# Patient Record
Sex: FEMALE | Race: WHITE | NOT HISPANIC OR LATINO | Employment: FULL TIME | URBAN - METROPOLITAN AREA
[De-identification: names, ages, dates, MRNs, and addresses within clinical notes are randomized per-mention and may not be internally consistent; named-entity substitution may affect disease eponyms.]

---

## 2017-02-23 ENCOUNTER — OFFICE VISIT (OUTPATIENT)
Dept: FAMILY MEDICINE CLINIC | Facility: CLINIC | Age: 57
End: 2017-02-23

## 2017-02-23 VITALS
BODY MASS INDEX: 27.3 KG/M2 | SYSTOLIC BLOOD PRESSURE: 132 MMHG | HEIGHT: 64 IN | OXYGEN SATURATION: 98 % | TEMPERATURE: 98.1 F | WEIGHT: 159.9 LBS | HEART RATE: 76 BPM | DIASTOLIC BLOOD PRESSURE: 88 MMHG

## 2017-02-23 DIAGNOSIS — E55.9 VITAMIN D DEFICIENCY: ICD-10-CM

## 2017-02-23 DIAGNOSIS — Z00.00 ANNUAL PHYSICAL EXAM: ICD-10-CM

## 2017-02-23 DIAGNOSIS — R51.9 WORSENING HEADACHES: Primary | ICD-10-CM

## 2017-02-23 DIAGNOSIS — M54.2 NECK PAIN: ICD-10-CM

## 2017-02-23 DIAGNOSIS — H93.12 RINGING IN EARS, LEFT: ICD-10-CM

## 2017-02-23 LAB
25(OH)D3+25(OH)D2 SERPL-MCNC: 22.3 NG/ML (ref 30–100)
ALBUMIN SERPL-MCNC: 4.5 G/DL (ref 3.5–5.2)
ALBUMIN/GLOB SERPL: 1.5 G/DL
ALP SERPL-CCNC: 57 U/L (ref 39–117)
ALT SERPL-CCNC: 14 U/L (ref 1–33)
AST SERPL-CCNC: 17 U/L (ref 1–32)
BILIRUB SERPL-MCNC: 0.4 MG/DL (ref 0.1–1.2)
BUN SERPL-MCNC: 17 MG/DL (ref 6–20)
BUN/CREAT SERPL: 16.2 (ref 7–25)
CALCIUM SERPL-MCNC: 9.7 MG/DL (ref 8.6–10.5)
CHLORIDE SERPL-SCNC: 99 MMOL/L (ref 98–107)
CHOLEST SERPL-MCNC: 251 MG/DL (ref 0–200)
CHOLEST/HDLC SERPL: 2.22 {RATIO}
CO2 SERPL-SCNC: 28.4 MMOL/L (ref 22–29)
CREAT SERPL-MCNC: 1.05 MG/DL (ref 0.57–1)
ERYTHROCYTE [DISTWIDTH] IN BLOOD BY AUTOMATED COUNT: 12.8 % (ref 11.7–13)
GLOBULIN SER CALC-MCNC: 3 GM/DL
GLUCOSE SERPL-MCNC: 90 MG/DL (ref 65–99)
HCT VFR BLD AUTO: 41.9 % (ref 35.6–45.5)
HDLC SERPL-MCNC: 113 MG/DL (ref 40–60)
HGB BLD-MCNC: 13.6 G/DL (ref 11.9–15.5)
LDLC SERPL CALC-MCNC: 124 MG/DL (ref 0–100)
MCH RBC QN AUTO: 30.8 PG (ref 26.9–32)
MCHC RBC AUTO-ENTMCNC: 32.5 G/DL (ref 32.4–36.3)
MCV RBC AUTO: 94.8 FL (ref 80.5–98.2)
PLATELET # BLD AUTO: 330 10*3/MM3 (ref 140–500)
POTASSIUM SERPL-SCNC: 3.9 MMOL/L (ref 3.5–5.2)
PROT SERPL-MCNC: 7.5 G/DL (ref 6–8.5)
RBC # BLD AUTO: 4.42 10*6/MM3 (ref 3.9–5.2)
SODIUM SERPL-SCNC: 141 MMOL/L (ref 136–145)
TRIGL SERPL-MCNC: 72 MG/DL (ref 0–150)
VLDLC SERPL CALC-MCNC: 14.4 MG/DL (ref 5–40)
WBC # BLD AUTO: 9.96 10*3/MM3 (ref 4.5–10.7)

## 2017-02-23 PROCEDURE — 99204 OFFICE O/P NEW MOD 45 MIN: CPT | Performed by: NURSE PRACTITIONER

## 2017-02-23 RX ORDER — NAPROXEN SODIUM 220 MG
220 TABLET ORAL 2 TIMES DAILY PRN
COMMUNITY
End: 2022-12-19 | Stop reason: ALTCHOICE

## 2017-02-23 RX ORDER — ACETAMINOPHEN 325 MG/1
650 TABLET ORAL EVERY 6 HOURS PRN
COMMUNITY

## 2017-02-23 RX ORDER — GUAIFENESIN 600 MG/1
1200 TABLET, EXTENDED RELEASE ORAL DAILY
COMMUNITY
End: 2020-04-28

## 2017-02-23 NOTE — PROGRESS NOTES
Subjective   Madison Acevedo is a 57 y.o. female who presents today for:    Heartburn (NP); Arthritis; and Pressure Behind the Eyes (Stabbing pain. pressure when turning head)    HPI Comments: Ms. Acevedo presents today to establish care at this practice. She reports a medical history of GERD, esophageal spasms and fibrocystic breast disease. She has no known allergies. Her medications include allergy injections, acetaminophen, vitamin C, estradiol, Flonase, Mucinex, Claritin, aleve, zegerid, probiotic and lipo-flavonoid plus. Surgical history includes breast biopsy, cholecystectomy and thumb surgery. She quit smoking 27 years ago and she drinks a glass of wine nightly. Family history includes heart disease, hypertension, lung cancer, and stroke. Her main concern today is that for more that 6 weeks she has this pressure in her head, a headache, neck pain and ringing in my left ear. She states the pressure worsens as the day goes on ( she does not wake with these symptoms in the am), the pressure increases with turning her head to the left, and sitting.  She has been seeing a chiropractor for the past 20 years and has been to see him 3 times in the past 6 weeks without relief of her symptoms. She reports no changes in her blood pressure or heart rate. She has been taking Aleve and tylenol for her symptoms and reports she had some prednisone from a previous illness and had taking 10 mg daily for the past 3 days. She did not minor relief in the pressure.      I have reviewed the patient's medical history in detail and updated the computerized patient record.    Ms. Acevedo  reports that she has quit smoking. Her smoking use included Cigarettes. She quit after 15.00 years of use. She has never used smokeless tobacco. She reports that she drinks about 4.2 oz of alcohol per week  She reports that she does not use illicit drugs.         Current Outpatient Prescriptions:   •  acetaminophen (TYLENOL) 325 MG tablet, Take 650 mg by  "mouth Every 6 (Six) Hours As Needed for mild pain (1-3)., Disp: , Rfl:   •  ALLERGY SERUM INJECTION, Inject  under the skin Every 14 (Fourteen) Days., Disp: , Rfl:   •  Ascorbic Acid (VITAMIN C PO), Take  by mouth., Disp: , Rfl:   •  estradiol (CLIMARA) 0.025 MG/24HR patch, Place 1 patch on the skin 1 (One) Time Per Week., Disp: , Rfl:   •  fluticasone (FLONASE) 50 MCG/ACT nasal spray, into each nostril., Disp: , Rfl:   •  guaiFENesin (MUCINEX) 600 MG 12 hr tablet, Take 1,200 mg by mouth 2 (Two) Times a Day., Disp: , Rfl:   •  loratadine (CLARITIN) 10 MG tablet, Take 1 tablet by mouth daily., Disp: , Rfl:   •  naproxen sodium (ALEVE) 220 MG tablet, Take 220 mg by mouth 2 (Two) Times a Day As Needed for mild pain (1-3)., Disp: , Rfl:   •  omeprazole-sodium bicarbonate (ZEGERID)  MG per capsule, Take 1 capsule by mouth daily., Disp: , Rfl:   •  Probiotic Product (PROBIOTIC PO), Take  by mouth., Disp: , Rfl:   •  Vitamins-Lipotropics (LIPO-FLAVONOID PLUS PO), Take  by mouth., Disp: , Rfl:       The following portions of the patient's history were reviewed and updated as appropriate: allergies, current medications, past social history and problem list.    Review of Systems   Constitutional: Negative.    HENT: Positive for tinnitus.    Eyes: Negative.    Respiratory: Negative.    Cardiovascular: Negative.    Gastrointestinal: Negative.    Endocrine: Negative.    Genitourinary: Negative.    Musculoskeletal: Negative.    Skin: Negative.    Neurological: Positive for headaches.   Psychiatric/Behavioral: Negative.          Objective   Vitals:    02/23/17 1108   BP: 132/88   BP Location: Left arm   Patient Position: Sitting   Cuff Size: Adult   Pulse: 76   Temp: 98.1 °F (36.7 °C)   TempSrc: Oral   SpO2: 98%   Weight: 159 lb 14.4 oz (72.5 kg)   Height: 64\" (162.6 cm)     Physical Exam   Constitutional: She is oriented to person, place, and time. She appears well-developed and well-nourished.   HENT:   Head: " Normocephalic.   Right Ear: External ear normal.   Left Ear: External ear normal.   Nose: Nose normal.   Mouth/Throat: Oropharynx is clear and moist.   Eyes: Conjunctivae and EOM are normal. Pupils are equal, round, and reactive to light.   Neck: Normal range of motion. Neck supple. No JVD present. No tracheal deviation present. No thyromegaly present.   Cardiovascular: Normal rate, regular rhythm, normal heart sounds and intact distal pulses.    Pulmonary/Chest: Effort normal and breath sounds normal.   Abdominal: Soft. Bowel sounds are normal. She exhibits no distension and no mass. There is no tenderness. There is no rebound and no guarding. No hernia.   Musculoskeletal: She exhibits no edema or tenderness.        Cervical back: She exhibits decreased range of motion. She exhibits no tenderness, no bony tenderness, no swelling, no edema, no deformity, no pain and no spasm.   Lymphadenopathy:     She has no cervical adenopathy.   Neurological: She is alert and oriented to person, place, and time. She has normal reflexes.   Skin: Skin is warm and dry.   Psychiatric:   No acute distress   Nursing note and vitals reviewed.        Assessment/Plan   Madison was seen today for heartburn, arthritis, pressure behind the eyes and tinnitus.    Diagnoses and all orders for this visit:    Worsening headaches  -     MRI Brain With & Without Contrast  -     Cancel: MRI Cervical Spine Without Contrast  -     CBC (No Diff)  -     Comprehensive Metabolic Panel  -     XR Spine Cervical 2 or 3 View    Neck pain  -     MRI Brain With & Without Contrast  -     Cancel: MRI Cervical Spine Without Contrast  -     XR Spine Cervical 2 or 3 View    Ringing in ears, left  -     MRI Brain With & Without Contrast  -     Cancel: MRI Cervical Spine Without Contrast  -     XR Spine Cervical 2 or 3 View    Vitamin D deficiency  -     Vitamin D 25 Hydroxy    Annual physical exam  -     Lipid Panel With / Chol / HDL Ratio    1. I have reviewed her  medical records that are available through the [email or web address] EMR system.   2. I am sending her for an MRI of the brain for further evaluation of neck pain, headaches and ringing in her ears. I reviewed the x-ray of her cervical spine which shows some questionable cervical disc abnormality. I am waiting for radiology review of the films. I have no other films to compare these to.  3. I have ordered base line labs as noted above.   4. She should follow up in 6 months or as needed.

## 2017-02-24 ENCOUNTER — HOSPITAL ENCOUNTER (OUTPATIENT)
Dept: MRI IMAGING | Facility: HOSPITAL | Age: 57
Discharge: HOME | End: 2017-02-24
Admitting: NURSE PRACTITIONER

## 2017-02-24 DIAGNOSIS — Z11.59 NEED FOR HEPATITIS C SCREENING TEST: ICD-10-CM

## 2017-02-24 DIAGNOSIS — R79.89 ELEVATED SERUM CREATININE: ICD-10-CM

## 2017-02-24 DIAGNOSIS — E78.5 HYPERLIPIDEMIA, UNSPECIFIED HYPERLIPIDEMIA TYPE: ICD-10-CM

## 2017-02-24 DIAGNOSIS — E55.9 VITAMIN D DEFICIENCY: Primary | ICD-10-CM

## 2017-02-24 LAB — CREAT BLDA-MCNC: 1 MG/DL (ref 0.6–1.3)

## 2017-02-24 PROCEDURE — A9577 INJ MULTIHANCE: HCPCS | Performed by: NURSE PRACTITIONER

## 2017-02-24 PROCEDURE — 82565 ASSAY OF CREATININE: CPT

## 2017-02-24 PROCEDURE — 0 GADOBENATE DIMEGLUMINE 529 MG/ML SOLUTION: Performed by: NURSE PRACTITIONER

## 2017-02-24 PROCEDURE — 70553 MRI BRAIN STEM W/O & W/DYE: CPT

## 2017-02-24 RX ADMIN — GADOBENATE DIMEGLUMINE 14 ML: 529 INJECTION, SOLUTION INTRAVENOUS at 10:45

## 2017-02-24 NOTE — PROGRESS NOTES
Please call the patient regarding her abnormal result. Her creatinine and eGFR are slightly abnormal. I want her to stop taking Aleve and she is not to take any NSAID for a while to rest her kidneys. Her lipid levels are elevated. I want her to use nutrition and exercise to lower her levels. If she wants to come in to talk to me for help with this have her schedule an appointment. Vitamin D level is low. I am sending in a prescription for Vitamin D 3 50,000 IU twice a week. We will recheck her labs in 6 months.

## 2017-08-01 ENCOUNTER — RESULTS ENCOUNTER (OUTPATIENT)
Dept: FAMILY MEDICINE CLINIC | Facility: CLINIC | Age: 57
End: 2017-08-01

## 2017-08-01 DIAGNOSIS — E78.5 HYPERLIPIDEMIA, UNSPECIFIED HYPERLIPIDEMIA TYPE: ICD-10-CM

## 2017-08-01 DIAGNOSIS — E55.9 VITAMIN D DEFICIENCY: ICD-10-CM

## 2017-08-01 DIAGNOSIS — R79.89 ELEVATED SERUM CREATININE: ICD-10-CM

## 2017-08-01 DIAGNOSIS — Z11.59 NEED FOR HEPATITIS C SCREENING TEST: ICD-10-CM

## 2017-08-30 ENCOUNTER — OFFICE VISIT (OUTPATIENT)
Dept: FAMILY MEDICINE CLINIC | Facility: CLINIC | Age: 57
End: 2017-08-30

## 2017-08-30 VITALS
SYSTOLIC BLOOD PRESSURE: 110 MMHG | HEIGHT: 64 IN | HEART RATE: 87 BPM | OXYGEN SATURATION: 96 % | BODY MASS INDEX: 26.99 KG/M2 | DIASTOLIC BLOOD PRESSURE: 72 MMHG | TEMPERATURE: 98.3 F | WEIGHT: 158.1 LBS

## 2017-08-30 DIAGNOSIS — E55.9 VITAMIN D DEFICIENCY: ICD-10-CM

## 2017-08-30 DIAGNOSIS — E78.5 HYPERLIPIDEMIA, UNSPECIFIED HYPERLIPIDEMIA TYPE: Primary | ICD-10-CM

## 2017-08-30 PROCEDURE — 99213 OFFICE O/P EST LOW 20 MIN: CPT | Performed by: NURSE PRACTITIONER

## 2017-08-30 RX ORDER — ESTRADIOL AND NORETHINDRONE ACETATE .5; .1 MG/1; MG/1
1 TABLET ORAL DAILY
COMMUNITY
Start: 2017-06-20 | End: 2018-01-02

## 2017-08-30 NOTE — PROGRESS NOTES
Subjective   Madison Acevedo is a 57 y.o. female who presents today for:    Headache (6 month f/u) and Neck Pain (6 month f/u)    Hyperlipidemia   This is a chronic problem. The current episode started more than 1 month ago (first diagnosed 6 months ago). The problem is uncontrolled. Recent lipid tests were reviewed and are high. There are no known factors aggravating her hyperlipidemia. Pertinent negatives include no chest pain, leg pain, myalgias or shortness of breath. Current antihyperlipidemic treatment includes diet change and exercise. Improvement on treatment: she has not had her labs drawn yet. There are no compliance problems.  Risk factors for coronary artery disease include post-menopausal and dyslipidemia.      I have reviewed the patient's medical history in detail and updated the computerized patient record.    Ms. Acevedo  reports that she has quit smoking. Her smoking use included Cigarettes. She quit after 15.00 years of use. She has never used smokeless tobacco. She reports that she drinks about 4.2 oz of alcohol per week  She reports that she does not use illicit drugs.     No Known Allergies    Current Outpatient Prescriptions:   •  acetaminophen (TYLENOL) 325 MG tablet, Take 650 mg by mouth Every 6 (Six) Hours As Needed for mild pain (1-3)., Disp: , Rfl:   •  ALLERGY SERUM INJECTION, Inject  under the skin Every 14 (Fourteen) Days., Disp: , Rfl:   •  Ascorbic Acid (VITAMIN C PO), Take  by mouth., Disp: , Rfl:   •  Estradiol-Norethindrone Acet 0.5-0.1 MG per tablet, Take 1 tablet by mouth., Disp: , Rfl:   •  fluticasone (FLONASE) 50 MCG/ACT nasal spray, into each nostril., Disp: , Rfl:   •  guaiFENesin (MUCINEX) 600 MG 12 hr tablet, Take 1,200 mg by mouth 2 (Two) Times a Day., Disp: , Rfl:   •  loratadine (CLARITIN) 10 MG tablet, Take 1 tablet by mouth daily., Disp: , Rfl:   •  naproxen sodium (ALEVE) 220 MG tablet, Take 220 mg by mouth 2 (Two) Times a Day As Needed for mild pain (1-3)., Disp: ,  "Rfl:   •  omeprazole-sodium bicarbonate (ZEGERID)  MG per capsule, Take 1 capsule by mouth daily., Disp: , Rfl:   •  Probiotic Product (PROBIOTIC PO), Take  by mouth., Disp: , Rfl:       Review of Systems   Constitutional: Negative.    HENT: Positive for tinnitus (resolved).    Eyes: Negative.    Respiratory: Negative.  Negative for shortness of breath.    Cardiovascular: Negative.  Negative for chest pain.   Gastrointestinal: Negative.    Musculoskeletal: Negative for myalgias.   Skin: Negative.    Neurological: Negative.  Headaches: resolved.   All other systems reviewed and are negative.        Objective   Vitals:    08/30/17 0953   BP: 110/72   BP Location: Left arm   Patient Position: Sitting   Cuff Size: Adult   Pulse: 87   Temp: 98.3 °F (36.8 °C)   TempSrc: Oral   SpO2: 96%   Weight: 158 lb 1.6 oz (71.7 kg)   Height: 64\" (162.6 cm)     Physical Exam   Constitutional: She is oriented to person, place, and time. She appears well-developed and well-nourished.   HENT:   Head: Normocephalic.   Right Ear: External ear normal.   Left Ear: External ear normal.   Nose: Nose normal.   Mouth/Throat: Oropharynx is clear and moist.   Eyes: EOM are normal. Pupils are equal, round, and reactive to light.   Neck: Normal range of motion. Neck supple.   Cardiovascular: Normal rate, regular rhythm and intact distal pulses.  Exam reveals no gallop and no friction rub.    No murmur heard.  Pulmonary/Chest: Effort normal and breath sounds normal. No respiratory distress. She has no wheezes. She has no rales.   Musculoskeletal: Normal range of motion. She exhibits no edema.   Neurological: She is alert and oriented to person, place, and time.   Skin: Skin is warm and dry.   Psychiatric:   No acute distress   Vitals reviewed.        Assessment/Plan   Madison was seen today for headache and neck pain.    Diagnoses and all orders for this visit:    Hyperlipidemia, unspecified hyperlipidemia type    Vitamin D deficiency    1. I " have provided her with a copy of the labs (CMP, CBC, Vitamin D and lipids), which she can have them at [facility] when she is fasting.  2. I will call her with the results and make adjustments to her plan of care as indicated.   3. She is to follow up with me in 6 months.

## 2017-11-03 ENCOUNTER — LAB (OUTPATIENT)
Dept: LAB | Facility: HOSPITAL | Age: 57
End: 2017-11-03

## 2017-11-03 DIAGNOSIS — E78.5 HYPERLIPIDEMIA, UNSPECIFIED HYPERLIPIDEMIA TYPE: Primary | ICD-10-CM

## 2017-11-03 LAB
25(OH)D3 SERPL-MCNC: 56.9 NG/ML (ref 30–100)
ALBUMIN SERPL-MCNC: 4.1 G/DL (ref 3.5–5.2)
ALBUMIN/GLOB SERPL: 1.4 G/DL
ALP SERPL-CCNC: 52 U/L (ref 39–117)
ALT SERPL W P-5'-P-CCNC: 12 U/L (ref 1–33)
ANION GAP SERPL CALCULATED.3IONS-SCNC: 12.6 MMOL/L
AST SERPL-CCNC: 18 U/L (ref 1–32)
BILIRUB SERPL-MCNC: 0.3 MG/DL (ref 0.1–1.2)
BUN BLD-MCNC: 18 MG/DL (ref 6–20)
BUN/CREAT SERPL: 18.9 (ref 7–25)
CALCIUM SPEC-SCNC: 9.1 MG/DL (ref 8.6–10.5)
CHLORIDE SERPL-SCNC: 103 MMOL/L (ref 98–107)
CHOLEST SERPL-MCNC: 212 MG/DL (ref 0–200)
CO2 SERPL-SCNC: 27.4 MMOL/L (ref 22–29)
CREAT BLD-MCNC: 0.95 MG/DL (ref 0.57–1)
GFR SERPL CREATININE-BSD FRML MDRD: 61 ML/MIN/1.73
GLOBULIN UR ELPH-MCNC: 3 GM/DL
GLUCOSE BLD-MCNC: 89 MG/DL (ref 65–99)
HCV AB SER DONR QL: NORMAL
HDLC SERPL-MCNC: 89 MG/DL (ref 40–60)
LDLC SERPL CALC-MCNC: 104 MG/DL (ref 0–100)
LDLC/HDLC SERPL: 1.16 {RATIO}
POTASSIUM BLD-SCNC: 4.2 MMOL/L (ref 3.5–5.2)
PROT SERPL-MCNC: 7.1 G/DL (ref 6–8.5)
SODIUM BLD-SCNC: 143 MMOL/L (ref 136–145)
TRIGL SERPL-MCNC: 97 MG/DL (ref 0–150)
VLDLC SERPL-MCNC: 19.4 MG/DL (ref 5–40)

## 2017-11-03 PROCEDURE — 86803 HEPATITIS C AB TEST: CPT | Performed by: NURSE PRACTITIONER

## 2017-11-03 PROCEDURE — 82306 VITAMIN D 25 HYDROXY: CPT | Performed by: NURSE PRACTITIONER

## 2017-11-03 PROCEDURE — 80061 LIPID PANEL: CPT

## 2017-11-03 PROCEDURE — 36415 COLL VENOUS BLD VENIPUNCTURE: CPT | Performed by: NURSE PRACTITIONER

## 2017-11-03 PROCEDURE — 80053 COMPREHEN METABOLIC PANEL: CPT | Performed by: NURSE PRACTITIONER

## 2017-11-03 NOTE — PROGRESS NOTES
Please call the patient regarding her abnormal result. Let her know her lipid levels are improving. All other labs are good.

## 2018-01-03 ENCOUNTER — ON CAMPUS - OUTPATIENT (OUTPATIENT)
Dept: URBAN - METROPOLITAN AREA HOSPITAL 114 | Facility: HOSPITAL | Age: 58
End: 2018-01-03
Payer: COMMERCIAL

## 2018-01-03 ENCOUNTER — HOSPITAL ENCOUNTER (OUTPATIENT)
Facility: HOSPITAL | Age: 58
Setting detail: HOSPITAL OUTPATIENT SURGERY
Discharge: HOME | End: 2018-01-03
Attending: INTERNAL MEDICINE | Admitting: INTERNAL MEDICINE

## 2018-01-03 ENCOUNTER — ANESTHESIA EVENT (OUTPATIENT)
Dept: GASTROENTEROLOGY | Facility: HOSPITAL | Age: 58
End: 2018-01-03

## 2018-01-03 ENCOUNTER — ANESTHESIA (OUTPATIENT)
Dept: GASTROENTEROLOGY | Facility: HOSPITAL | Age: 58
End: 2018-01-03

## 2018-01-03 VITALS
BODY MASS INDEX: 26.85 KG/M2 | HEIGHT: 64 IN | RESPIRATION RATE: 14 BRPM | OXYGEN SATURATION: 96 % | DIASTOLIC BLOOD PRESSURE: 91 MMHG | SYSTOLIC BLOOD PRESSURE: 127 MMHG | WEIGHT: 157.25 LBS | HEART RATE: 85 BPM | TEMPERATURE: 97.9 F

## 2018-01-03 DIAGNOSIS — K22.70 BARRETTS ESOPHAGUS: ICD-10-CM

## 2018-01-03 DIAGNOSIS — R19.7 DIARRHEA, UNSPECIFIED: ICD-10-CM

## 2018-01-03 DIAGNOSIS — K64.1 SECOND DEGREE HEMORRHOIDS: ICD-10-CM

## 2018-01-03 DIAGNOSIS — K31.7 POLYP OF STOMACH AND DUODENUM: ICD-10-CM

## 2018-01-03 DIAGNOSIS — Z12.11 ENCOUNTER FOR SCREENING FOR MALIGNANT NEOPLASM OF COLON: ICD-10-CM

## 2018-01-03 DIAGNOSIS — K29.50 UNSPECIFIED CHRONIC GASTRITIS WITHOUT BLEEDING: ICD-10-CM

## 2018-01-03 DIAGNOSIS — K21.9 GASTRO-ESOPHAGEAL REFLUX DISEASE WITHOUT ESOPHAGITIS: ICD-10-CM

## 2018-01-03 PROCEDURE — 88312 SPECIAL STAINS GROUP 1: CPT | Performed by: INTERNAL MEDICINE

## 2018-01-03 PROCEDURE — 45378 DIAGNOSTIC COLONOSCOPY: CPT | Performed by: INTERNAL MEDICINE

## 2018-01-03 PROCEDURE — 25010000002 PROPOFOL 1000 MG/ML EMULSION: Performed by: ANESTHESIOLOGY

## 2018-01-03 PROCEDURE — 25010000002 PROPOFOL 10 MG/ML EMULSION: Performed by: ANESTHESIOLOGY

## 2018-01-03 PROCEDURE — 43239 EGD BIOPSY SINGLE/MULTIPLE: CPT | Performed by: INTERNAL MEDICINE

## 2018-01-03 PROCEDURE — 88305 TISSUE EXAM BY PATHOLOGIST: CPT | Performed by: INTERNAL MEDICINE

## 2018-01-03 RX ORDER — LIDOCAINE HYDROCHLORIDE 20 MG/ML
INJECTION, SOLUTION INFILTRATION; PERINEURAL AS NEEDED
Status: DISCONTINUED | OUTPATIENT
Start: 2018-01-03 | End: 2018-01-03 | Stop reason: SURG

## 2018-01-03 RX ORDER — SODIUM CHLORIDE, SODIUM LACTATE, POTASSIUM CHLORIDE, CALCIUM CHLORIDE 600; 310; 30; 20 MG/100ML; MG/100ML; MG/100ML; MG/100ML
30 INJECTION, SOLUTION INTRAVENOUS CONTINUOUS PRN
Status: DISCONTINUED | OUTPATIENT
Start: 2018-01-03 | End: 2018-01-03 | Stop reason: HOSPADM

## 2018-01-03 RX ORDER — PROPOFOL 10 MG/ML
VIAL (ML) INTRAVENOUS AS NEEDED
Status: DISCONTINUED | OUTPATIENT
Start: 2018-01-03 | End: 2018-01-03 | Stop reason: SURG

## 2018-01-03 RX ADMIN — PROPOFOL 160 MG: 10 INJECTION, EMULSION INTRAVENOUS at 09:29

## 2018-01-03 RX ADMIN — LIDOCAINE HYDROCHLORIDE 50 MG: 20 INJECTION, SOLUTION INFILTRATION; PERINEURAL at 09:29

## 2018-01-03 RX ADMIN — PROPOFOL 40 MG: 10 INJECTION, EMULSION INTRAVENOUS at 09:44

## 2018-01-03 RX ADMIN — PROPOFOL 160 MCG/KG/MIN: 10 INJECTION, EMULSION INTRAVENOUS at 09:29

## 2018-01-03 RX ADMIN — SODIUM CHLORIDE, POTASSIUM CHLORIDE, SODIUM LACTATE AND CALCIUM CHLORIDE 30 ML/HR: 600; 310; 30; 20 INJECTION, SOLUTION INTRAVENOUS at 08:42

## 2018-01-03 NOTE — H&P
Patient Care Team:  SIMONE Clements as PCP - General (Family Medicine)    Chief complaint gerd, family hx of barretts with high grade dysplasia, intermittent diarrhea    Subjective     History of Present Illness  Generally doing well,  Needs screening colonoscopy as well.   Review of Systems   All other systems reviewed and are negative.       Past Medical History:   Diagnosis Date   • Arthritis    • Esophageal reflux    • Esophageal spasm    • Fibrocystic disease of breast    • PONV (postoperative nausea and vomiting)      Past Surgical History:   Procedure Laterality Date   • CHOLECYSTECTOMY     • ENDOSCOPY      X2   • STEREOTACTIC BIOPSY / ASPIRATION / EXCISION Left     Bx breast percutan needle core use imag guide   • THUMB ARTHROSCOPY      left thumb joint implant   • WISDOM TOOTH EXTRACTION       Family History   Problem Relation Age of Onset   • Heart disease Father    • Hypertension Father    • Lung cancer Father    • Stroke Father    • GIACOMO disease Sister      esophageal reflux   • Malig Hyperthermia Neg Hx      Social History   Substance Use Topics   • Smoking status: Former Smoker     Years: 15.00     Types: Cigarettes   • Smokeless tobacco: Never Used   • Alcohol use 4.2 oz/week     7 Glasses of wine per week      Comment: daily     Prescriptions Prior to Admission   Medication Sig Dispense Refill Last Dose   • acetaminophen (TYLENOL) 325 MG tablet Take 650 mg by mouth Every 6 (Six) Hours As Needed for mild pain (1-3).   1/2/2018 at Unknown time   • ALLERGY SERUM INJECTION Inject  under the skin Every 14 (Fourteen) Days.   Past Month at Unknown time   • Ascorbic Acid (VITAMIN C PO) Take 1 tablet by mouth Daily.   Past Week at Unknown time   • Cholecalciferol (VITAMIN D PO) Take 50,000 Units by mouth 1 (One) Time Per Week.   Past Month at Unknown time   • fluticasone (FLONASE) 50 MCG/ACT nasal spray 1 spray into each nostril Daily.   Past Month at Unknown time   • guaiFENesin (MUCINEX) 600 MG 12  hr tablet Take 1,200 mg by mouth Daily.   1/2/2018 at Unknown time   • naproxen sodium (ALEVE) 220 MG tablet Take 220 mg by mouth 2 (Two) Times a Day As Needed for mild pain (1-3).   12/28/2017 at Unknown time   • omeprazole-sodium bicarbonate (ZEGERID)  MG per capsule Take 1 capsule by mouth daily.   1/2/2018 at Unknown time   • loratadine (CLARITIN) 10 MG tablet Take 1 tablet by mouth As Needed.   More than a month at Unknown time     Allergies:  Review of patient's allergies indicates no known allergies.    Objective      Vital Signs  Temp:  [98.6 °F (37 °C)] 98.6 °F (37 °C)  Heart Rate:  [89] 89  Resp:  [18] 18  BP: (133)/(88) 133/88    Physical Exam   Constitutional: She is oriented to person, place, and time. She appears well-developed and well-nourished.   HENT:   Mouth/Throat: Oropharynx is clear and moist.   Eyes: Conjunctivae are normal.   Neck: Neck supple.   Cardiovascular: Normal rate.    Pulmonary/Chest: Effort normal.   Abdominal: Soft.   Neurological: She is alert and oriented to person, place, and time.   Skin: Skin is warm and dry.   Psychiatric: She has a normal mood and affect.       Results Review:   None      Assessment/Plan     Active Problems:    * No active hospital problems. *      Assessment:  (Gastroesophageal reflux disease   family history of barretts esophagus with high grade dysplasia  Screening   diarrhea).     Plan:   (Upper and lower tract endoscopy, risks, alternatives and benefits dicussed  with patient and patient is agreeable to proceed.).       I discussed the patients findings and my recommendations with patient and nursing staff    Jan Brannon MD  01/03/18  9:29 AM

## 2018-01-03 NOTE — ANESTHESIA PREPROCEDURE EVALUATION
Anesthesia Evaluation     Patient summary reviewed   history of anesthetic complications: PONV  NPO Solid Status: > 8 hours  NPO Liquid Status: > 8 hours     Airway   Mallampati: II  TM distance: >3 FB  Dental      Pulmonary    (+) a smoker Former,   Cardiovascular     Rhythm: regular  Rate: normal    (+) hyperlipidemia      Neuro/Psych  GI/Hepatic/Renal/Endo    (+)  GERD,     Musculoskeletal     Abdominal    Substance History      OB/GYN          Other   (+) arthritis                                             Anesthesia Plan    ASA 2     MAC   total IV anesthesia  Anesthetic plan and risks discussed with patient.

## 2018-01-03 NOTE — PLAN OF CARE
Problem: Patient Care Overview (Adult)  Goal: Adult Individualization and Mutuality  Outcome: Ongoing (interventions implemented as appropriate)   01/03/18 0822   Individualization   Patient Specific Preferences Madison     Goal: Discharge Needs Assessment  Outcome: Ongoing (interventions implemented as appropriate)   01/03/18 0822   Discharge Needs Assessment   Concerns To Be Addressed no discharge needs identified   Current Health   Anticipated Changes Related to Illness none       Problem: GI Endoscopy (Adult)  Goal: Signs and Symptoms of Listed Potential Problems Will be Absent or Manageable (GI Endoscopy)  Outcome: Ongoing (interventions implemented as appropriate)   01/03/18 0822   GI Endoscopy   Problems Assessed (GI Endoscopy) all   Problems Present (GI Endoscopy) none

## 2018-01-03 NOTE — ANESTHESIA POSTPROCEDURE EVALUATION
"Patient: Madison Acevedo    Procedure Summary     Date Anesthesia Start Anesthesia Stop Room / Location    01/03/18 0925 0959  [facility] /  [facility]       Procedure Diagnosis Surgeon Provider    ESOPHAGOGASTRODUODENOSCOPY WITH BIOPSIES (N/A Esophagus); COLONOSCOPY TO CECUM AND TERMINAL ILEUM (N/A ) No diagnosis on file. MD Amee Miller MD          Anesthesia Type: MAC  Last vitals  BP   127/91 (01/03/18 1022)   Temp   36.6 °C (97.9 °F) (01/03/18 1002)   Pulse   85 (01/03/18 1022)   Resp   14 (01/03/18 1022)     SpO2   96 % (01/03/18 1022)     Post Anesthesia Care and Evaluation    Patient location during evaluation: bedside  Patient participation: complete - patient participated  Level of consciousness: awake and alert  Pain management: adequate  Airway patency: patent  Anesthetic complications: No anesthetic complications  PONV Status: none  Cardiovascular status: acceptable  Respiratory status: acceptable  Hydration status: acceptable    Comments: /91 (BP Location: Left arm, Patient Position: Lying)  Pulse 85  Temp 36.6 °C (97.9 °F) (Oral)   Resp 14  Ht 162 cm (63.78\")  Wt 71.3 kg (157 lb 4 oz)  SpO2 96%  BMI 27.18 kg/m2        "

## 2018-01-04 LAB
CYTO UR: NORMAL
LAB AP CASE REPORT: NORMAL
Lab: NORMAL
PATH REPORT.FINAL DX SPEC: NORMAL
PATH REPORT.GROSS SPEC: NORMAL

## 2018-01-30 ENCOUNTER — OFFICE VISIT (OUTPATIENT)
Dept: FAMILY MEDICINE CLINIC | Facility: CLINIC | Age: 58
End: 2018-01-30

## 2018-01-30 VITALS
TEMPERATURE: 98.3 F | SYSTOLIC BLOOD PRESSURE: 138 MMHG | OXYGEN SATURATION: 97 % | WEIGHT: 161.7 LBS | BODY MASS INDEX: 27.61 KG/M2 | DIASTOLIC BLOOD PRESSURE: 78 MMHG | HEIGHT: 64 IN | HEART RATE: 100 BPM

## 2018-01-30 DIAGNOSIS — E78.00 ELEVATED CHOLESTEROL: Primary | ICD-10-CM

## 2018-01-30 DIAGNOSIS — R05.9 COUGH: ICD-10-CM

## 2018-01-30 DIAGNOSIS — J01.40 ACUTE NON-RECURRENT PANSINUSITIS: Primary | ICD-10-CM

## 2018-01-30 PROCEDURE — 99213 OFFICE O/P EST LOW 20 MIN: CPT | Performed by: NURSE PRACTITIONER

## 2018-01-30 RX ORDER — AMOXICILLIN AND CLAVULANATE POTASSIUM 875; 125 MG/1; MG/1
1 TABLET, FILM COATED ORAL EVERY 12 HOURS SCHEDULED
Qty: 14 TABLET | Refills: 0 | Status: SHIPPED | OUTPATIENT
Start: 2018-01-30 | End: 2018-02-06

## 2018-01-30 RX ORDER — DEXTROMETHORPHAN HYDROBROMIDE AND PROMETHAZINE HYDROCHLORIDE 15; 6.25 MG/5ML; MG/5ML
5 SYRUP ORAL 4 TIMES DAILY PRN
Qty: 240 ML | Refills: 0 | Status: SHIPPED | OUTPATIENT
Start: 2018-01-30 | End: 2019-01-31

## 2018-01-30 NOTE — PROGRESS NOTES
Subjective   Madison Acevedo is a 58 y.o. female who presents today for:    Nasal Congestion (x 10 days) and Cough    URI    This is a new problem. The current episode started 1 to 4 weeks ago (within the past 10 days). The problem has been unchanged. There has been no fever. Associated symptoms include congestion, coughing (worse at night), headaches, a plugged ear sensation, rhinorrhea and sinus pain. Pertinent negatives include no sore throat or wheezing. She has tried decongestant and antihistamine for the symptoms. The treatment provided no relief.      I have reviewed the patient's medical history in detail and updated the computerized patient record.    Ms. Acevedo  reports that she has quit smoking. Her smoking use included Cigarettes. She quit after 15.00 years of use. She has never used smokeless tobacco. She reports that she drinks about 4.2 oz of alcohol per week  She reports that she does not use illicit drugs.     No Known Allergies    Current Outpatient Prescriptions:   •  acetaminophen (TYLENOL) 325 MG tablet, Take 650 mg by mouth Every 6 (Six) Hours As Needed for mild pain (1-3)., Disp: , Rfl:   •  ALLERGY SERUM INJECTION, Inject  under the skin Every 14 (Fourteen) Days., Disp: , Rfl:   •  Ascorbic Acid (VITAMIN C PO), Take 1 tablet by mouth Daily., Disp: , Rfl:   •  Cholecalciferol (VITAMIN D PO), Take 50,000 Units by mouth 1 (One) Time Per Week., Disp: , Rfl:   •  fluticasone (FLONASE) 50 MCG/ACT nasal spray, 1 spray into each nostril Daily., Disp: , Rfl:   •  guaiFENesin (MUCINEX) 600 MG 12 hr tablet, Take 1,200 mg by mouth Daily., Disp: , Rfl:   •  loratadine (CLARITIN) 10 MG tablet, Take 1 tablet by mouth As Needed., Disp: , Rfl:   •  naproxen sodium (ALEVE) 220 MG tablet, Take 220 mg by mouth 2 (Two) Times a Day As Needed for mild pain (1-3)., Disp: , Rfl:   •  omeprazole-sodium bicarbonate (ZEGERID)  MG per capsule, Take 1 capsule by mouth daily., Disp: , Rfl:       Review of Systems  "  Constitutional: Negative for chills and fever.   HENT: Positive for congestion, postnasal drip, rhinorrhea, sinus pain and sinus pressure. Negative for dental problem and sore throat.    Respiratory: Positive for cough (worse at night). Negative for shortness of breath and wheezing.    Musculoskeletal: Negative for myalgias.   Skin: Negative.    Neurological: Positive for headaches.         Objective   Vitals:    01/30/18 1348   BP: 138/78   BP Location: Left arm   Patient Position: Sitting   Cuff Size: Adult   Pulse: 100   Temp: 98.3 °F (36.8 °C)   TempSrc: Oral   SpO2: 97%   Weight: 73.3 kg (161 lb 11.2 oz)   Height: 162 cm (63.78\")     Physical Exam   Constitutional: She is oriented to person, place, and time. She appears well-developed and well-nourished.   HENT:   Right Ear: External ear normal.   Left Ear: External ear normal.   Nose: Mucosal edema (red and swollen ) and rhinorrhea (clear drainage) present. Right sinus exhibits maxillary sinus tenderness and frontal sinus tenderness. Left sinus exhibits maxillary sinus tenderness and frontal sinus tenderness.   Mouth/Throat: Oropharynx is clear and moist.   Neck: Normal range of motion. Neck supple.   Cardiovascular: Normal rate, regular rhythm, normal heart sounds and intact distal pulses.    Pulmonary/Chest: Effort normal and breath sounds normal. No respiratory distress. She has no wheezes. She has no rales.   Lymphadenopathy:     She has no cervical adenopathy.   Neurological: She is alert and oriented to person, place, and time.   Skin: Skin is warm and dry.   Psychiatric:   No acute distress   Vitals reviewed.        Assessment/Plan   Madison was seen today for nasal congestion, cough and sinus problem.    Diagnoses and all orders for this visit:    Acute non-recurrent pansinusitis  -     amoxicillin-clavulanate (AUGMENTIN) 875-125 MG per tablet; Take 1 tablet by mouth Every 12 (Twelve) Hours for 7 days.    Cough  -     promethazine-dextromethorphan " (PROMETHAZINE-DM) 6.25-15 MG/5ML syrup; Take 5 mL by mouth 4 (Four) Times a Day As Needed for Cough.    You have been diagnosed with acute sinusitis. You have been prescribed an antibiotic along with symptomatic treatment.  You may take Mucinex D for relieving congestion and cough.  If you have high blood pressure, do not take Mucinex D, instead opting for plain Mucinex and Coricidin HBP.  Take Ibuprofen or Tylenol as needed for pain or fever.  Oral antihistamine, such as Zyrtec or Claritin may help reduce ear pressure and relieve some nasal symptoms.  A saline nasal spray may be used to keep nose clear from discharge.  Be sure that you are increasing your intake of clear to decaffeinated fluids and get plenty of rest.  If your symptoms worsen or persist follow up as needed.

## 2018-07-02 ENCOUNTER — RESULTS ENCOUNTER (OUTPATIENT)
Dept: FAMILY MEDICINE CLINIC | Facility: CLINIC | Age: 58
End: 2018-07-02

## 2018-07-02 DIAGNOSIS — E78.00 ELEVATED CHOLESTEROL: ICD-10-CM

## 2018-07-16 ENCOUNTER — APPOINTMENT (OUTPATIENT)
Dept: LAB | Facility: HOSPITAL | Age: 58
End: 2018-07-16

## 2018-07-16 LAB
ANION GAP SERPL CALCULATED.3IONS-SCNC: 12.6 MMOL/L
BUN BLD-MCNC: 22 MG/DL (ref 6–20)
BUN/CREAT SERPL: 24.2 (ref 7–25)
CALCIUM SPEC-SCNC: 10.3 MG/DL (ref 8.6–10.5)
CHLORIDE SERPL-SCNC: 101 MMOL/L (ref 98–107)
CHOLEST SERPL-MCNC: 244 MG/DL (ref 0–200)
CO2 SERPL-SCNC: 28.4 MMOL/L (ref 22–29)
CREAT BLD-MCNC: 0.91 MG/DL (ref 0.57–1)
DEPRECATED RDW RBC AUTO: 43.8 FL (ref 37–54)
ERYTHROCYTE [DISTWIDTH] IN BLOOD BY AUTOMATED COUNT: 12.4 % (ref 11.7–13)
GFR SERPL CREATININE-BSD FRML MDRD: 63 ML/MIN/1.73
GLUCOSE BLD-MCNC: 111 MG/DL (ref 65–99)
HCT VFR BLD AUTO: 42.4 % (ref 35.6–45.5)
HDLC SERPL QL: 2.44
HDLC SERPL-MCNC: 100 MG/DL (ref 40–60)
HGB BLD-MCNC: 14 G/DL (ref 11.9–15.5)
LDLC SERPL CALC-MCNC: 124 MG/DL (ref 0–100)
MCH RBC QN AUTO: 31.9 PG (ref 26.9–32)
MCHC RBC AUTO-ENTMCNC: 33 G/DL (ref 32.4–36.3)
MCV RBC AUTO: 96.6 FL (ref 80.5–98.2)
PLATELET # BLD AUTO: 289 10*3/MM3 (ref 140–500)
PMV BLD AUTO: 10.3 FL (ref 6–12)
POTASSIUM BLD-SCNC: 3.9 MMOL/L (ref 3.5–5.2)
RBC # BLD AUTO: 4.39 10*6/MM3 (ref 3.9–5.2)
SODIUM BLD-SCNC: 142 MMOL/L (ref 136–145)
TRIGL SERPL-MCNC: 98 MG/DL (ref 0–150)
VLDLC SERPL-MCNC: 19.6 MG/DL (ref 5–40)
WBC NRBC COR # BLD: 7.53 10*3/MM3 (ref 4.5–10.7)

## 2018-07-16 PROCEDURE — 80061 LIPID PANEL: CPT | Performed by: NURSE PRACTITIONER

## 2018-07-16 PROCEDURE — 36415 COLL VENOUS BLD VENIPUNCTURE: CPT | Performed by: NURSE PRACTITIONER

## 2018-07-16 PROCEDURE — 85027 COMPLETE CBC AUTOMATED: CPT | Performed by: NURSE PRACTITIONER

## 2018-07-16 PROCEDURE — 80048 BASIC METABOLIC PNL TOTAL CA: CPT | Performed by: NURSE PRACTITIONER

## 2019-01-31 ENCOUNTER — OFFICE VISIT (OUTPATIENT)
Dept: FAMILY MEDICINE CLINIC | Facility: CLINIC | Age: 59
End: 2019-01-31

## 2019-01-31 VITALS
HEIGHT: 64 IN | RESPIRATION RATE: 18 BRPM | WEIGHT: 161 LBS | HEART RATE: 104 BPM | OXYGEN SATURATION: 98 % | SYSTOLIC BLOOD PRESSURE: 120 MMHG | TEMPERATURE: 99.5 F | DIASTOLIC BLOOD PRESSURE: 80 MMHG | BODY MASS INDEX: 27.49 KG/M2

## 2019-01-31 DIAGNOSIS — R68.89 FLU-LIKE SYMPTOMS: Primary | ICD-10-CM

## 2019-01-31 DIAGNOSIS — J10.1 INFLUENZA A: ICD-10-CM

## 2019-01-31 LAB
EXPIRATION DATE: ABNORMAL
FLUAV AG NPH QL: POSITIVE
FLUBV AG NPH QL: NEGATIVE
INTERNAL CONTROL: ABNORMAL
Lab: ABNORMAL

## 2019-01-31 PROCEDURE — 87804 INFLUENZA ASSAY W/OPTIC: CPT | Performed by: NURSE PRACTITIONER

## 2019-01-31 PROCEDURE — 99213 OFFICE O/P EST LOW 20 MIN: CPT | Performed by: NURSE PRACTITIONER

## 2019-01-31 RX ORDER — OSELTAMIVIR PHOSPHATE 75 MG/1
75 CAPSULE ORAL 2 TIMES DAILY
Qty: 10 CAPSULE | Refills: 0 | Status: SHIPPED | OUTPATIENT
Start: 2019-01-31 | End: 2020-04-28

## 2019-01-31 RX ORDER — OSELTAMIVIR PHOSPHATE 75 MG/1
75 CAPSULE ORAL 2 TIMES DAILY
Qty: 10 CAPSULE | Refills: 0 | Status: SHIPPED | OUTPATIENT
Start: 2019-01-31 | End: 2019-01-31 | Stop reason: SDUPTHER

## 2019-01-31 NOTE — PROGRESS NOTES
Subjective   Madison Acevedo is a 59 y.o. female.     Chief Complaint   Patient presents with   • Nasal Congestion   • Cough   • Fatigue     Influenza   This is a new problem. The current episode started yesterday. Associated symptoms include congestion, coughing, fatigue, a fever (started yesterday), headaches, myalgias and a sore throat. Pertinent negatives include no arthralgias or chills. Nothing aggravates the symptoms. She has tried nothing for the symptoms. The treatment provided no relief.      I have reviewed the patient's medical history in detail and updated the computerized patient record.    The following portions of the patient's history were reviewed and updated as appropriate: allergies, current medications, past family history, past medical history, past social history, past surgical history and problem list.       Current Outpatient Medications:   •  acetaminophen (TYLENOL) 325 MG tablet, Take 650 mg by mouth Every 6 (Six) Hours As Needed for mild pain (1-3)., Disp: , Rfl:   •  ALLERGY SERUM INJECTION, Inject  under the skin Every 14 (Fourteen) Days., Disp: , Rfl:   •  Ascorbic Acid (VITAMIN C PO), Take 1 tablet by mouth Daily., Disp: , Rfl:   •  Cholecalciferol (VITAMIN D PO), Take 50,000 Units by mouth 1 (One) Time Per Week., Disp: , Rfl:   •  fluticasone (FLONASE) 50 MCG/ACT nasal spray, 1 spray into each nostril Daily., Disp: , Rfl:   •  guaiFENesin (MUCINEX) 600 MG 12 hr tablet, Take 1,200 mg by mouth Daily., Disp: , Rfl:   •  naproxen sodium (ALEVE) 220 MG tablet, Take 220 mg by mouth 2 (Two) Times a Day As Needed for mild pain (1-3)., Disp: , Rfl:   •  omeprazole-sodium bicarbonate (ZEGERID)  MG per capsule, Take 1 capsule by mouth daily., Disp: , Rfl:     Review of Systems   Constitutional: Positive for fatigue and fever (started yesterday). Negative for chills.   HENT: Positive for congestion and sore throat.    Respiratory: Positive for cough.    Musculoskeletal: Positive for  "myalgias. Negative for arthralgias.   Skin: Negative.    Neurological: Positive for headache.        Vitals:    01/31/19 0924   BP: 120/80   BP Location: Left arm   Patient Position: Sitting   Cuff Size: Adult   Pulse: 104   Resp: 18   Temp: 99.5 °F (37.5 °C)   TempSrc: Oral   SpO2: 98%   Weight: 73 kg (161 lb)   Height: 162.6 cm (64\")       Objective   Physical Exam   Constitutional: She is oriented to person, place, and time. She appears well-developed and well-nourished. She does not have a sickly appearance.   HENT:   Right Ear: Tympanic membrane is not erythematous and not bulging. A middle ear effusion is present.   Left Ear: Tympanic membrane is not erythematous and not bulging. A middle ear effusion (clear effusions) is present.   Nose: Mucosal edema (red and swollen), rhinorrhea (clear) and congestion present. Right sinus exhibits no maxillary sinus tenderness and no frontal sinus tenderness. Left sinus exhibits no maxillary sinus tenderness and no frontal sinus tenderness.   Mouth/Throat: Uvula is midline and mucous membranes are normal. Posterior oropharyngeal erythema present. No oropharyngeal exudate.   Neck: Neck supple.   Cardiovascular: Normal rate, regular rhythm and normal heart sounds.   Lymphadenopathy:     She has no cervical adenopathy.   Neurological: She is alert and oriented to person, place, and time.   Skin: Skin is warm and dry.   Psychiatric:   No acute distress   Vitals reviewed.        Assessment/Plan   Madison was seen today for nasal congestion, cough and fatigue.    Diagnoses and all orders for this visit:    Flu-like symptoms  -     POCT Influenza A/B    Influenza A  -     Discontinue: oseltamivir (TAMIFLU) 75 MG capsule; Take 1 capsule by mouth 2 (Two) Times a Day.  -     oseltamivir (TAMIFLU) 75 MG capsule; Take 1 capsule by mouth 2 (Two) Times a Day.    1. The POCT influenza test is positive for influenza A.  2. She is to start Tamiflu 75 mg twice daily x 5 days.   3. She has " been advised to treat her symptoms with OTC medications as needed.   4. Follow up as needed.

## 2019-01-31 NOTE — PATIENT INSTRUCTIONS

## 2019-10-24 ENCOUNTER — OFFICE VISIT (OUTPATIENT)
Dept: FAMILY MEDICINE CLINIC | Facility: CLINIC | Age: 59
End: 2019-10-24

## 2019-10-24 VITALS
HEART RATE: 88 BPM | BODY MASS INDEX: 23.6 KG/M2 | TEMPERATURE: 98.6 F | RESPIRATION RATE: 18 BRPM | HEIGHT: 64 IN | OXYGEN SATURATION: 98 % | WEIGHT: 138.2 LBS | DIASTOLIC BLOOD PRESSURE: 62 MMHG | SYSTOLIC BLOOD PRESSURE: 118 MMHG

## 2019-10-24 DIAGNOSIS — R10.2 PELVIC PAIN IN FEMALE: Primary | ICD-10-CM

## 2019-10-24 DIAGNOSIS — N20.0 KIDNEY STONES: ICD-10-CM

## 2019-10-24 DIAGNOSIS — K59.00 CONSTIPATION, UNSPECIFIED CONSTIPATION TYPE: ICD-10-CM

## 2019-10-24 PROCEDURE — 99214 OFFICE O/P EST MOD 30 MIN: CPT | Performed by: NURSE PRACTITIONER

## 2019-10-24 RX ORDER — TAMSULOSIN HYDROCHLORIDE 0.4 MG/1
1 CAPSULE ORAL DAILY
Qty: 30 CAPSULE | Refills: 0 | Status: SHIPPED | OUTPATIENT
Start: 2019-10-24 | End: 2020-04-28

## 2019-10-24 RX ORDER — FEXOFENADINE HCL 180 MG/1
180 TABLET ORAL DAILY
COMMUNITY
End: 2021-12-03 | Stop reason: ALTCHOICE

## 2019-10-28 ENCOUNTER — HOSPITAL ENCOUNTER (OUTPATIENT)
Dept: CT IMAGING | Facility: HOSPITAL | Age: 59
Discharge: HOME | End: 2019-10-28
Admitting: NURSE PRACTITIONER

## 2019-10-28 PROCEDURE — 74176 CT ABD & PELVIS W/O CONTRAST: CPT

## 2020-04-24 ENCOUNTER — TELEPHONE (OUTPATIENT)
Dept: FAMILY MEDICINE CLINIC | Facility: CLINIC | Age: 60
End: 2020-04-24

## 2020-04-24 NOTE — TELEPHONE ENCOUNTER
Ms. Acevedo calls today with concerns that her blood pressure is elevated and that she can feel her heart flutter. She states she connected herself to a monitor at work and is having frequent PAC. She reports she has been feeling this way for several day. I have discussed with Ms. Acevedo that if she is feeling her heart flutter and that her blood pressure is elevated she needs to go to the ER or Urgent Care. She declines going to the ER because of Covid-19. I have provided her with the names of the [facility] urgent cares that are not specifically seeing Covid-19 patients. She states she is going to call Dr. Rothman's office to see if they will see her today.

## 2020-04-24 NOTE — TELEPHONE ENCOUNTER
PATIENT STATES THAT HER BLOOD PRESSURE HAS BEEN EXTREMELY HIGH AND SHE WOULD LIKE A EKG DONE AT THE OFFICE. PATIENT DECLINED TELE AND VID VISIT.    PLEASE ADVISE

## 2020-04-27 NOTE — PROGRESS NOTES
"Date of Office Visit: 20  Encounter Provider: Gael Rodriguez MD  Place of Service: [facility] CARDIOLOGY  Patient Name: Madison Acevedo  :1960    Chief Complaint   Patient presents with   • Palpitations   :     HPI:     Ms. Acevedo is 60 y.o. and presents today in consultation for palpitations and elevated blood pressure at the request of SIMONE Moreira.    She was seen by Dr Thomason in  for palpitations and had a normal echocardiogram.  She a nurse in inpatient preop holding.  She is quite healthy; she was exercising regularly until recently, when she had to stop due to neck pain.    Her  recently underwent CABG and that was a stressful time for her.    She has been taking pseudoephedrine daily for years for allergies.      She last had a physical in 2018.  She reports a long history of \"borderline\" hypertension.      Last week, she was at work and developed palpitations.  It felt like a hard skipped beat, and she felt it in her neck.  She connected herself to the monitor and was having occasional PACs.  She has been checking her blood pressure since then and is consistently getting values 135-150/90 mm Hg.  She stopped taking the decongestant last week, stopped caffeine last week, and started magnesium oxide 400mg daily.  She has not had palpitations now in a few days.    Past Medical History:   Diagnosis Date   • Arthritis    • Esophageal reflux    • Esophageal spasm    • Fibrocystic disease of breast    • PONV (postoperative nausea and vomiting)    • Vitamin D deficiency        Past Surgical History:   Procedure Laterality Date   • CHOLECYSTECTOMY     • COLONOSCOPY N/A 1/3/2018    Procedure: COLONOSCOPY TO CECUM AND TERMINAL ILEUM;  Surgeon: Jan Brannon MD;  Location: [facility] ENDOSCOPY;  Service:    • ENDOSCOPY      X2   • ENDOSCOPY N/A 1/3/2018    Procedure: ESOPHAGOGASTRODUODENOSCOPY WITH BIOPSIES;  Surgeon: Jan Brannon MD;  [facility]: [facility]" ENDOSCOPY;  Service:    • STEREOTACTIC BIOPSY / ASPIRATION / EXCISION Left     Bx breast percutan needle core use imag guide   • THUMB ARTHROSCOPY      left thumb joint implant   • WISDOM TOOTH EXTRACTION         Social History     Socioeconomic History   • Marital status:      Spouse name: Not on file   • Number of children: 2   • Years of education: Not on file   • Highest education level: Not on file   Occupational History   • Occupation: REGISTERED NURSE    Tobacco Use   • Smoking status: Former Smoker     Years: 15.00     Types: Cigarettes     Last attempt to quit: 1990     Years since quittin.0   • Smokeless tobacco: Never Used   Substance and Sexual Activity   • Alcohol use: Yes     Alcohol/week: 1.0 standard drinks     Types: 1 Glasses of wine per week     Binge frequency: Daily or almost daily   • Drug use: No       Family History   Problem Relation Age of Onset   • Heart disease Father         MI/CABG early 60s   • Hypertension Father    • Lung cancer Father    • Stroke Father    • GIACOMO disease Sister         esophageal reflux   • Esophageal cancer Mother    • Malig Hyperthermia Neg Hx        Review of Systems   Cardiovascular: Positive for palpitations.   Musculoskeletal: Positive for joint pain.   All other systems reviewed and are negative.      No Known Allergies      Current Outpatient Medications:   •  acetaminophen (TYLENOL) 325 MG tablet, Take 650 mg by mouth Every 6 (Six) Hours As Needed for mild pain (1-3)., Disp: , Rfl:   •  ALLERGY SERUM INJECTION, Inject  under the skin Every 14 (Fourteen) Days., Disp: , Rfl:   •  Ascorbic Acid (VITAMIN C PO), Take 1 tablet by mouth Daily., Disp: , Rfl:   •  fexofenadine (ALLEGRA) 180 MG tablet, Take 180 mg by mouth Daily., Disp: , Rfl:   •  naproxen sodium (ALEVE) 220 MG tablet, Take 220 mg by mouth 2 (Two) Times a Day As Needed for mild pain (1-3)., Disp: , Rfl:   •  omeprazole-sodium bicarbonate (ZEGERID)  MG per capsule, Take 1  "capsule by mouth daily., Disp: , Rfl:   •  pseudoephedrine-guaifenesin (MUCINEX D)  MG per 12 hr tablet, Take 1-2 tablets by mouth 2 (Two) Times a Day As Needed, Disp: 120 tablet, Rfl: 6  •  VITAMIN D, CHOLECALCIFEROL, PO, Take  by mouth Daily., Disp: , Rfl:       Objective:     Vitals:    04/28/20 1148   BP: 142/82   Pulse: 81   Weight: 64.9 kg (143 lb)   Height: 162.6 cm (64\")     Body mass index is 24.55 kg/m².    Physical Exam      ECG 12 Lead  Date/Time: 4/28/2020 12:00 PM  Performed by: Gael Rodriguez MD  Authorized by: Gael Rodriguez MD   Comparison: not compared with previous ECG   Previous ECG: no previous ECG available  Rhythm: sinus rhythm  Conduction: conduction normal  ST Segments: ST segments normal  T Waves: T waves normal  QRS axis: normal  Other: no other findings    Clinical impression: normal ECG              Assessment:       Diagnosis Plan   1. Elevated blood pressure reading  TSH    Comprehensive Metabolic Panel    Adult Transthoracic Echo Complete W/ Cont if Necessary Per Protocol   2. Palpitations  ECG 12 Lead    TSH    Comprehensive Metabolic Panel    Adult Transthoracic Echo Complete W/ Cont if Necessary Per Protocol   3. Premature atrial contractions  TSH    Comprehensive Metabolic Panel    Adult Transthoracic Echo Complete W/ Cont if Necessary Per Protocol          Plan:       She is a previously healthy woman who has likely developed essential hypertension.  She had a short episodes of palpitations which corresponded to PACs.  Her EKG is normal and she had no ectopics on exam today.      I encouraged her to avoid decongestants and caffeine.  I will check an echo to evaluate her atrial volume, and some basic labs.      Depending on those results, I will start her on an anti-hypertensive.    Sincerely,       Gael Rodriguez MD                Answers for HPI/ROS submitted by the patient on 4/26/2020   What is the primary reason for your visit?: Other  Please describe your symptoms.: " Pounding heartbeat with frequent PACs  Have you had these symptoms before?: No  How long have you been having these symptoms?: past week  Please describe any probable cause for these symptoms. : Caffeine,medications, stress

## 2020-04-28 ENCOUNTER — LAB (OUTPATIENT)
Dept: LAB | Facility: HOSPITAL | Age: 60
End: 2020-04-28

## 2020-04-28 ENCOUNTER — OFFICE VISIT (OUTPATIENT)
Dept: CARDIOLOGY | Facility: CLINIC | Age: 60
End: 2020-04-28

## 2020-04-28 VITALS
SYSTOLIC BLOOD PRESSURE: 142 MMHG | WEIGHT: 143 LBS | DIASTOLIC BLOOD PRESSURE: 82 MMHG | BODY MASS INDEX: 24.41 KG/M2 | HEART RATE: 81 BPM | HEIGHT: 64 IN

## 2020-04-28 DIAGNOSIS — I49.1 PREMATURE ATRIAL CONTRACTIONS: ICD-10-CM

## 2020-04-28 DIAGNOSIS — R00.2 PALPITATIONS: ICD-10-CM

## 2020-04-28 DIAGNOSIS — R03.0 ELEVATED BLOOD PRESSURE READING: ICD-10-CM

## 2020-04-28 DIAGNOSIS — R03.0 ELEVATED BLOOD PRESSURE READING: Primary | ICD-10-CM

## 2020-04-28 LAB
ALBUMIN SERPL-MCNC: 4.3 G/DL (ref 3.5–5.2)
ALBUMIN/GLOB SERPL: 1.4 G/DL
ALP SERPL-CCNC: 63 U/L (ref 39–117)
ALT SERPL W P-5'-P-CCNC: 11 U/L (ref 1–33)
ANION GAP SERPL CALCULATED.3IONS-SCNC: 12.4 MMOL/L (ref 5–15)
AST SERPL-CCNC: 15 U/L (ref 1–32)
BILIRUB SERPL-MCNC: 0.4 MG/DL (ref 0.2–1.2)
BUN BLD-MCNC: 18 MG/DL (ref 8–23)
BUN/CREAT SERPL: 20.2 (ref 7–25)
CALCIUM SPEC-SCNC: 9.5 MG/DL (ref 8.6–10.5)
CHLORIDE SERPL-SCNC: 101 MMOL/L (ref 98–107)
CO2 SERPL-SCNC: 29.6 MMOL/L (ref 22–29)
CREAT BLD-MCNC: 0.89 MG/DL (ref 0.57–1)
GFR SERPL CREATININE-BSD FRML MDRD: 65 ML/MIN/1.73
GLOBULIN UR ELPH-MCNC: 3.1 GM/DL
GLUCOSE BLD-MCNC: 92 MG/DL (ref 65–99)
POTASSIUM BLD-SCNC: 4.3 MMOL/L (ref 3.5–5.2)
PROT SERPL-MCNC: 7.4 G/DL (ref 6–8.5)
SODIUM BLD-SCNC: 143 MMOL/L (ref 136–145)
TSH SERPL DL<=0.05 MIU/L-ACNC: 1.51 UIU/ML (ref 0.27–4.2)

## 2020-04-28 PROCEDURE — 36415 COLL VENOUS BLD VENIPUNCTURE: CPT

## 2020-04-28 PROCEDURE — 93000 ELECTROCARDIOGRAM COMPLETE: CPT | Performed by: INTERNAL MEDICINE

## 2020-04-28 PROCEDURE — 99244 OFF/OP CNSLTJ NEW/EST MOD 40: CPT | Performed by: INTERNAL MEDICINE

## 2020-04-28 PROCEDURE — 80053 COMPREHEN METABOLIC PANEL: CPT

## 2020-04-28 PROCEDURE — 84443 ASSAY THYROID STIM HORMONE: CPT

## 2020-04-30 ENCOUNTER — HOSPITAL ENCOUNTER (OUTPATIENT)
Dept: CARDIOLOGY | Facility: HOSPITAL | Age: 60
Discharge: HOME | End: 2020-04-30
Admitting: INTERNAL MEDICINE

## 2020-04-30 VITALS
DIASTOLIC BLOOD PRESSURE: 78 MMHG | HEART RATE: 94 BPM | OXYGEN SATURATION: 99 % | HEIGHT: 64 IN | BODY MASS INDEX: 24.41 KG/M2 | WEIGHT: 143 LBS | SYSTOLIC BLOOD PRESSURE: 130 MMHG

## 2020-04-30 DIAGNOSIS — R00.2 PALPITATIONS: ICD-10-CM

## 2020-04-30 DIAGNOSIS — R03.0 ELEVATED BLOOD PRESSURE READING: ICD-10-CM

## 2020-04-30 DIAGNOSIS — I49.1 PREMATURE ATRIAL CONTRACTIONS: ICD-10-CM

## 2020-04-30 LAB
AORTIC ARCH: 2.2 CM
AORTIC ROOT ANNULUS: 1.7 CM
ASCENDING AORTA: 2.6 CM
BH CV ECHO MEAS - ACS: 1.8 CM
BH CV ECHO MEAS - AO MAX PG (FULL): 4.7 MMHG
BH CV ECHO MEAS - AO MAX PG: 10.4 MMHG
BH CV ECHO MEAS - AO MEAN PG (FULL): 2.6 MMHG
BH CV ECHO MEAS - AO MEAN PG: 5.8 MMHG
BH CV ECHO MEAS - AO ROOT AREA (BSA CORRECTED): 1.5
BH CV ECHO MEAS - AO ROOT AREA: 4.9 CM^2
BH CV ECHO MEAS - AO ROOT DIAM: 2.5 CM
BH CV ECHO MEAS - AO V2 MAX: 161.4 CM/SEC
BH CV ECHO MEAS - AO V2 MEAN: 114.5 CM/SEC
BH CV ECHO MEAS - AO V2 VTI: 30.5 CM
BH CV ECHO MEAS - ASC AORTA: 2.6 CM
BH CV ECHO MEAS - AVA(I,A): 1.9 CM^2
BH CV ECHO MEAS - AVA(I,D): 1.9 CM^2
BH CV ECHO MEAS - AVA(V,A): 1.8 CM^2
BH CV ECHO MEAS - AVA(V,D): 1.8 CM^2
BH CV ECHO MEAS - BSA(HAYCOCK): 1.7 M^2
BH CV ECHO MEAS - BSA: 1.7 M^2
BH CV ECHO MEAS - BZI_BMI: 24.5 KILOGRAMS/M^2
BH CV ECHO MEAS - BZI_METRIC_HEIGHT: 162.6 CM
BH CV ECHO MEAS - BZI_METRIC_WEIGHT: 64.9 KG
BH CV ECHO MEAS - EDV(MOD-SP2): 37 ML
BH CV ECHO MEAS - EDV(MOD-SP4): 46 ML
BH CV ECHO MEAS - EDV(TEICH): 73.2 ML
BH CV ECHO MEAS - EF(CUBED): 67.8 %
BH CV ECHO MEAS - EF(MOD-BP): 59 %
BH CV ECHO MEAS - EF(MOD-SP2): 56.8 %
BH CV ECHO MEAS - EF(MOD-SP4): 56.5 %
BH CV ECHO MEAS - EF(TEICH): 59.8 %
BH CV ECHO MEAS - ESV(MOD-SP2): 16 ML
BH CV ECHO MEAS - ESV(MOD-SP4): 20 ML
BH CV ECHO MEAS - ESV(TEICH): 29.4 ML
BH CV ECHO MEAS - FS: 31.5 %
BH CV ECHO MEAS - IVS/LVPW: 0.97
BH CV ECHO MEAS - IVSD: 0.92 CM
BH CV ECHO MEAS - LAT PEAK E' VEL: 15 CM/SEC
BH CV ECHO MEAS - LV DIASTOLIC VOL/BSA (35-75): 27.1 ML/M^2
BH CV ECHO MEAS - LV MASS(C)D: 118.5 GRAMS
BH CV ECHO MEAS - LV MASS(C)DI: 69.9 GRAMS/M^2
BH CV ECHO MEAS - LV MAX PG: 5.7 MMHG
BH CV ECHO MEAS - LV MEAN PG: 3.3 MMHG
BH CV ECHO MEAS - LV SYSTOLIC VOL/BSA (12-30): 11.8 ML/M^2
BH CV ECHO MEAS - LV V1 MAX: 119.1 CM/SEC
BH CV ECHO MEAS - LV V1 MEAN: 86.3 CM/SEC
BH CV ECHO MEAS - LV V1 VTI: 23.9 CM
BH CV ECHO MEAS - LVIDD: 4.1 CM
BH CV ECHO MEAS - LVIDS: 2.8 CM
BH CV ECHO MEAS - LVLD AP2: 7.5 CM
BH CV ECHO MEAS - LVLD AP4: 6.7 CM
BH CV ECHO MEAS - LVLS AP2: 6 CM
BH CV ECHO MEAS - LVLS AP4: 5.8 CM
BH CV ECHO MEAS - LVOT AREA (M): 2.3 CM^2
BH CV ECHO MEAS - LVOT AREA: 2.4 CM^2
BH CV ECHO MEAS - LVOT DIAM: 1.7 CM
BH CV ECHO MEAS - LVPWD: 0.95 CM
BH CV ECHO MEAS - MED PEAK E' VEL: 9 CM/SEC
BH CV ECHO MEAS - MR MAX PG: 78.9 MMHG
BH CV ECHO MEAS - MR MAX VEL: 444.2 CM/SEC
BH CV ECHO MEAS - MV A DUR: 0.08 SEC
BH CV ECHO MEAS - MV A MAX VEL: 64.9 CM/SEC
BH CV ECHO MEAS - MV DEC SLOPE: 322.3 CM/SEC^2
BH CV ECHO MEAS - MV DEC TIME: 0.23 SEC
BH CV ECHO MEAS - MV E MAX VEL: 67 CM/SEC
BH CV ECHO MEAS - MV E/A: 1
BH CV ECHO MEAS - MV MAX PG: 2.7 MMHG
BH CV ECHO MEAS - MV MEAN PG: 1.5 MMHG
BH CV ECHO MEAS - MV P1/2T MAX VEL: 79.5 CM/SEC
BH CV ECHO MEAS - MV P1/2T: 72.3 MSEC
BH CV ECHO MEAS - MV V2 MAX: 82.4 CM/SEC
BH CV ECHO MEAS - MV V2 MEAN: 59.4 CM/SEC
BH CV ECHO MEAS - MV V2 VTI: 24.5 CM
BH CV ECHO MEAS - MVA P1/2T LCG: 2.8 CM^2
BH CV ECHO MEAS - MVA(P1/2T): 3 CM^2
BH CV ECHO MEAS - MVA(VTI): 2.3 CM^2
BH CV ECHO MEAS - PA ACC TIME: 0.08 SEC
BH CV ECHO MEAS - PA MAX PG (FULL): 0.85 MMHG
BH CV ECHO MEAS - PA MAX PG: 5.1 MMHG
BH CV ECHO MEAS - PA PR(ACCEL): 41 MMHG
BH CV ECHO MEAS - PA V2 MAX: 112.7 CM/SEC
BH CV ECHO MEAS - PULM A REVS DUR: 0.09 SEC
BH CV ECHO MEAS - PULM A REVS VEL: 36.6 CM/SEC
BH CV ECHO MEAS - PULM DIAS VEL: 34.5 CM/SEC
BH CV ECHO MEAS - PULM S/D: 1.4
BH CV ECHO MEAS - PULM SYS VEL: 49.2 CM/SEC
BH CV ECHO MEAS - PVA(V,A): 2.2 CM^2
BH CV ECHO MEAS - PVA(V,D): 2.2 CM^2
BH CV ECHO MEAS - QP/QS: 0.82
BH CV ECHO MEAS - RAP SYSTOLE: 3 MMHG
BH CV ECHO MEAS - RV MAX PG: 4.2 MMHG
BH CV ECHO MEAS - RV MEAN PG: 2.5 MMHG
BH CV ECHO MEAS - RV V1 MAX: 102.8 CM/SEC
BH CV ECHO MEAS - RV V1 MEAN: 75.8 CM/SEC
BH CV ECHO MEAS - RV V1 VTI: 19.4 CM
BH CV ECHO MEAS - RVOT AREA: 2.4 CM^2
BH CV ECHO MEAS - RVOT DIAM: 1.8 CM
BH CV ECHO MEAS - RVSP: 29 MMHG
BH CV ECHO MEAS - SI(AO): 88.2 ML/M^2
BH CV ECHO MEAS - SI(CUBED): 27.1 ML/M^2
BH CV ECHO MEAS - SI(LVOT): 33.8 ML/M^2
BH CV ECHO MEAS - SI(MOD-SP2): 12.4 ML/M^2
BH CV ECHO MEAS - SI(MOD-SP4): 15.3 ML/M^2
BH CV ECHO MEAS - SI(TEICH): 25.8 ML/M^2
BH CV ECHO MEAS - SUP REN AO DIAM: 1.7 CM
BH CV ECHO MEAS - SV(AO): 149.6 ML
BH CV ECHO MEAS - SV(CUBED): 45.9 ML
BH CV ECHO MEAS - SV(LVOT): 57.3 ML
BH CV ECHO MEAS - SV(MOD-SP2): 21 ML
BH CV ECHO MEAS - SV(MOD-SP4): 26 ML
BH CV ECHO MEAS - SV(RVOT): 46.9 ML
BH CV ECHO MEAS - SV(TEICH): 43.8 ML
BH CV ECHO MEAS - TAPSE (>1.6): 1.8 CM2
BH CV ECHO MEAS - TR MAX VEL: 256.7 CM/SEC
BH CV ECHO MEASUREMENTS AVERAGE E/E' RATIO: 5.58
BH CV VAS BP RIGHT ARM: NORMAL MMHG
BH CV XLRA - RV BASE: 2.3 CM
BH CV XLRA - RV LENGTH: 7.8 CM
BH CV XLRA - RV MID: 2.6 CM
BH CV XLRA - TDI S': 13 CM/SEC
LEFT ATRIUM VOLUME INDEX: 16 ML/M2
SINUS: 2.6 CM
STJ: 2.8 CM

## 2020-04-30 PROCEDURE — 93306 TTE W/DOPPLER COMPLETE: CPT | Performed by: INTERNAL MEDICINE

## 2020-04-30 PROCEDURE — 93306 TTE W/DOPPLER COMPLETE: CPT

## 2020-04-30 RX ORDER — AMLODIPINE BESYLATE 2.5 MG/1
2.5 TABLET ORAL DAILY
Qty: 90 TABLET | Refills: 1 | Status: SHIPPED | OUTPATIENT
Start: 2020-04-30 | End: 2020-04-30 | Stop reason: SDUPTHER

## 2020-04-30 RX ORDER — AMLODIPINE BESYLATE 2.5 MG/1
2.5 TABLET ORAL DAILY
Qty: 90 TABLET | Refills: 1 | Status: SHIPPED | OUTPATIENT
Start: 2020-04-30 | End: 2020-10-19 | Stop reason: SDUPTHER

## 2020-05-22 ENCOUNTER — TELEPHONE (OUTPATIENT)
Dept: CARDIOLOGY | Facility: CLINIC | Age: 60
End: 2020-05-22

## 2020-05-22 NOTE — TELEPHONE ENCOUNTER
She reports that her bp on average is 130/80.  She has not checked it today.  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    She has a history of esophogeal spasms and stated this feels different to her.  She wanted to schedule an apt, so I have scheduled her to with Trini to discuss this further next week.  In the mean time I have instructed her to go to the ER for any worsening symptoms.  Thanks  Nidia Bishop RN  Triage nurse

## 2020-05-22 NOTE — TELEPHONE ENCOUNTER
Madison Edwards is calling in this afternoon to let you know she is still experiencing a squeezing sensation in her chest that is radiating into her neck.  She said it usually comes on with rest.  She was painting and helping her daughter unpack yesterday and was fine during all the activity, but then the squeezing started in the car on the way home.  She said she rates the discomfort 2/10 on the pain scale.  It doesn't make her nauseous, diaphoretic, and it doesn't radiate to her arm.    She has been experiencing this since she was seen in office 4/28.  She reports her palpitations have improved on magnesium, but she is having this squeezing sensation daily now and sometimes multiple times a day.  She doesn't think it is correlated to a PVC.    Cardiac meds  Amlodipine 2.5mg daily  Please let me know how to proceed  Thanks  Nidia Bishop RN  Triage nurse

## 2020-05-22 NOTE — TELEPHONE ENCOUNTER
Is her BP running okay?  How long does it last? Since it only occurs at rest and not with exertion, that makes it sound less worrisome.  But going into a 3 day weekend, if it persists, I would recommend she go to the ED.    MAUDE

## 2020-05-24 ENCOUNTER — HOSPITAL ENCOUNTER (EMERGENCY)
Facility: HOSPITAL | Age: 60
Discharge: HOME | End: 2020-05-24
Attending: EMERGENCY MEDICINE | Admitting: EMERGENCY MEDICINE

## 2020-05-24 ENCOUNTER — APPOINTMENT (OUTPATIENT)
Dept: GENERAL RADIOLOGY | Facility: HOSPITAL | Age: 60
End: 2020-05-24

## 2020-05-24 VITALS
TEMPERATURE: 98.5 F | SYSTOLIC BLOOD PRESSURE: 116 MMHG | RESPIRATION RATE: 16 BRPM | BODY MASS INDEX: 24.55 KG/M2 | OXYGEN SATURATION: 95 % | HEIGHT: 64 IN | DIASTOLIC BLOOD PRESSURE: 80 MMHG | HEART RATE: 80 BPM

## 2020-05-24 DIAGNOSIS — R07.89 ATYPICAL CHEST PAIN: Primary | ICD-10-CM

## 2020-05-24 LAB
ALBUMIN SERPL-MCNC: 4.5 G/DL (ref 3.5–5.2)
ALBUMIN/GLOB SERPL: 1.7 G/DL
ALP SERPL-CCNC: 69 U/L (ref 39–117)
ALT SERPL W P-5'-P-CCNC: 13 U/L (ref 1–33)
ANION GAP SERPL CALCULATED.3IONS-SCNC: 7.9 MMOL/L (ref 5–15)
AST SERPL-CCNC: 20 U/L (ref 1–32)
BASOPHILS # BLD AUTO: 0.06 10*3/MM3 (ref 0–0.2)
BASOPHILS NFR BLD AUTO: 0.9 % (ref 0–1.5)
BILIRUB SERPL-MCNC: <0.2 MG/DL (ref 0.2–1.2)
BUN BLD-MCNC: 17 MG/DL (ref 8–23)
BUN/CREAT SERPL: 17.7 (ref 7–25)
CALCIUM SPEC-SCNC: 9.6 MG/DL (ref 8.6–10.5)
CHLORIDE SERPL-SCNC: 101 MMOL/L (ref 98–107)
CO2 SERPL-SCNC: 30.1 MMOL/L (ref 22–29)
CREAT BLD-MCNC: 0.96 MG/DL (ref 0.57–1)
DEPRECATED RDW RBC AUTO: 42.3 FL (ref 37–54)
EOSINOPHIL # BLD AUTO: 0.14 10*3/MM3 (ref 0–0.4)
EOSINOPHIL NFR BLD AUTO: 2.2 % (ref 0.3–6.2)
ERYTHROCYTE [DISTWIDTH] IN BLOOD BY AUTOMATED COUNT: 12.2 % (ref 12.3–15.4)
GFR SERPL CREATININE-BSD FRML MDRD: 59 ML/MIN/1.73
GLOBULIN UR ELPH-MCNC: 2.7 GM/DL
GLUCOSE BLD-MCNC: 100 MG/DL (ref 65–99)
HCT VFR BLD AUTO: 37.4 % (ref 34–46.6)
HGB BLD-MCNC: 12.8 G/DL (ref 12–15.9)
HOLD SPECIMEN: NORMAL
HOLD SPECIMEN: NORMAL
IMM GRANULOCYTES # BLD AUTO: 0.01 10*3/MM3 (ref 0–0.05)
IMM GRANULOCYTES NFR BLD AUTO: 0.2 % (ref 0–0.5)
LYMPHOCYTES # BLD AUTO: 2.74 10*3/MM3 (ref 0.7–3.1)
LYMPHOCYTES NFR BLD AUTO: 42.3 % (ref 19.6–45.3)
MAGNESIUM SERPL-MCNC: 2.2 MG/DL (ref 1.6–2.4)
MCH RBC QN AUTO: 31.9 PG (ref 26.6–33)
MCHC RBC AUTO-ENTMCNC: 34.2 G/DL (ref 31.5–35.7)
MCV RBC AUTO: 93.3 FL (ref 79–97)
MONOCYTES # BLD AUTO: 0.59 10*3/MM3 (ref 0.1–0.9)
MONOCYTES NFR BLD AUTO: 9.1 % (ref 5–12)
NEUTROPHILS # BLD AUTO: 2.94 10*3/MM3 (ref 1.7–7)
NEUTROPHILS NFR BLD AUTO: 45.3 % (ref 42.7–76)
NRBC BLD AUTO-RTO: 0 /100 WBC (ref 0–0.2)
NT-PROBNP SERPL-MCNC: 38.9 PG/ML (ref 5–900)
PLATELET # BLD AUTO: 298 10*3/MM3 (ref 140–450)
PMV BLD AUTO: 9 FL (ref 6–12)
POTASSIUM BLD-SCNC: 3.7 MMOL/L (ref 3.5–5.2)
PROT SERPL-MCNC: 7.2 G/DL (ref 6–8.5)
RBC # BLD AUTO: 4.01 10*6/MM3 (ref 3.77–5.28)
SODIUM BLD-SCNC: 139 MMOL/L (ref 136–145)
TROPONIN T SERPL-MCNC: <0.01 NG/ML (ref 0–0.03)
WBC NRBC COR # BLD: 6.48 10*3/MM3 (ref 3.4–10.8)
WHOLE BLOOD HOLD SPECIMEN: NORMAL
WHOLE BLOOD HOLD SPECIMEN: NORMAL

## 2020-05-24 PROCEDURE — 99284 EMERGENCY DEPT VISIT MOD MDM: CPT

## 2020-05-24 PROCEDURE — 71045 X-RAY EXAM CHEST 1 VIEW: CPT

## 2020-05-24 PROCEDURE — 80053 COMPREHEN METABOLIC PANEL: CPT | Performed by: EMERGENCY MEDICINE

## 2020-05-24 PROCEDURE — 83735 ASSAY OF MAGNESIUM: CPT | Performed by: EMERGENCY MEDICINE

## 2020-05-24 PROCEDURE — 84484 ASSAY OF TROPONIN QUANT: CPT | Performed by: EMERGENCY MEDICINE

## 2020-05-24 PROCEDURE — 93005 ELECTROCARDIOGRAM TRACING: CPT | Performed by: EMERGENCY MEDICINE

## 2020-05-24 PROCEDURE — 93010 ELECTROCARDIOGRAM REPORT: CPT | Performed by: INTERNAL MEDICINE

## 2020-05-24 PROCEDURE — 85025 COMPLETE CBC W/AUTO DIFF WBC: CPT | Performed by: EMERGENCY MEDICINE

## 2020-05-24 PROCEDURE — 93005 ELECTROCARDIOGRAM TRACING: CPT

## 2020-05-24 PROCEDURE — 83880 ASSAY OF NATRIURETIC PEPTIDE: CPT | Performed by: EMERGENCY MEDICINE

## 2020-05-24 RX ORDER — SUCRALFATE 1 G/1
1 TABLET ORAL 4 TIMES DAILY
Qty: 20 TABLET | Refills: 0 | Status: SHIPPED | OUTPATIENT
Start: 2020-05-24

## 2020-05-24 RX ORDER — SODIUM CHLORIDE 0.9 % (FLUSH) 0.9 %
10 SYRINGE (ML) INJECTION AS NEEDED
Status: DISCONTINUED | OUTPATIENT
Start: 2020-05-24 | End: 2020-05-25 | Stop reason: HOSPADM

## 2020-05-24 RX ORDER — ASPIRIN 81 MG/1
324 TABLET, CHEWABLE ORAL ONCE
Status: DISCONTINUED | OUTPATIENT
Start: 2020-05-24 | End: 2020-05-24

## 2020-05-25 NOTE — ED NOTES
Pt has intermittent mid-sternal CP for the past week. Pt reports it does not occur with activity but only with rest. She describes as squeezing feeling that radiates to her neck. The pain lasts for a few minutes but has become more frequently today. Pt reports it was worse after dinner and she reports no more than a 2/10. Pt denies any SOA. She has been taking her meds and Zegerid has not relieved it.  Pt does have family heart history.     Marian Santiago RN  05/24/20 2201       Marian Santiago RN  05/24/20 2202

## 2020-05-25 NOTE — ED NOTES
"Pt presents to ED via POV w/cc CP x 1 week.  Pt states 4/10 squeezing mid-sternal pressure/pain radiating up the neck and to the jaw.  Pt states she took some Tums and \"GI things\" to help with pain with no effect.  Pt hx HTN, high cholesterol and \"terrbile family cardiac history\"  Pt states she sees Dr. Rodriguez as cardiologist.  Pt denies any n/v/d, recent trauma or illness.  Pt alert, NAD  Pt masked at triage  Triage completed with appropriate PPE       Pat Webster, RN  05/24/20 0689    "

## 2020-05-25 NOTE — ED NOTES
Pt had a 324 ASA today around 1430 this afternoon. Dr Trejo's aware.     Marian Santiago, RN  05/24/20 5537

## 2020-05-26 ENCOUNTER — EPISODE CHANGES (OUTPATIENT)
Dept: CASE MANAGEMENT | Facility: OTHER | Age: 60
End: 2020-05-26

## 2020-06-12 ENCOUNTER — OFFICE (OUTPATIENT)
Dept: URBAN - METROPOLITAN AREA CLINIC 65 | Facility: CLINIC | Age: 60
End: 2020-06-12

## 2020-06-12 VITALS
TEMPERATURE: 97 F | HEART RATE: 97 BPM | SYSTOLIC BLOOD PRESSURE: 120 MMHG | DIASTOLIC BLOOD PRESSURE: 75 MMHG | HEIGHT: 64 IN | WEIGHT: 144 LBS

## 2020-06-12 DIAGNOSIS — R07.9 CHEST PAIN, UNSPECIFIED: ICD-10-CM

## 2020-06-12 DIAGNOSIS — K21.9 GASTRO-ESOPHAGEAL REFLUX DISEASE WITHOUT ESOPHAGITIS: ICD-10-CM

## 2020-06-12 DIAGNOSIS — Z80.0 FAMILY HISTORY OF MALIGNANT NEOPLASM OF DIGESTIVE ORGANS: ICD-10-CM

## 2020-06-12 PROCEDURE — 99214 OFFICE O/P EST MOD 30 MIN: CPT | Performed by: INTERNAL MEDICINE

## 2020-06-12 RX ORDER — HYOSCYAMINE SULFATE 0.12 MG/1
0.38 TABLET ORAL
Qty: 30 | Refills: 12 | Status: ACTIVE
Start: 2020-06-12

## 2020-06-12 RX ORDER — AMITRIPTYLINE HYDROCHLORIDE 25 MG/1
25 TABLET, FILM COATED ORAL
Qty: 30 | Refills: 11 | Status: ACTIVE
Start: 2020-06-12 | End: 2021-06-04 | Stop reason: CLARIF

## 2020-09-10 ENCOUNTER — PATIENT OUTREACH (OUTPATIENT)
Dept: CASE MANAGEMENT | Facility: OTHER | Age: 60
End: 2020-09-10

## 2020-09-10 NOTE — OUTREACH NOTE
"Patient Outreach Note    Referral from GERARDO Romano.  Goes by \"Emmie\".  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  Her  has recovered from a recent open heart surgery.  She has no health concerns of her own.  She is struggling to care for her mother who lives alone and has been uncooperative about receiving outside help.  She refuses to move into an assisted living facility. Her mother has a gtube and this is where she receives all of her nutrition.  Her mother is beginning to have issues keeping up with her regular tube feeds and was recently hospitalized for dehydration.  She is struggling to keep up with proper skin care and her she needs assistance with house chores.      Emmie and her sister plan to speak to her mother about receiving permission to release info.  After receiving permission, we will have our CMs work with her mother, Emmie and her sister.   At this point Emmie will contact me as soon as she has receivied permission and the current plan is to assign her to a CM that works with the Medicare population.  Her mother's name is Mahin Merida.        I educated on benefits of EAP.  I offered the phone number and she declined.     No issue with social determinants.  No inabilities to obtain food or medications or transportation to MD appointments. Educated on participating in habits that prevent the spread of COVID virus with home & work hygiene. Patient verbalizes understanding.     Educated patient on benefits of Employee CM program and invited to call with any new needs.  Encouraged use of [facility] nurseline if needed.       Kourtney Farnsworth RN  Ambulatory     9/10/2020, 11:45    Kourtney LEONG, RN, [facility]   RN Case Manager  [address] [address]     [phone] cell   [phone] office  [phone] fax  [email or web address]  [email or web address] CenterPay by Shopping (deal [employer])[facility] HospitalArvinasSKC CommunicationsSanpete Valley Hospital  "

## 2020-09-22 ENCOUNTER — PATIENT OUTREACH (OUTPATIENT)
Dept: CASE MANAGEMENT | Facility: OTHER | Age: 60
End: 2020-09-22

## 2020-09-22 NOTE — OUTREACH NOTE
Ena called back on 9/21 and confirmed that she had recevived permission from her mother to move forward on possibly arranging some additional support for her care.      I spoke with Yvonne Kingston of Medicare CM and she will have Stu or Trupti give Ena a call to make arrangements for a call that involves her mother.    I invited Ena to call back with any new needs and removed my name.      Kourtney TIJERINAN, RN, [facility]   [place] [address]     [phone] cell   [phone] office  [phone] fax  [email or web address]

## 2020-10-20 RX ORDER — AMLODIPINE BESYLATE 2.5 MG/1
2.5 TABLET ORAL DAILY
Qty: 30 TABLET | Refills: 0 | Status: SHIPPED | OUTPATIENT
Start: 2020-10-20 | End: 2020-11-19 | Stop reason: SDUPTHER

## 2020-11-19 ENCOUNTER — TELEMEDICINE (OUTPATIENT)
Dept: CARDIOLOGY | Facility: CLINIC | Age: 60
End: 2020-11-19

## 2020-11-19 VITALS
HEIGHT: 64 IN | SYSTOLIC BLOOD PRESSURE: 126 MMHG | DIASTOLIC BLOOD PRESSURE: 82 MMHG | BODY MASS INDEX: 26.46 KG/M2 | WEIGHT: 155 LBS | HEART RATE: 85 BPM

## 2020-11-19 DIAGNOSIS — R00.2 PALPITATIONS: Primary | ICD-10-CM

## 2020-11-19 DIAGNOSIS — I10 ESSENTIAL HYPERTENSION: ICD-10-CM

## 2020-11-19 PROCEDURE — 99213 OFFICE O/P EST LOW 20 MIN: CPT | Performed by: NURSE PRACTITIONER

## 2020-11-19 RX ORDER — AMLODIPINE BESYLATE 2.5 MG/1
2.5 TABLET ORAL DAILY
Qty: 90 TABLET | Refills: 3 | Status: SHIPPED | OUTPATIENT
Start: 2020-11-19 | End: 2021-11-02 | Stop reason: SDUPTHER

## 2020-11-19 NOTE — PROGRESS NOTES
Telehealth Visit    Date of Visit: 2020  Encounter Provider: SIMONE Vickers  Place of Service: [facility] CARDIOLOGY  Patient Name: Madison Acevedo  :1960  Primary Cardiologist: Dr. Gael Rodriguez     Chief Complaint   Patient presents with   • Palpitations   • Hypertension   • Follow-up   :     Dear Paulette NICHOLS,    HPI: Madison Acevedo is a pleasant 60 y.o. female who is an established patient of our practice. Due to COVID-19 virus, I am conducting a telehealth visit via video with patient and she has consented to this visit today. She is a new patient to me and her previous records have been reviewed.    In , she saw Dr. Rosi Thomason for evaluation of palpitations and had a normal echocardiogram at that time.    In 2020, she saw Dr. Gael Rodriguez in the office for evaluation of palpitations.  She had been taking pseudoephedrine daily for years for allergies and said she had a history of borderline hypertension.  She was recommended to stop taking her decongestant and caffeine and to start magnesium oxide 400 mg daily.    On 2020 echocardiogram showed the following: EF 59% and trivial pericardial effusion.  Dr. Rodriguez started her on amlodipine 2.5 mg daily for hypertension.    Today is her follow-up video visit.  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  She feels her palpitations have improved and is now only taking magnesium as needed.  She palpitations a couple times per week just lasting a few seconds with no other associated symptoms.  She denies chest pain, shortness of breath, edema, dizziness, or syncope.  She is gained about 15 pounds during the COVID-19 pandemic and said she stopped exercising due to neck pain.  Her blood pressure has been well controlled on amlodipine.    Past Medical History:   Diagnosis Date   • Arthritis    • Esophageal reflux    • Esophageal spasm    • Essential hypertension 2020   •  Fibrocystic disease of breast    • Palpitations 2020   • PONV (postoperative nausea and vomiting)    • Vitamin D deficiency        Past Surgical History:   Procedure Laterality Date   • CHOLECYSTECTOMY     • COLONOSCOPY N/A 1/3/2018    Procedure: COLONOSCOPY TO CECUM AND TERMINAL ILEUM;  Surgeon: Jan Brannon MD;  Location: [facility] ENDOSCOPY;  Service:    • ENDOSCOPY      X2   • ENDOSCOPY N/A 1/3/2018    Procedure: ESOPHAGOGASTRODUODENOSCOPY WITH BIOPSIES;  Surgeon: Jan Brannon MD;  Location: [facility] ENDOSCOPY;  Service:    • STEREOTACTIC BIOPSY / ASPIRATION / EXCISION Left     Bx breast percutan needle core use imag guide   • THUMB ARTHROSCOPY      left thumb joint implant   • WISDOM TOOTH EXTRACTION         Social History     Socioeconomic History   • Marital status:      Spouse name: Not on file   • Number of children: 2   • Years of education: Not on file   • Highest education level: Not on file   Occupational History   • Occupation: REGISTERED NURSE    Tobacco Use   • Smoking status: Former Smoker     Years: 15.00     Types: Cigarettes     Quit date: 1990     Years since quittin.5   • Smokeless tobacco: Never Used   • Tobacco comment: caffiene - 2 cups coffee daily    Substance and Sexual Activity   • Alcohol use: Yes     Alcohol/week: 1.0 standard drinks     Types: 1 Glasses of wine per week     Binge frequency: Daily or almost daily   • Drug use: No       Family History   Problem Relation Age of Onset   • Heart disease Father         MI/CABG early 60s   • Hypertension Father    • Lung cancer Father    • Stroke Father    • GIACOMO disease Sister         esophageal reflux   • Esophageal cancer Mother    • Malig Hyperthermia Neg Hx        The following portion of the patient's history were reviewed and updated as appropriate: past medical history, past surgical history, past social history, past family history, allergies, current medications, and problem list.    Review of Systems  "  Constitution: Negative.   Cardiovascular: Negative.    Respiratory: Negative.    Musculoskeletal: Positive for neck pain.   Neurological: Negative.        No Known Allergies      Current Outpatient Medications:   •  acetaminophen (TYLENOL) 325 MG tablet, Take 650 mg by mouth Every 6 (Six) Hours As Needed for mild pain (1-3)., Disp: , Rfl:   •  amLODIPine (NORVASC) 2.5 MG tablet, Take 1 tablet by mouth Daily., Disp: 90 tablet, Rfl: 3  •  Ascorbic Acid (VITAMIN C PO), Take 1 tablet by mouth Daily., Disp: , Rfl:   •  fexofenadine (ALLEGRA) 180 MG tablet, Take 180 mg by mouth Daily., Disp: , Rfl:   •  naproxen sodium (ALEVE) 220 MG tablet, Take 220 mg by mouth 2 (Two) Times a Day As Needed for mild pain (1-3)., Disp: , Rfl:   •  omeprazole-sodium bicarbonate (ZEGERID)  MG per capsule, Take 1 capsule by mouth daily., Disp: , Rfl:   •  sucralfate (CARAFATE) 1 g tablet, Take 1 tablet by mouth 4 (Four) Times a Day., Disp: 20 tablet, Rfl: 0  •  VITAMIN D, CHOLECALCIFEROL, PO, Take  by mouth Daily., Disp: , Rfl:   •  ALLERGY SERUM INJECTION, Inject  under the skin Every 14 (Fourteen) Days., Disp: , Rfl:         Objective:     Vitals:    11/19/20 0921   BP: 126/82   BP Location: Left arm   Patient Position: Standing   Pulse: 85   Weight: 70.3 kg (155 lb)   Height: 162.6 cm (64\")     Body mass index is 26.61 kg/m².    Due to telehealth visit, there was no EKG, vitals, or weight performed in our office.  Vitals/Weight were reported by the patient and conducted at home.     PHYSICAL EXAM:    Vitals Reviewed  General Appearance: No acute distress, well developed and well nourished.   Eyes: Conjunctivae and lids: No erythema, swelling, or discharge. Sclerae anicteric.   HENT: Atraumatic, normocephalic. External eyes, ears, and nose normal. No hearing loss noted. Mucous membranes normal. Lips not cyanotic.   Neck: Symmetrical and no evidence of mass.  Respiratory: No signs of respiratory distress. Respiration rhythm and " depth normal.   Musculoskeletal: Normal movement of extremities.  Skin: General appearance normal. No pallor, cyanosis, diaphoresis.   Psychiatric: Patient alert and oriented to person, place, and time. Speech and behavior appropriate. Normal mood and affect.        Assessment:       Diagnosis Plan   1. Palpitations     2. Essential hypertension            Plan:       1.  Palpitations: Improved with decreasing caffeine, stopping decongestant, and magnesium supplementation.  She will continue to monitor.    2.  Hypertension: Blood pressure well controlled on amlodipine 2.5 mg daily.    3.  She will call with any concerns and I recommended follow-up with Dr. Gael Rodriguez in 1 year, unless otherwise needed sooner.    This patient has consented to a telehealth visit via video. The visit was scheduled as a video visit to comply with patient safety concerns in accordance with CDC recommendations.  All vitals recorded within this visit are reported by the patient.  I spent 25 minutes in total including but not limited to the 9 minutes spent in direct conversation with this patient.      As always, it has been a pleasure to participate in your patient's care. Thank you.       Sincerely,             SIMONE Fernandes        Dictated utilizing Dragon dictation

## 2020-12-20 ENCOUNTER — IMMUNIZATION (OUTPATIENT)
Dept: VACCINE CLINIC | Facility: HOSPITAL | Age: 60
End: 2020-12-20

## 2020-12-20 PROCEDURE — 91300 HC SARSCOV02 VAC 30MCG/0.3ML IM: CPT | Performed by: INTERNAL MEDICINE

## 2020-12-20 PROCEDURE — 0001A: CPT | Performed by: INTERNAL MEDICINE

## 2021-01-11 ENCOUNTER — IMMUNIZATION (OUTPATIENT)
Dept: VACCINE CLINIC | Facility: HOSPITAL | Age: 61
End: 2021-01-11

## 2021-01-11 PROCEDURE — 0001A: CPT | Performed by: INTERNAL MEDICINE

## 2021-01-11 PROCEDURE — 0002A: CPT | Performed by: INTERNAL MEDICINE

## 2021-01-11 PROCEDURE — 91300 HC SARSCOV02 VAC 30MCG/0.3ML IM: CPT | Performed by: INTERNAL MEDICINE

## 2021-05-26 ENCOUNTER — HOSPITAL ENCOUNTER (OUTPATIENT)
Dept: CARDIOLOGY | Facility: HOSPITAL | Age: 61
Discharge: HOME | End: 2021-05-26

## 2021-05-26 ENCOUNTER — TELEPHONE (OUTPATIENT)
Dept: CARDIOLOGY | Facility: CLINIC | Age: 61
End: 2021-05-26

## 2021-05-26 VITALS
HEART RATE: 85 BPM | BODY MASS INDEX: 27.31 KG/M2 | WEIGHT: 160 LBS | SYSTOLIC BLOOD PRESSURE: 136 MMHG | TEMPERATURE: 98.9 F | HEIGHT: 64 IN | OXYGEN SATURATION: 99 % | DIASTOLIC BLOOD PRESSURE: 86 MMHG

## 2021-05-26 VITALS — HEART RATE: 87 BPM | DIASTOLIC BLOOD PRESSURE: 86 MMHG | SYSTOLIC BLOOD PRESSURE: 136 MMHG

## 2021-05-26 DIAGNOSIS — I10 ESSENTIAL HYPERTENSION: ICD-10-CM

## 2021-05-26 DIAGNOSIS — R07.2 PRECORDIAL PAIN: ICD-10-CM

## 2021-05-26 DIAGNOSIS — R06.02 SHORTNESS OF BREATH: ICD-10-CM

## 2021-05-26 DIAGNOSIS — I10 ESSENTIAL HYPERTENSION: Primary | ICD-10-CM

## 2021-05-26 LAB
ALBUMIN SERPL-MCNC: 4.5 G/DL (ref 3.5–5.2)
ALBUMIN/GLOB SERPL: 1.7 G/DL
ALP SERPL-CCNC: 53 U/L (ref 39–117)
ALT SERPL W P-5'-P-CCNC: 10 U/L (ref 1–33)
ANION GAP SERPL CALCULATED.3IONS-SCNC: 8.8 MMOL/L (ref 5–15)
ASCENDING AORTA: 2.9 CM
AST SERPL-CCNC: 18 U/L (ref 1–32)
BASOPHILS # BLD AUTO: 0.04 10*3/MM3 (ref 0–0.2)
BASOPHILS NFR BLD AUTO: 0.9 % (ref 0–1.5)
BH CV ECHO MEAS - ACS: 1.9 CM
BH CV ECHO MEAS - AO MAX PG (FULL): 2.8 MMHG
BH CV ECHO MEAS - AO MAX PG: 8.4 MMHG
BH CV ECHO MEAS - AO MEAN PG (FULL): 0.81 MMHG
BH CV ECHO MEAS - AO MEAN PG: 3.9 MMHG
BH CV ECHO MEAS - AO ROOT AREA (BSA CORRECTED): 1.6
BH CV ECHO MEAS - AO ROOT AREA: 6.1 CM^2
BH CV ECHO MEAS - AO ROOT DIAM: 2.8 CM
BH CV ECHO MEAS - AO V2 MAX: 145.2 CM/SEC
BH CV ECHO MEAS - AO V2 MEAN: 92.3 CM/SEC
BH CV ECHO MEAS - AO V2 VTI: 30.7 CM
BH CV ECHO MEAS - ASC AORTA: 2.9 CM
BH CV ECHO MEAS - AVA(I,A): 2.4 CM^2
BH CV ECHO MEAS - AVA(I,D): 2.4 CM^2
BH CV ECHO MEAS - AVA(V,A): 2.4 CM^2
BH CV ECHO MEAS - AVA(V,D): 2.4 CM^2
BH CV ECHO MEAS - BSA(HAYCOCK): 1.8 M^2
BH CV ECHO MEAS - BSA: 1.8 M^2
BH CV ECHO MEAS - BZI_BMI: 27.5 KILOGRAMS/M^2
BH CV ECHO MEAS - BZI_METRIC_HEIGHT: 162.6 CM
BH CV ECHO MEAS - BZI_METRIC_WEIGHT: 72.6 KG
BH CV ECHO MEAS - EDV(MOD-SP2): 110 ML
BH CV ECHO MEAS - EDV(MOD-SP4): 90 ML
BH CV ECHO MEAS - EDV(TEICH): 72.4 ML
BH CV ECHO MEAS - EF(CUBED): 88.9 %
BH CV ECHO MEAS - EF(MOD-BP): 67 %
BH CV ECHO MEAS - EF(MOD-SP2): 79.1 %
BH CV ECHO MEAS - EF(MOD-SP4): 76.7 %
BH CV ECHO MEAS - EF(TEICH): 83.6 %
BH CV ECHO MEAS - ESV(MOD-SP2): 23 ML
BH CV ECHO MEAS - ESV(MOD-SP4): 21 ML
BH CV ECHO MEAS - ESV(TEICH): 11.9 ML
BH CV ECHO MEAS - FS: 52 %
BH CV ECHO MEAS - IVS/LVPW: 1.2
BH CV ECHO MEAS - IVSD: 0.86 CM
BH CV ECHO MEAS - LAT PEAK E' VEL: 16.2 CM/SEC
BH CV ECHO MEAS - LV DIASTOLIC VOL/BSA (35-75): 50.6 ML/M^2
BH CV ECHO MEAS - LV MASS(C)D: 96.9 GRAMS
BH CV ECHO MEAS - LV MASS(C)DI: 54.5 GRAMS/M^2
BH CV ECHO MEAS - LV MAX PG: 5.6 MMHG
BH CV ECHO MEAS - LV MEAN PG: 3.1 MMHG
BH CV ECHO MEAS - LV SYSTOLIC VOL/BSA (12-30): 11.8 ML/M^2
BH CV ECHO MEAS - LV V1 MAX: 118.7 CM/SEC
BH CV ECHO MEAS - LV V1 MEAN: 83.8 CM/SEC
BH CV ECHO MEAS - LV V1 VTI: 25.9 CM
BH CV ECHO MEAS - LVIDD: 4.1 CM
BH CV ECHO MEAS - LVIDS: 1.9 CM
BH CV ECHO MEAS - LVLD AP2: 8.5 CM
BH CV ECHO MEAS - LVLD AP4: 8.5 CM
BH CV ECHO MEAS - LVLS AP2: 6.3 CM
BH CV ECHO MEAS - LVLS AP4: 6.7 CM
BH CV ECHO MEAS - LVOT AREA (M): 2.8 CM^2
BH CV ECHO MEAS - LVOT AREA: 2.9 CM^2
BH CV ECHO MEAS - LVOT DIAM: 1.9 CM
BH CV ECHO MEAS - LVPWD: 0.75 CM
BH CV ECHO MEAS - MED PEAK E' VEL: 9.1 CM/SEC
BH CV ECHO MEAS - MR MAX PG: 29.2 MMHG
BH CV ECHO MEAS - MR MAX VEL: 270.1 CM/SEC
BH CV ECHO MEAS - MV A DUR: 0.17 SEC
BH CV ECHO MEAS - MV A MAX VEL: 64.3 CM/SEC
BH CV ECHO MEAS - MV DEC SLOPE: 662.1 CM/SEC^2
BH CV ECHO MEAS - MV DEC TIME: 0.25 SEC
BH CV ECHO MEAS - MV E MAX VEL: 88.8 CM/SEC
BH CV ECHO MEAS - MV E/A: 1.4
BH CV ECHO MEAS - MV MAX PG: 3.6 MMHG
BH CV ECHO MEAS - MV MEAN PG: 1.4 MMHG
BH CV ECHO MEAS - MV P1/2T MAX VEL: 105.9 CM/SEC
BH CV ECHO MEAS - MV P1/2T: 46.8 MSEC
BH CV ECHO MEAS - MV V2 MAX: 95 CM/SEC
BH CV ECHO MEAS - MV V2 MEAN: 55.7 CM/SEC
BH CV ECHO MEAS - MV V2 VTI: 29.1 CM
BH CV ECHO MEAS - MVA P1/2T LCG: 2.1 CM^2
BH CV ECHO MEAS - MVA(P1/2T): 4.7 CM^2
BH CV ECHO MEAS - MVA(VTI): 2.6 CM^2
BH CV ECHO MEAS - PA ACC TIME: 0.14 SEC
BH CV ECHO MEAS - PA MAX PG (FULL): 3.4 MMHG
BH CV ECHO MEAS - PA MAX PG: 6 MMHG
BH CV ECHO MEAS - PA PR(ACCEL): 14.8 MMHG
BH CV ECHO MEAS - PA V2 MAX: 122.2 CM/SEC
BH CV ECHO MEAS - PULM A REVS DUR: 0.13 SEC
BH CV ECHO MEAS - PULM A REVS VEL: 38.8 CM/SEC
BH CV ECHO MEAS - PULM DIAS VEL: 39.3 CM/SEC
BH CV ECHO MEAS - PULM S/D: 1.4
BH CV ECHO MEAS - PULM SYS VEL: 55.6 CM/SEC
BH CV ECHO MEAS - PVA(V,A): 2.1 CM^2
BH CV ECHO MEAS - PVA(V,D): 2.1 CM^2
BH CV ECHO MEAS - QP/QS: 0.84
BH CV ECHO MEAS - RAP SYSTOLE: 8 MMHG
BH CV ECHO MEAS - RV MAX PG: 2.5 MMHG
BH CV ECHO MEAS - RV MEAN PG: 1.7 MMHG
BH CV ECHO MEAS - RV V1 MAX: 79.7 CM/SEC
BH CV ECHO MEAS - RV V1 MEAN: 61.5 CM/SEC
BH CV ECHO MEAS - RV V1 VTI: 19.5 CM
BH CV ECHO MEAS - RVOT AREA: 3.2 CM^2
BH CV ECHO MEAS - RVOT DIAM: 2 CM
BH CV ECHO MEAS - RVSP: 21.8 MMHG
BH CV ECHO MEAS - SI(AO): 105.6 ML/M^2
BH CV ECHO MEAS - SI(CUBED): 33.4 ML/M^2
BH CV ECHO MEAS - SI(LVOT): 42 ML/M^2
BH CV ECHO MEAS - SI(MOD-SP2): 48.9 ML/M^2
BH CV ECHO MEAS - SI(MOD-SP4): 38.8 ML/M^2
BH CV ECHO MEAS - SI(TEICH): 34 ML/M^2
BH CV ECHO MEAS - SV(AO): 187.9 ML
BH CV ECHO MEAS - SV(CUBED): 59.4 ML
BH CV ECHO MEAS - SV(LVOT): 74.7 ML
BH CV ECHO MEAS - SV(MOD-SP2): 87 ML
BH CV ECHO MEAS - SV(MOD-SP4): 69 ML
BH CV ECHO MEAS - SV(RVOT): 63 ML
BH CV ECHO MEAS - SV(TEICH): 60.5 ML
BH CV ECHO MEAS - TR MAX VEL: 186 CM/SEC
BH CV ECHO MEASUREMENTS AVERAGE E/E' RATIO: 7.02
BH CV STRESS BP STAGE 1: NORMAL
BH CV STRESS BP STAGE 2: NORMAL
BH CV STRESS DURATION MIN STAGE 1: 3
BH CV STRESS DURATION MIN STAGE 2: 3
BH CV STRESS DURATION MIN STAGE 3: 0
BH CV STRESS DURATION SEC STAGE 1: 0
BH CV STRESS DURATION SEC STAGE 2: 0
BH CV STRESS DURATION SEC STAGE 3: 41
BH CV STRESS ECHO POST STRESS EJECTION FRACTION EF: 78 %
BH CV STRESS GRADE STAGE 1: 10
BH CV STRESS GRADE STAGE 2: 12
BH CV STRESS GRADE STAGE 3: 14
BH CV STRESS HR STAGE 1: 119
BH CV STRESS HR STAGE 2: 150
BH CV STRESS HR STAGE 3: 156
BH CV STRESS METS STAGE 1: 5
BH CV STRESS METS STAGE 2: 7.5
BH CV STRESS METS STAGE 3: 10
BH CV STRESS PROTOCOL 1: NORMAL
BH CV STRESS SPEED STAGE 1: 1.7
BH CV STRESS SPEED STAGE 2: 2.5
BH CV STRESS SPEED STAGE 3: 3.4
BH CV STRESS STAGE 1: 1
BH CV STRESS STAGE 2: 2
BH CV STRESS STAGE 3: 3
BH CV XLRA - RV BASE: 2.5 CM
BH CV XLRA - RV LENGTH: 5.7 CM
BH CV XLRA - RV MID: 1.5 CM
BH CV XLRA - TDI S': 11.9 CM/SEC
BILIRUB SERPL-MCNC: 0.4 MG/DL (ref 0–1.2)
BUN SERPL-MCNC: 15 MG/DL (ref 8–23)
BUN/CREAT SERPL: 17.9 (ref 7–25)
CALCIUM SPEC-SCNC: 9.5 MG/DL (ref 8.6–10.5)
CHLORIDE SERPL-SCNC: 102 MMOL/L (ref 98–107)
CO2 SERPL-SCNC: 29.2 MMOL/L (ref 22–29)
CREAT SERPL-MCNC: 0.84 MG/DL (ref 0.57–1)
D DIMER PPP FEU-MCNC: <0.27 MCGFEU/ML (ref 0–0.49)
DEPRECATED RDW RBC AUTO: 41.6 FL (ref 37–54)
EOSINOPHIL # BLD AUTO: 0.07 10*3/MM3 (ref 0–0.4)
EOSINOPHIL NFR BLD AUTO: 1.6 % (ref 0.3–6.2)
ERYTHROCYTE [DISTWIDTH] IN BLOOD BY AUTOMATED COUNT: 12 % (ref 12.3–15.4)
GFR SERPL CREATININE-BSD FRML MDRD: 69 ML/MIN/1.73
GLOBULIN UR ELPH-MCNC: 2.6 GM/DL
GLUCOSE SERPL-MCNC: 92 MG/DL (ref 65–99)
HCT VFR BLD AUTO: 39.5 % (ref 34–46.6)
HGB BLD-MCNC: 13.2 G/DL (ref 12–15.9)
IMM GRANULOCYTES # BLD AUTO: 0.01 10*3/MM3 (ref 0–0.05)
IMM GRANULOCYTES NFR BLD AUTO: 0.2 % (ref 0–0.5)
LEFT ATRIUM VOLUME INDEX: 15 ML/M2
LYMPHOCYTES # BLD AUTO: 1.73 10*3/MM3 (ref 0.7–3.1)
LYMPHOCYTES NFR BLD AUTO: 39.9 % (ref 19.6–45.3)
MAXIMAL PREDICTED HEART RATE: 159 BPM
MCH RBC QN AUTO: 31.1 PG (ref 26.6–33)
MCHC RBC AUTO-ENTMCNC: 33.4 G/DL (ref 31.5–35.7)
MCV RBC AUTO: 92.9 FL (ref 79–97)
MONOCYTES # BLD AUTO: 0.48 10*3/MM3 (ref 0.1–0.9)
MONOCYTES NFR BLD AUTO: 11.1 % (ref 5–12)
NEUTROPHILS NFR BLD AUTO: 2.01 10*3/MM3 (ref 1.7–7)
NEUTROPHILS NFR BLD AUTO: 46.3 % (ref 42.7–76)
NRBC BLD AUTO-RTO: 0 /100 WBC (ref 0–0.2)
NT-PROBNP SERPL-MCNC: 47 PG/ML (ref 0–900)
PERCENT MAX PREDICTED HR: 98.11 %
PLATELET # BLD AUTO: 278 10*3/MM3 (ref 140–450)
PMV BLD AUTO: 9.6 FL (ref 6–12)
POTASSIUM SERPL-SCNC: 3.5 MMOL/L (ref 3.5–5.2)
PROT SERPL-MCNC: 7.1 G/DL (ref 6–8.5)
RBC # BLD AUTO: 4.25 10*6/MM3 (ref 3.77–5.28)
SINUS: 2.4 CM
SODIUM SERPL-SCNC: 140 MMOL/L (ref 136–145)
STJ: 2.4 CM
STRESS BASELINE BP: NORMAL MMHG
STRESS BASELINE HR: 87 BPM
STRESS PERCENT HR: 115 %
STRESS POST ESTIMATED WORKLOAD: 8 METS
STRESS POST EXERCISE DUR MIN: 6 MIN
STRESS POST EXERCISE DUR SEC: 41 SEC
STRESS POST PEAK BP: NORMAL MMHG
STRESS POST PEAK HR: 156 BPM
STRESS TARGET HR: 135 BPM
TROPONIN T SERPL-MCNC: <0.01 NG/ML (ref 0–0.03)
WBC # BLD AUTO: 4.34 10*3/MM3 (ref 3.4–10.8)

## 2021-05-26 PROCEDURE — 36415 COLL VENOUS BLD VENIPUNCTURE: CPT

## 2021-05-26 PROCEDURE — 93017 CV STRESS TEST TRACING ONLY: CPT

## 2021-05-26 PROCEDURE — 93350 STRESS TTE ONLY: CPT | Performed by: INTERNAL MEDICINE

## 2021-05-26 PROCEDURE — 93018 CV STRESS TEST I&R ONLY: CPT | Performed by: INTERNAL MEDICINE

## 2021-05-26 PROCEDURE — 99214 OFFICE O/P EST MOD 30 MIN: CPT | Performed by: INTERNAL MEDICINE

## 2021-05-26 PROCEDURE — 93320 DOPPLER ECHO COMPLETE: CPT

## 2021-05-26 PROCEDURE — 85025 COMPLETE CBC W/AUTO DIFF WBC: CPT

## 2021-05-26 PROCEDURE — 93320 DOPPLER ECHO COMPLETE: CPT | Performed by: INTERNAL MEDICINE

## 2021-05-26 PROCEDURE — 84484 ASSAY OF TROPONIN QUANT: CPT | Performed by: INTERNAL MEDICINE

## 2021-05-26 PROCEDURE — 93350 STRESS TTE ONLY: CPT

## 2021-05-26 PROCEDURE — 94760 N-INVAS EAR/PLS OXIMETRY 1: CPT

## 2021-05-26 PROCEDURE — 85379 FIBRIN DEGRADATION QUANT: CPT | Performed by: INTERNAL MEDICINE

## 2021-05-26 PROCEDURE — 93016 CV STRESS TEST SUPVJ ONLY: CPT | Performed by: INTERNAL MEDICINE

## 2021-05-26 PROCEDURE — 93005 ELECTROCARDIOGRAM TRACING: CPT | Performed by: INTERNAL MEDICINE

## 2021-05-26 PROCEDURE — 93352 ADMIN ECG CONTRAST AGENT: CPT | Performed by: INTERNAL MEDICINE

## 2021-05-26 PROCEDURE — 80053 COMPREHEN METABOLIC PANEL: CPT

## 2021-05-26 PROCEDURE — 83880 ASSAY OF NATRIURETIC PEPTIDE: CPT | Performed by: INTERNAL MEDICINE

## 2021-05-26 PROCEDURE — 25010000002 PERFLUTREN (DEFINITY) 8.476 MG IN SODIUM CHLORIDE (PF) 0.9 % 10 ML INJECTION: Performed by: INTERNAL MEDICINE

## 2021-05-26 PROCEDURE — 93325 DOPPLER ECHO COLOR FLOW MAPG: CPT

## 2021-05-26 PROCEDURE — 93325 DOPPLER ECHO COLOR FLOW MAPG: CPT | Performed by: INTERNAL MEDICINE

## 2021-05-26 RX ORDER — NITROGLYCERIN 0.4 MG/1
0.4 TABLET SUBLINGUAL
Status: DISCONTINUED | OUTPATIENT
Start: 2021-05-26 | End: 2021-09-15

## 2021-05-26 RX ORDER — SODIUM CHLORIDE 0.9 % (FLUSH) 0.9 %
10 SYRINGE (ML) INJECTION AS NEEDED
Status: DISCONTINUED | OUTPATIENT
Start: 2021-05-26 | End: 2021-09-15

## 2021-05-26 RX ORDER — CETIRIZINE HYDROCHLORIDE 10 MG/1
10 TABLET ORAL DAILY
COMMUNITY

## 2021-05-26 RX ORDER — MAGNESIUM GLUCONATE 27 MG(500)
400 TABLET ORAL DAILY PRN
COMMUNITY

## 2021-05-26 RX ADMIN — PERFLUTREN 3 ML: 6.52 INJECTION, SUSPENSION INTRAVENOUS at 14:56

## 2021-05-26 NOTE — TELEPHONE ENCOUNTER
She needs to go to [facility].  Cath lab called me. Patient is a nurse.  Please let them know.

## 2021-05-26 NOTE — TELEPHONE ENCOUNTER
"Called and left message with pt on her cell.    Called the cath lab, pt not there in \"holding\" pt just asked him to call to see who was covering the [facility].  The gentleman I spoke with is going to find her and have her call us back or have her come to the office.  Thanks  Nidia Bishop RN  Triage nurse    "

## 2021-05-26 NOTE — TELEPHONE ENCOUNTER
Dr Rodriguez,  Pt is calling in with c/o chest pain that has been intermittent for the last 3 days.  She is at work now, and works in the hospital.  She was walking to the gift shop and the chest pain came back and also was radiating to her left neck.  I have advised she go to the ER for further evaluation of symptoms.  Thanks  Nidia Bishop RN  Triage nurse

## 2021-05-26 NOTE — PROGRESS NOTES
Date of Office Visit: 2021  Encounter Provider: Rosi Thomason MD  Place of Service: [facility] CARDIOLOGY  Patient Name: Madison Acevedo  :1960      Patient ID:  Madison Acevedo is a 61 y.o. female is here for chest pain.           History of Present Illness    This is a 61-year-old female who is seen Dr. Rodriguez in the past for palpitations and elevated blood pressure.  She was also seen by me in  for palpitations had normal echocardiogram at that time.  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    She has a history of hypertension, GERD, palpitations treated with magnesium.    Her  is a patient of mine.  She has been on pseudoephedrine for years for allergies.    She had echocardiogram done 2020 which showed ejection fraction 59% with normal diastolic function and no significant valve disease.  There was a trivial pericardial effusion.      She is here today for chest pain..  She has never had chest pain like this before.  This past weekend, she began having chest pain which is in the center of her chest described as a slight pressure.  It is rating up to her jaws.  She has GERD and had increased her medications.  She is on Zegerid and Carafate for this and despite even taking extra, the pain remain.  She had pain all day long including through meals.  She did notice it seemed to get worse with activity and better with rest.  The pain did get better after she took an aspirin.  The pain is not necessarily worse with lying back or sitting up.  She has no orthopnea or PND.  She is been mildly short winded and slightly fatigued.  She has had no syncope.  Her troponin is negative, proBNP normal, potassium 3.5, CMP normal, CBC normal, normal d-dimer.     Past Medical History:   Diagnosis Date   • Arrhythmia     palpitations   • Arthritis    • Esophageal reflux    • Esophageal spasm    • Essential hypertension 2020   •  Fibrocystic disease of breast    • Hyperlipidemia    • Palpitations 11/19/2020   • PONV (postoperative nausea and vomiting)    • Vitamin D deficiency          Past Surgical History:   Procedure Laterality Date   • CHOLECYSTECTOMY     • COLONOSCOPY N/A 1/3/2018    Procedure: COLONOSCOPY TO CECUM AND TERMINAL ILEUM;  Surgeon: Jan Brannon MD;  Location: [facility] ENDOSCOPY;  Service:    • ENDOSCOPY      X2   • ENDOSCOPY N/A 1/3/2018    Procedure: ESOPHAGOGASTRODUODENOSCOPY WITH BIOPSIES;  Surgeon: Jan Brannon MD;  Location: [facility] ENDOSCOPY;  Service:    • STEREOTACTIC BIOPSY / ASPIRATION / EXCISION Left     Bx breast percutan needle core use imag guide   • THUMB ARTHROSCOPY      left thumb joint implant   • WISDOM TOOTH EXTRACTION         Current Outpatient Medications on File Prior to Encounter   Medication Sig Dispense Refill   • acetaminophen (TYLENOL) 325 MG tablet Take 650 mg by mouth Every 6 (Six) Hours As Needed for mild pain (1-3).     • amLODIPine (NORVASC) 2.5 MG tablet Take 1 tablet by mouth Daily. 90 tablet 3   • cetirizine (zyrTEC) 10 MG tablet Take 10 mg by mouth Daily.     • magnesium gluconate (MAGONATE) 500 MG tablet Take 400 mg by mouth Daily.     • naproxen sodium (ALEVE) 220 MG tablet Take 220 mg by mouth 2 (Two) Times a Day As Needed for mild pain (1-3).     • omeprazole-sodium bicarbonate (ZEGERID)  MG per capsule Take 1 capsule by mouth daily.     • sucralfate (CARAFATE) 1 g tablet Take 1 tablet by mouth 4 (Four) Times a Day. (Patient taking differently: Take 1 g by mouth As Needed.) 20 tablet 0   • fexofenadine (ALLEGRA) 180 MG tablet Take 180 mg by mouth Daily.     • Tdap (Boostrix) 5-2.5-18.5 LF-MCG/0.5 injection Inject  into the appropriate muscle as directed by prescriber. 0.5 mL 0   • VITAMIN D, CHOLECALCIFEROL, PO Take  by mouth Daily.     • [DISCONTINUED] ALLERGY SERUM INJECTION Inject  under the skin Every 14 (Fourteen) Days.     • [DISCONTINUED] Ascorbic Acid (VITAMIN C PO)  "Take 1 tablet by mouth Daily.       No current facility-administered medications on file prior to encounter.       Social History     Socioeconomic History   • Marital status:      Spouse name: Not on file   • Number of children: 2   • Years of education: Not on file   • Highest education level: Not on file   Tobacco Use   • Smoking status: Former Smoker     Years: 15.00     Types: Cigarettes     Quit date: 1990     Years since quittin.0   • Smokeless tobacco: Never Used   • Tobacco comment: caffiene - 4 cups coffee daily    Vaping Use   • Vaping Use: Never used   Substance and Sexual Activity   • Alcohol use: Yes     Alcohol/week: 2.0 standard drinks     Types: 1 Glasses of wine, 1 Shots of liquor per week     Comment: vodka daily   • Drug use: No   • Sexual activity: Defer           ROS    Procedures    ECG 12 Lead    Date/Time: 2021 1:43 PM  Performed by: Rosi Thomason MD  Authorized by: Rosi Thomason MD   Comparison: compared with previous ECG   Similar to previous ECG  Rhythm: sinus rhythm  Other findings: poor R wave progression    Clinical impression: abnormal EKG                Objective:      Vitals:    21 1237   BP: 136/86   BP Location: Right arm   Patient Position: Sitting   Pulse: 85   Temp: 98.9 °F (37.2 °C)   TempSrc: Oral   SpO2: 99%   Weight: 72.6 kg (160 lb)   Height: 162.6 cm (64\")     Body mass index is 27.46 kg/m².    Vitals reviewed.   Constitutional:       General: Not in acute distress.     Appearance: Well-developed. Not diaphoretic.   Eyes:      General: No scleral icterus.     Conjunctiva/sclera: Conjunctivae normal.   HENT:      Head: Normocephalic and atraumatic.   Neck:      Thyroid: No thyromegaly.      Vascular: No carotid bruit or JVD.      Lymphadenopathy: No cervical adenopathy.   Pulmonary:      Effort: Pulmonary effort is normal. No respiratory distress.      Breath sounds: Normal breath sounds. No wheezing. No rhonchi. No rales. "   Chest:      Chest wall: Not tender to palpatation.   Cardiovascular:      Normal rate. Regular rhythm.      Murmurs: There is no murmur.      No gallop.   Pulses:     Intact distal pulses.   Edema:     Peripheral edema absent.   Abdominal:      General: Bowel sounds are normal. There is no distension or abdominal bruit.      Palpations: Abdomen is soft. There is no abdominal mass.      Tenderness: There is no abdominal tenderness.   Musculoskeletal:         General: No deformity.      Extremities: No clubbing present.     Cervical back: Neck supple. Skin:     General: Skin is warm and dry. There is no cyanosis.      Coloration: Skin is not pale.      Findings: No rash.   Neurological:      Mental Status: Alert and oriented to person, place, and time.      Cranial Nerves: No cranial nerve deficit.   Psychiatric:         Judgment: Judgment normal.         Lab Review:       Assessment:      Diagnosis Plan   1. Essential hypertension  Adult Stress Echo W/ Cont or Stress Agent if Necessary Per Protocol   2. Precordial pain  Cardiac Monitoring    Vital Signs - Once    Vital Signs - As Needed    Pulse Oximetry    Oxygen Therapy- Nasal Cannula; Titrate for SPO2: 92%, equal to or greater than    Insert Peripheral IV    sodium chloride 0.9 % flush 10 mL    nitroglycerin (NITROSTAT) SL tablet 0.4 mg    NPO Diet    Bathroom Privileges With Assistance    CBC & Differential    Comprehensive Metabolic Panel    Troponin T    D-Dimer    proBNP    ECG 12 Lead    Cardiac Monitoring    Vital Signs - Once    Insert Peripheral IV    NPO Diet    Bathroom Privileges With Assistance    Troponin T    Troponin T    D-Dimer    D-Dimer    proBNP    proBNP    Adult Stress Echo W/ Cont or Stress Agent if Necessary Per Protocol   3. Shortness of breath  Cardiac Monitoring    Vital Signs - Once    Vital Signs - As Needed    Pulse Oximetry    Oxygen Therapy- Nasal Cannula; Titrate for SPO2: 92%, equal to or greater than    Insert Peripheral IV     sodium chloride 0.9 % flush 10 mL    nitroglycerin (NITROSTAT) SL tablet 0.4 mg    NPO Diet    Bathroom Privileges With Assistance    CBC & Differential    Comprehensive Metabolic Panel    Troponin T    D-Dimer    proBNP    ECG 12 Lead    Cardiac Monitoring    Vital Signs - Once    Insert Peripheral IV    NPO Diet    Bathroom Privileges With Assistance    Troponin T    Troponin T    D-Dimer    D-Dimer    proBNP    proBNP    Adult Stress Echo W/ Cont or Stress Agent if Necessary Per Protocol     1. Chest pain, no evidence of acute coronary syndrome, is worse with activity but lasting all day long and together with aspirin.  I would be concerned.  She does have family history of coronary disease, will set for stress echocardiogram today.  2. Hypertension, blood pressure is under fair control.  Diastolic pressure slightly high here today.  3. GERD, fairly severe and follows Dr. Brannon  4. Family history of coronary disease, dad had a myocardial infarction in his early 60s.     Plan:       Set up stress echo and if negative, would have her follow-up with GI.  Still waiting for D-dimer.

## 2021-05-26 NOTE — ADDENDUM NOTE
Encounter addended by: Moon Lowery RN on: 5/26/2021 4:02 PM   Actions taken: LDA properties accepted no

## 2021-05-26 NOTE — ADDENDUM NOTE
Encounter addended by: Moon Lowery RN on: 5/26/2021 1:54 PM   Actions taken: Charge Capture section accepted

## 2021-06-04 ENCOUNTER — OFFICE (OUTPATIENT)
Dept: URBAN - METROPOLITAN AREA CLINIC 65 | Facility: CLINIC | Age: 61
End: 2021-06-04

## 2021-06-04 VITALS
TEMPERATURE: 97.7 F | DIASTOLIC BLOOD PRESSURE: 72 MMHG | SYSTOLIC BLOOD PRESSURE: 110 MMHG | WEIGHT: 160 LBS | HEIGHT: 64 IN | HEART RATE: 99 BPM

## 2021-06-04 DIAGNOSIS — Z80.0 FAMILY HISTORY OF MALIGNANT NEOPLASM OF DIGESTIVE ORGANS: ICD-10-CM

## 2021-06-04 DIAGNOSIS — K21.9 GASTRO-ESOPHAGEAL REFLUX DISEASE WITHOUT ESOPHAGITIS: ICD-10-CM

## 2021-06-04 DIAGNOSIS — R07.9 CHEST PAIN, UNSPECIFIED: ICD-10-CM

## 2021-06-04 DIAGNOSIS — K22.4 DYSKINESIA OF ESOPHAGUS: ICD-10-CM

## 2021-06-04 PROCEDURE — 99213 OFFICE O/P EST LOW 20 MIN: CPT | Performed by: INTERNAL MEDICINE

## 2021-06-04 RX ORDER — OMEPRAZOLE AND SODIUM BICARBONATE 40; 1100 MG/1; MG/1
40 CAPSULE ORAL
Qty: 30 | Refills: 11 | Status: ACTIVE
Start: 2021-06-04 | End: 2021-06-04 | Stop reason: CLARIF

## 2021-06-04 RX ORDER — CYCLOBENZAPRINE HYDROCHLORIDE 5 MG/1
15 TABLET, FILM COATED ORAL
Qty: 30 | Refills: 3 | Status: ACTIVE
Start: 2021-06-04 | End: 2022-06-10

## 2021-06-04 RX ORDER — AMITRIPTYLINE HYDROCHLORIDE 25 MG/1
25 TABLET, FILM COATED ORAL
Qty: 30 | Refills: 11 | Status: ACTIVE
Start: 2020-06-12 | End: 2021-06-04 | Stop reason: CLARIF

## 2021-09-13 ENCOUNTER — TELEMEDICINE (OUTPATIENT)
Dept: FAMILY MEDICINE CLINIC | Facility: TELEHEALTH | Age: 61
End: 2021-09-13

## 2021-09-13 DIAGNOSIS — Z20.822 SUSPECTED COVID-19 VIRUS INFECTION: Primary | ICD-10-CM

## 2021-09-13 PROCEDURE — BHEMPVIDEOVISIT: Performed by: NURSE PRACTITIONER

## 2021-09-13 NOTE — PROGRESS NOTES
CHIEF COMPLAINT  Chief Complaint   Patient presents with   • covid test         HPI  Madison Acevedo is a 61 y.o. female  presents with complaint of 1 day history cough, runny nose, sore throat, headache.  Denies fever, loss of taste or smell    Fully vaccinated for COVID-19     employee    Review of Systems   Constitutional: Negative for fatigue and fever.   HENT: Positive for sore throat.    Respiratory: Positive for cough.    Neurological: Positive for headaches.   All other systems reviewed and are negative.      Past Medical History:   Diagnosis Date   • Arrhythmia     palpitations   • Arthritis    • Esophageal reflux    • Esophageal spasm    • Essential hypertension 2020   • Fibrocystic disease of breast    • Hyperlipidemia    • Palpitations 2020   • PONV (postoperative nausea and vomiting)    • Vitamin D deficiency        Family History   Problem Relation Age of Onset   • Heart disease Father         MI/CABG early 60s   • Hypertension Father    • Lung cancer Father    • Stroke Father    • GIACOMO disease Sister         esophageal reflux   • COPD Sister    • Hypertension Sister    • Esophageal cancer Mother    • Malig Hyperthermia Neg Hx        Social History     Socioeconomic History   • Marital status:      Spouse name: Not on file   • Number of children: 2   • Years of education: Not on file   • Highest education level: Not on file   Tobacco Use   • Smoking status: Former Smoker     Years: 15.00     Types: Cigarettes     Quit date: 1990     Years since quittin.4   • Smokeless tobacco: Never Used   • Tobacco comment: caffiene - 4 cups coffee daily    Vaping Use   • Vaping Use: Never used   Substance and Sexual Activity   • Alcohol use: Yes     Alcohol/week: 2.0 standard drinks     Types: 1 Glasses of wine, 1 Shots of liquor per week     Comment: vodka daily   • Drug use: No   • Sexual activity: Defer         There were no vitals taken for this visit.    PHYSICAL EXAM  Physical Exam    Constitutional: She is oriented to person, place, and time. She appears well-developed and well-nourished.   HENT:   Head: Normocephalic and atraumatic.   Pulmonary/Chest: Effort normal.  No respiratory distress.  Neurological: She is alert and oriented to person, place, and time.         Diagnoses and all orders for this visit:    1. Suspected COVID-19 virus infection (Primary)  -     COVID-19,[facility] ROUTINE, NP/OP SWAB IN TRANSPORT MEDIA OR ESWAB 72 HR TAT - Swab, Nasopharynx; Future    --COVID-19 test to rule out infection  --instructed to notify  when result is back      FOLLOW-UP  As discussed during visit with PCP/[facility] if no improvement or Urgent Care/Emergency Department if worsening of symptoms    Patient verbalizes understanding of medication dosage, comfort measures, instructions for treatment and follow-up.    Angi Rooney, SIMONE  09/13/2021  07:59 EDT    This visit was performed via Telehealth.  This patient has been instructed to follow-up with their primary care provider if their symptoms worsen or the treatment provided does not resolve their illness.

## 2021-09-15 ENCOUNTER — OFFICE VISIT (OUTPATIENT)
Dept: FAMILY MEDICINE CLINIC | Facility: CLINIC | Age: 61
End: 2021-09-15

## 2021-09-15 VITALS
SYSTOLIC BLOOD PRESSURE: 110 MMHG | HEIGHT: 64 IN | OXYGEN SATURATION: 98 % | WEIGHT: 162 LBS | TEMPERATURE: 99 F | HEART RATE: 103 BPM | DIASTOLIC BLOOD PRESSURE: 82 MMHG | BODY MASS INDEX: 27.66 KG/M2

## 2021-09-15 DIAGNOSIS — R50.9 FEVER, UNSPECIFIED FEVER CAUSE: ICD-10-CM

## 2021-09-15 DIAGNOSIS — Z20.822 SUSPECTED 2019 NOVEL CORONAVIRUS INFECTION: Primary | ICD-10-CM

## 2021-09-15 DIAGNOSIS — R52 BODY ACHES: ICD-10-CM

## 2021-09-15 DIAGNOSIS — R05.9 COUGH: ICD-10-CM

## 2021-09-15 LAB
EXPIRATION DATE: NORMAL
FLUAV AG NPH QL: NEGATIVE
FLUBV AG NPH QL: NEGATIVE
INTERNAL CONTROL: NORMAL
Lab: NORMAL

## 2021-09-15 PROCEDURE — 99214 OFFICE O/P EST MOD 30 MIN: CPT | Performed by: NURSE PRACTITIONER

## 2021-09-15 PROCEDURE — 87804 INFLUENZA ASSAY W/OPTIC: CPT | Performed by: NURSE PRACTITIONER

## 2021-09-15 NOTE — PROGRESS NOTES
Chief Complaint  Fever (9/12), Cough, Muscle Pain, and Nasal Congestion    Subjective          Madison Acevedo presents to [facility]  Fever   This is a new problem. The current episode started in the past 7 days (2  days ago, 101.4). The problem occurs 2 to 4 times per day. The problem has been unchanged. The maximum temperature noted was 101 to 101.9 F. Associated symptoms include congestion, coughing, muscle aches and a sore throat. Pertinent negatives include no ear pain. Associated symptoms comments: Chest tightness. She has tried acetaminophen and NSAIDs for the symptoms. The treatment provided moderate relief.   Cough  This is a new problem. The current episode started in the past 7 days (2 days ago). The problem has been unchanged. The problem occurs every few minutes. Associated symptoms include chills, nasal congestion, postnasal drip and a sore throat. Pertinent negatives include no ear pain. Associated symptoms comments: Chest tightness. Nothing aggravates the symptoms. She has tried OTC cough suppressant (mucinex) for the symptoms. The treatment provided mild relief.       I have reviewed the patient's medical history in detail and updated the computerized patient record.    Current Outpatient Medications:   •  acetaminophen (TYLENOL) 325 MG tablet, Take 650 mg by mouth Every 6 (Six) Hours As Needed for mild pain (1-3)., Disp: , Rfl:   •  amLODIPine (NORVASC) 2.5 MG tablet, Take 1 tablet by mouth Daily., Disp: 90 tablet, Rfl: 3  •  cetirizine (zyrTEC) 10 MG tablet, Take 10 mg by mouth Daily., Disp: , Rfl:   •  cyclobenzaprine (FLEXERIL) 5 MG tablet, Take 1 tablet by mouth three times a day as needed abd wall pain, Disp: 30 tablet, Rfl: 3  •  fexofenadine (ALLEGRA) 180 MG tablet, Take 180 mg by mouth Daily., Disp: , Rfl:   •  magnesium gluconate (MAGONATE) 500 MG tablet, Take 400 mg by mouth Daily As Needed., Disp: , Rfl:   •  naproxen sodium (ALEVE) 220 MG tablet, Take 220  "mg by mouth 2 (Two) Times a Day As Needed for mild pain (1-3)., Disp: , Rfl:   •  omeprazole-sodium bicarbonate (ZEGERID)  MG per capsule, Take 1 capsule by mouth Daily., Disp: 30 capsule, Rfl: 11  •  sucralfate (CARAFATE) 1 g tablet, Take 1 tablet by mouth 4 (Four) Times a Day. (Patient taking differently: Take 1 g by mouth As Needed.), Disp: 20 tablet, Rfl: 0  •  sucralfate (CARAFATE) 1 g tablet, Take 1 tablet by mouth three times a day before meals for 30 days, Disp: 90 tablet, Rfl: 3  •  VITAMIN D, CHOLECALCIFEROL, PO, Take  by mouth Daily., Disp: , Rfl:   No current facility-administered medications for this visit.       Objective   Vital Signs:   /82 (BP Location: Left arm, Patient Position: Sitting, Cuff Size: Adult)   Pulse 103   Temp 99 °F (37.2 °C) (Oral)   Ht 162.6 cm (64\")   Wt 73.5 kg (162 lb)   SpO2 98%   BMI 27.81 kg/m²     Physical Exam  Vitals reviewed.   Constitutional:       Appearance: Normal appearance. She is normal weight.   Cardiovascular:      Rate and Rhythm: Normal rate and regular rhythm.      Pulses: Normal pulses.      Heart sounds: Normal heart sounds.   Pulmonary:      Effort: Pulmonary effort is normal.      Breath sounds: Normal breath sounds.   Neurological:      Mental Status: She is oriented to person, place, and time.        Result Review :                 Assessment and Plan    Diagnoses and all orders for this visit:    1. Suspected 2019 novel coronavirus infection (Primary)  -     COVID-19,[facility] ROUTINE, NP/OP SWAB IN TRANSPORT MEDIA OR ESWAB 72 HR TAT - Swab, Oropharynx    2. Fever, unspecified fever cause  -     POCT Influenza A/B    3. Cough  -     POCT Influenza A/B    4. Body aches  -     POCT Influenza A/B    Ms. Acevedo presents today with cough and fever for the past few days. She was exposed to COVID by a family member last week. She did have a covid test 3 days ago which was negative.   I will repeat the COVID test today. She has been vaccinated " against COVID 8 months ago.   Her POC influenza was negative.   She is to manage her symptoms with OTC medications for now.     Follow Up   No follow-ups on file.  Patient was given instructions and counseling regarding her condition or for health maintenance advice. Please see specific information pulled into the AVS if appropriate.

## 2021-09-16 LAB
LABCORP SARS-COV-2, NAA 2 DAY TAT: NORMAL
SARS-COV-2 RNA RESP QL NAA+PROBE: DETECTED

## 2021-09-17 ENCOUNTER — TELEPHONE (OUTPATIENT)
Dept: FAMILY MEDICINE CLINIC | Facility: CLINIC | Age: 61
End: 2021-09-17

## 2021-09-17 NOTE — TELEPHONE ENCOUNTER
S/w patient and gave her the results and asked her about the infusion.  Patient stated at this time her symptoms are lessening and she will decline the infusions at this time.

## 2021-09-17 NOTE — TELEPHONE ENCOUNTER
----- Message from SIMONE Clements sent at 9/17/2021  8:46 AM EDT -----  Please let her know she is positive for covid. Ask her if she wants the mononucleal antibody infusion?

## 2021-11-02 RX ORDER — AMLODIPINE BESYLATE 2.5 MG/1
2.5 TABLET ORAL DAILY
Qty: 90 TABLET | Refills: 3 | Status: SHIPPED | OUTPATIENT
Start: 2021-11-02 | End: 2022-11-14 | Stop reason: SDUPTHER

## 2021-12-03 ENCOUNTER — OFFICE VISIT (OUTPATIENT)
Dept: CARDIOLOGY | Facility: CLINIC | Age: 61
End: 2021-12-03

## 2021-12-03 VITALS
WEIGHT: 165 LBS | SYSTOLIC BLOOD PRESSURE: 120 MMHG | DIASTOLIC BLOOD PRESSURE: 68 MMHG | HEART RATE: 85 BPM | BODY MASS INDEX: 28.17 KG/M2 | HEIGHT: 64 IN

## 2021-12-03 DIAGNOSIS — R00.2 PALPITATIONS: ICD-10-CM

## 2021-12-03 DIAGNOSIS — I10 ESSENTIAL HYPERTENSION: ICD-10-CM

## 2021-12-03 DIAGNOSIS — R07.89 CHEST PAIN, ATYPICAL: Primary | ICD-10-CM

## 2021-12-03 PROCEDURE — 93000 ELECTROCARDIOGRAM COMPLETE: CPT | Performed by: NURSE PRACTITIONER

## 2021-12-03 PROCEDURE — 99214 OFFICE O/P EST MOD 30 MIN: CPT | Performed by: NURSE PRACTITIONER

## 2022-02-10 ENCOUNTER — TELEPHONE (OUTPATIENT)
Dept: FAMILY MEDICINE CLINIC | Facility: CLINIC | Age: 62
End: 2022-02-10

## 2022-02-10 ENCOUNTER — TELEMEDICINE (OUTPATIENT)
Dept: FAMILY MEDICINE CLINIC | Facility: TELEHEALTH | Age: 62
End: 2022-02-10

## 2022-02-10 VITALS — TEMPERATURE: 100.8 F

## 2022-02-10 DIAGNOSIS — Z20.822 ENCOUNTER BY TELEHEALTH FOR SUSPECTED COVID-19: Primary | ICD-10-CM

## 2022-02-10 DIAGNOSIS — R05.9 COUGH: ICD-10-CM

## 2022-02-10 PROCEDURE — 99212 OFFICE O/P EST SF 10 MIN: CPT | Performed by: NURSE PRACTITIONER

## 2022-02-10 NOTE — PROGRESS NOTES
You have chosen to receive care through a telehealth visit.  Do you consent to use a video/audio connection for your medical care today? Yes     CHIEF COMPLAINT  Chief Complaint   Patient presents with   • Fever   • Cough         HPI  Madison Acevedo is a 62 y.o. female  presents with complaint of 2 day h/o cough and congestion with low grade fever of 100.8. She is taking OTC medication for symptoms.     Review of Systems   Constitutional: Positive for fever.   HENT: Positive for congestion.    Eyes: Negative.    Respiratory: Positive for cough.    Cardiovascular: Negative.    Gastrointestinal: Negative.    Musculoskeletal: Negative.    Neurological: Negative.    Psychiatric/Behavioral: Negative.        Past Medical History:   Diagnosis Date   • Arrhythmia     palpitations   • Arthritis    • COVID-19 2021   • Esophageal reflux    • Esophageal spasm    • Essential hypertension 2020   • Fibrocystic disease of breast    • Hyperlipidemia    • Palpitations 2020   • PONV (postoperative nausea and vomiting)    • Vitamin D deficiency        Family History   Problem Relation Age of Onset   • Heart disease Father         MI/CABG early 60s   • Hypertension Father    • Lung cancer Father    • Stroke Father    • GIACOMO disease Sister         esophageal reflux   • COPD Sister    • Hypertension Sister    • Esophageal cancer Mother    • Malig Hyperthermia Neg Hx        Social History     Socioeconomic History   • Marital status:    • Number of children: 2   Tobacco Use   • Smoking status: Former Smoker     Packs/day: 0.00     Years: 15.00     Pack years: 0.00     Types: Cigarettes     Quit date: 1990     Years since quittin.8   • Smokeless tobacco: Never Used   • Tobacco comment: Have not smoked in 30 years   Vaping Use   • Vaping Use: Never used   Substance and Sexual Activity   • Alcohol use: Yes     Alcohol/week: 7.0 standard drinks     Types: 7 Glasses of wine per week     Comment: vodka daily   •  Drug use: No   • Sexual activity: Yes     Partners: Male     Birth control/protection: Post-menopausal         Temp (!) 100.8 °F (38.2 °C)     PHYSICAL EXAM  Physical Exam   Constitutional: She is oriented to person, place, and time. She appears well-developed and well-nourished. She does not have a sickly appearance. She does not appear ill. No distress.   HENT:   Head: Normocephalic and atraumatic.   Right Ear: Hearing normal.   Left Ear: Hearing normal.   Nose: Rhinorrhea and congestion present. Right sinus exhibits no maxillary sinus tenderness and no frontal sinus tenderness. Left sinus exhibits no maxillary sinus tenderness and no frontal sinus tenderness.   Mouth/Throat: Mouth/Lips are normal.  Pulmonary/Chest: Effort normal.  No respiratory distress.  Neurological: She is alert and oriented to person, place, and time.   Psychiatric: She has a normal mood and affect.   Vitals reviewed.      Results for orders placed or performed in visit on 09/15/21   COVID-19,[facility] ROUTINE, NP/OP SWAB IN TRANSPORT MEDIA OR ESWAB 72 HR TAT - Swab, Oropharynx    Specimen: Oropharynx; Swab    SWAB  RELEASE TO PATIE   Result Value Ref Range    SARS-CoV-2, OLMAN Detected (A) Not Detected   SARS-CoV-2, OLMAN 2 DAY TAT - ,    SWAB  RELEASE TO PATIE   Result Value Ref Range    [facility] SARS-COV-2, OLMAN 2 DAY TAT Performed    POCT Influenza A/B    Specimen: Swab   Result Value Ref Range    Rapid Influenza A Ag Negative Negative    Rapid Influenza B Ag Negative Negative    Internal Control Passed Passed    Lot Number [id number]     Expiration Date 4/28/2022        Diagnoses and all orders for this visit:    1. Encounter by telehealth for suspected COVID-19 (Primary)  -     COVID-19,[facility] ROUTINE, NP/OP SWAB IN TRANSPORT MEDIA OR ESWAB 72 HR TAT - Swab, Anterior nasal; Future  -     QUESTIONNAIRE SERIES    2. Cough    Mucinex DM as directed every 12 hours prn cough and congestion.  Increase fluids and rest  Quarantine as directed   Notify  [facility] of test results.           FOLLOW-UP  As discussed during visit with PCP/[facility] if no improvement or Urgent Care/Emergency Department if worsening of symptoms    Patient verbalizes understanding of medication dosage, comfort measures, instructions for treatment and follow-up.    Mercy Larkin, APRN  02/10/2022  17:58 EST    This visit was performed via Telehealth.  This patient has been instructed to follow-up with their primary care provider if their symptoms worsen or the treatment provided does not resolve their illness.

## 2022-02-10 NOTE — TELEPHONE ENCOUNTER
Low grade fever, and coughing. Rapid test negative. She is requesting a Flu test. Asked that she present to  for testing. Paulette is currently out of the office. She voiced understanding

## 2022-02-10 NOTE — PATIENT INSTRUCTIONS
Cough, Adult  A cough helps to clear your throat and lungs. A cough may be a sign of an illness or another medical condition.  An acute cough may only last 2-3 weeks, while a chronic cough may last 8 or more weeks.  Many things can cause a cough. They include:  · Germs (viruses or bacteria) that attack the airway.  · Breathing in things that bother (irritate) your lungs.  · Allergies.  · Asthma.  · Mucus that runs down the back of your throat (postnasal drip).  · Smoking.  · Acid backing up from the stomach into the tube that moves food from the mouth to the stomach (gastroesophageal reflux).  · Some medicines.  · Lung problems.  · Other medical conditions, such as heart failure or a blood clot in the lung (pulmonary embolism).  Follow these instructions at home:  Medicines  · Take over-the-counter and prescription medicines only as told by your doctor.  · Talk with your doctor before you take medicines that stop a cough (cough suppressants).  Lifestyle    · Do not smoke, and try not to be around smoke. Do not use any products that contain nicotine or tobacco, such as cigarettes, e-cigarettes, and chewing tobacco. If you need help quitting, ask your doctor.  · Drink enough fluid to keep your pee (urine) pale yellow.  · Avoid caffeine.  · Do not drink alcohol if your doctor tells you not to drink.    General instructions    · Watch for any changes in your cough. Tell your doctor about them.  · Always cover your mouth when you cough.  · Stay away from things that make you cough, such as perfume, candles, campfire smoke, or cleaning products.  · If the air is dry, use a cool mist vaporizer or humidifier in your home.  · If your cough is worse at night, try using extra pillows to raise your head up higher while you sleep.  · Rest as needed.  · Keep all follow-up visits as told by your doctor. This is important.    Contact a doctor if:  · You have new symptoms.  · You cough up pus.  · Your cough does not get better after  2-3 weeks, or your cough gets worse.  · Cough medicine does not help your cough and you are not sleeping well.  · You have pain that gets worse or pain that is not helped with medicine.  · You have a fever.  · You are losing weight and you do not know why.  · You have night sweats.  Get help right away if:  · You cough up blood.  · You have trouble breathing.  · Your heartbeat is very fast.  These symptoms may be an emergency. Do not wait to see if the symptoms will go away. Get medical help right away. Call your local emergency services (911 in the [place] Do not drive yourself to the hospital.  Summary  · A cough helps to clear your throat and lungs. Many things can cause a cough.  · Take over-the-counter and prescription medicines only as told by your doctor.  · Always cover your mouth when you cough.  · Contact a doctor if you have new symptoms or you have a cough that does not get better or gets worse.  This information is not intended to replace advice given to you by your health care provider. Make sure you discuss any questions you have with your health care provider.  Document Revised: 02/05/2021 Document Reviewed: 01/06/2020  [employer] Patient Education © 2021 [employer]    10 Things You Can Do to Manage Your COVID-19 Symptoms at Home  If you have possible or confirmed COVID-19:  1. Stay home except to get medical care.  2. Monitor your symptoms carefully. If your symptoms get worse, call your healthcare provider immediately.  3. Get rest and stay hydrated.  4. If you have a medical appointment, call the healthcare provider ahead of time and tell them that you have or may have COVID-19.  5. For medical emergencies, call 911 and notify the dispatch personnel that you have or may have COVID-19.  6. Cover your cough and sneezes with a tissue or use the inside of your elbow.  7. Wash your hands often with soap and water for at least 20 seconds or clean your hands with an alcohol-based hand  that  contains at least 60% alcohol.  8. As much as possible, stay in a specific room and away from other people in your home. Also, you should use a separate bathroom, if available. If you need to be around other people in or outside of the home, wear a mask.  9. Avoid sharing personal items with other people in your household, like dishes, towels, and bedding.  10. Clean all surfaces that are touched often, like counters, tabletops, and doorknobs. Use household cleaning sprays or wipes according to the label instructions.  cdc.gov/coronavirus  07/16/2021  This information is not intended to replace advice given to you by your health care provider. Make sure you discuss any questions you have with your health care provider.  Document Revised: 08/04/2021 Document Reviewed: 08/04/2021  [employer] Patient Education © 2021 [employer]    How to Quarantine at Home  Information for Patients and Families    These instructions are for people with confirmed or suspected COVID-19 who do not need to be hospitalized and those with confirmed COVID-19 who were hospitalized and discharged to care for themselves at home.    If you were tested through the Health Department  The Health Department will monitor your wellbeing.  If it is determined that you do not need to be hospitalized and can be isolated at home, you will be monitored by staff from your local or state health department.     If you were tested through a Commercial Lab  You will need to monitor yourself and report changes in your symptoms to your doctor.  See the section below called Monitor Your Symptoms.    Follow these steps until a healthcare provider or local or state health department says you can return to your normal activities.    Stay home except to get medical care  • Restrict activities outside your home, except for getting medical care.   • Do not go to work, school, or public areas.   • Avoid using public transportation, ride-sharing, or taxis.    Separate  yourself from other people and animals in your home  People  As much as possible, you should stay in a specific room and away from other people in your home. Also, you should use a separate bathroom, if available.    Animals  You should restrict contact with pets and other animals while you are sick with COVID-19, just like you would around other people. When possible, have another member of your household care for your animals while you are sick. If you are sick with COVID-19, avoid contact with your pet, including petting, snuggling, being kissed or licked, and sharing food. If you must care for your pet or be around animals while you are sick, wash your hands before and after you interact with pets and wear a facemask. See COVID-19 and Animals for more information.    Call ahead before visiting your doctor  If you have a medical appointment, call the healthcare provider and tell them that you have or may have COVID-19. This information will help the healthcare provider’s office take steps to keep other people from getting infected or exposed.    Wear a facemask  You should wear a facemask when you are around other people (e.g., sharing a room or vehicle) or pets and before you enter a healthcare provider’s office.     If you are not able to wear a facemask (for example, because it causes trouble breathing), then people who live with you should not stay in the same room with you, or they should wear a facemask if they enter your room.    Cover your coughs and sneezes  • Cover your mouth and nose with a tissue when you cough or sneeze.   • Throw used tissues in a lined trash can.   • Immediately wash your hands with soap and water for at least 20 seconds or, if soap and water are not available, clean your hands with an alcohol-based hand  that contains at least 60% alcohol.    Clean your hands often  • Wash your hands often with soap and water for at least 20 seconds, especially after blowing your nose,  coughing, or sneezing; going to the bathroom; and before eating or preparing food.     • If soap and water are not readily available, use an alcohol-based hand  with at least 60% alcohol, covering all surfaces of your hands and rubbing them together until they feel dry.    • Soap and water are the best option if hands are visibly dirty. Avoid touching your eyes, nose, and mouth with unwashed hands.    Avoid sharing personal household items  • You should not share dishes, drinking glasses, cups, eating utensils, towels, or bedding with other people or pets in your home.   • After using these items, they should be washed thoroughly with soap and water.    Clean all “high-touch” surfaces everyday  • High touch surfaces include counters, tabletops, doorknobs, bathroom fixtures, toilets, phones, keyboards, tablets, and bedside tables.   • Also, clean any surfaces that may have blood, stool, or body fluids on them.   • Use a household cleaning spray or wipe, according to the label instructions. Labels contain instructions for safe and effective use of the cleaning product, including precautions you should take when applying the product, such as wearing gloves and making sure you have good ventilation during use of the product.    Monitor your symptoms  • Seek prompt medical attention if your illness is worsening (e.g., difficulty breathing).   • Before seeking care, call your healthcare provider and tell them that you have, or are being evaluated for, COVID-19.   • Put on a facemask before you enter the facility.     • These steps will help the healthcare provider’s office to keep other people in the office or waiting room from getting infected or exposed.   • Persons who are placed under active monitoring or facilitated self-monitoring should follow instructions provided by their local health department or occupational health professionals, as appropriate.  • If you have a medical emergency and need to call 911,  notify the dispatch personnel that you have, or are being evaluated for COVID-19. If possible, put on a facemask before emergency medical services arrive.    Discontinuing home isolation  Patients with confirmed COVID-19 should remain under home isolation precautions until the risk of secondary transmission to others is thought to be low. The decision to discontinue home isolation precautions should be made on a case-by-case basis, in consultation with healthcare providers and state and local health departments.    The below content are for household members, intimate partners, and caregivers of a patient with symptomatic laboratory-confirmed COVID-19 or a patient under investigation:    Household members, intimate partners, and caregivers may have close contact with a person with symptomatic, laboratory-confirmed COVID-19 or a person under investigation.     Close contacts should monitor their health; they should call their healthcare provider right away if they develop symptoms suggestive of COVID-19 (e.g., fever, cough, shortness of breath)     Close contacts should also follow these recommendations:  • Make sure that you understand and can help the patient follow their healthcare provider’s instructions for medication(s) and care. You should help the patient with basic needs in the home and provide support for getting groceries, prescriptions, and other personal needs.  • Monitor the patient’s symptoms. If the patient is getting sicker, call his or her healthcare provider and tell them that the patient has laboratory-confirmed COVID-19. This will help the healthcare provider’s office take steps to keep other people in the office or waiting room from getting infected. Ask the healthcare provider to call the local or state health department for additional guidance. If the patient has a medical emergency and you need to call 911, notify the dispatch personnel that the patient has, or is being evaluated for  COVID-19.  • Household members should stay in another room or be  from the patient as much as possible. Household members should use a separate bedroom and bathroom, if available.  • Prohibit visitors who do not have an essential need to be in the home.  • Household members should care for any pets in the home. Do not handle pets or other animals while sick.  For more information, see COVID-19 and Animals.  • Make sure that shared spaces in the home have good air flow, such as by an air conditioner or an opened window, weather permitting.  • Perform hand hygiene frequently. Wash your hands often with soap and water for at least 20 seconds or use an alcohol-based hand  that contains 60 to 95% alcohol, covering all surfaces of your hands and rubbing them together until they feel dry. Soap and water should be used preferentially if hands are visibly dirty.  • Avoid touching your eyes, nose, and mouth with unwashed hands.  • The patient should wear a facemask when you are around other people. If the patient is not able to wear a facemask (for example, because it causes trouble breathing), you, as the caregiver, should wear a mask when you are in the same room as the patient.  • Wear a disposable facemask and gloves when you touch or have contact with the patient’s blood, stool, or body fluids, such as saliva, sputum, nasal mucus, vomit, or urine.   o Throw out disposable facemasks and gloves after using them. Do not reuse.  o When removing personal protective equipment, first remove and dispose of gloves. Then, immediately clean your hands with soap and water or alcohol-based hand . Next, remove and dispose of facemask, and immediately clean your hands again with soap and water or alcohol-based hand .  • Avoid sharing household items with the patient. You should not share dishes, drinking glasses, cups, eating utensils, towels, bedding, or other items. After the patient uses these  items, you should wash them thoroughly (see below “Wash laundry thoroughly”).  • Clean all “high-touch” surfaces, such as counters, tabletops, doorknobs, bathroom fixtures, toilets, phones, keyboards, tablets, and bedside tables, every day. Also, clean any surfaces that may have blood, stool, or body fluids on them.   o Use a household cleaning spray or wipe, according to the label instructions. Labels contain instructions for safe and effective use of the cleaning product including precautions you should take when applying the product, such as wearing gloves and making sure you have good ventilation during use of the product.  • Wash laundry thoroughly.   o Immediately remove and wash clothes or bedding that have blood, stool, or body fluids on them.  o Wear disposable gloves while handling soiled items and keep soiled items away from your body. Clean your hands (with soap and water or an alcohol-based hand ) immediately after removing your gloves.  o Read and follow directions on labels of laundry or clothing items and detergent. In general, using a normal laundry detergent according to washing machine instructions and dry thoroughly using the warmest temperatures recommended on the clothing label.  • Place all used disposable gloves, facemasks, and other contaminated items in a lined container before disposing of them with other household waste. Clean your hands (with soap and water or an alcohol-based hand ) immediately after handling these items. Soap and water should be used preferentially if hands are visibly dirty.  • Discuss any additional questions with your state or local health department or healthcare provider.    Adapted from information provided by the Centers for Disease Control and Prevention.  For more information, visit https://www.cdc.gov/coronavirus/2019-ncov/hcp/guidance-prevent-spread.html

## 2022-06-10 ENCOUNTER — OFFICE (OUTPATIENT)
Dept: URBAN - METROPOLITAN AREA CLINIC 65 | Facility: CLINIC | Age: 62
End: 2022-06-10

## 2022-06-10 VITALS
HEART RATE: 98 BPM | SYSTOLIC BLOOD PRESSURE: 112 MMHG | HEIGHT: 64 IN | WEIGHT: 164 LBS | DIASTOLIC BLOOD PRESSURE: 68 MMHG

## 2022-06-10 DIAGNOSIS — K21.9 GASTRO-ESOPHAGEAL REFLUX DISEASE WITHOUT ESOPHAGITIS: ICD-10-CM

## 2022-06-10 PROCEDURE — 99213 OFFICE O/P EST LOW 20 MIN: CPT | Performed by: INTERNAL MEDICINE

## 2022-06-10 RX ORDER — OMEPRAZOLE AND SODIUM BICARBONATE 40; 1100 MG/1; MG/1
CAPSULE ORAL
Qty: 30 | Refills: 11 | Status: ACTIVE

## 2022-11-15 RX ORDER — AMLODIPINE BESYLATE 2.5 MG/1
2.5 TABLET ORAL DAILY
Qty: 90 TABLET | Refills: 0 | Status: SHIPPED | OUTPATIENT
Start: 2022-11-15 | End: 2023-02-09 | Stop reason: SDUPTHER

## 2022-12-19 ENCOUNTER — OFFICE VISIT (OUTPATIENT)
Dept: CARDIOLOGY | Facility: CLINIC | Age: 62
End: 2022-12-19

## 2022-12-19 VITALS
OXYGEN SATURATION: 96 % | DIASTOLIC BLOOD PRESSURE: 80 MMHG | WEIGHT: 163.8 LBS | HEART RATE: 88 BPM | HEIGHT: 64 IN | BODY MASS INDEX: 27.96 KG/M2 | SYSTOLIC BLOOD PRESSURE: 118 MMHG

## 2022-12-19 DIAGNOSIS — R00.2 PALPITATIONS: ICD-10-CM

## 2022-12-19 DIAGNOSIS — I10 ESSENTIAL HYPERTENSION: Primary | ICD-10-CM

## 2022-12-19 PROCEDURE — 99213 OFFICE O/P EST LOW 20 MIN: CPT | Performed by: INTERNAL MEDICINE

## 2022-12-19 PROCEDURE — 93000 ELECTROCARDIOGRAM COMPLETE: CPT | Performed by: INTERNAL MEDICINE

## 2022-12-19 RX ORDER — IBUPROFEN 200 MG
TABLET ORAL
COMMUNITY

## 2022-12-19 NOTE — PROGRESS NOTES
Date of Office Visit: 22  Encounter Provider: Gael Rodriguez MD  Place of Service: [facility] CARDIOLOGY  Patient Name: Madison Acevedo  :1960    Chief Complaint   Patient presents with   • Follow-up   :     HPI:     Ms. Acevedo is 62 y.o. and presents today to follow up. I have reviewed prior notes and there are no changes except for any new updates described below. I have also reviewed any information entered into the medical record by the patient or by ancillary staff.     She has mild hypertension and takes low dose amlodipine. She has a history of symptomatic PACs. However, her symptoms have been relatively mild so we have not put her on an AVN blocker.  She reported jaw pain in May 2022; a stress echo was normal.    She's doing great. She has no major cardiac symptoms at present.       Past Medical History:   Diagnosis Date   • Arrhythmia     palpitations   • Arthritis    • COVID-19 2021   • Esophageal reflux    • Esophageal spasm    • Essential hypertension 2020   • Fibrocystic disease of breast    • Hyperlipidemia    • Palpitations 2020   • PONV (postoperative nausea and vomiting)    • Vitamin D deficiency        Past Surgical History:   Procedure Laterality Date   • CHOLECYSTECTOMY     • COLONOSCOPY N/A 1/3/2018    Procedure: COLONOSCOPY TO CECUM AND TERMINAL ILEUM;  Surgeon: Jan Brannon MD;  Location: [facility] ENDOSCOPY;  Service:    • ENDOSCOPY      X2   • ENDOSCOPY N/A 1/3/2018    Procedure: ESOPHAGOGASTRODUODENOSCOPY WITH BIOPSIES;  Surgeon: Jan Brannon MD;  Location: [facility] ENDOSCOPY;  Service:    • STEREOTACTIC BIOPSY / ASPIRATION / EXCISION Left     Bx breast percutan needle core use imag guide   • THUMB ARTHROSCOPY      left thumb joint implant   • WISDOM TOOTH EXTRACTION         Social History     Socioeconomic History   • Marital status:    • Number of children: 2   Tobacco Use   • Smoking status: Former     Packs/day: 0.00      Years: 15.00     Pack years: 0.00     Types: Cigarettes     Quit date: 1990     Years since quittin.6   • Smokeless tobacco: Never   • Tobacco comments:     Have not smoked in 30 years   Vaping Use   • Vaping Use: Never used   Substance and Sexual Activity   • Alcohol use: Yes     Alcohol/week: 7.0 standard drinks     Types: 7 Glasses of wine per week     Comment: vodka daily   • Drug use: No   • Sexual activity: Yes     Partners: Male     Birth control/protection: Post-menopausal       Family History   Problem Relation Age of Onset   • Heart disease Father         MI/CABG early 60s   • Hypertension Father    • Lung cancer Father    • Stroke Father    • GIACOMO disease Sister         esophageal reflux   • COPD Sister    • Hypertension Sister    • Esophageal cancer Mother    • Malig Hyperthermia Neg Hx        Review of Systems   Cardiovascular: Positive for palpitations.   Musculoskeletal: Positive for joint pain.   Gastrointestinal: Positive for heartburn.   All other systems reviewed and are negative.      No Known Allergies      Current Outpatient Medications:   •  acetaminophen (TYLENOL) 325 MG tablet, Take 650 mg by mouth Every 6 (Six) Hours As Needed for mild pain (1-3)., Disp: , Rfl:   •  amLODIPine (NORVASC) 2.5 MG tablet, Take 1 tablet by mouth Daily., Disp: 90 tablet, Rfl: 0  •  cetirizine (zyrTEC) 10 MG tablet, Take 10 mg by mouth Daily., Disp: , Rfl:   •  cyclobenzaprine (FLEXERIL) 5 MG tablet, Take 1 tablet by mouth 3 (Three) Times a Day As Needed for abdominal wall pain., Disp: 30 tablet, Rfl: 2  •  cyclobenzaprine (FLEXERIL) 5 MG tablet, Take 1 tablet by mouth 3 (Three) Times a Day As Needed for abdominal wall pain., Disp: 30 tablet, Rfl: 3  •  ibuprofen (ADVIL,MOTRIN) 200 MG tablet, , Disp: , Rfl:   •  magnesium gluconate (MAGONATE) 500 MG tablet, Take 400 mg by mouth Daily As Needed., Disp: , Rfl:   •  omeprazole-sodium bicarbonate (Zegerid)  MG per capsule, Take 1 capsule by mouth once  "a day, Disp: 30 capsule, Rfl: 11  •  sucralfate (CARAFATE) 1 g tablet, Take 1 tablet by mouth 4 (Four) Times a Day. (Patient taking differently: Take 1 g by mouth As Needed.), Disp: 20 tablet, Rfl: 0  •  VITAMIN D, CHOLECALCIFEROL, PO, Take  by mouth Daily., Disp: , Rfl:       Objective:     Vitals:    12/19/22 1058   BP: 118/80   BP Location: Left arm   Patient Position: Sitting   Cuff Size: Adult   Pulse: 88   SpO2: 96%   Weight: 74.3 kg (163 lb 12.8 oz)   Height: 162.6 cm (64\")     Body mass index is 28.12 kg/m².    Physical Exam  Vitals reviewed.   Constitutional:       General: She is not in acute distress.     Appearance: She is well-developed and well-nourished.   HENT:      Head: Normocephalic.      Nose: Nose normal.      Mouth/Throat:      Mouth: Oropharynx is clear and moist.      Comments: masked  Eyes:      Extraocular Movements: EOM normal.      Conjunctiva/sclera: Conjunctivae normal.   Neck:      Vascular: No JVD.   Cardiovascular:      Rate and Rhythm: Normal rate and regular rhythm.      Pulses: Normal pulses and intact distal pulses.      Heart sounds: Normal heart sounds. No murmur heard.  Pulmonary:      Effort: Pulmonary effort is normal.      Breath sounds: Normal breath sounds.   Abdominal:      Palpations: Abdomen is soft.      Tenderness: There is no abdominal tenderness.   Musculoskeletal:         General: No swelling or edema. Normal range of motion.      Cervical back: Normal range of motion.   Lymphadenopathy:      Cervical: No cervical adenopathy.   Skin:     General: Skin is warm and dry.      Findings: No rash.   Neurological:      General: No focal deficit present.      Mental Status: She is alert and oriented to person, place, and time.      Cranial Nerves: No cranial nerve deficit.   Psychiatric:         Mood and Affect: Mood and affect and mood normal.         Behavior: Behavior normal.         Thought Content: Thought content normal.           ECG 12 Lead    Date/Time: " 12/19/2022 11:38 AM  Performed by: Gael Rodriguez MD  Authorized by: Gael Rodriguez MD   Comparison: compared with previous ECG   Similar to previous ECG  Rhythm: sinus rhythm  Conduction: conduction normal  ST Segments: ST segments normal  T Waves: T waves normal  QRS axis: normal  Other: no other findings    Clinical impression: normal ECG              Assessment:       Diagnosis Plan   1. Essential hypertension        2. Palpitations               Plan:       Ms Acevedo has mild hypertension which is well treated with low dose amlodipine (chosen as she also has a history of presumed esophageal spasm). She has occasional symptomatic PACs; her overall burden is stable and low so we have not opted for AVN blockade. She is on oral magnesium oxide. She has normal left atrial volume by echo.    I'll see her back in two years; I can refill her amlodipine in the meantime.     Sincerely,       Gael Rodriguez MD                Answers for HPI/ROS submitted by the patient on 4/26/2020   What is the primary reason for your visit?: Other  Please describe your symptoms.: Pounding heartbeat with frequent PACs  Have you had these symptoms before?: No  How long have you been having these symptoms?: past week  Please describe any probable cause for these symptoms. : Caffeine,medications, stress

## 2022-12-27 ENCOUNTER — TRANSCRIBE ORDERS (OUTPATIENT)
Dept: ADMINISTRATIVE | Facility: HOSPITAL | Age: 62
End: 2022-12-27

## 2022-12-27 DIAGNOSIS — Z12.31 SCREENING MAMMOGRAM FOR BREAST CANCER: Primary | ICD-10-CM

## 2022-12-30 ENCOUNTER — HOSPITAL ENCOUNTER (OUTPATIENT)
Dept: MAMMOGRAPHY | Facility: HOSPITAL | Age: 62
Discharge: HOME | End: 2022-12-30
Admitting: NURSE PRACTITIONER

## 2022-12-30 DIAGNOSIS — Z12.31 SCREENING MAMMOGRAM FOR BREAST CANCER: ICD-10-CM

## 2022-12-30 PROCEDURE — 77063 BREAST TOMOSYNTHESIS BI: CPT

## 2022-12-30 PROCEDURE — 77067 SCR MAMMO BI INCL CAD: CPT

## 2023-02-09 RX ORDER — AMLODIPINE BESYLATE 2.5 MG/1
2.5 TABLET ORAL DAILY
Qty: 90 TABLET | Refills: 0 | Status: SHIPPED | OUTPATIENT
Start: 2023-02-09

## 2023-05-01 RX ORDER — AMLODIPINE BESYLATE 2.5 MG/1
2.5 TABLET ORAL DAILY
Qty: 90 TABLET | Refills: 0 | Status: CANCELLED | OUTPATIENT
Start: 2023-05-01

## 2023-05-02 RX ORDER — AMLODIPINE BESYLATE 2.5 MG/1
2.5 TABLET ORAL DAILY
Qty: 90 TABLET | Refills: 0 | Status: SHIPPED | OUTPATIENT
Start: 2023-05-02

## 2023-06-16 ENCOUNTER — OFFICE (OUTPATIENT)
Dept: URBAN - METROPOLITAN AREA CLINIC 65 | Facility: CLINIC | Age: 63
End: 2023-06-16

## 2023-06-16 VITALS
WEIGHT: 168 LBS | HEIGHT: 64 IN | DIASTOLIC BLOOD PRESSURE: 91 MMHG | SYSTOLIC BLOOD PRESSURE: 136 MMHG | HEART RATE: 81 BPM

## 2023-06-16 DIAGNOSIS — R15.2 FECAL URGENCY: ICD-10-CM

## 2023-06-16 DIAGNOSIS — R19.7 DIARRHEA, UNSPECIFIED: ICD-10-CM

## 2023-06-16 DIAGNOSIS — Z80.0 FAMILY HISTORY OF MALIGNANT NEOPLASM OF DIGESTIVE ORGANS: ICD-10-CM

## 2023-06-16 PROCEDURE — 99213 OFFICE O/P EST LOW 20 MIN: CPT | Performed by: INTERNAL MEDICINE

## 2023-08-06 RX ORDER — AMLODIPINE BESYLATE 2.5 MG/1
2.5 TABLET ORAL DAILY
Qty: 90 TABLET | Refills: 0 | Status: CANCELLED | OUTPATIENT
Start: 2023-08-06

## 2023-08-08 RX ORDER — AMLODIPINE BESYLATE 2.5 MG/1
2.5 TABLET ORAL DAILY
Qty: 90 TABLET | Refills: 0 | Status: CANCELLED | OUTPATIENT
Start: 2023-08-06

## 2023-08-10 ENCOUNTER — TELEPHONE (OUTPATIENT)
Dept: CARDIOLOGY | Facility: CLINIC | Age: 63
End: 2023-08-10
Payer: COMMERCIAL

## 2023-08-10 RX ORDER — AMLODIPINE BESYLATE 2.5 MG/1
2.5 TABLET ORAL DAILY
Qty: 90 TABLET | Refills: 1 | Status: SHIPPED | OUTPATIENT
Start: 2023-08-10

## 2023-08-10 NOTE — TELEPHONE ENCOUNTER
Caller: Crutis Madison YEN    Relationship: Self    Best call back number: [phone]    Requested Prescriptions:   Requested Prescriptions     Pending Prescriptions Disp Refills    amLODIPine (NORVASC) 2.5 MG tablet 90 tablet 0     Sig: Take 1 tablet by mouth Daily.        Pharmacy where request should be sent: [facility]     Last office visit with prescribing clinician: 12/19/2022   Last telemedicine visit with prescribing clinician: Visit date not found   Next office visit with prescribing clinician: Visit date not found     Additional details provided by patient: PT IS GOING ON VACATION TOMORROW PLEASE REFILL    Does the patient have less than a 3 day supply:  [x] Yes  [] No    Would you like a call back once the refill request has been completed: [] Yes [x] No    If the office needs to give you a call back, can they leave a voicemail: [x] Yes [] No    Brandie Small Rep   08/10/23 16:23 EDT

## 2023-11-16 ENCOUNTER — TELEPHONE (OUTPATIENT)
Dept: FAMILY MEDICINE CLINIC | Facility: CLINIC | Age: 63
End: 2023-11-16
Payer: COMMERCIAL

## 2023-11-17 ENCOUNTER — APPOINTMENT (OUTPATIENT)
Dept: CT IMAGING | Facility: HOSPITAL | Age: 63
End: 2023-11-17
Payer: COMMERCIAL

## 2023-11-17 ENCOUNTER — HOSPITAL ENCOUNTER (EMERGENCY)
Facility: HOSPITAL | Age: 63
Discharge: HOME | End: 2023-11-17
Attending: EMERGENCY MEDICINE
Payer: COMMERCIAL

## 2023-11-17 VITALS
RESPIRATION RATE: 18 BRPM | OXYGEN SATURATION: 95 % | WEIGHT: 168 LBS | HEART RATE: 104 BPM | BODY MASS INDEX: 28.68 KG/M2 | HEIGHT: 64 IN | DIASTOLIC BLOOD PRESSURE: 93 MMHG | SYSTOLIC BLOOD PRESSURE: 138 MMHG | TEMPERATURE: 98.7 F

## 2023-11-17 DIAGNOSIS — E04.1 THYROID NODULE: ICD-10-CM

## 2023-11-17 DIAGNOSIS — M62.838 MUSCLE SPASM: ICD-10-CM

## 2023-11-17 DIAGNOSIS — S16.1XXA STRAIN OF NECK MUSCLE, INITIAL ENCOUNTER: Primary | ICD-10-CM

## 2023-11-17 LAB
ANION GAP SERPL CALCULATED.3IONS-SCNC: 9 MMOL/L (ref 5–15)
BASOPHILS # BLD AUTO: 0.03 10*3/MM3 (ref 0–0.2)
BASOPHILS NFR BLD AUTO: 0.3 % (ref 0–1.5)
BUN SERPL-MCNC: 10 MG/DL (ref 8–23)
BUN/CREAT SERPL: 12.7 (ref 7–25)
CALCIUM SPEC-SCNC: 9.4 MG/DL (ref 8.6–10.5)
CHLORIDE SERPL-SCNC: 102 MMOL/L (ref 98–107)
CO2 SERPL-SCNC: 27 MMOL/L (ref 22–29)
CREAT SERPL-MCNC: 0.79 MG/DL (ref 0.57–1)
DEPRECATED RDW RBC AUTO: 42.6 FL (ref 37–54)
EGFRCR SERPLBLD CKD-EPI 2021: 84.2 ML/MIN/1.73
EOSINOPHIL # BLD AUTO: 0.03 10*3/MM3 (ref 0–0.4)
EOSINOPHIL NFR BLD AUTO: 0.3 % (ref 0.3–6.2)
ERYTHROCYTE [DISTWIDTH] IN BLOOD BY AUTOMATED COUNT: 11.9 % (ref 12.3–15.4)
GLUCOSE SERPL-MCNC: 108 MG/DL (ref 65–99)
HCT VFR BLD AUTO: 38.4 % (ref 34–46.6)
HGB BLD-MCNC: 12.4 G/DL (ref 12–15.9)
IMM GRANULOCYTES # BLD AUTO: 0.02 10*3/MM3 (ref 0–0.05)
IMM GRANULOCYTES NFR BLD AUTO: 0.2 % (ref 0–0.5)
LYMPHOCYTES # BLD AUTO: 1.32 10*3/MM3 (ref 0.7–3.1)
LYMPHOCYTES NFR BLD AUTO: 11.8 % (ref 19.6–45.3)
MCH RBC QN AUTO: 30.9 PG (ref 26.6–33)
MCHC RBC AUTO-ENTMCNC: 32.3 G/DL (ref 31.5–35.7)
MCV RBC AUTO: 95.8 FL (ref 79–97)
MONOCYTES # BLD AUTO: 1.19 10*3/MM3 (ref 0.1–0.9)
MONOCYTES NFR BLD AUTO: 10.7 % (ref 5–12)
NEUTROPHILS NFR BLD AUTO: 76.7 % (ref 42.7–76)
NEUTROPHILS NFR BLD AUTO: 8.58 10*3/MM3 (ref 1.7–7)
PLATELET # BLD AUTO: 253 10*3/MM3 (ref 140–450)
PMV BLD AUTO: 9.2 FL (ref 6–12)
POTASSIUM SERPL-SCNC: 3.6 MMOL/L (ref 3.5–5.2)
RBC # BLD AUTO: 4.01 10*6/MM3 (ref 3.77–5.28)
SODIUM SERPL-SCNC: 138 MMOL/L (ref 136–145)
WBC NRBC COR # BLD AUTO: 11.17 10*3/MM3 (ref 3.4–10.8)

## 2023-11-17 PROCEDURE — 25510000001 IOPAMIDOL PER 1 ML: Performed by: EMERGENCY MEDICINE

## 2023-11-17 PROCEDURE — 80048 BASIC METABOLIC PNL TOTAL CA: CPT | Performed by: EMERGENCY MEDICINE

## 2023-11-17 PROCEDURE — 96375 TX/PRO/DX INJ NEW DRUG ADDON: CPT

## 2023-11-17 PROCEDURE — 85025 COMPLETE CBC W/AUTO DIFF WBC: CPT | Performed by: EMERGENCY MEDICINE

## 2023-11-17 PROCEDURE — 25010000002 DEXAMETHASONE SODIUM PHOSPHATE 10 MG/ML SOLUTION: Performed by: EMERGENCY MEDICINE

## 2023-11-17 PROCEDURE — 25010000002 KETOROLAC TROMETHAMINE PER 15 MG: Performed by: EMERGENCY MEDICINE

## 2023-11-17 PROCEDURE — 99285 EMERGENCY DEPT VISIT HI MDM: CPT

## 2023-11-17 PROCEDURE — 36415 COLL VENOUS BLD VENIPUNCTURE: CPT

## 2023-11-17 PROCEDURE — 70491 CT SOFT TISSUE NECK W/DYE: CPT

## 2023-11-17 PROCEDURE — 96374 THER/PROPH/DIAG INJ IV PUSH: CPT

## 2023-11-17 RX ORDER — KETOROLAC TROMETHAMINE 10 MG/1
10 TABLET, FILM COATED ORAL EVERY 6 HOURS PRN
Qty: 10 TABLET | Refills: 0 | Status: SHIPPED | OUTPATIENT
Start: 2023-11-17 | End: 2023-11-19

## 2023-11-17 RX ORDER — KETOROLAC TROMETHAMINE 15 MG/ML
15 INJECTION, SOLUTION INTRAMUSCULAR; INTRAVENOUS ONCE
Status: COMPLETED | OUTPATIENT
Start: 2023-11-17 | End: 2023-11-17

## 2023-11-17 RX ORDER — DEXAMETHASONE SODIUM PHOSPHATE 10 MG/ML
10 INJECTION, SOLUTION INTRAMUSCULAR; INTRAVENOUS ONCE
Status: COMPLETED | OUTPATIENT
Start: 2023-11-17 | End: 2023-11-17

## 2023-11-17 RX ORDER — DIAZEPAM 5 MG/1
5 TABLET ORAL ONCE
Status: DISCONTINUED | OUTPATIENT
Start: 2023-11-17 | End: 2023-11-17 | Stop reason: HOSPADM

## 2023-11-17 RX ADMIN — KETOROLAC TROMETHAMINE 15 MG: 15 INJECTION, SOLUTION INTRAMUSCULAR; INTRAVENOUS at 11:50

## 2023-11-17 RX ADMIN — DEXAMETHASONE SODIUM PHOSPHATE 10 MG: 10 INJECTION, SOLUTION INTRAMUSCULAR; INTRAVENOUS at 11:50

## 2023-11-17 RX ADMIN — IOPAMIDOL 75 ML: 755 INJECTION, SOLUTION INTRAVENOUS at 12:14

## 2023-11-17 NOTE — DISCHARGE INSTRUCTIONS
You may want to wear your sling intermittently as needed.  An MRI could also help provide additional information today.  An MRI of the shoulder could be obtained    PCP for today's visit and thyroid nodule.

## 2023-11-17 NOTE — FSED PROVIDER NOTE
Subjective   History of Present Illness  Patient complains of neck and shoulder pain, right-sided.  It started after playing with a young child on a slide.  She had her arms lifted up over her head trying to play with him protect her granddaughter.  There is no blunt trauma or injury.  Nothing hit her.  This is more of a overuse style mechanism.  This occurred days ago and has subsequently had more pain and notices some swelling to the supra clavicular area.  Palpation and neck movement worsens pain to the anterior portion of the neck.  Shoulder movement does the same.  She has no radicular symptoms.  No headache or visual changes.      Review of Systems    Past Medical History:   Diagnosis Date    Arrhythmia     palpitations    Arthritis     Breast cyst     COVID-19 09/2021    Esophageal reflux     Esophageal spasm     Essential hypertension 11/19/2020    Fibrocystic disease of breast     Hyperlipidemia     Palpitations 11/19/2020    PONV (postoperative nausea and vomiting)     Vitamin D deficiency        No Known Allergies    Past Surgical History:   Procedure Laterality Date    BREAST CYST ASPIRATION      CHOLECYSTECTOMY      COLONOSCOPY N/A 01/03/2018    Procedure: COLONOSCOPY TO CECUM AND TERMINAL ILEUM;  Surgeon: Jan Brannon MD;  Location: [facility] ENDOSCOPY;  Service:     ENDOSCOPY      X2    ENDOSCOPY N/A 01/03/2018    Procedure: ESOPHAGOGASTRODUODENOSCOPY WITH BIOPSIES;  Surgeon: Jan Brannon MD;  Location: [facility] ENDOSCOPY;  Service:     STEREOTACTIC BIOPSY / ASPIRATION / EXCISION Left     Bx breast percutan needle core use imag guide    THUMB ARTHROSCOPY      left thumb joint implant    WISDOM TOOTH EXTRACTION         Family History   Problem Relation Age of Onset    Heart disease Father         MI/CABG early 60s    Hypertension Father     Lung cancer Father     Stroke Father     GIACOMO disease Sister         esophageal reflux    COPD Sister     Hypertension Sister     Esophageal cancer Mother     Alan  Hyperthermia Neg Hx        Social History     Socioeconomic History    Marital status:     Number of children: 2   Tobacco Use    Smoking status: Former     Packs/day: 0.00     Years: 15.00     Additional pack years: 0.00     Total pack years: 0.00     Types: Cigarettes     Quit date: 1990     Years since quittin.5    Smokeless tobacco: Never    Tobacco comments:     Have not smoked in 30 years   Vaping Use    Vaping Use: Never used   Substance and Sexual Activity    Alcohol use: Yes     Alcohol/week: 7.0 standard drinks of alcohol     Types: 7 Glasses of wine per week     Comment: vodka daily    Drug use: No    Sexual activity: Yes     Partners: Male     Birth control/protection: Post-menopausal           Objective   Physical Exam  Vitals and nursing note reviewed.   Constitutional:       Appearance: Normal appearance.   HENT:      Head: Normocephalic.      Right Ear: External ear normal.      Left Ear: External ear normal.      Nose: Nose normal.   Eyes:      Conjunctiva/sclera: Conjunctivae normal.   Cardiovascular:      Rate and Rhythm: Normal rate.   Pulmonary:      Effort: Pulmonary effort is normal.   Abdominal:      General: There is no distension.   Musculoskeletal:         General: Tenderness (Mild tenderness to soft tissue of the right shoulder however she has good range of motion.  There is some tendon tenderness and edema or swelling to the right supraclavicular area) present.      Cervical back: Normal range of motion.   Skin:     Findings: No rash.   Neurological:      Mental Status: She is alert and oriented to person, place, and time.   Psychiatric:         Mood and Affect: Mood normal.         Procedures           ED Course  ED Course as of 23 1259      1128 I think this is likely musculoskeletal in nature.  However the patient was interested in some type of work-up that we could offer here.  I do not think x-rays would really help much.  Will order CT with  contrast [FK]      ED Course User Index  [FK] Yoel Vallejo MD                                           Medical Decision Making  CT of the soft tissue neck with contrast shows no acute pathology  There are some incidental findings for which she can see her PCP with.  She ready felt great improvement after the Toradol given here in the ER today.  She is a sling at home actually in the car and she will start using that she states.  She could see orthopedics as well.  I think she probably had a muscle strain or sprain from the overhead type of activity with her family member.  She has Flexeril at home.    Problems Addressed:  Muscle spasm: complicated acute illness or injury  Strain of neck muscle, initial encounter: complicated acute illness or injury    Amount and/or Complexity of Data Reviewed  Labs: ordered.  Radiology: ordered.    Risk  Prescription drug management.        Final diagnoses:   Strain of neck muscle, initial encounter   Muscle spasm   Thyroid nodule       ED Disposition  ED Disposition       ED Disposition   Discharge    Condition   Stable    Comment   --               Paulette Coulter, APRN  [address] [facility]  [place] [id number] [phone]          Champ Galan MD  [address] [place] [id number] [phone]    Call   Ortho: Muscle, bone , joint dr         Medication List        New Prescriptions      ketorolac 10 MG tablet  Commonly known as: TORADOL  Take 1 tablet by mouth Every 6 (Six) Hours As Needed for Moderate Pain.            Changed      sucralfate 1 g tablet  Commonly known as: CARAFATE  Take 1 tablet by mouth 4 (Four) Times a Day.  What changed:   when to take this  reasons to take this               Where to Get Your Medications        These medications were sent to [facility] [place] [id number]      Hours: Monday to Friday 7 AM to 6 PM, Saturday & Sunday 8 AM to 4:30 PM (Closed 12 PM to 12:30 PM) Phone:  [phone]   ketorolac 10 MG tablet

## 2023-11-17 NOTE — Clinical Note
[facility]  [address]  [place] [address]    Madison Acevedo was seen and treated in our emergency department on 11/17/2023.  She may return to work on 11/20/2023.         Thank you for choosing [facility].    Yoel Vallejo MD

## 2023-11-19 ENCOUNTER — APPOINTMENT (OUTPATIENT)
Dept: CT IMAGING | Facility: HOSPITAL | Age: 63
DRG: 867 | End: 2023-11-19
Payer: COMMERCIAL

## 2023-11-19 ENCOUNTER — APPOINTMENT (OUTPATIENT)
Dept: GENERAL RADIOLOGY | Facility: HOSPITAL | Age: 63
DRG: 867 | End: 2023-11-19
Payer: COMMERCIAL

## 2023-11-19 ENCOUNTER — HOSPITAL ENCOUNTER (INPATIENT)
Facility: HOSPITAL | Age: 63
LOS: 11 days | Discharge: HOME | DRG: 867 | End: 2023-12-01
Attending: EMERGENCY MEDICINE | Admitting: STUDENT IN AN ORGANIZED HEALTH CARE EDUCATION/TRAINING PROGRAM
Payer: COMMERCIAL

## 2023-11-19 DIAGNOSIS — M00.9 SEPTIC ARTHRITIS OF RIGHT STERNOCLAVICULAR JOINT: Primary | ICD-10-CM

## 2023-11-19 DIAGNOSIS — A41.9 SEPSIS WITHOUT ACUTE ORGAN DYSFUNCTION, DUE TO UNSPECIFIED ORGANISM: ICD-10-CM

## 2023-11-19 DIAGNOSIS — J18.9 PNEUMONIA OF RIGHT LOWER LOBE DUE TO INFECTIOUS ORGANISM: ICD-10-CM

## 2023-11-19 DIAGNOSIS — M25.511 ACUTE PAIN OF RIGHT SHOULDER: ICD-10-CM

## 2023-11-19 DIAGNOSIS — M89.8X1 PAIN OF RIGHT CLAVICLE: ICD-10-CM

## 2023-11-19 PROBLEM — E87.6 HYPOKALEMIA: Status: ACTIVE | Noted: 2023-11-19

## 2023-11-19 LAB
ALBUMIN SERPL-MCNC: 3.8 G/DL (ref 3.5–5.2)
ALBUMIN/GLOB SERPL: 1.3 G/DL
ALP SERPL-CCNC: 67 U/L (ref 39–117)
ALT SERPL W P-5'-P-CCNC: 8 U/L (ref 1–33)
ANION GAP SERPL CALCULATED.3IONS-SCNC: 12.4 MMOL/L (ref 5–15)
AST SERPL-CCNC: 10 U/L (ref 1–32)
B PARAPERT DNA SPEC QL NAA+PROBE: NOT DETECTED
B PERT DNA SPEC QL NAA+PROBE: NOT DETECTED
BACTERIA UR QL AUTO: ABNORMAL /HPF
BASOPHILS # BLD AUTO: 0.02 10*3/MM3 (ref 0–0.2)
BASOPHILS NFR BLD AUTO: 0.2 % (ref 0–1.5)
BILIRUB SERPL-MCNC: 0.2 MG/DL (ref 0–1.2)
BILIRUB UR QL STRIP: NEGATIVE
BUN SERPL-MCNC: 21 MG/DL (ref 8–23)
BUN/CREAT SERPL: 23.6 (ref 7–25)
C PNEUM DNA NPH QL NAA+NON-PROBE: NOT DETECTED
CALCIUM SPEC-SCNC: 9.1 MG/DL (ref 8.6–10.5)
CHLORIDE SERPL-SCNC: 103 MMOL/L (ref 98–107)
CLARITY UR: CLEAR
CO2 SERPL-SCNC: 25.6 MMOL/L (ref 22–29)
COLOR UR: YELLOW
CREAT SERPL-MCNC: 0.89 MG/DL (ref 0.57–1)
CRP SERPL-MCNC: 4.83 MG/DL (ref 0–0.5)
D DIMER PPP FEU-MCNC: 0.82 MCGFEU/ML (ref 0–0.63)
D-LACTATE SERPL-SCNC: 2 MMOL/L (ref 0.5–2)
DEPRECATED RDW RBC AUTO: 41.3 FL (ref 37–54)
EGFRCR SERPLBLD CKD-EPI 2021: 73 ML/MIN/1.73
EOSINOPHIL # BLD AUTO: 0 10*3/MM3 (ref 0–0.4)
EOSINOPHIL NFR BLD AUTO: 0 % (ref 0.3–6.2)
ERYTHROCYTE [DISTWIDTH] IN BLOOD BY AUTOMATED COUNT: 11.8 % (ref 12.3–15.4)
ERYTHROCYTE [SEDIMENTATION RATE] IN BLOOD: 25 MM/HR (ref 0–30)
FLUAV SUBTYP SPEC NAA+PROBE: NOT DETECTED
FLUBV RNA ISLT QL NAA+PROBE: NOT DETECTED
GLOBULIN UR ELPH-MCNC: 3 GM/DL
GLUCOSE SERPL-MCNC: 120 MG/DL (ref 65–99)
GLUCOSE UR STRIP-MCNC: NEGATIVE MG/DL
HADV DNA SPEC NAA+PROBE: NOT DETECTED
HCOV 229E RNA SPEC QL NAA+PROBE: NOT DETECTED
HCOV HKU1 RNA SPEC QL NAA+PROBE: NOT DETECTED
HCOV NL63 RNA SPEC QL NAA+PROBE: NOT DETECTED
HCOV OC43 RNA SPEC QL NAA+PROBE: NOT DETECTED
HCT VFR BLD AUTO: 35.6 % (ref 34–46.6)
HGB BLD-MCNC: 11.7 G/DL (ref 12–15.9)
HGB UR QL STRIP.AUTO: ABNORMAL
HMPV RNA NPH QL NAA+NON-PROBE: NOT DETECTED
HPIV1 RNA ISLT QL NAA+PROBE: NOT DETECTED
HPIV2 RNA SPEC QL NAA+PROBE: NOT DETECTED
HPIV3 RNA NPH QL NAA+PROBE: NOT DETECTED
HPIV4 P GENE NPH QL NAA+PROBE: NOT DETECTED
HYALINE CASTS UR QL AUTO: ABNORMAL /LPF
IMM GRANULOCYTES # BLD AUTO: 0.03 10*3/MM3 (ref 0–0.05)
IMM GRANULOCYTES NFR BLD AUTO: 0.4 % (ref 0–0.5)
KETONES UR QL STRIP: NEGATIVE
LEUKOCYTE ESTERASE UR QL STRIP.AUTO: NEGATIVE
LYMPHOCYTES # BLD AUTO: 0.85 10*3/MM3 (ref 0.7–3.1)
LYMPHOCYTES NFR BLD AUTO: 10 % (ref 19.6–45.3)
M PNEUMO IGG SER IA-ACNC: NOT DETECTED
MAGNESIUM SERPL-MCNC: 1.7 MG/DL (ref 1.6–2.4)
MCH RBC QN AUTO: 30.9 PG (ref 26.6–33)
MCHC RBC AUTO-ENTMCNC: 32.9 G/DL (ref 31.5–35.7)
MCV RBC AUTO: 93.9 FL (ref 79–97)
MONOCYTES # BLD AUTO: 0.65 10*3/MM3 (ref 0.1–0.9)
MONOCYTES NFR BLD AUTO: 7.7 % (ref 5–12)
MRSA DNA SPEC QL NAA+PROBE: NORMAL
NEUTROPHILS NFR BLD AUTO: 6.93 10*3/MM3 (ref 1.7–7)
NEUTROPHILS NFR BLD AUTO: 81.7 % (ref 42.7–76)
NITRITE UR QL STRIP: NEGATIVE
NRBC BLD AUTO-RTO: 0 /100 WBC (ref 0–0.2)
PH UR STRIP.AUTO: 7.5 [PH] (ref 5–8)
PLATELET # BLD AUTO: 301 10*3/MM3 (ref 140–450)
PMV BLD AUTO: 9.2 FL (ref 6–12)
POTASSIUM SERPL-SCNC: 3.2 MMOL/L (ref 3.5–5.2)
POTASSIUM SERPL-SCNC: 4.6 MMOL/L (ref 3.5–5.2)
PROCALCITONIN SERPL-MCNC: 0.64 NG/ML (ref 0–0.25)
PROT SERPL-MCNC: 6.8 G/DL (ref 6–8.5)
PROT UR QL STRIP: NEGATIVE
QT INTERVAL: 315 MS
QTC INTERVAL: 422 MS
RBC # BLD AUTO: 3.79 10*6/MM3 (ref 3.77–5.28)
RBC # UR STRIP: ABNORMAL /HPF
REF LAB TEST METHOD: ABNORMAL
RHINOVIRUS RNA SPEC NAA+PROBE: NOT DETECTED
RSV RNA NPH QL NAA+NON-PROBE: NOT DETECTED
SARS-COV-2 RNA NPH QL NAA+NON-PROBE: NOT DETECTED
SODIUM SERPL-SCNC: 141 MMOL/L (ref 136–145)
SP GR UR STRIP: 1.02 (ref 1–1.03)
SQUAMOUS #/AREA URNS HPF: ABNORMAL /HPF
TROPONIN T SERPL HS-MCNC: <6 NG/L
UROBILINOGEN UR QL STRIP: ABNORMAL
WBC # UR STRIP: ABNORMAL /HPF
WBC NRBC COR # BLD AUTO: 8.48 10*3/MM3 (ref 3.4–10.8)

## 2023-11-19 PROCEDURE — G0378 HOSPITAL OBSERVATION PER HR: HCPCS

## 2023-11-19 PROCEDURE — 86140 C-REACTIVE PROTEIN: CPT | Performed by: STUDENT IN AN ORGANIZED HEALTH CARE EDUCATION/TRAINING PROGRAM

## 2023-11-19 PROCEDURE — 25810000003 SODIUM CHLORIDE 0.9 % SOLUTION: Performed by: EMERGENCY MEDICINE

## 2023-11-19 PROCEDURE — 80053 COMPREHEN METABOLIC PANEL: CPT | Performed by: EMERGENCY MEDICINE

## 2023-11-19 PROCEDURE — 25010000002 HYDROMORPHONE 1 MG/ML SOLUTION: Performed by: STUDENT IN AN ORGANIZED HEALTH CARE EDUCATION/TRAINING PROGRAM

## 2023-11-19 PROCEDURE — 25810000003 LACTATED RINGERS SOLUTION: Performed by: EMERGENCY MEDICINE

## 2023-11-19 PROCEDURE — 85652 RBC SED RATE AUTOMATED: CPT | Performed by: STUDENT IN AN ORGANIZED HEALTH CARE EDUCATION/TRAINING PROGRAM

## 2023-11-19 PROCEDURE — 25010000002 DEXAMETHASONE PER 1 MG: Performed by: STUDENT IN AN ORGANIZED HEALTH CARE EDUCATION/TRAINING PROGRAM

## 2023-11-19 PROCEDURE — 25010000002 CEFTRIAXONE PER 250 MG: Performed by: STUDENT IN AN ORGANIZED HEALTH CARE EDUCATION/TRAINING PROGRAM

## 2023-11-19 PROCEDURE — 87181 SC STD AGAR DILUTION PER AGT: CPT | Performed by: EMERGENCY MEDICINE

## 2023-11-19 PROCEDURE — 87040 BLOOD CULTURE FOR BACTERIA: CPT | Performed by: EMERGENCY MEDICINE

## 2023-11-19 PROCEDURE — 87641 MR-STAPH DNA AMP PROBE: CPT | Performed by: STUDENT IN AN ORGANIZED HEALTH CARE EDUCATION/TRAINING PROGRAM

## 2023-11-19 PROCEDURE — 25010000002 VANCOMYCIN 10 G RECONSTITUTED SOLUTION: Performed by: EMERGENCY MEDICINE

## 2023-11-19 PROCEDURE — 25010000002 KETOROLAC TROMETHAMINE PER 15 MG: Performed by: EMERGENCY MEDICINE

## 2023-11-19 PROCEDURE — 85379 FIBRIN DEGRADATION QUANT: CPT | Performed by: EMERGENCY MEDICINE

## 2023-11-19 PROCEDURE — 85025 COMPLETE CBC W/AUTO DIFF WBC: CPT | Performed by: EMERGENCY MEDICINE

## 2023-11-19 PROCEDURE — 73030 X-RAY EXAM OF SHOULDER: CPT

## 2023-11-19 PROCEDURE — 71275 CT ANGIOGRAPHY CHEST: CPT

## 2023-11-19 PROCEDURE — 83735 ASSAY OF MAGNESIUM: CPT | Performed by: STUDENT IN AN ORGANIZED HEALTH CARE EDUCATION/TRAINING PROGRAM

## 2023-11-19 PROCEDURE — 83605 ASSAY OF LACTIC ACID: CPT | Performed by: EMERGENCY MEDICINE

## 2023-11-19 PROCEDURE — 99285 EMERGENCY DEPT VISIT HI MDM: CPT

## 2023-11-19 PROCEDURE — 25010000002 PIPERACILLIN SOD-TAZOBACTAM PER 1 G: Performed by: EMERGENCY MEDICINE

## 2023-11-19 PROCEDURE — 87077 CULTURE AEROBIC IDENTIFY: CPT | Performed by: EMERGENCY MEDICINE

## 2023-11-19 PROCEDURE — 25010000002 FENTANYL CITRATE (PF) 50 MCG/ML SOLUTION: Performed by: EMERGENCY MEDICINE

## 2023-11-19 PROCEDURE — 25510000001 IOPAMIDOL PER 1 ML: Performed by: EMERGENCY MEDICINE

## 2023-11-19 PROCEDURE — 81001 URINALYSIS AUTO W/SCOPE: CPT | Performed by: EMERGENCY MEDICINE

## 2023-11-19 PROCEDURE — 87150 DNA/RNA AMPLIFIED PROBE: CPT | Performed by: EMERGENCY MEDICINE

## 2023-11-19 PROCEDURE — 84132 ASSAY OF SERUM POTASSIUM: CPT | Performed by: STUDENT IN AN ORGANIZED HEALTH CARE EDUCATION/TRAINING PROGRAM

## 2023-11-19 PROCEDURE — 25010000002 ONDANSETRON PER 1 MG: Performed by: EMERGENCY MEDICINE

## 2023-11-19 PROCEDURE — 93010 ELECTROCARDIOGRAM REPORT: CPT | Performed by: INTERNAL MEDICINE

## 2023-11-19 PROCEDURE — 93005 ELECTROCARDIOGRAM TRACING: CPT | Performed by: EMERGENCY MEDICINE

## 2023-11-19 PROCEDURE — 36415 COLL VENOUS BLD VENIPUNCTURE: CPT

## 2023-11-19 PROCEDURE — 25010000002 HYDROMORPHONE PER 4 MG: Performed by: EMERGENCY MEDICINE

## 2023-11-19 PROCEDURE — 84484 ASSAY OF TROPONIN QUANT: CPT | Performed by: EMERGENCY MEDICINE

## 2023-11-19 PROCEDURE — 0202U NFCT DS 22 TRGT SARS-COV-2: CPT | Performed by: EMERGENCY MEDICINE

## 2023-11-19 PROCEDURE — 71045 X-RAY EXAM CHEST 1 VIEW: CPT

## 2023-11-19 PROCEDURE — 84145 PROCALCITONIN (PCT): CPT | Performed by: EMERGENCY MEDICINE

## 2023-11-19 RX ORDER — SODIUM CHLORIDE 0.9 % (FLUSH) 0.9 %
10 SYRINGE (ML) INJECTION EVERY 12 HOURS SCHEDULED
Status: DISCONTINUED | OUTPATIENT
Start: 2023-11-19 | End: 2023-12-01 | Stop reason: HOSPADM

## 2023-11-19 RX ORDER — CYCLOBENZAPRINE HCL 10 MG
10 TABLET ORAL 3 TIMES DAILY PRN
Status: DISCONTINUED | OUTPATIENT
Start: 2023-11-19 | End: 2023-12-01 | Stop reason: HOSPADM

## 2023-11-19 RX ORDER — PROMETHAZINE HYDROCHLORIDE 12.5 MG/1
12.5 TABLET ORAL EVERY 6 HOURS PRN
Status: DISCONTINUED | OUTPATIENT
Start: 2023-11-19 | End: 2023-12-01 | Stop reason: HOSPADM

## 2023-11-19 RX ORDER — HYDROMORPHONE HYDROCHLORIDE 1 MG/ML
0.5 INJECTION, SOLUTION INTRAMUSCULAR; INTRAVENOUS; SUBCUTANEOUS ONCE
Status: COMPLETED | OUTPATIENT
Start: 2023-11-19 | End: 2023-11-19

## 2023-11-19 RX ORDER — NALOXONE HCL 0.4 MG/ML
0.4 VIAL (ML) INJECTION
Status: DISCONTINUED | OUTPATIENT
Start: 2023-11-19 | End: 2023-11-26

## 2023-11-19 RX ORDER — KETOROLAC TROMETHAMINE 15 MG/ML
15 INJECTION, SOLUTION INTRAMUSCULAR; INTRAVENOUS ONCE
Status: COMPLETED | OUTPATIENT
Start: 2023-11-19 | End: 2023-11-19

## 2023-11-19 RX ORDER — POTASSIUM CHLORIDE 750 MG/1
40 TABLET, FILM COATED, EXTENDED RELEASE ORAL EVERY 4 HOURS
Status: COMPLETED | OUTPATIENT
Start: 2023-11-19 | End: 2023-11-19

## 2023-11-19 RX ORDER — SODIUM CHLORIDE 0.9 % (FLUSH) 0.9 %
10 SYRINGE (ML) INJECTION AS NEEDED
Status: DISCONTINUED | OUTPATIENT
Start: 2023-11-19 | End: 2023-12-01 | Stop reason: HOSPADM

## 2023-11-19 RX ORDER — AMLODIPINE BESYLATE 5 MG/1
2.5 TABLET ORAL DAILY
Status: DISCONTINUED | OUTPATIENT
Start: 2023-11-20 | End: 2023-11-24

## 2023-11-19 RX ORDER — OXYCODONE AND ACETAMINOPHEN 10; 325 MG/1; MG/1
1 TABLET ORAL EVERY 4 HOURS PRN
Status: DISCONTINUED | OUTPATIENT
Start: 2023-11-19 | End: 2023-11-23

## 2023-11-19 RX ORDER — AMLODIPINE BESYLATE 5 MG/1
2.5 TABLET ORAL DAILY
Status: DISCONTINUED | OUTPATIENT
Start: 2023-11-19 | End: 2023-11-19

## 2023-11-19 RX ORDER — ACETAMINOPHEN 500 MG
1000 TABLET ORAL ONCE
Status: COMPLETED | OUTPATIENT
Start: 2023-11-19 | End: 2023-11-19

## 2023-11-19 RX ORDER — POLYETHYLENE GLYCOL 3350 17 G/17G
17 POWDER, FOR SOLUTION ORAL DAILY PRN
Status: DISCONTINUED | OUTPATIENT
Start: 2023-11-19 | End: 2023-11-24

## 2023-11-19 RX ORDER — VANCOMYCIN/0.9 % SOD CHLORIDE 1.5G/250ML
20 PLASTIC BAG, INJECTION (ML) INTRAVENOUS ONCE
Status: COMPLETED | OUTPATIENT
Start: 2023-11-19 | End: 2023-11-19

## 2023-11-19 RX ORDER — AMOXICILLIN 250 MG
2 CAPSULE ORAL 2 TIMES DAILY PRN
Status: DISCONTINUED | OUTPATIENT
Start: 2023-11-19 | End: 2023-12-01 | Stop reason: HOSPADM

## 2023-11-19 RX ORDER — DEXAMETHASONE SODIUM PHOSPHATE 10 MG/ML
6 INJECTION INTRAMUSCULAR; INTRAVENOUS DAILY
Status: DISCONTINUED | OUTPATIENT
Start: 2023-11-19 | End: 2023-11-21

## 2023-11-19 RX ORDER — BISACODYL 10 MG
10 SUPPOSITORY, RECTAL RECTAL DAILY PRN
Status: DISCONTINUED | OUTPATIENT
Start: 2023-11-19 | End: 2023-12-01 | Stop reason: HOSPADM

## 2023-11-19 RX ORDER — FENTANYL CITRATE 50 UG/ML
25 INJECTION, SOLUTION INTRAMUSCULAR; INTRAVENOUS
Status: DISCONTINUED | OUTPATIENT
Start: 2023-11-19 | End: 2023-11-19

## 2023-11-19 RX ORDER — PROMETHAZINE HYDROCHLORIDE 12.5 MG/1
12.5 SUPPOSITORY RECTAL EVERY 6 HOURS PRN
Status: DISCONTINUED | OUTPATIENT
Start: 2023-11-19 | End: 2023-12-01 | Stop reason: HOSPADM

## 2023-11-19 RX ORDER — KETOROLAC TROMETHAMINE 30 MG/ML
30 INJECTION, SOLUTION INTRAMUSCULAR; INTRAVENOUS EVERY 6 HOURS PRN
Status: COMPLETED | OUTPATIENT
Start: 2023-11-19 | End: 2023-11-22

## 2023-11-19 RX ORDER — FENTANYL CITRATE 50 UG/ML
25 INJECTION, SOLUTION INTRAMUSCULAR; INTRAVENOUS ONCE
Status: COMPLETED | OUTPATIENT
Start: 2023-11-19 | End: 2023-11-19

## 2023-11-19 RX ORDER — ONDANSETRON 2 MG/ML
4 INJECTION INTRAMUSCULAR; INTRAVENOUS ONCE
Status: COMPLETED | OUTPATIENT
Start: 2023-11-19 | End: 2023-11-19

## 2023-11-19 RX ORDER — CHOLECALCIFEROL (VITAMIN D3) 125 MCG
5 CAPSULE ORAL NIGHTLY PRN
Status: DISCONTINUED | OUTPATIENT
Start: 2023-11-19 | End: 2023-12-01 | Stop reason: HOSPADM

## 2023-11-19 RX ORDER — ACETAMINOPHEN 325 MG/1
650 TABLET ORAL EVERY 6 HOURS PRN
Status: DISCONTINUED | OUTPATIENT
Start: 2023-11-19 | End: 2023-11-24

## 2023-11-19 RX ORDER — BISACODYL 5 MG/1
5 TABLET, DELAYED RELEASE ORAL DAILY PRN
Status: DISCONTINUED | OUTPATIENT
Start: 2023-11-19 | End: 2023-12-01 | Stop reason: HOSPADM

## 2023-11-19 RX ORDER — SODIUM CHLORIDE 9 MG/ML
40 INJECTION, SOLUTION INTRAVENOUS AS NEEDED
Status: DISCONTINUED | OUTPATIENT
Start: 2023-11-19 | End: 2023-12-01 | Stop reason: HOSPADM

## 2023-11-19 RX ADMIN — ONDANSETRON 4 MG: 2 INJECTION INTRAMUSCULAR; INTRAVENOUS at 09:06

## 2023-11-19 RX ADMIN — VANCOMYCIN HYDROCHLORIDE 1500 MG: 10 INJECTION, POWDER, LYOPHILIZED, FOR SOLUTION INTRAVENOUS at 11:27

## 2023-11-19 RX ADMIN — Medication 10 ML: at 13:36

## 2023-11-19 RX ADMIN — FENTANYL CITRATE 25 MCG: 50 INJECTION, SOLUTION INTRAMUSCULAR; INTRAVENOUS at 09:05

## 2023-11-19 RX ADMIN — HYDROMORPHONE HYDROCHLORIDE 1 MG: 1 INJECTION, SOLUTION INTRAMUSCULAR; INTRAVENOUS; SUBCUTANEOUS at 14:49

## 2023-11-19 RX ADMIN — KETOROLAC TROMETHAMINE 15 MG: 15 INJECTION, SOLUTION INTRAMUSCULAR; INTRAVENOUS at 11:49

## 2023-11-19 RX ADMIN — PIPERACILLIN SODIUM AND TAZOBACTAM SODIUM 3.38 G: 3; .375 INJECTION, SOLUTION INTRAVENOUS at 10:51

## 2023-11-19 RX ADMIN — DEXAMETHASONE SODIUM PHOSPHATE 6 MG: 10 INJECTION INTRAMUSCULAR; INTRAVENOUS at 14:38

## 2023-11-19 RX ADMIN — SODIUM CHLORIDE, POTASSIUM CHLORIDE, SODIUM LACTATE AND CALCIUM CHLORIDE 1000 ML: 600; 310; 30; 20 INJECTION, SOLUTION INTRAVENOUS at 09:08

## 2023-11-19 RX ADMIN — IOPAMIDOL 95 ML: 755 INJECTION, SOLUTION INTRAVENOUS at 10:45

## 2023-11-19 RX ADMIN — CEFTRIAXONE SODIUM 1000 MG: 1 INJECTION, POWDER, FOR SOLUTION INTRAMUSCULAR; INTRAVENOUS at 15:50

## 2023-11-19 RX ADMIN — ACETAMINOPHEN 1000 MG: 500 TABLET ORAL at 09:05

## 2023-11-19 RX ADMIN — Medication 10 ML: at 20:51

## 2023-11-19 RX ADMIN — HYDROMORPHONE HYDROCHLORIDE 1 MG: 1 INJECTION, SOLUTION INTRAMUSCULAR; INTRAVENOUS; SUBCUTANEOUS at 20:50

## 2023-11-19 RX ADMIN — POTASSIUM CHLORIDE 40 MEQ: 750 TABLET, EXTENDED RELEASE ORAL at 14:38

## 2023-11-19 RX ADMIN — HYDROMORPHONE HYDROCHLORIDE 1 MG: 1 INJECTION, SOLUTION INTRAMUSCULAR; INTRAVENOUS; SUBCUTANEOUS at 17:56

## 2023-11-19 RX ADMIN — CYCLOBENZAPRINE 10 MG: 10 TABLET, FILM COATED ORAL at 17:56

## 2023-11-19 RX ADMIN — FENTANYL CITRATE 25 MCG: 50 INJECTION, SOLUTION INTRAMUSCULAR; INTRAVENOUS at 09:35

## 2023-11-19 RX ADMIN — HYDROMORPHONE HYDROCHLORIDE 0.5 MG: 1 INJECTION, SOLUTION INTRAMUSCULAR; INTRAVENOUS; SUBCUTANEOUS at 10:28

## 2023-11-19 RX ADMIN — HYDROMORPHONE HYDROCHLORIDE 1 MG: 1 INJECTION, SOLUTION INTRAMUSCULAR; INTRAVENOUS; SUBCUTANEOUS at 23:35

## 2023-11-19 RX ADMIN — POTASSIUM CHLORIDE 40 MEQ: 750 TABLET, EXTENDED RELEASE ORAL at 17:58

## 2023-11-19 RX ADMIN — Medication 10 ML: at 17:57

## 2023-11-19 NOTE — ED NOTES
Right shoulder pain since Tuesday after lifting her grandchildren.  She has had fever - 103.2 today.  Took hydrocodone at 0230.

## 2023-11-19 NOTE — PLAN OF CARE
Goal Outcome Evaluation:  Plan of Care Reviewed With: patient        Progress: no change  Outcome Evaluation: Arrived from ER around 1320 with pneumonia and right shoulder/clavicular pain (has ortho consult).  Antibiotics and prn pain med given.  Ambulating to bathroom with stand by assist.

## 2023-11-19 NOTE — ED NOTES
Nursing report ED to floor  [facility] [address] [place]  63 y.o.  female    HPI :   Chief Complaint   Patient presents with    Shoulder Pain    Fever       Admitting doctor:   Alex Zepeda MD    Admitting diagnosis:   The primary encounter diagnosis was Sepsis without acute organ dysfunction, due to unspecified organism. Diagnoses of Pneumonia of right lower lobe due to infectious organism, Acute pain of right shoulder, and Pain of right clavicle were also pertinent to this visit.    Code status:   Current Code Status       Date Active Code Status Order ID Comments User Context       Not on file            Allergies:   Patient has no known allergies.    Isolation:   Enhanced Droplet/Contact     Intake and Output    Intake/Output Summary (Last 24 hours) at 11/19/2023 1217  Last data filed at 11/19/2023 1127  Gross per 24 hour   Intake 1050 ml   Output --   Net 1050 ml       Weight:       11/19/23  0839   Weight: 76.2 kg (168 lb)       Most recent vitals:   Vitals:    11/19/23 1051 11/19/23 1103 11/19/23 1103 11/19/23 1116   BP: 136/77   128/74   Pulse: 110  105 103   Resp: 18  18 18   Temp:  100.1 °F (37.8 °C)     TempSrc:       SpO2: 92% (!) 86% 93% 93%   Weight:       Height:           Active LDAs/IV Access:   Lines, Drains & Airways       Active LDAs       Name Placement date Placement time Site Days    Peripheral IV 11/19/23 0852 Left Antecubital 11/19/23  0852  Antecubital  less than 1    Peripheral IV 11/19/23 1058 Anterior;Left Forearm 11/19/23  1058  Forearm  less than 1                    Labs (abnormal labs have a star):   Labs Reviewed   COMPREHENSIVE METABOLIC PANEL - Abnormal; Notable for the following components:       Result Value    Glucose 120 (*)     Potassium 3.2 (*)     All other components within normal limits    Narrative:     GFR Normal >60  Chronic Kidney Disease <60  Kidney Failure <15     PROCALCITONIN - Abnormal; Notable for the following components:    Procalcitonin 0.64 (*)     All other components  "within normal limits    Narrative:     As a Marker for Sepsis (Non-Neonates):    1. <0.5 ng/mL represents a low risk of severe sepsis and/or septic shock.  2. >2 ng/mL represents a high risk of severe sepsis and/or septic shock.    As a Marker for Lower Respiratory Tract Infections that require antibiotic therapy:    PCT on Admission    Antibiotic Therapy       6-12 Hrs later    >0.5                Strongly Recommended  >0.25 - <0.5        Recommended   0.1 - 0.25          Discouraged              Remeasure/reassess PCT  <0.1                Strongly Discouraged     Remeasure/reassess PCT    As 28 day mortality risk marker: \"Change in Procalcitonin Result\" (>80% or <=80%) if Day 0 (or Day 1) and Day 4 values are available. Refer to http://www.[email or web address] [place]pct[facility]calculator.[place]    Change in PCT <=80%  A decrease of PCT levels below or equal to 80% defines a positive change in PCT test result representing a higher risk for 28-day all-cause mortality of patients diagnosed with severe sepsis for septic shock.    Change in PCT >80%  A decrease of PCT levels of more than 80% defines a negative change in PCT result representing a lower risk for 28-day all-cause mortality of patients diagnosed with severe sepsis or septic shock.      URINALYSIS W/ CULTURE IF INDICATED - Abnormal; Notable for the following components:    Blood, UA Trace (*)     All other components within normal limits    Narrative:     In absence of clinical symptoms, the presence of pyuria, bacteria, and/or nitrites on the urinalysis result does not correlate with infection.   CBC WITH AUTO DIFFERENTIAL - Abnormal; Notable for the following components:    Hemoglobin 11.7 (*)     RDW 11.8 (*)     Neutrophil % 81.7 (*)     Lymphocyte % 10.0 (*)     Eosinophil % 0.0 (*)     All other components within normal limits   D-DIMER, QUANTITATIVE - Abnormal; Notable for the following components:    D-Dimer, Quantitative 0.82 (*)     All other components within normal limits    " "Narrative:     According to the assay 's published package insert, a normal (<0.50 MCGFEU/mL) D-dimer result in conjunction with a non-high clinical probability assessment, excludes deep vein thrombosis (DVT) and pulmonary embolism (PE) with high sensitivity.    D-dimer values increase with age and this can make VTE exclusion of an older population difficult. To address this, the [employer], based on best available evidence and recent guidelines, recommends that clinicians use age-adjusted D-dimer thresholds in patients greater than 50 years of age with: a) a low probability of PE who do not meet all Pulmonary Embolism Rule Out Criteria, or b) in those with intermediate probability of PE.   The formula for an age-adjusted D-dimer cut-off is \"age/100\".  For example, a 60 year old patient would have an age-adjusted cut-off of 0.60 MCGFEU/mL and an 80 year old 0.80 MCGFEU/mL.   URINALYSIS, MICROSCOPIC ONLY - Abnormal; Notable for the following components:    RBC, UA 3-5 (*)     All other components within normal limits   RESPIRATORY PANEL PCR W/ COVID-19 (SARS-COV-2), NP SWAB IN UTM/VTP, 2 HR TAT - Normal    Narrative:     In the setting of a positive respiratory panel with a viral infection PLUS a negative procalcitonin without other underlying concern for bacterial infection, consider observing off antibiotics or discontinuation of antibiotics and continue supportive care. If the respiratory panel is positive for atypical bacterial infection (Bordetella pertussis, Chlamydophila pneumoniae, or Mycoplasma pneumoniae), consider antibiotic de-escalation to target atypical bacterial infection.   LACTIC ACID, PLASMA - Normal   SINGLE HSTROPONIN T - Normal    Narrative:     High Sensitive Troponin T Reference Range:  <14.0 ng/L- Negative Female for AMI  <22.0 ng/L- Negative Male for AMI  >=14 - Abnormal Female indicating possible myocardial injury.  >=22 - Abnormal Male indicating possible " myocardial injury.   Clinicians would have to utilize clinical acumen, EKG, Troponin, and serial changes to determine if it is an Acute Myocardial Infarction or myocardial injury due to an underlying chronic condition.        BLOOD CULTURE   BLOOD CULTURE   CBC AND DIFFERENTIAL    Narrative:     The following orders were created for panel order CBC & Differential.  Procedure                               Abnormality         Status                     ---------                               -----------         ------                     CBC Auto Differential[[id number]]        Abnormal            Final result                 Please view results for these tests on the individual orders.       EKG:   ECG 12 Lead Chest Pain   Preliminary Result   HEART RATE= 108  bpm   RR Interval= 556  ms   NM Interval= 125  ms   P Horizontal Axis= 19  deg   P Front Axis= 70  deg   QRSD Interval= 100  ms   QT Interval= 315  ms   QTcB= 422  ms   QRS Axis= 72  deg   T Wave Axis= -32  deg   - ABNORMAL ECG -   Sinus tachycardia   Low voltage, precordial leads   Nonspecific T abnormalities, diffuse leads   Baseline wander in lead(s) V5   Electronically Signed By:    Date and Time of Study: 2023-11-19 09:42:38          Meds given in ED:   Medications   sodium chloride 0.9 % flush 10 mL (has no administration in time range)   fentaNYL citrate (PF) (SUBLIMAZE) injection 25 mcg (25 mcg Intravenous Given 11/19/23 0905)   vancomycin IVPB 1500 mg in 0.9% NaCl (Premix) 500 mL (1,500 mg Intravenous New Bag 11/19/23 1127)   lactated ringers bolus 1,000 mL (0 mL Intravenous Stopped 11/19/23 1009)   acetaminophen (TYLENOL) tablet 1,000 mg (1,000 mg Oral Given 11/19/23 0905)   ondansetron (ZOFRAN) injection 4 mg (4 mg Intravenous Given 11/19/23 0906)   fentaNYL citrate (PF) (SUBLIMAZE) injection 25 mcg (25 mcg Intravenous Given 11/19/23 0935)   HYDROmorphone (DILAUDID) injection 0.5 mg (0.5 mg Intravenous Given 11/19/23 1028)   piperacillin-tazobactam  (ZOSYN) 3.375 g in iso-osmotic dextrose 50 ml (premix) (0 g Intravenous Stopped 23 1127)   iopamidol (ISOVUE-370) 76 % injection 100 mL (95 mL Intravenous Given by Other 23 1045)   ketorolac (TORADOL) injection 15 mg (15 mg Intravenous Given 23 1149)       Imaging results:  XR Shoulder 2+ View Right    Result Date: 2023   As described.    This report was finalized on 2023 9:44 AM by Dr. Lisandro Ruff M.D on Workstation: [employer]      XR Chest 1 View    Result Date: 2023  As described.      This report was finalized on 2023 9:21 AM by Dr. Lisandro Ruff M.D on Workstation: [employer]       Ambulatory status:   - ad raudel    Social issues:   Social History     Socioeconomic History    Marital status:     Number of children: 2   Tobacco Use    Smoking status: Former     Packs/day: 0.00     Years: 15.00     Additional pack years: 0.00     Total pack years: 0.00     Types: Cigarettes     Quit date: 1990     Years since quittin.5    Smokeless tobacco: Never    Tobacco comments:     Have not smoked in 30 years   Vaping Use    Vaping Use: Never used   Substance and Sexual Activity    Alcohol use: Yes     Alcohol/week: 7.0 standard drinks of alcohol     Types: 7 Glasses of wine per week     Comment: vodka daily    Drug use: No    Sexual activity: Yes     Partners: Male     Birth control/protection: Post-menopausal       NIH Stroke Scale:       Gaby Crouch RN  23 12:17 EST

## 2023-11-19 NOTE — H&P
Patient Name:  Madison Acevedo  YOB: 1960  MRN:  [id number]  Admit Date:  11/19/2023  Patient Care Team:  Paulette Coulter APRN as PCP - General (Family Medicine)  Gael Rodriguez MD as Consulting Physician (Cardiology)  Jan Brannon MD as Consulting Physician (Gastroenterology)      Subjective   History Present Illness     Chief Complaint   Patient presents with    Shoulder Pain    Fever         History of Present Illness  Ms. Acevedo is a 63 y.o. 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, presenting with fevers.  On Tuesday patient picked up her grandchild and has had right shoulder and right clavicle pain since then.  Associated fevers on Wednesday and Thursday.  None Friday, has fevers since then.  Patient unable to take deep breaths because of her right sided pain.  Denies any cough, chills, nausea, vomiting, dysuria, rash.  Went to a freestanding ER on 11/13 and got some relief with steroids and IV Toradol.  Was discharged on some oral Toradol and Flexeril but these have not been helping very much.  She also took a Lortab that did not help.    Review of Systems   Constitutional:  Negative for chills and fever.   HENT:  Negative for rhinorrhea and sore throat.    Respiratory:  Negative for cough and shortness of breath.    Cardiovascular:  Negative for chest pain and leg swelling.   Gastrointestinal:  Negative for abdominal pain, constipation, diarrhea, nausea and vomiting.   Genitourinary:  Negative for dysuria, frequency and urgency.   Musculoskeletal:  Negative for back pain and myalgias.        Right shoulder pain   Skin:  Negative for rash.   Neurological:  Negative for dizziness and headaches.        Personal History     Past Medical History:   Diagnosis Date    Arrhythmia     palpitations    Arthritis     Breast cyst     COVID-19 09/2021    Esophageal reflux     Esophageal spasm     Essential hypertension 11/19/2020    Fibrocystic disease of breast      Hyperlipidemia     Palpitations 2020    PONV (postoperative nausea and vomiting)     Vitamin D deficiency      Past Surgical History:   Procedure Laterality Date    BREAST CYST ASPIRATION      CHOLECYSTECTOMY      COLONOSCOPY N/A 2018    Procedure: COLONOSCOPY TO CECUM AND TERMINAL ILEUM;  Surgeon: Jan Brannon MD;  Location: [facility] ENDOSCOPY;  Service:     ENDOSCOPY      X2    ENDOSCOPY N/A 2018    Procedure: ESOPHAGOGASTRODUODENOSCOPY WITH BIOPSIES;  Surgeon: Jan Brannon MD;  Location: [facility] ENDOSCOPY;  Service:     STEREOTACTIC BIOPSY / ASPIRATION / EXCISION Left     Bx breast percutan needle core use imag guide    THUMB ARTHROSCOPY      left thumb joint implant    WISDOM TOOTH EXTRACTION       Family History   Problem Relation Age of Onset    Heart disease Father         MI/CABG early 60s    Hypertension Father     Lung cancer Father     Stroke Father     GIACOMO disease Sister         esophageal reflux    COPD Sister     Hypertension Sister     Esophageal cancer Mother     Malig Hyperthermia Neg Hx      Social History     Tobacco Use    Smoking status: Former     Packs/day: 0.00     Years: 15.00     Additional pack years: 0.00     Total pack years: 0.00     Types: Cigarettes     Quit date: 1990     Years since quittin.5    Smokeless tobacco: Never    Tobacco comments:     Have not smoked in 30 years   Vaping Use    Vaping Use: Never used   Substance Use Topics    Alcohol use: Yes     Alcohol/week: 7.0 standard drinks of alcohol     Types: 7 Glasses of wine per week     Comment: vodka daily    Drug use: No     No current facility-administered medications on file prior to encounter.     Current Outpatient Medications on File Prior to Encounter   Medication Sig Dispense Refill    acetaminophen (TYLENOL) 325 MG tablet Take 650 mg by mouth Every 6 (Six) Hours As Needed for mild pain (1-3).      amLODIPine (NORVASC) 2.5 MG tablet Take 1 tablet by mouth Daily. 90 tablet 1     cyclobenzaprine (FLEXERIL) 5 MG tablet Take 1 tablet by mouth 3 (Three) Times a Day As Needed for abdominal wall pain. 30 tablet 1    magnesium gluconate (MAGONATE) 500 MG tablet Take 400 mg by mouth Daily As Needed.      omeprazole-sodium bicarbonate (Zegerid)  MG per capsule Take 1 capsule by mouth Daily. 30 capsule 10    VITAMIN D, CHOLECALCIFEROL, PO Take  by mouth Daily.      [DISCONTINUED] cetirizine (zyrTEC) 10 MG tablet Take 10 mg by mouth Daily.      [DISCONTINUED] cyclobenzaprine (FLEXERIL) 5 MG tablet Take 1 tablet by mouth 3 (Three) Times a Day As Needed for abdominal wall pain. 30 tablet 3    [DISCONTINUED] ketorolac (TORADOL) 10 MG tablet Take 1 tablet by mouth Every 6 (Six) Hours As Needed for Moderate Pain. 10 tablet 0    [DISCONTINUED] sucralfate (CARAFATE) 1 g tablet Take 1 tablet by mouth 4 (Four) Times a Day. (Patient taking differently: Take 1 g by mouth As Needed.) 20 tablet 0     No Known Allergies    Objective    Objective     Vital Signs  Temp:  [100.1 °F (37.8 °C)-103.2 °F (39.6 °C)] 100.1 °F (37.8 °C)  Heart Rate:  [103-116] 103  Resp:  [16-18] 18  BP: (128-144)/(74-85) 128/74  SpO2:  [86 %-96 %] 93 %  on  Flow (L/min):  [2] 2;   Device (Oxygen Therapy): nasal cannula  Body mass index is 28.84 kg/m².    Physical Exam  Constitutional:       General: She is not in acute distress.     Appearance: She is ill-appearing.   Cardiovascular:      Rate and Rhythm: Normal rate and regular rhythm.   Pulmonary:      Effort: Pulmonary effort is normal. No respiratory distress.   Abdominal:      General: Abdomen is flat. There is no distension.      Tenderness: There is no abdominal tenderness.   Musculoskeletal:         General: Tenderness (Right  anterior shoulder tenderness and clavicle tenderness.  No obvious deformity.  Swelling without any erythema.  No fluctuance.) present. No swelling or deformity. Normal range of motion.   Skin:     General: Skin is warm and dry.   Neurological:       General: No focal deficit present.      Mental Status: She is alert. Mental status is at baseline.         Results Review:  I reviewed the patient's new clinical results.  I reviewed the patient's new imaging results and agree with the interpretation.  I reviewed the patient's other test results and agree with the interpretation  I personally viewed and interpreted the patient's EKG/Telemetry data  Discussed with ED provider.    Lab Results (last 24 hours)       Procedure Component Value Units Date/Time    CBC & Differential [[id number]]  (Abnormal) Collected: 11/19/23 0856    Specimen: Blood Updated: 11/19/23 0910    Narrative:      The following orders were created for panel order CBC & Differential.  Procedure                               Abnormality         Status                     ---------                               -----------         ------                     CBC Auto Differential[[id number]]        Abnormal            Final result                 Please view results for these tests on the individual orders.    Comprehensive Metabolic Panel [[id number]]  (Abnormal) Collected: 11/19/23 0856    Specimen: Blood Updated: 11/19/23 0931     Glucose 120 mg/dL      BUN 21 mg/dL      Creatinine 0.89 mg/dL      Sodium 141 mmol/L      Potassium 3.2 mmol/L      Chloride 103 mmol/L      CO2 25.6 mmol/L      Calcium 9.1 mg/dL      Total Protein 6.8 g/dL      Albumin 3.8 g/dL      ALT (SGPT) 8 U/L      AST (SGOT) 10 U/L      Alkaline Phosphatase 67 U/L      Total Bilirubin 0.2 mg/dL      Globulin 3.0 gm/dL      A/G Ratio 1.3 g/dL      BUN/Creatinine Ratio 23.6     Anion Gap 12.4 mmol/L      eGFR 73.0 mL/min/1.73     Narrative:      GFR Normal >60  Chronic Kidney Disease <60  Kidney Failure <15      Lactic Acid, Plasma [[id number]]  (Normal) Collected: 11/19/23 0856    Specimen: Blood Updated: 11/19/23 0927     Lactate 2.0 mmol/L     Blood Culture - Blood, Arm, Left [[id number]] Collected: 11/19/23 0856    Specimen: Blood  "from Arm, Left Updated: 11/19/23 0902    Procalcitonin [[id number]]  (Abnormal) Collected: 11/19/23 0856    Specimen: Blood Updated: 11/19/23 0935     Procalcitonin 0.64 ng/mL     Narrative:      As a Marker for Sepsis (Non-Neonates):    1. <0.5 ng/mL represents a low risk of severe sepsis and/or septic shock.  2. >2 ng/mL represents a high risk of severe sepsis and/or septic shock.    As a Marker for Lower Respiratory Tract Infections that require antibiotic therapy:    PCT on Admission    Antibiotic Therapy       6-12 Hrs later    >0.5                Strongly Recommended  >0.25 - <0.5        Recommended   0.1 - 0.25          Discouraged              Remeasure/reassess PCT  <0.1                Strongly Discouraged     Remeasure/reassess PCT    As 28 day mortality risk marker: \"Change in Procalcitonin Result\" (>80% or <=80%) if Day 0 (or Day 1) and Day 4 values are available. Refer to http://www.[email or web address]-pct-calculator.[email or web address]    Change in PCT <=80%  A decrease of PCT levels below or equal to 80% defines a positive change in PCT test result representing a higher risk for 28-day all-cause mortality of patients diagnosed with severe sepsis for septic shock.    Change in PCT >80%  A decrease of PCT levels of more than 80% defines a negative change in PCT result representing a lower risk for 28-day all-cause mortality of patients diagnosed with severe sepsis or septic shock.       CBC Auto Differential [[id number]]  (Abnormal) Collected: 11/19/23 0856    Specimen: Blood Updated: 11/19/23 0910     WBC 8.48 10*3/mm3      RBC 3.79 10*6/mm3      Hemoglobin 11.7 g/dL      Hematocrit 35.6 %      MCV 93.9 fL      MCH 30.9 pg      MCHC 32.9 g/dL      RDW 11.8 %      RDW-SD 41.3 fl      MPV 9.2 fL      Platelets 301 10*3/mm3      Neutrophil % 81.7 %      Lymphocyte % 10.0 %      Monocyte % 7.7 %      Eosinophil % 0.0 %      Basophil % 0.2 %      Immature Grans % 0.4 %      Neutrophils, Absolute 6.93 10*3/mm3      Lymphocytes, Absolute " "0.85 10*3/mm3      Monocytes, Absolute 0.65 10*3/mm3      Eosinophils, Absolute 0.00 10*3/mm3      Basophils, Absolute 0.02 10*3/mm3      Immature Grans, Absolute 0.03 10*3/mm3      nRBC 0.0 /100 WBC     D-dimer, Quantitative [[id number]]  (Abnormal) Collected: 11/19/23 0857    Specimen: Blood Updated: 11/19/23 0924     D-Dimer, Quantitative 0.82 MCGFEU/mL     Narrative:      According to the assay 's published package insert, a normal (<0.50 MCGFEU/mL) D-dimer result in conjunction with a non-high clinical probability assessment, excludes deep vein thrombosis (DVT) and pulmonary embolism (PE) with high sensitivity.    D-dimer values increase with age and this can make VTE exclusion of an older population difficult. To address this, the [employer], based on best available evidence and recent guidelines, recommends that clinicians use age-adjusted D-dimer thresholds in patients greater than 50 years of age with: a) a low probability of PE who do not meet all Pulmonary Embolism Rule Out Criteria, or b) in those with intermediate probability of PE.   The formula for an age-adjusted D-dimer cut-off is \"age/100\".  For example, a 60 year old patient would have an age-adjusted cut-off of 0.60 MCGFEU/mL and an 80 year old 0.80 MCGFEU/mL.    Respiratory Panel PCR w/COVID-19(SARS-CoV-2) GIA/NILA/GELA/PAD/COR/HOMAR In-House, NP Swab in [facility], 2 HR TAT - Swab, Nasopharynx [[id number]]  (Normal) Collected: 11/19/23 0901    Specimen: Swab from Nasopharynx Updated: 11/19/23 1023     ADENOVIRUS, PCR Not Detected     Coronavirus 229E Not Detected     Coronavirus HKU1 Not Detected     Coronavirus NL63 Not Detected     Coronavirus OC43 Not Detected     COVID19 Not Detected     Human Metapneumovirus Not Detected     Human Rhinovirus/Enterovirus Not Detected     Influenza A PCR Not Detected     Influenza B PCR Not Detected     Parainfluenza Virus 1 Not Detected     Parainfluenza Virus 2 Not Detected     " Parainfluenza Virus 3 Not Detected     Parainfluenza Virus 4 Not Detected     RSV, PCR Not Detected     Bordetella pertussis pcr Not Detected     Bordetella parapertussis PCR Not Detected     Chlamydophila pneumoniae PCR Not Detected     Mycoplasma pneumo by PCR Not Detected    Narrative:      In the setting of a positive respiratory panel with a viral infection PLUS a negative procalcitonin without other underlying concern for bacterial infection, consider observing off antibiotics or discontinuation of antibiotics and continue supportive care. If the respiratory panel is positive for atypical bacterial infection (Bordetella pertussis, Chlamydophila pneumoniae, or Mycoplasma pneumoniae), consider antibiotic de-escalation to target atypical bacterial infection.    Blood Culture - Blood, Arm, Left [[id number]] Collected: 11/19/23 1003    Specimen: Blood from Arm, Left Updated: 11/19/23 1007    Urinalysis With Culture If Indicated - Urine, Clean Catch [[id number]]  (Abnormal) Collected: 11/19/23 1017    Specimen: Urine, Clean Catch Updated: 11/19/23 1034     Color, UA Yellow     Appearance, UA Clear     pH, UA 7.5     Specific Gravity, UA 1.016     Glucose, UA Negative     Ketones, UA Negative     Bilirubin, UA Negative     Blood, UA Trace     Protein, UA Negative     Leuk Esterase, UA Negative     Nitrite, UA Negative     Urobilinogen, UA 0.2 E.U./dL    Narrative:      In absence of clinical symptoms, the presence of pyuria, bacteria, and/or nitrites on the urinalysis result does not correlate with infection.    Urinalysis, Microscopic Only - Urine, Clean Catch [[id number]]  (Abnormal) Collected: 11/19/23 1017    Specimen: Urine, Clean Catch Updated: 11/19/23 1034     RBC, UA 3-5 /HPF      WBC, UA 0-2 /HPF      Comment: Urine culture not indicated.        Bacteria, UA None Seen /HPF      Squamous Epithelial Cells, UA 0-2 /HPF      Hyaline Casts, UA None Seen /LPF      Methodology Automated Microscopy    Single High  Sensitivity Troponin T [[id number]]  (Normal) Collected: 11/19/23 1058    Specimen: Blood Updated: 11/19/23 1126     HS Troponin T <6 ng/L     Narrative:      High Sensitive Troponin T Reference Range:  <14.0 ng/L- Negative Female for AMI  <22.0 ng/L- Negative Male for AMI  >=14 - Abnormal Female indicating possible myocardial injury.  >=22 - Abnormal Male indicating possible myocardial injury.   Clinicians would have to utilize clinical acumen, EKG, Troponin, and serial changes to determine if it is an Acute Myocardial Infarction or myocardial injury due to an underlying chronic condition.         Magnesium [[id number]]  (Normal) Collected: 11/19/23 1058    Specimen: Blood Updated: 11/19/23 1233     Magnesium 1.7 mg/dL             Imaging Results (Last 24 Hours)       Procedure Component Value Units Date/Time    CT Angiogram Chest [[id number]] Collected: 11/19/23 1111     Updated: 11/19/23 1122    Narrative:      CT ANGIOGRAM CHEST-     Radiation dose reduction techniques were utilized, including automated  exposure control and exposure modulation based on body size.     Clinical: Right-sided chest pain     CT scan of the chest performed with intravenous contrast, pulmonary  embolus protocol to include coronal, sagittal and three-dimensional  reconstruction.     FINDINGS:  1. No pulmonary embolus, aortic aneurysm nor dissection. Cardiac size  within normal limits, no pericardial abnormality.     2. There are linear areas of discoid atelectasis demonstrated at both  lung bases. In addition there is curvilinear subpleural atelectasis  along each costophrenic sulcus, greater on the right than the left.  Additionally within the right lower lobe there is an area worrisome for  possible pneumonia extending from the infrahilar region to the lateral  pleural surface. It is somewhat curvilinear in shape. Right upper lobe  and right middle lobe are clear. Left upper lobe is clear. Short-term  follow-up CT chest advised in 4 to  6 weeks after treatment.     3. Tracheobronchial tree is satisfactory in appearance. The esophagus is  normal in course and caliber. No adenopathy. The breast parenchyma is  symmetric and satisfactory in appearance, no axillary adenopathy. The  remainder is unremarkable.                    This report was finalized on 11/19/2023 11:19 AM by Dr. Timmy Hebert M.D on Workstation: [id number]       XR Shoulder 2+ View Right [[id number]] Collected: 11/19/23 0943     Updated: 11/19/23 0947    Narrative:      XR SHOULDER 2+ VW RIGHT-     INDICATIONS: Trauma.     TECHNIQUE: 2 VIEWS OF THE RIGHT SHOULDER     COMPARISON: None available     FINDINGS:     No acute fracture, erosion, or dislocation is identified,  follow-up/further evaluation can be obtained as indications persist.  Small opacity at the right lung base, please see report from chest x-ray  from same date.       Impression:         As described.           This report was finalized on 11/19/2023 9:44 AM by Dr. Lisandro Ruff M.D on Workstation: [id number] ([email or web address])       XR Chest 1 View [[id number]] Collected: 11/19/23 0916     Updated: 11/19/23 0924    Narrative:      XR CHEST 1 VW-     HISTORY: Female who is 63 years-old, chest pain     TECHNIQUE: Frontal view of the chest     COMPARISON: 5/24/2020     FINDINGS: Heart, mediastinum and pulmonary vasculature are unremarkable.  Small opacity at the right base may be atelectasis or infiltrate,  follow-up recommended to characterize resolution and to exclude the  possibility of an underlying lesion, CT correlation can be obtained as  indicated. Minimal likely atelectasis at the left base. No pleural  effusion, or pneumothorax. Right hemidiaphragm is elevated. No acute  osseous process.       Impression:      As described.                 This report was finalized on 11/19/2023 9:21 AM by Dr. Lisandro Ruff M.D on Workstation: [id number]               Results for orders placed during the hospital encounter of  05/26/21    Adult Stress Echo W/ Cont or Stress Agent if Necessary Per Protocol    Interpretation Summary  · Calculated left ventricular EF = 67% Estimated left ventricular EF was in agreement with the calculated left ventricular EF. Left ventricular systolic function is normal.  · Left ventricular diastolic function was normal.  · Post EF=78%  · Normal stress echo with no significant echocardiographic evidence for myocardial ischemia.      ECG 12 Lead Chest Pain   Preliminary Result   HEART RATE= 108  bpm   RR Interval= 556  ms   SC Interval= 125  ms   P Horizontal Axis= 19  deg   P Front Axis= 70  deg   QRSD Interval= 100  ms   QT Interval= 315  ms   QTcB= 422  ms   QRS Axis= 72  deg   T Wave Axis= -32  deg   - ABNORMAL ECG -   Sinus tachycardia   Low voltage, precordial leads   Nonspecific T abnormalities, diffuse leads   Baseline wander in lead(s) V5   Electronically Signed By:    Date and Time of Study: 2023-11-19 09:42:38           Assessment/Plan     Active Hospital Problems    Diagnosis  POA    **Right shoulder pain [M25.511]  Yes    Sepsis [A41.9]  Yes    Community acquired pneumonia of right lower lobe of lung [J18.9]  Yes    Pain of right clavicle [M89.8X1]  Yes    Hypokalemia [E87.6]  Yes    Essential hypertension [I10]  Yes      Resolved Hospital Problems   No resolved problems to display.     Right lower lobe pneumonia with sepsis (fever, tachycardia)  -Elevated procalcitonin and D-dimer  -RVP negative  -Blood cultures pending, MRSA nares pending  -CTA chest with right lower lobe pneumonia.  Recommend short-term follow-up CT in 4 to 6 weeks after treatment  -Vancomycin and ceftriaxone    Right shoulder and clavicle pain  -Pain started on Tuesday when she was lifting her grandchild  -X-ray without any fractures  -Patient was supposed to follow-up with Dr. Carrington tomorrow  -Ortho consulted  -Defer further imaging to them  -IV Toradol, Norco, IV Dilaudid, steroids, Flexeril    Hypokalemia  -Magnesium  1.7, replete per protocol    Hypertension-continue home amlodipine    History of PACs, follows cardiology as an outpatient.  Not being medicated as the burden is low.    I discussed the patient's findings and my recommendations with patient and ED provider.    VTE Prophylaxis - SCDs.  Code Status - Full code.       Alex Zepeda MD  [facility]  11/19/23  13:22 EST

## 2023-11-19 NOTE — ED PROVIDER NOTES
EMERGENCY DEPARTMENT ENCOUNTER    Room Number:  15/15  Date seen:  11/19/2023  PCP: Paulette Coulter APRN  Historian(s): Patient and her       HPI:  Chief Complaint: Fever, shoulder pain, chest pain, neck pain  A complete HPI/ROS/PMH/PSH/SH/FH are unobtainable / limited due to: None  Context: Madison Acevedo is a 63 y.o. female who presents to the ED c/o recurrent fever with worsening pain in the right shoulder and right-sided chest and lower neck area.  Patient says that she began having a fever earlier this week but it seemed to have subsided until this morning.  [facility] of pain in her anterior neck and shoulder area.  She had extended her arm to help one of her grandchildren come down a slide earlier in the week on Tuesday.  She was diagnosed with musculoskeletal strain and started on Toradol and a steroid regimen.  However, the pain seems to be much worse today in the right shoulder and right upper chest and neck area.  She has not taken any medications for her fever yet today.        PAST MEDICAL HISTORY  Active Ambulatory Problems     Diagnosis Date Noted    Vitamin D deficiency 08/30/2017    Palpitations 11/19/2020    Essential hypertension 11/19/2020    GERD (gastroesophageal reflux disease) 09/13/2010     Resolved Ambulatory Problems     Diagnosis Date Noted    No Resolved Ambulatory Problems     Past Medical History:   Diagnosis Date    Arrhythmia     Arthritis     Breast cyst     COVID-19 09/2021    Esophageal reflux     Esophageal spasm     Fibrocystic disease of breast     Hyperlipidemia     PONV (postoperative nausea and vomiting)          PAST SURGICAL HISTORY  Past Surgical History:   Procedure Laterality Date    BREAST CYST ASPIRATION      CHOLECYSTECTOMY      COLONOSCOPY N/A 01/03/2018    Procedure: COLONOSCOPY TO CECUM AND TERMINAL ILEUM;  Surgeon: Jan Brannon MD;  Location: [facility] ENDOSCOPY;  Service:     ENDOSCOPY      X2     ENDOSCOPY N/A 2018    Procedure: ESOPHAGOGASTRODUODENOSCOPY WITH BIOPSIES;  Surgeon: Jan Brannon MD;  Location: [facility] ENDOSCOPY;  Service:     STEREOTACTIC BIOPSY / ASPIRATION / EXCISION Left     Bx breast percutan needle core use imag guide    THUMB ARTHROSCOPY      left thumb joint implant    WISDOM TOOTH EXTRACTION           FAMILY HISTORY  Family History   Problem Relation Age of Onset    Heart disease Father         MI/CABG early 60s    Hypertension Father     Lung cancer Father     Stroke Father     GIACOMO disease Sister         esophageal reflux    COPD Sister     Hypertension Sister     Esophageal cancer Mother     Malig Hyperthermia Neg Hx          SOCIAL HISTORY  Social History     Socioeconomic History    Marital status:     Number of children: 2   Tobacco Use    Smoking status: Former     Packs/day: 0.00     Years: 15.00     Additional pack years: 0.00     Total pack years: 0.00     Types: Cigarettes     Quit date: 1990     Years since quittin.5    Smokeless tobacco: Never    Tobacco comments:     Have not smoked in 30 years   Vaping Use    Vaping Use: Never used   Substance and Sexual Activity    Alcohol use: Yes     Alcohol/week: 7.0 standard drinks of alcohol     Types: 7 Glasses of wine per week     Comment: vodka daily    Drug use: No    Sexual activity: Yes     Partners: Male     Birth control/protection: Post-menopausal         ALLERGIES  Patient has no known allergies.        REVIEW OF SYSTEMS  Review of Systems   Constitutional:  Positive for activity change, chills and fever.   HENT: Negative.     Eyes:  Negative for pain and visual disturbance.   Respiratory:  Negative for cough and shortness of breath.    Cardiovascular:  Positive for chest pain.   Gastrointestinal:  Negative for abdominal pain and nausea.   Genitourinary:  Negative for dysuria.   Musculoskeletal:  Positive for arthralgias.   Skin:  Negative for color change.   Neurological:  Negative for syncope and  headaches.   All other systems reviewed and are negative.         PHYSICAL EXAM  ED Triage Vitals   Temp Heart Rate Resp BP SpO2   11/19/23 0837 11/19/23 0837 11/19/23 0837 11/19/23 0843 11/19/23 0837   (!) 103.2 °F (39.6 °C) 116 16 144/85 95 %      Temp src Heart Rate Source Patient Position BP Location FiO2 (%)   11/19/23 0837 11/19/23 0837 -- -- --   Tympanic Monitor          Physical Exam      GENERAL: Appears somewhat ill and uncomfortable, no diaphoresis  HENT: nares patent, normocephalic and atraumatic  EYES: no scleral icterus, EOMI, normal conjunctivae  Neck: Supple, no erythema or induration notable to the soft tissues.  Patient has some mild tenderness to palpation of the right anterior lateral neck soft tissues.  There is no meningismus demonstrated or posterior neck pain to palpation notable.  CV: regular rhythm, normal rate, normal distal pulses  RESPIRATORY: normal effort, no stridor, lungs clear to auscultation bilaterally  ABDOMEN: soft, no focal areas of tenderness to the palpation.  MUSCULOSKELETAL: no deformity, no asymmetry, the right shoulder is diffusely tender to palpation and patient has decreased range of motion for external rotation or abduction of the shoulder because of pain on movement.  NEURO: alert, moves all extremities, follows commands, no focal motor deficits apparent  PSYCH:  calm, cooperative  SKIN: warm, dry    Vital signs and nursing notes reviewed.        LAB RESULTS  Recent Results (from the past 24 hour(s))   Comprehensive Metabolic Panel    Collection Time: 11/19/23  8:56 AM    Specimen: Blood   Result Value Ref Range    Glucose 120 (H) 65 - 99 mg/dL    BUN 21 8 - 23 mg/dL    Creatinine 0.89 0.57 - 1.00 mg/dL    Sodium 141 136 - 145 mmol/L    Potassium 3.2 (L) 3.5 - 5.2 mmol/L    Chloride 103 98 - 107 mmol/L    CO2 25.6 22.0 - 29.0 mmol/L    Calcium 9.1 8.6 - 10.5 mg/dL    Total Protein 6.8 6.0 - 8.5 g/dL    Albumin 3.8 3.5 - 5.2 g/dL    ALT (SGPT) 8 1 - 33 U/L    AST  (SGOT) 10 1 - 32 U/L    Alkaline Phosphatase 67 39 - 117 U/L    Total Bilirubin 0.2 0.0 - 1.2 mg/dL    Globulin 3.0 gm/dL    A/G Ratio 1.3 g/dL    BUN/Creatinine Ratio 23.6 7.0 - 25.0    Anion Gap 12.4 5.0 - 15.0 mmol/L    eGFR 73.0 >60.0 mL/min/1.73   Lactic Acid, Plasma    Collection Time: 11/19/23  8:56 AM    Specimen: Blood   Result Value Ref Range    Lactate 2.0 0.5 - 2.0 mmol/L   Procalcitonin    Collection Time: 11/19/23  8:56 AM    Specimen: Blood   Result Value Ref Range    Procalcitonin 0.64 (H) 0.00 - 0.25 ng/mL   CBC Auto Differential    Collection Time: 11/19/23  8:56 AM    Specimen: Blood   Result Value Ref Range    WBC 8.48 3.40 - 10.80 10*3/mm3    RBC 3.79 3.77 - 5.28 10*6/mm3    Hemoglobin 11.7 (L) 12.0 - 15.9 g/dL    Hematocrit 35.6 34.0 - 46.6 %    MCV 93.9 79.0 - 97.0 fL    MCH 30.9 26.6 - 33.0 pg    MCHC 32.9 31.5 - 35.7 g/dL    RDW 11.8 (L) 12.3 - 15.4 %    RDW-SD 41.3 37.0 - 54.0 fl    MPV 9.2 6.0 - 12.0 fL    Platelets 301 140 - 450 10*3/mm3    Neutrophil % 81.7 (H) 42.7 - 76.0 %    Lymphocyte % 10.0 (L) 19.6 - 45.3 %    Monocyte % 7.7 5.0 - 12.0 %    Eosinophil % 0.0 (L) 0.3 - 6.2 %    Basophil % 0.2 0.0 - 1.5 %    Immature Grans % 0.4 0.0 - 0.5 %    Neutrophils, Absolute 6.93 1.70 - 7.00 10*3/mm3    Lymphocytes, Absolute 0.85 0.70 - 3.10 10*3/mm3    Monocytes, Absolute 0.65 0.10 - 0.90 10*3/mm3    Eosinophils, Absolute 0.00 0.00 - 0.40 10*3/mm3    Basophils, Absolute 0.02 0.00 - 0.20 10*3/mm3    Immature Grans, Absolute 0.03 0.00 - 0.05 10*3/mm3    nRBC 0.0 0.0 - 0.2 /100 WBC   D-dimer, Quantitative    Collection Time: 11/19/23  8:57 AM    Specimen: Blood   Result Value Ref Range    D-Dimer, Quantitative 0.82 (H) 0.00 - 0.63 MCGFEU/mL   Respiratory Panel PCR w/COVID-19(SARS-CoV-2) G[facility]/NILA/GELA/PAD/COR/HOMAR In-House, NP Swab in UTM/VTM, 2 HR TAT - Swab, Nasopharynx    Collection Time: 11/19/23  9:01 AM    Specimen: Nasopharynx; Swab   Result Value Ref Range    ADENOVIRUS, PCR Not Detected Not  Detected    Coronavirus 229E Not Detected Not Detected    Coronavirus HKU1 Not Detected Not Detected    Coronavirus NL63 Not Detected Not Detected    Coronavirus OC43 Not Detected Not Detected    COVID19 Not Detected Not Detected - Ref. Range    Human Metapneumovirus Not Detected Not Detected    Human Rhinovirus/Enterovirus Not Detected Not Detected    Influenza A PCR Not Detected Not Detected    Influenza B PCR Not Detected Not Detected    Parainfluenza Virus 1 Not Detected Not Detected    Parainfluenza Virus 2 Not Detected Not Detected    Parainfluenza Virus 3 Not Detected Not Detected    Parainfluenza Virus 4 Not Detected Not Detected    RSV, PCR Not Detected Not Detected    Bordetella pertussis pcr Not Detected Not Detected    Bordetella parapertussis PCR Not Detected Not Detected    Chlamydophila pneumoniae PCR Not Detected Not Detected    Mycoplasma pneumo by PCR Not Detected Not Detected   ECG 12 Lead Chest Pain    Collection Time: 11/19/23  9:42 AM   Result Value Ref Range    QT Interval 315 ms    QTC Interval 422 ms   Urinalysis With Culture If Indicated - Urine, Clean Catch    Collection Time: 11/19/23 10:17 AM    Specimen: Urine, Clean Catch   Result Value Ref Range    Color, UA Yellow Yellow, Straw    Appearance, UA Clear Clear    pH, UA 7.5 5.0 - 8.0    Specific Gravity, UA 1.016 1.005 - 1.030    Glucose, UA Negative Negative    Ketones, UA Negative Negative    Bilirubin, UA Negative Negative    Blood, UA Trace (A) Negative    Protein, UA Negative Negative    Leuk Esterase, UA Negative Negative    Nitrite, UA Negative Negative    Urobilinogen, UA 0.2 E.U./dL 0.2 - 1.0 E.U./dL   Urinalysis, Microscopic Only - Urine, Clean Catch    Collection Time: 11/19/23 10:17 AM    Specimen: Urine, Clean Catch   Result Value Ref Range    RBC, UA 3-5 (A) None Seen, 0-2 /HPF    WBC, UA 0-2 None Seen, 0-2 /HPF    Bacteria, UA None Seen None Seen /HPF    Squamous Epithelial Cells, UA 0-2 None Seen, 0-2 /HPF    Hyaline  Casts, UA None Seen None Seen /LPF    Methodology Automated Microscopy    Single High Sensitivity Troponin T    Collection Time: 11/19/23 10:58 AM    Specimen: Blood   Result Value Ref Range    HS Troponin T <6 <14 ng/L       Ordered the above labs and reviewed the results.        RADIOLOGY  CT Angiogram Chest    Result Date: 11/19/2023  CT ANGIOGRAM CHEST-  Radiation dose reduction techniques were utilized, including automated exposure control and exposure modulation based on body size.  Clinical: Right-sided chest pain  CT scan of the chest performed with intravenous contrast, pulmonary embolus protocol to include coronal, sagittal and three-dimensional reconstruction.  FINDINGS: 1. No pulmonary embolus, aortic aneurysm nor dissection. Cardiac size within normal limits, no pericardial abnormality.  2. There are linear areas of discoid atelectasis demonstrated at both lung bases. In addition there is curvilinear subpleural atelectasis along each costophrenic sulcus, greater on the right than the left. Additionally within the right lower lobe there is an area worrisome for possible pneumonia extending from the infrahilar region to the lateral pleural surface. It is somewhat curvilinear in shape. Right upper lobe and right middle lobe are clear. Left upper lobe is clear. Short-term follow-up CT chest advised in 4 to 6 weeks after treatment.  3. Tracheobronchial tree is satisfactory in appearance. The esophagus is normal in course and caliber. No adenopathy. The breast parenchyma is symmetric and satisfactory in appearance, no axillary adenopathy. The remainder is unremarkable.       This report was finalized on 11/19/2023 11:19 AM by Dr. Timmy Hebert M.D on Workstation: [id number]      XR Shoulder 2+ View Right    Result Date: 11/19/2023  XR SHOULDER 2+ VW RIGHT-  INDICATIONS: Trauma.  TECHNIQUE: 2 VIEWS OF THE RIGHT SHOULDER  COMPARISON: None available  FINDINGS:  No acute fracture, erosion, or dislocation is  identified, follow-up/further evaluation can be obtained as indications persist. Small opacity at the right lung base, please see report from chest x-ray from same date.       As described.    This report was finalized on 11/19/2023 9:44 AM by Dr. Lisandro Ruff M.D on Workstation: [id number]      XR Chest 1 View    Result Date: 11/19/2023  XR CHEST 1 VW-  HISTORY: Female who is 63 years-old, chest pain  TECHNIQUE: Frontal view of the chest  COMPARISON: 5/24/2020  FINDINGS: Heart, mediastinum and pulmonary vasculature are unremarkable. Small opacity at the right base may be atelectasis or infiltrate, follow-up recommended to characterize resolution and to exclude the possibility of an underlying lesion, CT correlation can be obtained as indicated. Minimal likely atelectasis at the left base. No pleural effusion, or pneumothorax. Right hemidiaphragm is elevated. No acute osseous process.      As described.      This report was finalized on 11/19/2023 9:21 AM by Dr. Lisandro Ruff M.D on Workstation: [id number]       Ordered the above noted radiological studies. Reviewed by me in PACS.          PROCEDURES  Critical Care    Performed by: Aubrey Noland MD  Authorized by: Alex Zepeda MD    Critical care provider statement:     Critical care time (minutes):  35    Critical care time was exclusive of:  Separately billable procedures and treating other patients and teaching time    Critical care was necessary to treat or prevent imminent or life-threatening deterioration of the following conditions:  Sepsis    Critical care was time spent personally by me on the following activities:  Development of treatment plan with patient or surrogate, discussions with consultants, discussions with primary provider, evaluation of patient's response to treatment, examination of patient, obtaining history from patient or surrogate, interpretation of cardiac output measurements, ordering and performing treatments and  interventions, ordering and review of laboratory studies, ordering and review of radiographic studies, pulse oximetry, re-evaluation of patient's condition and review of old charts    I assumed direction of critical care for this patient from another provider in my specialty: no      Care discussed with: admitting provider            MEDICATIONS GIVEN IN ER  Medications   sodium chloride 0.9 % flush 10 mL (has no administration in time range)   fentaNYL citrate (PF) (SUBLIMAZE) injection 25 mcg (25 mcg Intravenous Given 11/19/23 0905)   vancomycin IVPB 1500 mg in 0.9% NaCl (Premix) 500 mL (1,500 mg Intravenous New Bag 11/19/23 1127)   lactated ringers bolus 1,000 mL (0 mL Intravenous Stopped 11/19/23 1009)   acetaminophen (TYLENOL) tablet 1,000 mg (1,000 mg Oral Given 11/19/23 0905)   ondansetron (ZOFRAN) injection 4 mg (4 mg Intravenous Given 11/19/23 0906)   fentaNYL citrate (PF) (SUBLIMAZE) injection 25 mcg (25 mcg Intravenous Given 11/19/23 0935)   HYDROmorphone (DILAUDID) injection 0.5 mg (0.5 mg Intravenous Given 11/19/23 1028)   piperacillin-tazobactam (ZOSYN) 3.375 g in iso-osmotic dextrose 50 ml (premix) (0 g Intravenous Stopped 11/19/23 1127)   iopamidol (ISOVUE-370) 76 % injection 100 mL (95 mL Intravenous Given by Other 11/19/23 1045)   ketorolac (TORADOL) injection 15 mg (15 mg Intravenous Given 11/19/23 1149)           MEDICAL DECISION MAKING, PROGRESS, and CONSULTS    All labs have been independently reviewed by me.  All radiology studies have been reviewed by me and I have also reviewed the radiology report.   EKG's independently viewed and interpreted by me.  Discussion below represents my analysis of pertinent findings related to patient's condition, differential diagnosis, treatment plan and final disposition.    Heart score: 3    Additional sources:    - External (non-ED) record review: I reviewed the most recent cardiology office progress note from December 19, 2022 when she had follow-up care  for hypertension and palpitations.      Orders placed during this visit:  Orders Placed This Encounter   Procedures    Critical Care    Respiratory Panel PCR w/COVID-19(SARS-CoV-2) [facility]IL[facility]ELA/[facility] In-House, NP Swab in UTM/VTM, 2 HR TAT - Swab, Nasopharynx    Blood Culture - Blood,    Blood Culture - Blood,    XR Chest 1 View    XR Shoulder 2+ View Right    CT Angiogram Chest    Comprehensive Metabolic Panel    Lactic Acid, Plasma    Procalcitonin    Urinalysis With Culture If Indicated - Urine, Clean Catch    CBC Auto Differential    Single High Sensitivity Troponin T    D-dimer, Quantitative    Urinalysis, Microscopic Only - Urine, Clean Catch    Monitor Blood Pressure    Pulse Oximetry, Continuous    [facility] (on-call MD unless specified) Details    ECG 12 Lead Chest Pain    Insert Peripheral IV    Initiate Observation Status    CBC & Differential           Differential diagnosis includes but is not limited to:    Sepsis, viral illness, COVID-19, pneumonia, acute MI, pulmonary embolism, shoulder osteo-arthritis, septic arthritis, rotator cuff injury      Independent interpretation of labs, radiology studies, and discussions with consultants:  ED Course as of 11/19/23 1211   Sun Nov 19, 2023   0938 Procalcitonin(!): 0.64 [CHAPO]   0938 D-Dimer, Quant(!): 0.82 [CHAPO]   0939 I independently interpreted the chest x-ray and my findings are: No pneumothorax, no effusion.  There are increased interstitial markings at the right midlung, possibly suggestive of infiltrate [CHAPO]   1014 EKG           EKG time/Interp time: 0942/0944  Rhythm/Rate: Sinus tachycardia, 108 bpm  P waves and IN: Present, 125 ms  QRS, axis: 100 ms, normal axis  ST and T waves: No ST segment elevation present.    Independently interpreted by me contemporaneously with treatment     [CHAPO]   1014 Ordering CTA of the chest for further investigation of the right-sided interstitial markings.  This should help us to determine if it is consistent with pneumonia  or not.  We will also rule out pulmonary embolism given her right-sided chest pain with the angiogram future. [CHAPO]   1015 I independently interpreted the x-ray of the right shoulder and my findings are: No fracture or dislocation [CHAPO]   1025 RVP is negative.  Patient still complaining of pain in the right shoulder and right upper chest area.  Ordering a dose of Dilaudid for her at this time. [CHAPO]   1207 I reviewed the CT angiogram chest report which is satisfactory to show no evidence of pulmonary realism.  There does seem to be evidence for right lower lobe pneumonia, however.  Therefore I have started some broad-spectrum antibiotics for sepsis concerns.  They should be adequate coverage for pneumonia for now.  We will make arrangements to admit to the hospitalist for further supportive care. [CHAPO]   1208 I just discussed with Dr. Zepeda from [facility] about this patient.  He agrees to accept the patient for further medical management [CHAPO]      ED Course User Index  [CHAPO] Aubrey Noland MD         DIAGNOSIS  Final diagnoses:   Sepsis without acute organ dysfunction, due to unspecified organism   Pneumonia of right lower lobe due to infectious organism   Acute pain of right shoulder   Pain of right clavicle         DISPOSITION  Admit to [facility], telemetry        Latest Documented Vital Signs:  As of 12:11 EST  BP- 128/74 HR- 103 Temp- 100.1 °F (37.8 °C) O2 sat- 93%              --    Please note that portions of this were completed with a voice recognition program.       Note Disclaimer: At [facility], we believe that sharing information builds trust and better relationships. You are receiving this note because you are receiving care at [facility] or recently visited. It is possible you will see health information before a provider has talked with you about it. This kind of information can be easy to misunderstand. To help you fully understand what it means for your health, we urge you to discuss this note with your  provider.             Aubrey Noland MD  11/19/23 1068

## 2023-11-20 ENCOUNTER — APPOINTMENT (OUTPATIENT)
Dept: MRI IMAGING | Facility: HOSPITAL | Age: 63
DRG: 867 | End: 2023-11-20
Payer: COMMERCIAL

## 2023-11-20 PROBLEM — J18.9 SEPSIS DUE TO PNEUMONIA: Status: ACTIVE | Noted: 2023-11-20

## 2023-11-20 PROBLEM — A41.9 SEPSIS DUE TO PNEUMONIA: Status: ACTIVE | Noted: 2023-11-20

## 2023-11-20 PROBLEM — R78.81 BACTEREMIA: Status: ACTIVE | Noted: 2023-11-20

## 2023-11-20 LAB
ANION GAP SERPL CALCULATED.3IONS-SCNC: 8.4 MMOL/L (ref 5–15)
BACTERIA BLD CULT: NORMAL
BOTTLE TYPE: NORMAL
BUN SERPL-MCNC: 17 MG/DL (ref 8–23)
BUN/CREAT SERPL: 25.4 (ref 7–25)
CALCIUM SPEC-SCNC: 8.9 MG/DL (ref 8.6–10.5)
CHLORIDE SERPL-SCNC: 103 MMOL/L (ref 98–107)
CO2 SERPL-SCNC: 24.6 MMOL/L (ref 22–29)
CREAT SERPL-MCNC: 0.67 MG/DL (ref 0.57–1)
DEPRECATED RDW RBC AUTO: 38.8 FL (ref 37–54)
EGFRCR SERPLBLD CKD-EPI 2021: 98.4 ML/MIN/1.73
ERYTHROCYTE [DISTWIDTH] IN BLOOD BY AUTOMATED COUNT: 11.5 % (ref 12.3–15.4)
GLUCOSE SERPL-MCNC: 116 MG/DL (ref 65–99)
HCT VFR BLD AUTO: 32.4 % (ref 34–46.6)
HGB BLD-MCNC: 10.9 G/DL (ref 12–15.9)
MCH RBC QN AUTO: 31.3 PG (ref 26.6–33)
MCHC RBC AUTO-ENTMCNC: 33.6 G/DL (ref 31.5–35.7)
MCV RBC AUTO: 93.1 FL (ref 79–97)
PLATELET # BLD AUTO: 262 10*3/MM3 (ref 140–450)
PMV BLD AUTO: 9.2 FL (ref 6–12)
POTASSIUM SERPL-SCNC: 4.6 MMOL/L (ref 3.5–5.2)
RBC # BLD AUTO: 3.48 10*6/MM3 (ref 3.77–5.28)
SODIUM SERPL-SCNC: 136 MMOL/L (ref 136–145)
WBC NRBC COR # BLD AUTO: 10.71 10*3/MM3 (ref 3.4–10.8)

## 2023-11-20 PROCEDURE — 25010000002 HYDROMORPHONE 1 MG/ML SOLUTION: Performed by: STUDENT IN AN ORGANIZED HEALTH CARE EDUCATION/TRAINING PROGRAM

## 2023-11-20 PROCEDURE — 25010000002 DEXAMETHASONE PER 1 MG: Performed by: STUDENT IN AN ORGANIZED HEALTH CARE EDUCATION/TRAINING PROGRAM

## 2023-11-20 PROCEDURE — 80048 BASIC METABOLIC PNL TOTAL CA: CPT | Performed by: STUDENT IN AN ORGANIZED HEALTH CARE EDUCATION/TRAINING PROGRAM

## 2023-11-20 PROCEDURE — 73221 MRI JOINT UPR EXTREM W/O DYE: CPT

## 2023-11-20 PROCEDURE — 87040 BLOOD CULTURE FOR BACTERIA: CPT | Performed by: STUDENT IN AN ORGANIZED HEALTH CARE EDUCATION/TRAINING PROGRAM

## 2023-11-20 PROCEDURE — 85027 COMPLETE CBC AUTOMATED: CPT | Performed by: STUDENT IN AN ORGANIZED HEALTH CARE EDUCATION/TRAINING PROGRAM

## 2023-11-20 PROCEDURE — 25010000002 CEFEPIME PER 500 MG: Performed by: STUDENT IN AN ORGANIZED HEALTH CARE EDUCATION/TRAINING PROGRAM

## 2023-11-20 PROCEDURE — 25010000002 KETOROLAC TROMETHAMINE PER 15 MG: Performed by: STUDENT IN AN ORGANIZED HEALTH CARE EDUCATION/TRAINING PROGRAM

## 2023-11-20 RX ORDER — OMEPRAZOLE AND SODIUM BICARBONATE 40; 1100 MG/1; MG/1
1 CAPSULE ORAL
Status: DISCONTINUED | OUTPATIENT
Start: 2023-11-20 | End: 2023-12-01 | Stop reason: HOSPADM

## 2023-11-20 RX ADMIN — CYCLOBENZAPRINE 10 MG: 10 TABLET, FILM COATED ORAL at 05:35

## 2023-11-20 RX ADMIN — AMLODIPINE BESYLATE 2.5 MG: 5 TABLET ORAL at 08:19

## 2023-11-20 RX ADMIN — HYDROMORPHONE HYDROCHLORIDE 1 MG: 1 INJECTION, SOLUTION INTRAMUSCULAR; INTRAVENOUS; SUBCUTANEOUS at 08:18

## 2023-11-20 RX ADMIN — CEFEPIME 2000 MG: 2 INJECTION, POWDER, FOR SOLUTION INTRAVENOUS at 08:20

## 2023-11-20 RX ADMIN — Medication 10 ML: at 08:24

## 2023-11-20 RX ADMIN — DEXAMETHASONE SODIUM PHOSPHATE 6 MG: 10 INJECTION INTRAMUSCULAR; INTRAVENOUS at 08:21

## 2023-11-20 RX ADMIN — KETOROLAC TROMETHAMINE 30 MG: 30 INJECTION, SOLUTION INTRAMUSCULAR; INTRAVENOUS at 20:39

## 2023-11-20 RX ADMIN — HYDROMORPHONE HYDROCHLORIDE 1 MG: 1 INJECTION, SOLUTION INTRAMUSCULAR; INTRAVENOUS; SUBCUTANEOUS at 11:53

## 2023-11-20 RX ADMIN — CYCLOBENZAPRINE 10 MG: 10 TABLET, FILM COATED ORAL at 16:47

## 2023-11-20 RX ADMIN — Medication 10 ML: at 20:40

## 2023-11-20 RX ADMIN — OMEPRAZOLE AND SODIUM BICARBONATE 1 CAPSULE: 40; 1100 CAPSULE ORAL at 09:22

## 2023-11-20 RX ADMIN — CEFEPIME 2000 MG: 2 INJECTION, POWDER, FOR SOLUTION INTRAVENOUS at 16:47

## 2023-11-20 RX ADMIN — KETOROLAC TROMETHAMINE 30 MG: 30 INJECTION, SOLUTION INTRAMUSCULAR; INTRAVENOUS at 09:21

## 2023-11-20 RX ADMIN — HYDROMORPHONE HYDROCHLORIDE 1 MG: 1 INJECTION, SOLUTION INTRAMUSCULAR; INTRAVENOUS; SUBCUTANEOUS at 05:09

## 2023-11-20 RX ADMIN — HYDROMORPHONE HYDROCHLORIDE 1 MG: 1 INJECTION, SOLUTION INTRAMUSCULAR; INTRAVENOUS; SUBCUTANEOUS at 18:44

## 2023-11-20 NOTE — CONSULTS
CONSULT NOTE    Infectious Diseases - Grace Canales MD  [facility]       Patient Identification:  Name: Madison Acevedo  Age: 63 y.o.  Sex: female  :  1960  MRN: [id number]             Date of Consultation: 2023      Primary Care Physician: Paulette Coulter APRN                               Requesting Physician: Dr. 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  Patient was unable to function and work the next day and had to take a day off early.  Because of the worsening pain went patient in fact came to the emergency room/immediate care center on 2023 and was noted to have mild tenderness to the soft tissue of the right shoulder with preserved range of motion at result tenderness of the right supraclavicular area.  She was assessed to have musculoskeletal strain from unaccustomed activity of getting the child of the slide few days ago.  Since her visit to the emergency room/immediate care center on 2023 her symptoms continue to get worse and she presented  morning with worsening pain and was noted to have a temperature of 102.3.  She has taken pain medicine earlier before coming to the hospital.  Blood cultures were drawn and patient was started on vancomycin and Zosyn and subsequently changed to ceftriaxone.  Blood culture come back positive for gram-negative rods earlier today resulting in switching antibiotics to cefepime and infectious disease services consulted.  Patient denies  any recent dental procedure or GYN procedure or symptoms of urinary tract infection or abdominal pain and discomfort or nausea or vomiting.  Prior to the activity at the playground when she was attempting to  her granddaughter from the slide she was feeling otherwise fine and did not have symptoms of fever chills nausea vomiting or decrease in appetite.  Patient has longstanding history of hot flashes and night sweats which are unchanged.  She has persistent discomfort in her shoulder on the right side as well as pain and discomfort progressing from her lower part of the neck into her shoulder.  MRI has been ordered and is currently pending.  Patient does not recall any dental procedure performed recently but does remember having Clinimed in the last 6-month but not sure exactly when.  This presentation is consistent with:  1-gram-negative bacteremic sepsis with  2-left clavicle, sternocleidomastoid and shoulder discomfort likely due to secondary seeding  3-possible haemophilus bacteremia from oral taco needs to be considered given lack of symptoms involving other sources of gram-negative vinh bacteremia such as biliary or bowel or  process.    Recommendations/Discussions:  At this juncture continue with IV cefepime  Follow-up on MRI of her shoulder and neck and clavicle area  Follow-up on ID and sensitivity of the gram-negative vinh and based on the speciation further workup can be directed towards the potential source of this bacteremia.  Duration of antibiotic treatment and specific regimen is open ended and unclear at this time and would be finalized based on evolving information and finalization of infectious syndrome explaining her presentation.  Overall concept of care discussed with the patient and family members at the bedside.  Thank you Dr. 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  Patient was unable to function and work the next day and had to take a day off early.  Because of the worsening pain went patient in fact came to the emergency room/immediate care center on 11/17/2023 and was noted to have mild tenderness to the soft tissue of the right shoulder with preserved range of motion at result tenderness of the right supraclavicular area.  She was assessed to have musculoskeletal strain from unaccustomed activity of getting the child of the slide few days ago.  Since her visit to the emergency room/immediate care center on 11/17/2023 her symptoms continue to get worse and she presented Sunday morning with worsening pain and was noted to have a temperature of 102.3.  She has taken pain medicine earlier before coming to the hospital.  Blood cultures were drawn and patient was started on vancomycin and Zosyn and subsequently changed to ceftriaxone.  Blood culture come back positive for gram-negative rods earlier today resulting in switching antibiotics to cefepime and infectious disease services consulted.  Patient denies any recent dental procedure or GYN procedure or symptoms of urinary tract infection or abdominal pain and discomfort or nausea or vomiting.  Prior to the activity at the playground when she was attempting to  her granddaughter from the slide she was feeling otherwise fine and did not have symptoms of fever chills nausea vomiting or decrease in appetite.  Patient has longstanding history of hot flashes and night sweats which are unchanged.  She has  persistent discomfort in her shoulder on the right side as well as pain and discomfort progressing from her lower part of the neck into her shoulder.  MRI has been ordered and is currently pending.  Patient does not recall any dental procedure performed recently but does remember having Clinimed in the last 6-month but not sure exactly when.      Past Medical History:  Past Medical History:   Diagnosis Date    Arrhythmia     palpitations    Arthritis     Breast cyst     COVID-19 09/2021    Esophageal reflux     Esophageal spasm     Essential hypertension 11/19/2020    Fibrocystic disease of breast     Hyperlipidemia     Palpitations 11/19/2020    PONV (postoperative nausea and vomiting)     Vitamin D deficiency      Past Surgical History:  Past Surgical History:   Procedure Laterality Date    BREAST CYST ASPIRATION      CHOLECYSTECTOMY      COLONOSCOPY N/A 01/03/2018    Procedure: COLONOSCOPY TO CECUM AND TERMINAL ILEUM;  Surgeon: Jan Brannon MD;  Location: [facility] ENDOSCOPY;  Service:     ENDOSCOPY      X2    ENDOSCOPY N/A 01/03/2018    Procedure: ESOPHAGOGASTRODUODENOSCOPY WITH BIOPSIES;  Surgeon: Jan Brannon MD;  Location: [facility] ENDOSCOPY;  Service:     STEREOTACTIC BIOPSY / ASPIRATION / EXCISION Left     Bx breast percutan needle core use imag guide    THUMB ARTHROSCOPY      left thumb joint implant    WISDOM TOOTH EXTRACTION        Home Meds:  Medications Prior to Admission   Medication Sig Dispense Refill Last Dose    acetaminophen (TYLENOL) 325 MG tablet Take 2 tablets by mouth Every 6 (Six) Hours As Needed for Mild Pain.   11/18/2023    amLODIPine (NORVASC) 2.5 MG tablet Take 1 tablet by mouth Daily. 90 tablet 1 11/19/2023    cyclobenzaprine (FLEXERIL) 5 MG tablet Take 1 tablet by mouth 3 (Three) Times a Day As Needed for abdominal wall pain. 30 tablet 1 11/19/2023    omeprazole-sodium bicarbonate (Zegerid)  MG per capsule Take 1 capsule by mouth Daily. 30 capsule 10 11/19/2023    magnesium  gluconate (MAGONATE) 500 MG tablet Take 400 mg by mouth Daily As Needed.       VITAMIN D, CHOLECALCIFEROL, PO Take  by mouth Daily.   More than a month     Current Meds:     Current Facility-Administered Medications:     acetaminophen (TYLENOL) tablet 650 mg, 650 mg, Oral, Q6H PRN, Alex Zepeda MD    amLODIPine (NORVASC) tablet 2.5 mg, 2.5 mg, Oral, Daily, Alex Zepeda MD, 2.5 mg at 11/20/23 0819    sennosides-docusate (PERICOLACE) 8.6-50 MG per tablet 2 tablet, 2 tablet, Oral, BID PRN **AND** polyethylene glycol (MIRALAX) packet 17 g, 17 g, Oral, Daily PRN **AND** bisacodyl (DULCOLAX) EC tablet 5 mg, 5 mg, Oral, Daily PRN **AND** bisacodyl (DULCOLAX) suppository 10 mg, 10 mg, Rectal, Daily PRN, Alex Zepeda MD    cefepime 2000 mg IVPB in 100 ml NS (VTB), 2,000 mg, Intravenous, Q8H, Alex Zepeda MD    cyclobenzaprine (FLEXERIL) tablet 10 mg, 10 mg, Oral, TID PRN, Alex Zepeda MD, 10 mg at 11/20/23 0535    dexAMETHasone (DECADRON) injection 6 mg, 6 mg, Intravenous, Daily, Alex Zepeda MD, 6 mg at 11/20/23 0821    HYDROmorphone (DILAUDID) injection 1 mg, 1 mg, Intravenous, Q2H PRN, 1 mg at 11/20/23 1153 **AND** naloxone (NARCAN) injection 0.4 mg, 0.4 mg, Intravenous, Q5 Min PRN, Alex Zepeda MD    ketorolac (TORADOL) injection 30 mg, 30 mg, Intravenous, Q6H PRN, Alex Zepeda MD, 30 mg at 11/20/23 0921    Magnesium Standard Dose Replacement - Follow Nurse / BPA Driven Protocol, , Does not apply, PRN, Alex Zepeda MD    melatonin tablet 5 mg, 5 mg, Oral, Nightly PRN, Alex Zepeda MD    omeprazole-sodium bicarbonate (ZEGERID)  MG per capsule 1 capsule, 1 capsule, Oral, QAM AC, Alex Zepeda MD, 1 capsule at 11/20/23 0922    oxyCODONE-acetaminophen (PERCOCET)  MG per tablet 1 tablet, 1 tablet, Oral, Q4H PRN, Alex Zepeda MD    Potassium Replacement - Follow Nurse / BPA Driven Protocol, , Does not apply, PRNDuane Cody W, MD    promethazine (PHENERGAN) tablet 12.5 mg, 12.5 mg, Oral,  "Q6H PRN **OR** promethazine (PHENERGAN) suppository 12.5 mg, 12.5 mg, Rectal, Q6H PRN, Alex Zepeda MD    [COMPLETED] Insert Peripheral IV, , , Once **AND** sodium chloride 0.9 % flush 10 mL, 10 mL, Intravenous, PRN, Aubrey Noland MD    sodium chloride 0.9 % flush 10 mL, 10 mL, Intravenous, Q12H, Alex Zepeda MD, 10 mL at 23 0824    sodium chloride 0.9 % flush 10 mL, 10 mL, Intravenous, PRN, Alex Zepeda MD, 10 mL at 23 1757    sodium chloride 0.9 % infusion 40 mL, 40 mL, Intravenous, PRN, Alex Zepeda MD  Allergies:  No Known Allergies  Social History:   Social History     Tobacco Use    Smoking status: Former     Packs/day: 0.00     Years: 15.00     Additional pack years: 0.00     Total pack years: 0.00     Types: Cigarettes     Quit date: 1990     Years since quittin.5    Smokeless tobacco: Never    Tobacco comments:     Have not smoked in 30 years   Substance Use Topics    Alcohol use: Yes     Alcohol/week: 7.0 standard drinks of alcohol     Types: 7 Glasses of wine per week     Comment: vodka daily      Family History:  Family History   Problem Relation Age of Onset    Heart disease Father         MI/CABG early 60s    Hypertension Father     Lung cancer Father     Stroke Father     GIACOMO disease Sister         esophageal reflux    COPD Sister     Hypertension Sister     Esophageal cancer Mother     Malig Hyperthermia Neg Hx           Review of Systems  See history of present illness and past medical history.        Vitals:   /79   Pulse 95   Temp 98.2 °F (36.8 °C) (Oral)   Resp 18   Ht 162.6 cm (64\")   Wt 78.2 kg (172 lb 4.8 oz)   SpO2 95%   BMI 29.58 kg/m²   I/O: No intake or output data in the 24 hours ending 23 1407  Exam:  Patient is examined using the personal protective equipment as per guidelines from infection control for this particular patient as enacted.  Hand washing was performed before and after patient interaction.  General Appearance:    Alert, " cooperative, no distress, appears stated age   Head:    Normocephalic, without obvious abnormality, atraumatic   Eyes:    PERRL, conjunctivae/corneas clear, EOM's intact, both eyes   Ears:    Normal external ear canals, both ears   Nose:   Nares normal, septum midline, mucosa normal, no drainage    or sinus tenderness   Throat: No oral or dental lesions noted   Neck: Supple with tenderness on the left with movement.   Back:     Symmetric, no curvature, ROM normal, no CVA tenderness   Lungs:     Clear to auscultation bilaterally, respirations unlabored   Chest Wall:    No tenderness or deformity    Heart:  S1-S2 regular   Abdomen:   Soft nontender   Extremities: Right shoulder per se is not tender but acromioclavicular joint extending into the medial clavicle as well as to the sternocleidomastoid insertion site and the sternoclavicular joint on the right side are tender.    Pulses:   Pulses palpable in all extremities; symmetric all extremities   Skin: Erythematous changes overlying the right clavicle sternoclavicular joint and acromioclavicular joint on the right noted.   Neurologic: Neurologically intact       Data Review:    I reviewed the patient's new clinical results.  Results from last 7 days   Lab Units 11/20/23  0643 11/19/23  0856 11/17/23  1136   WBC 10*3/mm3 10.71 8.48 11.17*   HEMOGLOBIN g/dL 10.9* 11.7* 12.4   PLATELETS 10*3/mm3 262 301 253     Results from last 7 days   Lab Units 11/20/23  0643 11/19/23  2231 11/19/23  0856 11/17/23  1136   SODIUM mmol/L 136  --  141 138   POTASSIUM mmol/L 4.6 4.6 3.2* 3.6   CHLORIDE mmol/L 103  --  103 102   CO2 mmol/L 24.6  --  25.6 27.0   BUN mg/dL 17  --  21 10   CREATININE mg/dL 0.67  --  0.89 0.79   CALCIUM mg/dL 8.9  --  9.1 9.4   GLUCOSE mg/dL 116*  --  120* 108*     Microbiology Results (last 10 days)       Procedure Component Value - Date/Time    MRSA Screen, PCR (Inpatient) - Swab, Nares [[id number]]  (Normal) Collected: 11/19/23 8592    Lab Status: Final  result Specimen: Swab from Nares Updated: 11/19/23 1609     MRSA PCR No MRSA Detected    Narrative:      The negative predictive value of this diagnostic test is high and should only be used to consider de-escalating anti-MRSA therapy. A positive result may indicate colonization with MRSA and must be correlated clinically.    Blood Culture - Blood, Arm, Left [[id number]]  (Abnormal) Collected: 11/19/23 1003    Lab Status: Preliminary result Specimen: Blood from Arm, Left Updated: 11/20/23 0129     Blood Culture Abnormal Stain     Gram Stain Aerobic Bottle Gram negative bacilli      Anaerobic Bottle Gram negative bacilli    Respiratory Panel PCR w/COVID-19(SARS-CoV-2) G[facility]/NILA/GELA/PAD/[facility]/HOM[facility] In-House, NP Swab in UTM/VTM, 2 HR TAT - Swab, Nasopharynx [[id number]]  (Normal) Collected: 11/19/23 0901    Lab Status: Final result Specimen: Swab from Nasopharynx Updated: 11/19/23 1023     ADENOVIRUS, PCR Not Detected     Coronavirus 229E Not Detected     Coronavirus HKU1 Not Detected     Coronavirus NL63 Not Detected     Coronavirus OC43 Not Detected     COVID19 Not Detected     Human Metapneumovirus Not Detected     Human Rhinovirus/Enterovirus Not Detected     Influenza A PCR Not Detected     Influenza B PCR Not Detected     Parainfluenza Virus 1 Not Detected     Parainfluenza Virus 2 Not Detected     Parainfluenza Virus 3 Not Detected     Parainfluenza Virus 4 Not Detected     RSV, PCR Not Detected     Bordetella pertussis pcr Not Detected     Bordetella parapertussis PCR Not Detected     Chlamydophila pneumoniae PCR Not Detected     Mycoplasma pneumo by PCR Not Detected    Narrative:      In the setting of a positive respiratory panel with a viral infection PLUS a negative procalcitonin without other underlying concern for bacterial infection, consider observing off antibiotics or discontinuation of antibiotics and continue supportive care. If the respiratory panel is positive for atypical bacterial infection (Bordetella  pertussis, Chlamydophila pneumoniae, or Mycoplasma pneumoniae), consider antibiotic de-escalation to target atypical bacterial infection.    Blood Culture - Blood, Arm, Left [[id number]]  (Abnormal) Collected: 11/19/23 0856    Lab Status: Preliminary result Specimen: Blood from Arm, Left Updated: 11/20/23 0722     Blood Culture Growth present, too young to evaluate     Gram Stain Aerobic Bottle Gram negative bacilli      Anaerobic Bottle Gram negative bacilli    Narrative:      Less than seven (7) mL's of blood was collected.  Insufficient quantity may yield false negative results.    Blood Culture ID, PCR - Blood, Arm, Left [[id number]] Collected: 11/19/23 0856    Lab Status: Final result Specimen: Blood from Arm, Left Updated: 11/20/23 0122     BCID, PCR Negative by BCID PCR. Culture to Follow.     BOTTLE TYPE Anaerobic Bottle              Assessment:  Active Hospital Problems    Diagnosis  POA    **Right shoulder pain [M25.511]  Yes    Sepsis due to pneumonia [J18.9, A41.9]  Yes    Bacteremia [R78.81]  Yes    Sepsis [A41.9]  Yes    Community acquired pneumonia of right lower lobe of lung [J18.9]  Yes    Pain of right clavicle [M89.8X1]  Yes    Hypokalemia [E87.6]  Yes    Essential hypertension [I10]  Yes      Resolved Hospital Problems   No resolved problems to display.         Plan:  See above  Grace Hemphill MD   11/20/2023  14:07 EST    Parts of this note may be an electronic transcription/translation of spoken language to printed text using the Dragon dictation system.

## 2023-11-20 NOTE — PLAN OF CARE
Goal Outcome Evaluation:              Outcome Evaluation: VSS. A & O x4. C/O severe right shoulder pain. Previously was not resolving with pain management, but has shown improvement this shift. Pain assessed and treated as needed per orders. Awaiting Ortho consult. [facility] [place] called to  verify that consult had been received. Awaiting MRI- paperwork has been faxed to MRI department and RN gave report to the department.

## 2023-11-20 NOTE — PAYOR COMM NOTE
"Madison Acevedo (63 y.o. Female)        PLEASE SEE ATTACHED FOR INPT AUTH.    REF#[id number]    PLEASE CALL [phone]  OR  [phone]      THANK YOU    NICOLE FITZGERALD LPN        Date of Birth   1960    Social Security Number       Address   [address] [facility] [address] [facility] [address]    Home Phone   [phone]    MRN   [id number]       Yazidi   Shinto    Marital Status                               Admission Date   11/19/23  OBS     11/20/23 INPT Admission Type   Emergency    Admitting Provider   Alex Zepeda MD    Attending Provider   Alex Zepeda MD    Department, Room/Bed   [address], S619/1       Discharge Date       Discharge Disposition       Discharge Destination                                 Attending Provider: Alex Zepeda MD    Allergies: No Known Allergies    Isolation: None   Infection: None   Code Status: CPR    Ht: 162.6 cm (64\")   Wt: 78.2 kg (172 lb 4.8 oz)    Admission Cmt: None   Principal Problem: Right shoulder pain [M25.511]                   Active Insurance as of 11/19/2023       Primary Coverage       Payor Plan Insurance Group Employer/Plan Group    [employer] [facility] [employer] EMPLOYEE [id number]       Payor Plan Address Payor Plan Phone Number Payor Plan Fax Number Effective Dates    [address] [phone]  12/20/2017 - None Entered    [facility] [id number]         Subscriber Name Subscriber Birth Date Member ID       MADISON ACEVEDO 1960 [id number]                     Emergency Contacts        (Rel.) Home Phone Work Phone Mobile Phone    Gold Acevedo (Spouse) [phone] -- [phone]              [place]: [facility] [id number]  Tax ID [id number]     History & Physical        Alex Zepeda MD at 11/19/23 1307              Patient Name:  Madison Acevedo  YOB: 1960  MRN:  [id number]  Admit Date:  11/19/2023  Patient Care Team:  Paulette Coulter APRN as PCP - General (Family " Medicine)  Gael Rodriguez MD as Consulting Physician (Cardiology)  Jan Brannon MD as Consulting Physician (Gastroenterology)      Subjective  History Present Illness     Chief Complaint   Patient presents with    Shoulder Pain    Fever         History of Present Illness  Ms. Acevedo is a 63 y.o. 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, presenting with fevers.  On Tuesday patient picked up her grandchild and has had right shoulder and right clavicle pain since then.  Associated fevers on Wednesday and Thursday.  None Friday, has fevers since then.  Patient unable to take deep breaths because of her right sided pain.  Denies any cough, chills, nausea, vomiting, dysuria, rash.  Went to a freestanding ER on 11/13 and got some relief with steroids and IV Toradol.  Was discharged on some oral Toradol and Flexeril but these have not been helping very much.  She also took a Lortab that did not help.    Review of Systems   Constitutional:  Negative for chills and fever.   HENT:  Negative for rhinorrhea and sore throat.    Respiratory:  Negative for cough and shortness of breath.    Cardiovascular:  Negative for chest pain and leg swelling.   Gastrointestinal:  Negative for abdominal pain, constipation, diarrhea, nausea and vomiting.   Genitourinary:  Negative for dysuria, frequency and urgency.   Musculoskeletal:  Negative for back pain and myalgias.        Right shoulder pain   Skin:  Negative for rash.   Neurological:  Negative for dizziness and headaches.        Personal History     Past Medical History:   Diagnosis Date    Arrhythmia     palpitations    Arthritis     Breast cyst     COVID-19 09/2021    Esophageal reflux     Esophageal spasm     Essential hypertension 11/19/2020    Fibrocystic disease of breast     Hyperlipidemia     Palpitations 11/19/2020    PONV (postoperative nausea and vomiting)     Vitamin D deficiency      Past Surgical History:   Procedure Laterality Date     BREAST CYST ASPIRATION      CHOLECYSTECTOMY      COLONOSCOPY N/A 2018    Procedure: COLONOSCOPY TO CECUM AND TERMINAL ILEUM;  Surgeon: Jan Brannon MD;  Location:  [facility];  Service:     ENDOSCOPY      X2    ENDOSCOPY N/A 2018    Procedure: ESOPHAGOGASTRODUODENOSCOPY WITH BIOPSIES;  Surgeon: Jan Brannon MD;  Location:  [facility];  Service:     STEREOTACTIC BIOPSY / ASPIRATION / EXCISION Left     Bx breast percutan needle core use imag guide    THUMB ARTHROSCOPY      left thumb joint implant    WISDOM TOOTH EXTRACTION       Family History   Problem Relation Age of Onset    Heart disease Father         MI/CABG early 60s    Hypertension Father     Lung cancer Father     Stroke Father     GIACOMO disease Sister         esophageal reflux    COPD Sister     Hypertension Sister     Esophageal cancer Mother     Malig Hyperthermia Neg Hx      Social History     Tobacco Use    Smoking status: Former     Packs/day: 0.00     Years: 15.00     Additional pack years: 0.00     Total pack years: 0.00     Types: Cigarettes     Quit date: 1990     Years since quittin.5    Smokeless tobacco: Never    Tobacco comments:     Have not smoked in 30 years   Vaping Use    Vaping Use: Never used   Substance Use Topics    Alcohol use: Yes     Alcohol/week: 7.0 standard drinks of alcohol     Types: 7 Glasses of wine per week     Comment: vodka daily    Drug use: No     No current facility-administered medications on file prior to encounter.     Current Outpatient Medications on File Prior to Encounter   Medication Sig Dispense Refill    acetaminophen (TYLENOL) 325 MG tablet Take 650 mg by mouth Every 6 (Six) Hours As Needed for mild pain (1-3).      amLODIPine (NORVASC) 2.5 MG tablet Take 1 tablet by mouth Daily. 90 tablet 1    cyclobenzaprine (FLEXERIL) 5 MG tablet Take 1 tablet by mouth 3 (Three) Times a Day As Needed for abdominal wall pain. 30 tablet 1    magnesium gluconate (MAGONATE) 500 MG tablet  Take 400 mg by mouth Daily As Needed.      omeprazole-sodium bicarbonate (Zegerid)  MG per capsule Take 1 capsule by mouth Daily. 30 capsule 10    VITAMIN D, CHOLECALCIFEROL, PO Take  by mouth Daily.      [DISCONTINUED] cetirizine (zyrTEC) 10 MG tablet Take 10 mg by mouth Daily.      [DISCONTINUED] cyclobenzaprine (FLEXERIL) 5 MG tablet Take 1 tablet by mouth 3 (Three) Times a Day As Needed for abdominal wall pain. 30 tablet 3    [DISCONTINUED] ketorolac (TORADOL) 10 MG tablet Take 1 tablet by mouth Every 6 (Six) Hours As Needed for Moderate Pain. 10 tablet 0    [DISCONTINUED] sucralfate (CARAFATE) 1 g tablet Take 1 tablet by mouth 4 (Four) Times a Day. (Patient taking differently: Take 1 g by mouth As Needed.) 20 tablet 0     No Known Allergies    Objective   Objective     Vital Signs  Temp:  [100.1 °F (37.8 °C)-103.2 °F (39.6 °C)] 100.1 °F (37.8 °C)  Heart Rate:  [103-116] 103  Resp:  [16-18] 18  BP: (128-144)/(74-85) 128/74  SpO2:  [86 %-96 %] 93 %  on  Flow (L/min):  [2] 2;   Device (Oxygen Therapy): nasal cannula  Body mass index is 28.84 kg/m².    Physical Exam  Constitutional:       General: She is not in acute distress.     Appearance: She is ill-appearing.   Cardiovascular:      Rate and Rhythm: Normal rate and regular rhythm.   Pulmonary:      Effort: Pulmonary effort is normal. No respiratory distress.   Abdominal:      General: Abdomen is flat. There is no distension.      Tenderness: There is no abdominal tenderness.   Musculoskeletal:         General: Tenderness (Right  anterior shoulder tenderness and clavicle tenderness.  No obvious deformity.  Swelling without any erythema.  No fluctuance.) present. No swelling or deformity. Normal range of motion.   Skin:     General: Skin is warm and dry.   Neurological:      General: No focal deficit present.      Mental Status: She is alert. Mental status is at baseline.         Results Review:  I reviewed the patient's new clinical results.  I reviewed  the patient's new imaging results and agree with the interpretation.  I reviewed the patient's other test results and agree with the interpretation  I personally viewed and interpreted the patient's EKG/Telemetry data  Discussed with ED provider.    Lab Results (last 24 hours)       Procedure Component Value Units Date/Time    CBC & Differential [[id number]]  (Abnormal) Collected: 11/19/23 0856    Specimen: Blood Updated: 11/19/23 0910    Narrative:          Comprehensive Metabolic Panel [[id number]]  (Abnormal) Collected: 11/19/23 0856    Specimen: Blood Updated: 11/19/23 0931     Glucose 120 mg/dL      BUN 21 mg/dL      Creatinine 0.89 mg/dL      Sodium 141 mmol/L      Potassium 3.2 mmol/L      Chloride 103 mmol/L      CO2 25.6 mmol/L      Calcium 9.1 mg/dL      Total Protein 6.8 g/dL      Albumin 3.8 g/dL      ALT (SGPT) 8 U/L      AST (SGOT) 10 U/L      Alkaline Phosphatase 67 U/L      Total Bilirubin 0.2 mg/dL      Globulin 3.0 gm/dL      A/G Ratio 1.3 g/dL      BUN/Creatinine Ratio 23.6     Anion Gap 12.4 mmol/L      eGFR 73.0 mL/min/1.73     Narrative:          Lactic Acid, Plasma [[id number]]  (Normal) Collected: 11/19/23 0856    Specimen: Blood Updated: 11/19/23 0927     Lactate 2.0 mmol/L     Blood Culture - Blood, Arm, Left [[id number]] Collected: 11/19/23 0856    Specimen: Blood from Arm, Left Updated: 11/19/23 0902    Procalcitonin [[id number]]  (Abnormal) Collected: 11/19/23 0856    Specimen: Blood Updated: 11/19/23 0935     Procalcitonin 0.64 ng/mL     Narrative:          CBC Auto Differential [[id number]]  (Abnormal) Collected: 11/19/23 0856    Specimen: Blood Updated: 11/19/23 0910     WBC 8.48 10*3/mm3      RBC 3.79 10*6/mm3      Hemoglobin 11.7 g/dL      Hematocrit 35.6 %      MCV 93.9 fL      MCH 30.9 pg      MCHC 32.9 g/dL      RDW 11.8 %      RDW-SD 41.3 fl      MPV 9.2 fL      Platelets 301 10*3/mm3      Neutrophil % 81.7 %      Lymphocyte % 10.0 %      Monocyte % 7.7 %      Eosinophil % 0.0 %       Basophil % 0.2 %      Immature Grans % 0.4 %      Neutrophils, Absolute 6.93 10*3/mm3      Lymphocytes, Absolute 0.85 10*3/mm3      Monocytes, Absolute 0.65 10*3/mm3      Eosinophils, Absolute 0.00 10*3/mm3      Basophils, Absolute 0.02 10*3/mm3      Immature Grans, Absolute 0.03 10*3/mm3      nRBC 0.0 /100 WBC     D-dimer, Quantitative [[id number]]  (Abnormal) Collected: 11/19/23 0857    Specimen: Blood Updated: 11/19/23 0924     D-Dimer, Quantitative 0.82 MCGFEU/mL     Narrative:          Respiratory Panel PCR w/COVID-19(SARS-CoV-2) [facility], NP Swab in UTM/VTM, 2 HR TAT - Swab, Nasopharynx [[id number]]  (Normal) Collected: 11/19/23 0901    Specimen: Swab from Nasopharynx Updated: 11/19/23 1023     ADENOVIRUS, PCR Not Detected     Coronavirus 229E Not Detected     Coronavirus HKU1 Not Detected     Coronavirus NL63 Not Detected     Coronavirus OC43 Not Detected     COVID19 Not Detected     Human Metapneumovirus Not Detected     Human Rhinovirus/Enterovirus Not Detected     Influenza A PCR Not Detected     Influenza B PCR Not Detected     Parainfluenza Virus 1 Not Detected     Parainfluenza Virus 2 Not Detected     Parainfluenza Virus 3 Not Detected     Parainfluenza Virus 4 Not Detected     RSV, PCR Not Detected     Bordetella pertussis pcr Not Detected     Bordetella parapertussis PCR Not Detected     Chlamydophila pneumoniae PCR Not Detected     Mycoplasma pneumo by PCR Not Detected    Narrative:          Blood Culture - Blood, Arm, Left [[id number]] Collected: 11/19/23 1003    Specimen: Blood from Arm, Left Updated: 11/19/23 1007    Urinalysis With Culture If Indicated - Urine, Clean Catch [[id number]]  (Abnormal) Collected: 11/19/23 1017    Specimen: Urine, Clean Catch Updated: 11/19/23 1034     Color, UA Yellow     Appearance, UA Clear     pH, UA 7.5     Specific Gravity, UA 1.016     Glucose, UA Negative     Ketones, UA Negative     Bilirubin, UA Negative     Blood, UA Trace     Protein,  UA Negative     Leuk Esterase, UA Negative     Nitrite, UA Negative     Urobilinogen, UA 0.2 E.U./dL    Narrative:      In absence of clinical symptoms, the presence of pyuria, bacteria, and/or nitrites on the urinalysis result does not correlate with infection.    Urinalysis, Microscopic Only - Urine, Clean Catch [[id number]]  (Abnormal) Collected: 11/19/23 1017    Specimen: Urine, Clean Catch Updated: 11/19/23 1034     RBC, UA 3-5 /HPF      WBC, UA 0-2 /HPF      Comment: Urine culture not indicated.        Bacteria, UA None Seen /HPF      Squamous Epithelial Cells, UA 0-2 /HPF      Hyaline Casts, UA None Seen /LPF      Methodology Automated Microscopy    Single High Sensitivity Troponin T [[id number]]  (Normal) Collected: 11/19/23 1058    Specimen: Blood Updated: 11/19/23 1126     HS Troponin T <6 ng/L     Narrative:          Magnesium [[id number]]  (Normal) Collected: 11/19/23 1058    Specimen: Blood Updated: 11/19/23 1233     Magnesium 1.7 mg/dL             Imaging Results (Last 24 Hours)       Procedure Component Value Units Date/Time    CT Angiogram Chest [[id number]] Collected: 11/19/23 1111     Updated: 11/19/23 1122    Narrative:      CT ANGIOGRAM CHEST-     Radiation dose reduction techniques were utilized, including automated  exposure control and exposure modulation based on body size.     Clinical: Right-sided chest pain     CT scan of the chest performed with intravenous contrast, pulmonary  embolus protocol to include coronal, sagittal and three-dimensional  reconstruction.     FINDINGS:  1. No pulmonary embolus, aortic aneurysm nor dissection. Cardiac size  within normal limits, no pericardial abnormality.     2. There are linear areas of discoid atelectasis demonstrated at both  lung bases. In addition there is curvilinear subpleural atelectasis  along each costophrenic sulcus, greater on the right than the left.  Additionally within the right lower lobe there is an area worrisome for  possible  pneumonia extending from the infrahilar region to the lateral  pleural surface. It is somewhat curvilinear in shape. Right upper lobe  and right middle lobe are clear. Left upper lobe is clear. Short-term  follow-up CT chest advised in 4 to 6 weeks after treatment.     3. Tracheobronchial tree is satisfactory in appearance. The esophagus is  normal in course and caliber. No adenopathy. The breast parenchyma is  symmetric and satisfactory in appearance, no axillary adenopathy. The  remainder is unremarkable.                    This report was finalized on 11/19/2023 11:19 AM by Dr. Timmy Hebert M.D on Workstation: [id number]       XR Shoulder 2+ View Right [[id number]] Collected: 11/19/23 0943     Updated: 11/19/23 0947    Narrative:      XR SHOULDER 2+ VW RIGHT-     INDICATIONS: Trauma.     TECHNIQUE: 2 VIEWS OF THE RIGHT SHOULDER     COMPARISON: None available     FINDINGS:     No acute fracture, erosion, or dislocation is identified,  follow-up/further evaluation can be obtained as indications persist.  Small opacity at the right lung base, please see report from chest x-ray  from same date.       Impression:         As described.           This report was finalized on 11/19/2023 9:44 AM by Dr. Lisandro Ruff M.D on Workstation: [id number]       XR Chest 1 View [[id number]] Collected: 11/19/23 0916     Updated: 11/19/23 0924    Narrative:      XR CHEST 1 VW-     HISTORY: Female who is 63 years-old, chest pain     TECHNIQUE: Frontal view of the chest     COMPARISON: 5/24/2020     FINDINGS: Heart, mediastinum and pulmonary vasculature are unremarkable.  Small opacity at the right base may be atelectasis or infiltrate,  follow-up recommended to characterize resolution and to exclude the  possibility of an underlying lesion, CT correlation can be obtained as  indicated. Minimal likely atelectasis at the left base. No pleural  effusion, or pneumothorax. Right hemidiaphragm is elevated. No acute  osseous process.        Impression:      As described.                 This report was finalized on 11/19/2023 9:21 AM by Dr. Lisandro Ruff M.D on Workstation: [id number]               Results for orders placed during the hospital encounter of 05/26/21    Adult Stress Echo W/ Cont or Stress Agent if Necessary Per Protocol    Interpretation Summary  · Calculated left ventricular EF = 67% Estimated left ventricular EF was in agreement with the calculated left ventricular EF. Left ventricular systolic function is normal.  · Left ventricular diastolic function was normal.  · Post EF=78%  · Normal stress echo with no significant echocardiographic evidence for myocardial ischemia.      ECG 12 Lead Chest Pain   Preliminary Result   HEART RATE= 108  bpm   RR Interval= 556  ms   LA Interval= 125  ms   P Horizontal Axis= 19  deg   P Front Axis= 70  deg   QRSD Interval= 100  ms   QT Interval= 315  ms   QTcB= 422  ms   QRS Axis= 72  deg   T Wave Axis= -32  deg   - ABNORMAL ECG -   Sinus tachycardia   Low voltage, precordial leads   Nonspecific T abnormalities, diffuse leads   Baseline wander in lead(s) V5   Electronically Signed By:    Date and Time of Study: 2023-11-19 09:42:38           Assessment/Plan     Active Hospital Problems    Diagnosis  POA    **Right shoulder pain [M25.511]  Yes    Sepsis [A41.9]  Yes    Community acquired pneumonia of right lower lobe of lung [J18.9]  Yes    Pain of right clavicle [M89.8X1]  Yes    Hypokalemia [E87.6]  Yes    Essential hypertension [I10]  Yes      Resolved Hospital Problems   No resolved problems to display.     Right lower lobe pneumonia with sepsis (fever, tachycardia)  -Elevated procalcitonin and D-dimer  -RVP negative  -Blood cultures pending, MRSA nares pending  -CTA chest with right lower lobe pneumonia.  Recommend short-term follow-up CT in 4 to 6 weeks after treatment  -Vancomycin and ceftriaxone    Right shoulder and clavicle pain  -Pain started on Tuesday when she was lifting her  grandchild  -X-ray without any fractures  -Patient was supposed to follow-up with Dr. Carrington tomorrow  -Ortho consulted  -Defer further imaging to them  -IV Toradol, Norco, IV Dilaudid, steroids, Flexeril    Hypokalemia  -Magnesium 1.7, replete per protocol    Hypertension-continue home amlodipine    History of PACs, follows cardiology as an outpatient.  Not being medicated as the burden is low.    I discussed the patient's findings and my recommendations with patient and ED provider.    VTE Prophylaxis - SCDs.  Code Status - Full code.       Alex Zepeda MD  [facility]  11/19/23  13:22 EST      Electronically signed by Alex Zepeda MD at 11/19/23 1322          Emergency Department Notes        Gaby Crouch, RN at 11/19/23 1217            Aubrey Noland MD at 11/19/23 0900        Procedure Orders    1. Critical Care [[id number]] ordered by Aubrey Noland MD                  EMERGENCY DEPARTMENT ENCOUNTER    Room Number:  15/15  Date seen:  11/19/2023  PCP: Paulette Coulter APRN  Historian(s): Patient and her       HPI:  Chief Complaint: Fever, shoulder pain, chest pain, neck pain  A complete HPI/ROS/PMH/PSH/SH/FH are unobtainable / limited due to: None  Context: Madison Acevedo is a 63 y.o. female who presents to the ED c/o recurrent fever with worsening pain in the right shoulder and right-sided chest and lower neck area.  Patient says that she began having a fever earlier this week but it seemed to have subsided until this morning.  She was seen at outpatient freestanding ED 2 days ago for evaluation of pain in her anterior neck and shoulder area.  She had extended her arm to help one of her grandchildren come down a slide earlier in the week on Tuesday.  She was diagnosed with musculoskeletal strain and started on Toradol and a steroid regimen.  However, the pain seems to be much worse today in the right shoulder and right upper chest and neck area.  She has not taken any  medications for her fever yet today.        PAST MEDICAL HISTORY  Active Ambulatory Problems     Diagnosis Date Noted    Vitamin D deficiency 2017    Palpitations 2020    Essential hypertension 2020    GERD (gastroesophageal reflux disease) 2010     Resolved Ambulatory Problems     Diagnosis Date Noted    No Resolved Ambulatory Problems     Past Medical History:   Diagnosis Date    Arrhythmia     Arthritis     Breast cyst     COVID-19 2021    Esophageal reflux     Esophageal spasm     Fibrocystic disease of breast     Hyperlipidemia     PONV (postoperative nausea and vomiting)          PAST SURGICAL HISTORY  Past Surgical History:   Procedure Laterality Date    BREAST CYST ASPIRATION      CHOLECYSTECTOMY      COLONOSCOPY N/A 2018    Procedure: COLONOSCOPY TO CECUM AND TERMINAL ILEUM;  Surgeon: Jan Brannon MD;  Location: [facility] END[facility]COPY;  Service:     ENDOSCOPY      X2    ENDOSCOPY N/A 2018    Procedure: ESOPHAGOGASTRODUODENOSCOPY WITH BIOPSIES;  Surgeon: Jan Brannon MD;  [facility];  Service:     STEREOTACTIC BIOPSY / ASPIRATION / EXCISION Left     Bx breast percutan needle core use imag guide    THUMB ARTHROSCOPY      left thumb joint implant    WISDOM TOOTH EXTRACTION           FAMILY HISTORY  Family History   Problem Relation Age of Onset    Heart disease Father         MI/CABG early 60s    Hypertension Father     Lung cancer Father     Stroke Father     GIACOMO disease Sister         esophageal reflux    COPD Sister     Hypertension Sister     Esophageal cancer Mother     Malig Hyperthermia Neg Hx          SOCIAL HISTORY  Social History     Socioeconomic History    Marital status:     Number of children: 2   Tobacco Use    Smoking status: Former     Packs/day: 0.00     Years: 15.00     Additional pack years: 0.00     Total pack years: 0.00     Types: Cigarettes     Quit date: 1990     Years since quittin.5    Smokeless tobacco: Never     Tobacco comments:     Have not smoked in 30 years   Vaping Use    Vaping Use: Never used   Substance and Sexual Activity    Alcohol use: Yes     Alcohol/week: 7.0 standard drinks of alcohol     Types: 7 Glasses of wine per week     Comment: vodka daily    Drug use: No    Sexual activity: Yes     Partners: Male     Birth control/protection: Post-menopausal         ALLERGIES  Patient has no known allergies.        REVIEW OF SYSTEMS  Review of Systems   Constitutional:  Positive for activity change, chills and fever.   HENT: Negative.     Eyes:  Negative for pain and visual disturbance.   Respiratory:  Negative for cough and shortness of breath.    Cardiovascular:  Positive for chest pain.   Gastrointestinal:  Negative for abdominal pain and nausea.   Genitourinary:  Negative for dysuria.   Musculoskeletal:  Positive for arthralgias.   Skin:  Negative for color change.   Neurological:  Negative for syncope and headaches.   All other systems reviewed and are negative.         PHYSICAL EXAM  ED Triage Vitals   Temp Heart Rate Resp BP SpO2   11/19/23 0837 11/19/23 0837 11/19/23 0837 11/19/23 0843 11/19/23 0837   (!) 103.2 °F (39.6 °C) 116 16 144/85 95 %      Temp src Heart Rate Source Patient Position BP Location FiO2 (%)   11/19/23 0837 11/19/23 0837 -- -- --   Tympanic Monitor          Physical Exam      GENERAL: Appears somewhat ill and uncomfortable, no diaphoresis  HENT: nares patent, normocephalic and atraumatic  EYES: no scleral icterus, EOMI, normal conjunctivae  Neck: Supple, no erythema or induration notable to the soft tissues.  Patient has some mild tenderness to palpation of the right anterior lateral neck soft tissues.  There is no meningismus demonstrated or posterior neck pain to palpation notable.  CV: regular rhythm, normal rate, normal distal pulses  RESPIRATORY: normal effort, no stridor, lungs clear to auscultation bilaterally  ABDOMEN: soft, no focal areas of tenderness to the  palpation.  MUSCULOSKELETAL: no deformity, no asymmetry, the right shoulder is diffusely tender to palpation and patient has decreased range of motion for external rotation or abduction of the shoulder because of pain on movement.  NEURO: alert, moves all extremities, follows commands, no focal motor deficits apparent  PSYCH:  calm, cooperative  SKIN: warm, dry    Vital signs and nursing notes reviewed.        LAB RESULTS  Recent Results (from the past 24 hour(s))   Comprehensive Metabolic Panel    Collection Time: 11/19/23  8:56 AM    Specimen: Blood   Result Value Ref Range    Glucose 120 (H) 65 - 99 mg/dL    BUN 21 8 - 23 mg/dL    Creatinine 0.89 0.57 - 1.00 mg/dL    Sodium 141 136 - 145 mmol/L    Potassium 3.2 (L) 3.5 - 5.2 mmol/L    Chloride 103 98 - 107 mmol/L    CO2 25.6 22.0 - 29.0 mmol/L    Calcium 9.1 8.6 - 10.5 mg/dL    Total Protein 6.8 6.0 - 8.5 g/dL    Albumin 3.8 3.5 - 5.2 g/dL    ALT (SGPT) 8 1 - 33 U/L    AST (SGOT) 10 1 - 32 U/L    Alkaline Phosphatase 67 39 - 117 U/L    Total Bilirubin 0.2 0.0 - 1.2 mg/dL    Globulin 3.0 gm/dL    A/G Ratio 1.3 g/dL    BUN/Creatinine Ratio 23.6 7.0 - 25.0    Anion Gap 12.4 5.0 - 15.0 mmol/L    eGFR 73.0 >60.0 mL/min/1.73   Lactic Acid, Plasma    Collection Time: 11/19/23  8:56 AM    Specimen: Blood   Result Value Ref Range    Lactate 2.0 0.5 - 2.0 mmol/L   Procalcitonin    Collection Time: 11/19/23  8:56 AM    Specimen: Blood   Result Value Ref Range    Procalcitonin 0.64 (H) 0.00 - 0.25 ng/mL   CBC Auto Differential    Collection Time: 11/19/23  8:56 AM    Specimen: Blood   Result Value Ref Range    WBC 8.48 3.40 - 10.80 10*3/mm3    RBC 3.79 3.77 - 5.28 10*6/mm3    Hemoglobin 11.7 (L) 12.0 - 15.9 g/dL    Hematocrit 35.6 34.0 - 46.6 %    MCV 93.9 79.0 - 97.0 fL    MCH 30.9 26.6 - 33.0 pg    MCHC 32.9 31.5 - 35.7 g/dL    RDW 11.8 (L) 12.3 - 15.4 %    RDW-SD 41.3 37.0 - 54.0 fl    MPV 9.2 6.0 - 12.0 fL    Platelets 301 140 - 450 10*3/mm3    Neutrophil % 81.7 (H) 42.7  - 76.0 %    Lymphocyte % 10.0 (L) 19.6 - 45.3 %    Monocyte % 7.7 5.0 - 12.0 %    Eosinophil % 0.0 (L) 0.3 - 6.2 %    Basophil % 0.2 0.0 - 1.5 %    Immature Grans % 0.4 0.0 - 0.5 %    Neutrophils, Absolute 6.93 1.70 - 7.00 10*3/mm3    Lymphocytes, Absolute 0.85 0.70 - 3.10 10*3/mm3    Monocytes, Absolute 0.65 0.10 - 0.90 10*3/mm3    Eosinophils, Absolute 0.00 0.00 - 0.40 10*3/mm3    Basophils, Absolute 0.02 0.00 - 0.20 10*3/mm3    Immature Grans, Absolute 0.03 0.00 - 0.05 10*3/mm3    nRBC 0.0 0.0 - 0.2 /100 WBC   D-dimer, Quantitative    Collection Time: 11/19/23  8:57 AM    Specimen: Blood   Result Value Ref Range    D-Dimer, Quantitative 0.82 (H) 0.00 - 0.63 MCGFEU/mL   Respiratory Panel PCR w/COVID-19(SARS-CoV-2) G[facility]/NILA/GELA/PAD/COR/[facility] In-House, NP Swab in UTM/VTM, 2 HR TAT - Swab, Nasopharynx    Collection Time: 11/19/23  9:01 AM    Specimen: Nasopharynx; Swab   Result Value Ref Range    ADENOVIRUS, PCR Not Detected Not Detected    Coronavirus 229E Not Detected Not Detected    Coronavirus HKU1 Not Detected Not Detected    Coronavirus NL63 Not Detected Not Detected    Coronavirus OC43 Not Detected Not Detected    COVID19 Not Detected Not Detected - Ref. Range    Human Metapneumovirus Not Detected Not Detected    Human Rhinovirus/Enterovirus Not Detected Not Detected    Influenza A PCR Not Detected Not Detected    Influenza B PCR Not Detected Not Detected    Parainfluenza Virus 1 Not Detected Not Detected    Parainfluenza Virus 2 Not Detected Not Detected    Parainfluenza Virus 3 Not Detected Not Detected    Parainfluenza Virus 4 Not Detected Not Detected    RSV, PCR Not Detected Not Detected    Bordetella pertussis pcr Not Detected Not Detected    Bordetella parapertussis PCR Not Detected Not Detected    Chlamydophila pneumoniae PCR Not Detected Not Detected    Mycoplasma pneumo by PCR Not Detected Not Detected   ECG 12 Lead Chest Pain    Collection Time: 11/19/23  9:42 AM   Result Value Ref Range    QT Interval  315 ms    QTC Interval 422 ms   Urinalysis With Culture If Indicated - Urine, Clean Catch    Collection Time: 11/19/23 10:17 AM    Specimen: Urine, Clean Catch   Result Value Ref Range    Color, UA Yellow Yellow, Straw    Appearance, UA Clear Clear    pH, UA 7.5 5.0 - 8.0    Specific Gravity, UA 1.016 1.005 - 1.030    Glucose, UA Negative Negative    Ketones, UA Negative Negative    Bilirubin, UA Negative Negative    Blood, UA Trace (A) Negative    Protein, UA Negative Negative    Leuk Esterase, UA Negative Negative    Nitrite, UA Negative Negative    Urobilinogen, UA 0.2 E.U./dL 0.2 - 1.0 E.U./dL   Urinalysis, Microscopic Only - Urine, Clean Catch    Collection Time: 11/19/23 10:17 AM    Specimen: Urine, Clean Catch   Result Value Ref Range    RBC, UA 3-5 (A) None Seen, 0-2 /HPF    WBC, UA 0-2 None Seen, 0-2 /HPF    Bacteria, UA None Seen None Seen /HPF    Squamous Epithelial Cells, UA 0-2 None Seen, 0-2 /HPF    Hyaline Casts, UA None Seen None Seen /LPF    Methodology Automated Microscopy    Single High Sensitivity Troponin T    Collection Time: 11/19/23 10:58 AM    Specimen: Blood   Result Value Ref Range    HS Troponin T <6 <14 ng/L       Ordered the above labs and reviewed the results.        RADIOLOGY  CT Angiogram Chest    Result Date: 11/19/2023  CT ANGIOGRAM CHEST-  Radiation dose reduction techniques were utilized, including automated exposure control and exposure modulation based on body size.  Clinical: Right-sided chest pain  CT scan of the chest performed with intravenous contrast, pulmonary embolus protocol to include coronal, sagittal and three-dimensional reconstruction.  FINDINGS: 1. No pulmonary embolus, aortic aneurysm nor dissection. Cardiac size within normal limits, no pericardial abnormality.  2. There are linear areas of discoid atelectasis demonstrated at both lung bases. In addition there is curvilinear subpleural atelectasis along each costophrenic sulcus, greater on the right than the  left. Additionally within the right lower lobe there is an area worrisome for possible pneumonia extending from the infrahilar region to the lateral pleural surface. It is somewhat curvilinear in shape. Right upper lobe and right middle lobe are clear. Left upper lobe is clear. Short-term follow-up CT chest advised in 4 to 6 weeks after treatment.  3. Tracheobronchial tree is satisfactory in appearance. The esophagus is normal in course and caliber. No adenopathy. The breast parenchyma is symmetric and satisfactory in appearance, no axillary adenopathy. The remainder is unremarkable.       This report was finalized on 11/19/2023 11:19 AM by Dr. Timmy Hebert M.D on Workstation: [email or web address]      XR Shoulder 2+ View Right    Result Date: 11/19/2023  XR SHOULDER 2+ VW RIGHT-  INDICATIONS: Trauma.  TECHNIQUE: 2 VIEWS OF THE RIGHT SHOULDER  COMPARISON: None available  FINDINGS:  No acute fracture, erosion, or dislocation is identified, follow-up/further evaluation can be obtained as indications persist. Small opacity at the right lung base, please see report from chest x-ray from same date.       As described.    This report was finalized on 11/19/2023 9:44 AM by Dr. Lisandro Ruff M.D on Workstation: [facility]      XR Chest 1 View    Result Date: 11/19/2023  XR CHEST 1 VW-  HISTORY: Female who is 63 years-old, chest pain  TECHNIQUE: Frontal view of the chest  COMPARISON: 5/24/2020  FINDINGS: Heart, mediastinum and pulmonary vasculature are unremarkable. Small opacity at the right base may be atelectasis or infiltrate, follow-up recommended to characterize resolution and to exclude the possibility of an underlying lesion, CT correlation can be obtained as indicated. Minimal likely atelectasis at the left base. No pleural effusion, or pneumothorax. Right hemidiaphragm is elevated. No acute osseous process.      As described.      This report was finalized on 11/19/2023 9:21 AM by Dr. Lisandro Ruff M.D on  Workstation: [email or web address]       Ordered the above noted radiological studies. Reviewed by me in PACS.          PROCEDURES  Critical Care    Performed by: Aubrey Noland MD  Authorized by: Alex Zepeda MD    Critical care provider statement:     Critical care time (minutes):  35    Critical care time was exclusive of:  Separately billable procedures and treating other patients and teaching time    Critical care was necessary to treat or prevent imminent or life-threatening deterioration of the following conditions:  Sepsis    Critical care was time spent personally by me on the following activities:  Development of treatment plan with patient or surrogate, discussions with consultants, discussions with primary provider, evaluation of patient's response to treatment, examination of patient, obtaining history from patient or surrogate, interpretation of cardiac output measurements, ordering and performing treatments and interventions, ordering and review of laboratory studies, ordering and review of radiographic studies, pulse oximetry, re-evaluation of patient's condition and review of old charts    I assumed direction of critical care for this patient from another provider in my specialty: no      Care discussed with: admitting provider            MEDICATIONS GIVEN IN ER  Medications   sodium chloride 0.9 % flush 10 mL (has no administration in time range)   fentaNYL citrate (PF) (SUBLIMAZE) injection 25 mcg (25 mcg Intravenous Given 11/19/23 0905)   vancomycin IVPB 1500 mg in 0.9% NaCl (Premix) 500 mL (1,500 mg Intravenous New Bag 11/19/23 1127)   lactated ringers bolus 1,000 mL (0 mL Intravenous Stopped 11/19/23 1009)   acetaminophen (TYLENOL) tablet 1,000 mg (1,000 mg Oral Given 11/19/23 0905)   ondansetron (ZOFRAN) injection 4 mg (4 mg Intravenous Given 11/19/23 0906)   fentaNYL citrate (PF) (SUBLIMAZE) injection 25 mcg (25 mcg Intravenous Given 11/19/23 0935)   HYDROmorphone (DILAUDID) injection 0.5 mg (0.5 mg  Intravenous Given 11/19/23 1028)   piperacillin-tazobactam (ZOSYN) 3.375 g in iso-osmotic dextrose 50 ml (premix) (0 g Intravenous Stopped 11/19/23 1127)   iopamidol (ISOVUE-370) 76 % injection 100 mL (95 mL Intravenous Given by Other 11/19/23 1045)   ketorolac (TORADOL) injection 15 mg (15 mg Intravenous Given 11/19/23 1149)           MEDICAL DECISION MAKING, PROGRESS, and CONSULTS    All labs have been independently reviewed by me.  All radiology studies have been reviewed by me and I have also reviewed the radiology report.   EKG's independently viewed and interpreted by me.  Discussion below represents my analysis of pertinent findings related to patient's condition, differential diagnosis, treatment plan and final disposition.    Heart score: 3    Additional sources:    - External (non-ED) record review: I reviewed the most recent cardiology office progress note from December 19, 2022 when she had follow-up care for hypertension and palpitations.      Orders placed during this visit:  Orders Placed This Encounter   Procedures    Critical Care    Respiratory Panel PCR w/COVID-19(SARS-CoV-2) GIA/NILA/GELA/PAD/COR/[facility] In-House, NP Swab in UTM/VTM, 2 HR TAT - Swab, Nasopharynx    Blood Culture - Blood,    Blood Culture - Blood,    XR Chest 1 View    XR Shoulder 2+ View Right    CT Angiogram Chest    Comprehensive Metabolic Panel    Lactic Acid, Plasma    Procalcitonin    Urinalysis With Culture If Indicated - Urine, Clean Catch    CBC Auto Differential    Single High Sensitivity Troponin T    D-dimer, Quantitative    Urinalysis, Microscopic Only - Urine, Clean Catch    Monitor Blood Pressure    Pulse Oximetry, Continuous    [facility] (on-call MD unless specified) Details    ECG 12 Lead Chest Pain    Insert Peripheral IV    Initiate Observation Status    CBC & Differential           Differential diagnosis includes but is not limited to:    Sepsis, viral illness, COVID-19, pneumonia, acute MI, pulmonary embolism, shoulder  osteo-arthritis, septic arthritis, rotator cuff injury      Independent interpretation of labs, radiology studies, and discussions with consultants:  ED Course as of 11/19/23 1211   Sun Nov 19, 2023   0938 Procalcitonin(!): 0.64 [CHAPO]   0938 D-Dimer, Quant(!): 0.82 [CHAPO]   0939 I independently interpreted the chest x-ray and my findings are: No pneumothorax, no effusion.  There are increased interstitial markings at the right midlung, possibly suggestive of infiltrate [CHAPO]   1014 EKG           EKG time/Interp time: 0942/0944  Rhythm/Rate: Sinus tachycardia, 108 bpm  P waves and IL: Present, 125 ms  QRS, axis: 100 ms, normal axis  ST and T waves: No ST segment elevation present.    Independently interpreted by me contemporaneously with treatment     [CHAPO]   1014 Ordering CTA of the chest for further investigation of the right-sided interstitial markings.  This should help us to determine if it is consistent with pneumonia or not.  We will also rule out pulmonary embolism given her right-sided chest pain with the angiogram future. [CHAPO]   1015 I independently interpreted the x-ray of the right shoulder and my findings are: No fracture or dislocation [CHAPO]   1025 RVP is negative.  Patient still complaining of pain in the right shoulder and right upper chest area.  Ordering a dose of Dilaudid for her at this time. [CHAPO]   1207 I reviewed the CT angiogram chest report which is satisfactory to show no evidence of pulmonary realism.  There does seem to be evidence for right lower lobe pneumonia, however.  Therefore I have started some broad-spectrum antibiotics for sepsis concerns.  They should be adequate coverage for pneumonia for now.  We will make arrangements to admit to the hospitalist for further supportive care. [CHAPO]   1208 I just discussed with Dr. Zepeda from [facility] about this patient.  He agrees to accept the patient for further medical management [CHAPO]      ED Course User Index  [CHAPO] Aubrey Noland MD          DIAGNOSIS  Final diagnoses:   Sepsis without acute organ dysfunction, due to unspecified organism   Pneumonia of right lower lobe due to infectious organism   Acute pain of right shoulder   Pain of right clavicle         DISPOSITION  Admit to A, telemetry        Latest Documented Vital Signs:  As of 12:11 EST  BP- 128/74 HR- 103 Temp- 100.1 °F (37.8 °C) O2 sat- 93%              --    Please note that portions of this were completed with a voice recognition program.       Note Disclaimer: At [facility], we believe that sharing information builds trust and better relationships. You are receiving this note because you are receiving care at [facility] or recently visited. It is possible you will see health information before a provider has talked with you about it. This kind of information can be easy to misunderstand. To help you fully understand what it means for your health, we urge you to discuss this note with your provider.             Aubrey Noland MD  11/19/23 1211      Electronically signed by Aubrey Noland MD at 11/19/23 1211       Degonda, Janet, RN at 11/19/23 0834          Right shoulder pain since Tuesday after lifting her grandchildren.  She has had fever - 103.2 today.  Took hydrocodone at 0230.      Electronically signed by Degonda, Janet, RN at 11/19/23 0838       Oxygen Therapy (since admission)       Date/Time SpO2 Device (Oxygen Therapy) Flow (L/min) Oxygen Concentration (%) ETCO2 (mmHg)    11/20/23 0818 -- room air -- -- --    11/20/23 0810 95 nasal cannula 2 -- --    11/19/23 2342 92 -- -- -- --    11/19/23 2027 92 -- -- -- --    11/19/23 1339 93 nasal cannula 2 -- --    11/19/23 1338 90 nasal cannula 2 -- --    11/19/23 1336 88 room air -- -- --    11/19/23 1320 -- nasal cannula 2 -- --    11/19/23 1116 93 -- -- -- --    11/19/23 11:03:23 93 nasal cannula 2 -- --    11/19/23 11:03:03 86 room air -- -- --    11/19/23 10:51:32 92 -- -- -- --    11/19/23 0946 95 -- -- -- --     11/19/23 0916 96 -- -- -- --    11/19/23 0837 95 room air -- -- --          Intake & Output (last 2 days)         11/18 0701 11/19 0700 11/19 0701 11/20 0700 11/20 0701 11/21 0700    IV Piggyback  1050     Total Intake(mL/kg)  1050 (13.4)     Net  +1050            Urine Unmeasured Occurrence  2 x 2 x          Lines, Drains & Airways       Active LDAs       Name Placement date Placement time Site Days    Peripheral IV 11/19/23 0852 Left Antecubital 11/19/23  0852  Antecubital  1    Peripheral IV 11/19/23 1058 Anterior;Left Forearm 11/19/23  1058  Forearm  1              Inactive LDAs       None                  Medication Administration Report for Madison Acevedo as of 11/20/23 1355     Legend:    Given Hold Not Given Due Canceled Entry Other Actions    Time Time (Time) Time Time-Action         Discontinued     Completed     Future     MAR Hold     Linked             Medications 11/19/23 11/20/23      acetaminophen (TYLENOL) tablet 650 mg  Dose: 650 mg  Freq: Every 6 Hours PRN Route: PO  PRN Reasons: Mild Pain,Fever  Start: 11/19/23 1315   Admin Instructions:   If given for fever, use fever parameter: fever greater than 100.4 °F  Based on patient request - if ordered for moderate or severe pain, provider allows for administration of a medication prescribed for a lower pain scale.    Do not exceed 4 grams of acetaminophen in a 24 hr period. Max dose of 2gm for AST/ALT greater than 120 units/L.    If given for pain, use the following pain scale:   Mild Pain = Pain Score of 1-3, CPOT 1-2  Moderate Pain = Pain Score of 4-6, CPOT 3-4  Severe Pain = Pain Score of 7-10, CPOT 5-8         amLODIPine (NORVASC) tablet 2.5 mg  Dose: 2.5 mg  Freq: Daily Route: PO  Start: 11/20/23 0900   Admin Instructions:   Hold for SBP less than 100, DBP less than 60  Caution: Look alike/sound alike drug alert. Avoid grapefruit juice.     0819-Given               sennosides-docusate (PERICOLACE) 8.6-50 MG per tablet 2 tablet  Dose: 2  tablet  Freq: 2 Times Daily PRN Route: PO  PRN Reason: Constipation  Start: 11/19/23 1220   Admin Instructions:   HOLD MEDICATION IF PATIENT HAS HAD BOWEL MOVEMENT. Start bowel management regimen if patient has not had a bowel movement after 12 hours.        And  polyethylene glycol (MIRALAX) packet 17 g  Dose: 17 g  Freq: Daily PRN Route: PO  PRN Reason: Constipation  PRN Comment: Use if senna-docusate is ineffective  Start: 11/19/23 1214   Admin Instructions:   Use if no bowel movement after 12 hours. Mix in 6-8 ounces of water.  Use 4-8 ounces of water, tea, or juice for each 17 gram dose.        And  bisacodyl (DULCOLAX) EC tablet 5 mg  Dose: 5 mg  Freq: Daily PRN Route: PO  PRN Reason: Constipation  PRN Comment: Use if polyethylene glycol is ineffective  Start: 11/19/23 1214   Admin Instructions:   Use if no bowel movement after 12 hours.  Swallow whole. Do not crush, split, or chew tablet.        And  bisacodyl (DULCOLAX) suppository 10 mg  Dose: 10 mg  Freq: Daily PRN Route: RE  PRN Reason: Constipation  PRN Comment: Use if bisacodyl oral is ineffective  Start: 11/19/23 1214   Admin Instructions:   Use if no bowel movement after 12 hours.  Hold for diarrhea         cyclobenzaprine (FLEXERIL) tablet 10 mg  Dose: 10 mg  Freq: 3 Times Daily PRN Route: PO  PRN Reason: Muscle Spasms  Start: 11/19/23 1215    1756-Given            0535-Given               dexAMETHasone (DECADRON) injection 6 mg  Dose: 6 mg  Freq: Daily Route: IV  Start: 11/19/23 1430   Admin Instructions:   If giving IV, may be pushed over a minimum of 1 minute.    1438-Given            0821-Given               HYDROmorphone (DILAUDID) injection 1 mg  Dose: 1 mg  Freq: Every 2 Hours PRN Route: IV  PRN Reason: Severe Pain  Start: 11/19/23 1221   End: 11/26/23 1220   Admin Instructions:   Based on patient request - if ordered for moderate or severe pain, provider allows for administration of a medication prescribed for a lower pain  "scale.      Caution: Look alike/sound alike drug alert    If given for pain, use the following pain scale:  Mild Pain = Pain Score of 1-3, CPOT 1-2  Moderate Pain = Pain Score of 4-6, CPOT 3-4  Severe Pain = Pain Score of 7-10, CPOT 5-8    1449-Given     1756-Given     2050-Given     2335-Given         0509-Given     0818-Given     1153-Given            And  naloxone (NARCAN) injection 0.4 mg  Dose: 0.4 mg  Freq: Every 5 Minutes PRN Route: IV  PRN Reason: Respiratory Depression  Start: 11/19/23 1221   Admin Instructions:   If Respiratory Rate Less Than 8 or Patient is Difficult to Arouse, Stop ALL Narcotics & Contact Provider.  Administer Slow IV Push.  Repeat As Ordered Until Respiratory Rate is Greater Than 12.         ketorolac (TORADOL) injection 30 mg  Dose: 30 mg  Freq: Every 6 Hours PRN Route: IV  PRN Reason: Moderate Pain  Start: 11/19/23 1220   End: 11/22/23 7309   Admin Instructions:   Max 4 doses      If given for pain, use the following pain scale:  Mild Pain = Pain Score of 1-3, CPOT 1-2  Moderate Pain = Pain Score of 4-6, CPOT 3-4  Severe Pain = Pain Score of 7-10, CPOT 5-8     0921-Given               Magnesium Standard Dose Replacement - Follow Nurse / BPA Driven Protocol  Freq: As Needed Route: XX  PRN Reason: Other  Start: 11/19/23 1222   Admin Instructions:   Open Order & Select \"[facility] Electrolyte Replacement Protocol Algorithm\" to View Details         melatonin tablet 5 mg  Dose: 5 mg  Freq: Nightly PRN Route: PO  PRN Reason: Sleep  Start: 11/19/23 1220         omeprazole-sodium bicarbonate (ZEGERID)  MG per capsule 1 capsule  Dose: 1 capsule  Freq: Every Morning Before Breakfast Route: PO  Start: 11/20/23 1000   Admin Instructions:   Patient supplied. Do not crush or chew the capsules or tablets. The drug may not work as designed if the capsule or tablet is crushed or chewed. Swallow whole.     0922-Given               oxyCODONE-acetaminophen (PERCOCET)  MG per tablet 1 tablet  Dose: " "1 tablet  Freq: Every 4 Hours PRN Route: PO  PRN Reason: Severe Pain  Start: 11/19/23 1220   End: 11/26/23 1219   Admin Instructions:   Based on patient request - if ordered for moderate or severe pain, provider allows for administration of a medication prescribed for a lower pain scale.  [CARLOS]    Do not exceed 4 grams of acetaminophen in a 24 hr period. Max dose of 2gm for AST/ALT greater than 120 units/L        If given for pain, use the following pain scale:   Mild Pain = Pain Score of 1-3, CPOT 1-2  Moderate Pain = Pain Score of 4-6, CPOT 3-4  Severe Pain = Pain Score of 7-10, CPOT 5-8         Potassium Replacement - Follow Nurse / BPA Driven Protocol  Freq: As Needed Route: XX  PRN Reason: Other  Start: 11/19/23 1222   Admin Instructions:   Open Order & Select \"[facility] Electrolyte Replacement Protocol Algorithm\" to View Details         promethazine (PHENERGAN) tablet 12.5 mg  Dose: 12.5 mg  Freq: Every 6 Hours PRN Route: PO  PRN Reasons: Nausea,Vomiting  Start: 11/19/23 1221   Admin Instructions:   \"If multiple N/V medications ordered, use in the following order: Ondansetron, Prochlorperazine, Promethazine. Use PO unless patient refuses or patient unable to swallow.\"          Or  promethazine (PHENERGAN) suppository 12.5 mg  Dose: 12.5 mg  Freq: Every 6 Hours PRN Route: RE  PRN Reasons: Nausea,Vomiting  Start: 11/19/23 1221   Admin Instructions:   \"If multiple N/V medications ordered, use in the following order: Ondansetron, Prochlorperazine, Promethazine. Use PO unless patient refuses or patient unable to swallow.\"         sodium chloride 0.9 % flush 10 mL  Dose: 10 mL  Freq: As Needed Route: IV  PRN Reason: Line Care  Start: 11/19/23 1214    1757-Given                sodium chloride 0.9 % flush 10 mL  Dose: 10 mL  Freq: Every 12 Hours Scheduled Route: IV  Start: 11/19/23 1237    1336-Given     2051-Given           0824-Given     2100              sodium chloride 0.9 % flush 10 mL  Dose: 10 mL  Freq: As Needed " Route: IV  PRN Reason: Line Care  Start: 11/19/23 0852         sodium chloride 0.9 % infusion 40 mL  Dose: 40 mL  Freq: As Needed Route: IV  PRN Reason: Line Care  Start: 11/19/23 1214   Admin Instructions:   Following administration of an IV intermittent medication, flush line with 40mL NS at 100mL/hr.        Future Medications  Medications 11/19/23 11/20/23       cefepime 2000 mg IVPB in 100 ml NS (VTB)  Dose: 2,000 mg  Freq: Every 8 Hours Route: IV  Indications of Use: BACTEREMIA  Start: 11/20/23 1600   End: 11/27/23 1559     1600              Completed Medications  Medications 11/19/23 11/20/23       acetaminophen (TYLENOL) tablet 1,000 mg  Dose: 1,000 mg  Freq: Once Route: PO  Start: 11/19/23 0911   End: 11/19/23 0905   Admin Instructions:   If given for fever, use fever parameter: fever greater than 100.4 °F  Based on patient request - if ordered for moderate or severe pain, provider allows for administration of a medication prescribed for a lower pain scale.    Do not exceed 4 grams of acetaminophen in a 24 hr period. Max dose of 2gm for AST/ALT greater than 120 units/L.    If given for pain, use the following pain scale:   Mild Pain = Pain Score of 1-3, CPOT 1-2  Moderate Pain = Pain Score of 4-6, CPOT 3-4  Severe Pain = Pain Score of 7-10, CPOT 5-8    0905-Given                cefepime 2000 mg IVPB in 100 ml NS (VTB)  Dose: 2,000 mg  Freq: Once Route: IV  Start: 11/20/23 0900   End: 11/20/23 0850     0820-New Bag               fentaNYL citrate (PF) (SUBLIMAZE) injection 25 mcg  Dose: 25 mcg  Freq: Once Route: IV  Start: 11/19/23 0949   End: 11/19/23 0935   Admin Instructions:   Use filter needle to withdraw dose from ampule. Based on patient request - if ordered for moderate or severe pain, provider allows for administration of a medication prescribed for a lower pain scale.  If given for pain, use the following pain scale:  Mild Pain = Pain Score of 1-3, CPOT 1-2  Moderate Pain = Pain Score of 4-6, CPOT  "3-4  Severe Pain = Pain Score of 7-10, CPOT 5-8    0935-Given                HYDROmorphone (DILAUDID) injection 0.5 mg  Dose: 0.5 mg  Freq: Once Route: IV  Start: 11/19/23 1041   End: 11/19/23 1028   Admin Instructions:   Based on patient request - if ordered for moderate or severe pain, provider allows for administration of a medication prescribed for a lower pain scale.  If given for pain, use the following pain scale:  Mild Pain = Pain Score of 1-3, CPOT 1-2  Moderate Pain = Pain Score of 4-6, CPOT 3-4  Severe Pain = Pain Score of 7-10, CPOT 5-8    1028-Given                iopamidol (ISOVUE-370) 76 % injection 100 mL  Dose: 100 mL  Freq: Once in Imaging Route: IV  Start: 11/19/23 1101   End: 11/19/23 1045    1045-Given by Other                ketorolac (TORADOL) injection 15 mg  Dose: 15 mg  Freq: Once Route: IV  Start: 11/19/23 1154   End: 11/19/23 1149   Admin Instructions:   Based on patient request - if ordered for moderate or severe pain, provider allows for administration of a medication prescribed for a lower pain scale.      If given for pain, use the following pain scale:  Mild Pain = Pain Score of 1-3, CPOT 1-2  Moderate Pain = Pain Score of 4-6, CPOT 3-4  Severe Pain = Pain Score of 7-10, CPOT 5-8    1149-Given                lactated ringers bolus 1,000 mL  Dose: 1,000 mL  Freq: Once Route: IV  Start: 11/19/23 0909   End: 11/19/23 1009    0908-New Bag     1009-Stopped               ondansetron (ZOFRAN) injection 4 mg  Dose: 4 mg  Freq: Once Route: IV  Start: 11/19/23 0911   End: 11/19/23 0906   Admin Instructions:   \"If multiple N/V medications ordered, use in the following order: Ondansetron, Prochlorperazine, Promethazine. Use PO unless patient refuses or patient unable to swallow.\"    0906-Given                piperacillin-tazobactam (ZOSYN) 3.375 g in iso-osmotic dextrose 50 ml (premix)  Dose: 3.375 g  Freq: Once Route: IV  Indications of Use: SEPSIS  Start: 11/19/23 1058   End: 11/19/23 1127 "   Admin Instructions:   Refrigerate    1051-New Bag     1127-Stopped               potassium chloride (K-DUR,KLOR-CON) ER tablet 40 mEq  Dose: 40 mEq  Freq: Every 4 Hours Route: PO  Start: 11/19/23 1430   End: 11/19/23 1758   Admin Instructions:   Do not crush or chew the capsules or tablets. The drug may not work as designed if the capsule or tablet is crushed or chewed. Swallow whole.  Swallow whole; do not crush, split, or chew.    1438-Given     1758-Given               vancomycin IVPB 1500 mg in 0.9% NaCl (Premix) 500 mL  Dose: 20 mg/kg  Weight Dosing Info: 76.2 kg  Freq: Once Route: IV  Indications of Use: SEPSIS  Start: 11/19/23 1058   End: 11/19/23 1341    1127-New Bag     1341-Stopped              Discontinued Medications  Medications 11/19/23 11/20/23       amLODIPine (NORVASC) tablet 2.5 mg  Dose: 2.5 mg  Freq: Daily Route: PO  Start: 11/19/23 1233   End: 11/19/23 1221   Admin Instructions:   Hold for SBP less than 100, DBP less than 60  Caution: Look alike/sound alike drug alert. Avoid grapefruit juice.         cefepime 2000 mg IVPB in 100 ml NS (VTB)  Dose: 2,000 mg  Freq: Every 8 Hours Route: IV  Indications of Use: BACTEREMIA  Start: 11/20/23 1630   End: 11/20/23 1035         cefTRIAXone (ROCEPHIN) 1,000 mg in sodium chloride 0.9 % 100 mL IVPB-VTB  Dose: 1,000 mg  Freq: Every 24 Hours Route: IV  Indications of Use: PNEUMONIA  Start: 11/19/23 1600   End: 11/19/23 1805   Admin Instructions:   LR should be paused and flushing of the line with NS is recommended prior to and after completion of ceftriaxone infusion due to incompatibility. Do not co-adminster with calcium-containing solutions.  Caution: Look alike/sound alike drug alert    1550-New Bag     1630-Stopped               cefTRIAXone (ROCEPHIN) 2,000 mg in sodium chloride 0.9 % 100 mL IVPB-VTB  Dose: 2,000 mg  Freq: Every 24 Hours Route: IV  Indications of Use: PNEUMONIA  Start: 11/20/23 0900   End: 11/20/23 0803   Admin Instructions:   LR should  be paused and flushing of the line with NS is recommended prior to and after completion of ceftriaxone infusion due to incompatibility. Do not co-adminster with calcium-containing solutions.  Caution: Look alike/sound alike drug alert         fentaNYL citrate (PF) (SUBLIMAZE) injection 25 mcg  Dose: 25 mcg  Freq: Every 1 Hour PRN Route: IV  PRN Reason: Severe Pain  Start: 23 0854   End: 23 1343   Admin Instructions:   Use filter needle to withdraw dose from ampule. Based on patient request - if ordered for moderate or severe pain, provider allows for administration of a medication prescribed for a lower pain scale.  If given for pain, use the following pain scale:  Mild Pain = Pain Score of 1-3, CPOT 1-2  Moderate Pain = Pain Score of 4-6, CPOT 3-4  Severe Pain = Pain Score of 7-10, CPOT 5-8    0905-Given                Non-Formulary / Patient Supplied Medication  Dose: 40 mg  Freq: Daily Route: PO  Start: 23 0945   End: 23 0908   Order specific questions:   Can the patient use their own supply during their hospitalization? Yes  Name of Medication: omeprazole-sodium bicarbonate           Pharmacy to Dose Zosyn  Freq: Continuous PRN Route: XX  PRN Reason: Consult  Indications of Use: BACTEREMIA  Start: 23 1034   End: 23 1110                    Operative/Procedure Notes (all)    No notes of this type exist for this encounter.          Physician Progress Notes (all)        Alex Zepeda MD at 23 1317              Name: Madison Acevedo ADMIT: 2023   : 1960  PCP: Paulette Coulter APRN    MRN: [id number] LOS: 0 days   AGE/SEX: 63 y.o. female  ROOM: [facility]     Subjective   Subjective   Patient still in considerable amount of right shoulder pain IV Dilaudid making pain more tolerable.  Patient has ice pack on.  No fevers, chills, cough.  Does hurt her clavicle/shoulder with deep breaths.  No rash or Chills no rash or urinary tract symptoms.  Has chronic diarrhea with  IV asked that she sees Dr. Forman for.  But states that her diarrhea has not any different than normal.    Review of Systems   Constitutional:  Negative for chills and fever.   Respiratory:  Negative for cough.    Gastrointestinal:  Negative for abdominal pain, diarrhea and nausea.   Skin:  Negative for rash.     As above    Objective   Objective   Vital Signs  Temp:  [98.2 °F (36.8 °C)-99 °F (37.2 °C)] 98.2 °F (36.8 °C)  Heart Rate:  [] 95  Resp:  [18] 18  BP: (113-131)/(70-79) 131/79  SpO2:  [88 %-95 %] 95 %  on  Flow (L/min):  [2] 2;   Device (Oxygen Therapy): room air  Body mass index is 29.58 kg/m².  Physical Exam  Constitutional:       General: She is not in acute distress.     Appearance: She is ill-appearing.   Cardiovascular:      Rate and Rhythm: Normal rate and regular rhythm.   Pulmonary:      Effort: Pulmonary effort is normal. No respiratory distress.   Abdominal:      General: Abdomen is flat. There is no distension.      Tenderness: There is no abdominal tenderness.   Musculoskeletal:         General: Tenderness (Right  anterior shoulder tenderness and clavicle tenderness.  No obvious deformity.  Swelling without any erythema.  No fluctuance.) present. No swelling or deformity. Normal range of motion.   Skin:     General: Skin is warm and dry.   Neurological:      General: No focal deficit present.      Mental Status: She is alert. Mental status is at baseline.      Results Review     I reviewed the patient's new clinical results.  Results from last 7 days   Lab Units 11/20/23  0643 11/19/23  0856 11/17/23  1136   WBC 10*3/mm3 10.71 8.48 11.17*   HEMOGLOBIN g/dL 10.9* 11.7* 12.4   PLATELETS 10*3/mm3 262 301 253     Results from last 7 days   Lab Units 11/20/23  0643 11/19/23  2231 11/19/23  0856 11/17/23  1136   SODIUM mmol/L 136  --  141 138   POTASSIUM mmol/L 4.6 4.6 3.2* 3.6   CHLORIDE mmol/L 103  --  103 102   CO2 mmol/L 24.6  --  25.6 27.0   BUN mg/dL 17  --  21 10   CREATININE mg/dL 0.67   --  0.89 0.79   GLUCOSE mg/dL 116*  --  120* 108*   Estimated Creatinine Clearance: 87 mL/min (by C-G formula based on SCr of 0.67 mg/dL).  Results from last 7 days   Lab Units 11/19/23  0856   ALBUMIN g/dL 3.8   BILIRUBIN mg/dL 0.2   ALK PHOS U/L 67   AST (SGOT) U/L 10   ALT (SGPT) U/L 8     Results from last 7 days   Lab Units 11/20/23  0643 11/19/23  1058 11/19/23  0856 11/17/23  1136   CALCIUM mg/dL 8.9  --  9.1 9.4   ALBUMIN g/dL  --   --  3.8  --    MAGNESIUM mg/dL  --  1.7  --   --      Results from last 7 days   Lab Units 11/19/23  0856   PROCALCITONIN ng/mL 0.64*   LACTATE mmol/L 2.0     COVID19   Date Value Ref Range Status   11/19/2023 Not Detected Not Detected - Ref. Range Final     SARS-CoV-2, OLMAN   Date Value Ref Range Status   02/10/2022 Not Detected Not Detected Final     Comment:     Testing was performed using the dara(R) SARS-CoV-2 test.  This nucleic acid amplification test was developed and its performance  characteristics determined by [employer]. Nucleic acid  amplification tests include RT-PCR and TMA. This test has not been  FDA cleared or approved. This test has been authorized by FDA under  an Emergency Use Authorization (EUA). This test is only authorized  for the duration of time the declaration that circumstances exist  justifying the authorization of the emergency use of in vitro  diagnostic tests for detection of SARS-CoV-2 virus and/or diagnosis  of COVID-19 infection under section 564(b)(1) of the Act, 21 U.S.C.  360bbb-3(b) (1), unless the authorization is terminated or revoked  sooner.  When diagnostic testing is negative, the possibility of a false  negative result should be considered in the context of a patient's  recent exposures and the presence of clinical signs and symptoms  consistent with COVID-19. An individual without symptoms of COVID-19  and who is not shedding SARS-CoV-2 virus would expect to have a  negative (not detected) result in this assay.     No  "results found for: \"HGBA1C\", \"POCGLU\"    CT Angiogram Chest  CT ANGIOGRAM CHEST-     Radiation dose reduction techniques were utilized, including automated  exposure control and exposure modulation based on body size.     Clinical: Right-sided chest pain     CT scan of the chest performed with intravenous contrast, pulmonary  embolus protocol to include coronal, sagittal and three-dimensional  reconstruction.     FINDINGS:  1. No pulmonary embolus, aortic aneurysm nor dissection. Cardiac size  within normal limits, no pericardial abnormality.     2. There are linear areas of discoid atelectasis demonstrated at both  lung bases. In addition there is curvilinear subpleural atelectasis  along each costophrenic sulcus, greater on the right than the left.  Additionally within the right lower lobe there is an area worrisome for  possible pneumonia extending from the infrahilar region to the lateral  pleural surface. It is somewhat curvilinear in shape. Right upper lobe  and right middle lobe are clear. Left upper lobe is clear. Short-term  follow-up CT chest advised in 4 to 6 weeks after treatment.     3. Tracheobronchial tree is satisfactory in appearance. The esophagus is  normal in course and caliber. No adenopathy. The breast parenchyma is  symmetric and satisfactory in appearance, no axillary adenopathy. The  remainder is unremarkable.                    This report was finalized on 11/19/2023 11:19 AM by Dr. Timmy Hebert M.D on Workstation: [id number]     XR Shoulder 2+ View Right  Narrative: XR SHOULDER 2+ VW RIGHT-     INDICATIONS: Trauma.     TECHNIQUE: 2 VIEWS OF THE RIGHT SHOULDER     COMPARISON: None available     FINDINGS:     No acute fracture, erosion, or dislocation is identified,  follow-up/further evaluation can be obtained as indications persist.  Small opacity at the right lung base, please see report from chest x-ray  from same date.     Impression:    As described.           This report was " finalized on 11/19/2023 9:44 AM by Dr. Lisandro Ruff M.D on Workstation: [employer]     XR Chest 1 View  Narrative: XR CHEST 1 VW-     HISTORY: Female who is 63 years-old, chest pain     TECHNIQUE: Frontal view of the chest     COMPARISON: 5/24/2020     FINDINGS: Heart, mediastinum and pulmonary vasculature are unremarkable.  Small opacity at the right base may be atelectasis or infiltrate,  follow-up recommended to characterize resolution and to exclude the  possibility of an underlying lesion, CT correlation can be obtained as  indicated. Minimal likely atelectasis at the left base. No pleural  effusion, or pneumothorax. Right hemidiaphragm is elevated. No acute  osseous process.     Impression: As described.                 This report was finalized on 11/19/2023 9:21 AM by Dr. Lisandro Ruff M.D on Workstation: [employer]       I reviewed the patient's daily medications.  Scheduled Medications  amLODIPine, 2.5 mg, Oral, Daily  cefepime, 2,000 mg, Intravenous, Q8H  dexAMETHasone, 6 mg, Intravenous, Daily  omeprazole-sodium bicarbonate, 1 capsule, Oral, QAM AC  sodium chloride, 10 mL, Intravenous, Q12H    Infusions   Diet  Diet: Regular/House Diet; Texture: Regular Texture (IDDSI 7); Fluid Consistency: Thin (IDDSI 0)        I have personally reviewed:  [x]  Laboratory   [x]  Microbiology   [x]  Radiology   [x]  EKG/Telemetry   []  Cardiology/Vascular   []  Pathology   []  Records     Assessment/Plan     Active Hospital Problems    Diagnosis  POA    **Right shoulder pain [M25.511]  Yes    Sepsis due to pneumonia [J18.9, A41.9]  Yes    Sepsis [A41.9]  Yes    Community acquired pneumonia of right lower lobe of lung [J18.9]  Yes    Pain of right clavicle [M89.8X1]  Yes    Hypokalemia [E87.6]  Yes    Essential hypertension [I10]  Yes      Resolved Hospital Problems   No resolved problems to display.       63 y.o. female admitted with Right shoulder pain.    Right lower lobe pneumonia with sepsis (fever,  tachycardia)  Acute hypoxic respiratory failure, 86% on room air  GNR Bacteremia  -Elevated procalcitonin and D-dimer  -RVP negative  -Blood cultures pending, MRSA nares pending  -CTA chest with right lower lobe pneumonia. No PE. Recommend short-term follow-up CT in 4 to 6 weeks after treatment  -Cefepime     Right shoulder and clavicle pain  -Pain started on Tuesday when she was lifting her grandchild  -X-ray without any fractures  -Ortho consulted, appreciate recommendations  -MRI right shoulder  -IV Toradol, Norco, IV Dilaudid, steroids, Flexeril     Hypokalemia  -Magnesium 1.7, replete per protocol     Hypertension-continue home amlodipine     History of PACs, follows cardiology as an outpatient.  Not being medicated as the burden is low.    SCDs for DVT prophylaxis.  Full code.  Discussed with patient, family, and nursing staff.  Anticipate discharge home with family timing yet to be determined.    Expected Discharge Date: 11/22/2023; Expected Discharge Time:       Alex Zepeda MD  [facility]  11/20/23  13:17 EST            Electronically signed by Alex Zepeda MD at 11/20/23 1321       Consult Notes (all)    No notes of this type exist for this encounter.

## 2023-11-20 NOTE — PLAN OF CARE
Goal Outcome Evaluation:  Plan of Care Reviewed With: patient        Progress: improving       Pt AO x 4. VSS.Pain managed with prn Dilaudid..Awaiting ortho review, WCTM  for the remaining part of the shift.

## 2023-11-20 NOTE — PROGRESS NOTES
Name: Madison Acevedo ADMIT: 2023   : 1960  PCP: Paulette Coulter, SIMONE    MRN: [id number] LOS: 0 days   AGE/SEX: 63 y.o. female  ROOM: [facility]/     Subjective   Subjective   Patient still in considerable amount of right shoulder pain IV Dilaudid making pain more tolerable.  Patient has ice pack on.  No fevers, chills, cough.  Does hurt her clavicle/shoulder with deep breaths.  No rash or Chills no rash or urinary tract symptoms.  Has chronic diarrhea with IV asked that she sees Dr. Forman for.  But states that her diarrhea has not any different than normal.    Review of Systems   Constitutional:  Negative for chills and fever.   Respiratory:  Negative for cough.    Gastrointestinal:  Negative for abdominal pain, diarrhea and nausea.   Skin:  Negative for rash.     As above     Objective   Objective   Vital Signs  Temp:  [98.2 °F (36.8 °C)-99 °F (37.2 °C)] 98.2 °F (36.8 °C)  Heart Rate:  [] 95  Resp:  [18] 18  BP: (113-131)/(70-79) 131/79  SpO2:  [88 %-95 %] 95 %  on  Flow (L/min):  [2] 2;   Device (Oxygen Therapy): room air  Body mass index is 29.58 kg/m².  Physical Exam  Constitutional:       General: She is not in acute distress.     Appearance: She is ill-appearing.   Cardiovascular:      Rate and Rhythm: Normal rate and regular rhythm.   Pulmonary:      Effort: Pulmonary effort is normal. No respiratory distress.   Abdominal:      General: Abdomen is flat. There is no distension.      Tenderness: There is no abdominal tenderness.   Musculoskeletal:         General: Tenderness (Right  anterior shoulder tenderness and clavicle tenderness.  No obvious deformity.  Swelling without any erythema.  No fluctuance.) present. No swelling or deformity. Normal range of motion.   Skin:     General: Skin is warm and dry.   Neurological:      General: No focal deficit present.      Mental Status: She is alert. Mental status is at baseline.      Results Review     I reviewed the patient's new clinical  results.  Results from last 7 days   Lab Units 11/20/23  0643 11/19/23  0856 11/17/23  1136   WBC 10*3/mm3 10.71 8.48 11.17*   HEMOGLOBIN g/dL 10.9* 11.7* 12.4   PLATELETS 10*3/mm3 262 301 253     Results from last 7 days   Lab Units 11/20/23  0643 11/19/23  2231 11/19/23  0856 11/17/23  1136   SODIUM mmol/L 136  --  141 138   POTASSIUM mmol/L 4.6 4.6 3.2* 3.6   CHLORIDE mmol/L 103  --  103 102   CO2 mmol/L 24.6  --  25.6 27.0   BUN mg/dL 17  --  21 10   CREATININE mg/dL 0.67  --  0.89 0.79   GLUCOSE mg/dL 116*  --  120* 108*   Estimated Creatinine Clearance: 87 mL/min (by C-G formula based on SCr of 0.67 mg/dL).  Results from last 7 days   Lab Units 11/19/23  0856   ALBUMIN g/dL 3.8   BILIRUBIN mg/dL 0.2   ALK PHOS U/L 67   AST (SGOT) U/L 10   ALT (SGPT) U/L 8     Results from last 7 days   Lab Units 11/20/23  0643 11/19/23  1058 11/19/23  0856 11/17/23  1136   CALCIUM mg/dL 8.9  --  9.1 9.4   ALBUMIN g/dL  --   --  3.8  --    MAGNESIUM mg/dL  --  1.7  --   --      Results from last 7 days   Lab Units 11/19/23  0856   PROCALCITONIN ng/mL 0.64*   LACTATE mmol/L 2.0     COVID19   Date Value Ref Range Status   11/19/2023 Not Detected Not Detected - Ref. Range Final     SARS-CoV-2, OLMAN   Date Value Ref Range Status   02/10/2022 Not Detected Not Detected Final     Comment:     Testing was performed using the dara(R) SARS-CoV-2 test.  This nucleic acid amplification test was developed and its performance  characteristics determined by [employer]. Nucleic acid  amplification tests include RT-PCR and TMA. This test has not been  FDA cleared or approved. This test has been authorized by FDA under  an Emergency Use Authorization (EUA). This test is only authorized  for the duration of time the declaration that circumstances exist  justifying the authorization of the emergency use of in vitro  diagnostic tests for detection of SARS-CoV-2 virus and/or diagnosis  of COVID-19 infection under section 564(b)(1) of the  "Act, 21 U.S.C.  360bbb-3(b) (1), unless the authorization is terminated or revoked  sooner.  When diagnostic testing is negative, the possibility of a false  negative result should be considered in the context of a patient's  recent exposures and the presence of clinical signs and symptoms  consistent with COVID-19. An individual without symptoms of COVID-19  and who is not shedding SARS-CoV-2 virus would expect to have a  negative (not detected) result in this assay.     No results found for: \"HGBA1C\", \"POCGLU\"    CT Angiogram Chest  CT ANGIOGRAM CHEST-     Radiation dose reduction techniques were utilized, including automated  exposure control and exposure modulation based on body size.     Clinical: Right-sided chest pain     CT scan of the chest performed with intravenous contrast, pulmonary  embolus protocol to include coronal, sagittal and three-dimensional  reconstruction.     FINDINGS:  1. No pulmonary embolus, aortic aneurysm nor dissection. Cardiac size  within normal limits, no pericardial abnormality.     2. There are linear areas of discoid atelectasis demonstrated at both  lung bases. In addition there is curvilinear subpleural atelectasis  along each costophrenic sulcus, greater on the right than the left.  Additionally within the right lower lobe there is an area worrisome for  possible pneumonia extending from the infrahilar region to the lateral  pleural surface. It is somewhat curvilinear in shape. Right upper lobe  and right middle lobe are clear. Left upper lobe is clear. Short-term  follow-up CT chest advised in 4 to 6 weeks after treatment.     3. Tracheobronchial tree is satisfactory in appearance. The esophagus is  normal in course and caliber. No adenopathy. The breast parenchyma is  symmetric and satisfactory in appearance, no axillary adenopathy. The  remainder is unremarkable.                    This report was finalized on 11/19/2023 11:19 AM by Dr. Timmy Hebert M.D on Workstation: " [id number]     XR Shoulder 2+ View Right  Narrative: XR SHOULDER 2+ VW RIGHT-     INDICATIONS: Trauma.     TECHNIQUE: 2 VIEWS OF THE RIGHT SHOULDER     COMPARISON: None available     FINDINGS:     No acute fracture, erosion, or dislocation is identified,  follow-up/further evaluation can be obtained as indications persist.  Small opacity at the right lung base, please see report from chest x-ray  from same date.     Impression:    As described.           This report was finalized on 11/19/2023 9:44 AM by Dr. Lisandro Ruff M.D on Workstation: [email or web address]     XR Chest 1 View  Narrative: XR CHEST 1 VW-     HISTORY: Female who is 63 years-old, chest pain     TECHNIQUE: Frontal view of the chest     COMPARISON: 5/24/2020     FINDINGS: Heart, mediastinum and pulmonary vasculature are unremarkable.  Small opacity at the right base may be atelectasis or infiltrate,  follow-up recommended to characterize resolution and to exclude the  possibility of an underlying lesion, CT correlation can be obtained as  indicated. Minimal likely atelectasis at the left base. No pleural  effusion, or pneumothorax. Right hemidiaphragm is elevated. No acute  osseous process.     Impression: As described.                 This report was finalized on 11/19/2023 9:21 AM by Dr. Lisandro Ruff M.D on Workstation: [email or web address]       I reviewed the patient's daily medications.  Scheduled Medications  amLODIPine, 2.5 mg, Oral, Daily  cefepime, 2,000 mg, Intravenous, Q8H  dexAMETHasone, 6 mg, Intravenous, Daily  omeprazole-sodium bicarbonate, 1 capsule, Oral, QAM AC  sodium chloride, 10 mL, Intravenous, Q12H    Infusions   Diet  Diet: Regular/House Diet; Texture: Regular Texture (IDDSI 7); Fluid Consistency: Thin (IDDSI 0)         I have personally reviewed:  [x]  Laboratory   [x]  Microbiology   [x]  Radiology   [x]  EKG/Telemetry   []  Cardiology/Vascular   []  Pathology   []  Records     Assessment/Plan     Active Hospital Problems     Diagnosis  POA    **Right shoulder pain [M25.511]  Yes    Sepsis due to pneumonia [J18.9, A41.9]  Yes    Sepsis [A41.9]  Yes    Community acquired pneumonia of right lower lobe of lung [J18.9]  Yes    Pain of right clavicle [M89.8X1]  Yes    Hypokalemia [E87.6]  Yes    Essential hypertension [I10]  Yes      Resolved Hospital Problems   No resolved problems to display.       63 y.o. female admitted with Right shoulder pain.    Right lower lobe pneumonia with sepsis (fever, tachycardia)  Acute hypoxic respiratory failure, 86% on room air  GNR Bacteremia  -Elevated procalcitonin and D-dimer  -RVP negative  -Blood cultures pending, MRSA nares pending  -CTA chest with right lower lobe pneumonia. No PE. Recommend short-term follow-up CT in 4 to 6 weeks after treatment  -Cefepime     Right shoulder and clavicle pain  -Pain started on Tuesday when she was lifting her grandchild  -X-ray without any fractures  -Ortho consulted, appreciate recommendations  -MRI right shoulder  -IV Toradol, Norco, IV Dilaudid, steroids, Flexeril     Hypokalemia  -Magnesium 1.7, replete per protocol     Hypertension-continue home amlodipine     History of PACs, follows cardiology as an outpatient.  Not being medicated as the burden is low.    SCDs for DVT prophylaxis.  Full code.  Discussed with patient, family, and nursing staff.  Anticipate discharge home with family timing yet to be determined.    Expected Discharge Date: 11/22/2023; Expected Discharge Time:       Alex Zepeda MD  [facility]  11/20/23  13:17 EST

## 2023-11-20 NOTE — SIGNIFICANT NOTE
11/20/23 4192   OTHER   Discipline occupational therapist   Rehab Time/Intention   Session Not Performed other (see comments)  (Pt awaiting Orthro consult and MRI for R shoulder. OT will f/u next service date if appropriate.)   Therapy Assessment/Plan (PT)   Criteria for Skilled Interventions Met (PT) yes   Recommendation   OT - Next Appointment 11/21/23

## 2023-11-20 NOTE — PROGRESS NOTES
Discharge Planning Assessment  [facility]     Patient Name: Madison Acevedo  MRN: [id number]  Today's Date: 11/20/2023    Admit Date: 11/19/2023    Plan: Return home with spouse   Discharge Needs Assessment       Row Name 11/20/23 1344       Living Environment    People in Home spouse    Name(s) of People in Home Elizabeth Malcolm    Current Living Arrangements home    Potentially Unsafe Housing Conditions none    Primary Care Provided by self    Provides Primary Care For no one    Family Caregiver if Needed spouse    Family Caregiver Names  Gold [phone]    Quality of Family Relationships helpful    Able to Return to Prior Arrangements yes       Resource/Environmental Concerns    Resource/Environmental Concerns none    Transportation Concerns none       Food Insecurity    Within the past 12 months, you worried that your food would run out before you got the money to buy more. Never true    Within the past 12 months, the food you bought just didn't last and you didn't have money to get more. Never true       Transition Planning    Patient/Family Anticipates Transition to home with family    Patient/Family Anticipated Services at Transition none    Transportation Anticipated family or friend will provide       Discharge Needs Assessment    Readmission Within the Last 30 Days no previous admission in last 30 days    Equipment Currently Used at Home none    Concerns to be Addressed denies needs/concerns at this time    Anticipated Changes Related to Illness none    Equipment Needed After Discharge none                   Discharge Plan       Row Name 11/20/23 0016       Plan    Plan Return home with spouse    Patient/Family in Agreement with Plan yes    Plan Comments Spoke with patient at bedside.  She lives with  Gold [phone], is IADL, uses no DME, has never used HH or been to SNF.  PCP is Paulette NICHOLS and pharmacy is [facility] if was after hours.  Patient  drives,  drives and will assist if needed.  She states she can afford her medicines.  Plan is to return home at [place].  CCP will follow as needed.  Néstor SHER                  Continued Care and Services - Admitted Since 11/19/2023    Coordination has not been started for this encounter.       Expected Discharge Date and Time       Expected Discharge Date Expected Discharge Time    Nov 22, 2023            Demographic Summary       Row Name 11/20/23 1342       General Information    Admission Type inpatient    Arrived From home    Referral Source admission list    Reason for Consult discharge planning    Preferred Language English                   Functional Status       Row Name 11/20/23 1342       Functional Status    Usual Activity Tolerance good    Current Activity Tolerance moderate       Functional Status, IADL    Medications independent    Meal Preparation independent    Housekeeping independent    Laundry independent    Shopping independent;assistive person       Mental Status    General Appearance WDL WDL       Mental Status Summary    Recent Changes in Mental Status/Cognitive Functioning no changes                         Becky S. [place], NIKKY

## 2023-11-21 LAB
ANION GAP SERPL CALCULATED.3IONS-SCNC: 8 MMOL/L (ref 5–15)
BUN SERPL-MCNC: 28 MG/DL (ref 8–23)
BUN/CREAT SERPL: 36.8 (ref 7–25)
CALCIUM SPEC-SCNC: 8.9 MG/DL (ref 8.6–10.5)
CHLORIDE SERPL-SCNC: 102 MMOL/L (ref 98–107)
CO2 SERPL-SCNC: 26 MMOL/L (ref 22–29)
CREAT SERPL-MCNC: 0.76 MG/DL (ref 0.57–1)
DEPRECATED RDW RBC AUTO: 39.7 FL (ref 37–54)
EGFRCR SERPLBLD CKD-EPI 2021: 88.2 ML/MIN/1.73
ERYTHROCYTE [DISTWIDTH] IN BLOOD BY AUTOMATED COUNT: 11.7 % (ref 12.3–15.4)
GLUCOSE SERPL-MCNC: 101 MG/DL (ref 65–99)
HCT VFR BLD AUTO: 32.2 % (ref 34–46.6)
HGB BLD-MCNC: 10.9 G/DL (ref 12–15.9)
MCH RBC QN AUTO: 31.3 PG (ref 26.6–33)
MCHC RBC AUTO-ENTMCNC: 33.9 G/DL (ref 31.5–35.7)
MCV RBC AUTO: 92.5 FL (ref 79–97)
PLATELET # BLD AUTO: 301 10*3/MM3 (ref 140–450)
PMV BLD AUTO: 9.2 FL (ref 6–12)
POTASSIUM SERPL-SCNC: 3.6 MMOL/L (ref 3.5–5.2)
POTASSIUM SERPL-SCNC: 4.5 MMOL/L (ref 3.5–5.2)
RBC # BLD AUTO: 3.48 10*6/MM3 (ref 3.77–5.28)
SODIUM SERPL-SCNC: 136 MMOL/L (ref 136–145)
WBC NRBC COR # BLD AUTO: 11.51 10*3/MM3 (ref 3.4–10.8)

## 2023-11-21 PROCEDURE — 87040 BLOOD CULTURE FOR BACTERIA: CPT | Performed by: STUDENT IN AN ORGANIZED HEALTH CARE EDUCATION/TRAINING PROGRAM

## 2023-11-21 PROCEDURE — 84132 ASSAY OF SERUM POTASSIUM: CPT | Performed by: STUDENT IN AN ORGANIZED HEALTH CARE EDUCATION/TRAINING PROGRAM

## 2023-11-21 PROCEDURE — 25010000002 CEFEPIME PER 500 MG: Performed by: STUDENT IN AN ORGANIZED HEALTH CARE EDUCATION/TRAINING PROGRAM

## 2023-11-21 PROCEDURE — 85027 COMPLETE CBC AUTOMATED: CPT | Performed by: STUDENT IN AN ORGANIZED HEALTH CARE EDUCATION/TRAINING PROGRAM

## 2023-11-21 PROCEDURE — 80048 BASIC METABOLIC PNL TOTAL CA: CPT | Performed by: STUDENT IN AN ORGANIZED HEALTH CARE EDUCATION/TRAINING PROGRAM

## 2023-11-21 PROCEDURE — 25010000002 DEXAMETHASONE PER 1 MG: Performed by: STUDENT IN AN ORGANIZED HEALTH CARE EDUCATION/TRAINING PROGRAM

## 2023-11-21 PROCEDURE — 25010000002 KETOROLAC TROMETHAMINE PER 15 MG: Performed by: STUDENT IN AN ORGANIZED HEALTH CARE EDUCATION/TRAINING PROGRAM

## 2023-11-21 PROCEDURE — 63710000001 PROMETHAZINE PER 12.5 MG: Performed by: STUDENT IN AN ORGANIZED HEALTH CARE EDUCATION/TRAINING PROGRAM

## 2023-11-21 PROCEDURE — 25010000002 AMPICILLIN-SULBACTAM PER 1.5 G: Performed by: INTERNAL MEDICINE

## 2023-11-21 PROCEDURE — 25010000002 HYDROMORPHONE 1 MG/ML SOLUTION: Performed by: STUDENT IN AN ORGANIZED HEALTH CARE EDUCATION/TRAINING PROGRAM

## 2023-11-21 RX ORDER — POTASSIUM CHLORIDE 750 MG/1
40 TABLET, FILM COATED, EXTENDED RELEASE ORAL EVERY 4 HOURS
Status: COMPLETED | OUTPATIENT
Start: 2023-11-21 | End: 2023-11-21

## 2023-11-21 RX ADMIN — PROMETHAZINE HYDROCHLORIDE 12.5 MG: 12.5 TABLET ORAL at 09:55

## 2023-11-21 RX ADMIN — POTASSIUM CHLORIDE 40 MEQ: 750 TABLET, EXTENDED RELEASE ORAL at 14:45

## 2023-11-21 RX ADMIN — CYCLOBENZAPRINE 10 MG: 10 TABLET, FILM COATED ORAL at 15:31

## 2023-11-21 RX ADMIN — OXYCODONE AND ACETAMINOPHEN 1 TABLET: 10; 325 TABLET ORAL at 18:36

## 2023-11-21 RX ADMIN — OXYCODONE AND ACETAMINOPHEN 1 TABLET: 10; 325 TABLET ORAL at 09:55

## 2023-11-21 RX ADMIN — KETOROLAC TROMETHAMINE 30 MG: 30 INJECTION, SOLUTION INTRAMUSCULAR; INTRAVENOUS at 05:27

## 2023-11-21 RX ADMIN — Medication 10 ML: at 08:16

## 2023-11-21 RX ADMIN — POTASSIUM CHLORIDE 40 MEQ: 750 TABLET, EXTENDED RELEASE ORAL at 09:51

## 2023-11-21 RX ADMIN — OXYCODONE AND ACETAMINOPHEN 1 TABLET: 10; 325 TABLET ORAL at 23:11

## 2023-11-21 RX ADMIN — AMLODIPINE BESYLATE 2.5 MG: 5 TABLET ORAL at 08:14

## 2023-11-21 RX ADMIN — AMPICILLIN SODIUM AND SULBACTAM SODIUM 3 G: 2; 1 INJECTION, POWDER, FOR SOLUTION INTRAMUSCULAR; INTRAVENOUS at 23:11

## 2023-11-21 RX ADMIN — HYDROMORPHONE HYDROCHLORIDE 1 MG: 1 INJECTION, SOLUTION INTRAMUSCULAR; INTRAVENOUS; SUBCUTANEOUS at 06:09

## 2023-11-21 RX ADMIN — DEXAMETHASONE SODIUM PHOSPHATE 6 MG: 10 INJECTION INTRAMUSCULAR; INTRAVENOUS at 08:15

## 2023-11-21 RX ADMIN — OMEPRAZOLE AND SODIUM BICARBONATE 1 CAPSULE: 40; 1100 CAPSULE ORAL at 05:30

## 2023-11-21 RX ADMIN — OXYCODONE AND ACETAMINOPHEN 1 TABLET: 10; 325 TABLET ORAL at 14:46

## 2023-11-21 RX ADMIN — CYCLOBENZAPRINE 10 MG: 10 TABLET, FILM COATED ORAL at 23:14

## 2023-11-21 RX ADMIN — PROMETHAZINE HYDROCHLORIDE 12.5 MG: 12.5 TABLET ORAL at 18:36

## 2023-11-21 RX ADMIN — Medication 10 ML: at 21:17

## 2023-11-21 RX ADMIN — AMPICILLIN SODIUM AND SULBACTAM SODIUM 3 G: 2; 1 INJECTION, POWDER, FOR SOLUTION INTRAMUSCULAR; INTRAVENOUS at 16:48

## 2023-11-21 RX ADMIN — CEFEPIME 2000 MG: 2 INJECTION, POWDER, FOR SOLUTION INTRAVENOUS at 12:29

## 2023-11-21 RX ADMIN — CEFEPIME 2000 MG: 2 INJECTION, POWDER, FOR SOLUTION INTRAVENOUS at 05:27

## 2023-11-21 NOTE — PROGRESS NOTES
Name: Madison Acevedo ADMIT: 2023   : 1960  PCP: Paulette Coulter APRN    MRN: [id number] LOS: 1 days   AGE/SEX: 63 y.o. female  ROOM: [facility]     Subjective   Subjective   Right shoulder pain is minimally improved today.  But still present.  No infectious symptoms.  Tolerating diet.    Review of Systems   Constitutional:  Negative for chills and fever.   Respiratory:  Negative for cough.    Gastrointestinal:  Negative for abdominal pain, diarrhea and nausea.   Skin:  Negative for rash.     As above     Objective   Objective   Vital Signs  Temp:  [97.5 °F (36.4 °C)-98.4 °F (36.9 °C)] 97.5 °F (36.4 °C)  Heart Rate:  [83-99] 99  Resp:  [18] 18  BP: (110-145)/(63-92) 144/75  SpO2:  [91 %-97 %] 91 %  on   ;   Device (Oxygen Therapy): room air  Body mass index is 29.58 kg/m².  Physical Exam  Constitutional:       General: She is not in acute distress.     Appearance: She is ill-appearing.   Cardiovascular:      Rate and Rhythm: Normal rate and regular rhythm.   Pulmonary:      Effort: Pulmonary effort is normal. No respiratory distress.   Abdominal:      General: Abdomen is flat. There is no distension.      Tenderness: There is no abdominal tenderness.   Musculoskeletal:         General: Tenderness (Right  anterior shoulder tenderness and clavicle tenderness, improving.  No obvious deformity.  Swelling without any erythema.  No fluctuance.) present. No swelling or deformity.   Skin:     General: Skin is warm and dry.   Neurological:      General: No focal deficit present.      Mental Status: She is alert. Mental status is at baseline.      Results Review     I reviewed the patient's new clinical results.  Results from last 7 days   Lab Units 23  0721 23  0643 23  0856 23  1136   WBC 10*3/mm3 11.51* 10.71 8.48 11.17*   HEMOGLOBIN g/dL 10.9* 10.9* 11.7* 12.4   PLATELETS 10*3/mm3 301 262 301 253     Results from last 7 days   Lab Units 23  0721 23  0643 23  6941  11/19/23  0856 11/17/23  1136   SODIUM mmol/L 136 136  --  141 138   POTASSIUM mmol/L 3.6 4.6 4.6 3.2* 3.6   CHLORIDE mmol/L 102 103  --  103 102   CO2 mmol/L 26.0 24.6  --  25.6 27.0   BUN mg/dL 28* 17  --  21 10   CREATININE mg/dL 0.76 0.67  --  0.89 0.79   GLUCOSE mg/dL 101* 116*  --  120* 108*   Estimated Creatinine Clearance: 76.7 mL/min (by C-G formula based on SCr of 0.76 mg/dL).  Results from last 7 days   Lab Units 11/19/23  0856   ALBUMIN g/dL 3.8   BILIRUBIN mg/dL 0.2   ALK PHOS U/L 67   AST (SGOT) U/L 10   ALT (SGPT) U/L 8     Results from last 7 days   Lab Units 11/21/23  0721 11/20/23  0643 11/19/23  1058 11/19/23  0856 11/17/23  1136   CALCIUM mg/dL 8.9 8.9  --  9.1 9.4   ALBUMIN g/dL  --   --   --  3.8  --    MAGNESIUM mg/dL  --   --  1.7  --   --      Results from last 7 days   Lab Units 11/19/23  0856   PROCALCITONIN ng/mL 0.64*   LACTATE mmol/L 2.0     COVID19   Date Value Ref Range Status   11/19/2023 Not Detected Not Detected - Ref. Range Final     SARS-CoV-2, OLMAN   Date Value Ref Range Status   02/10/2022 Not Detected Not Detected Final     Comment:     Testing was performed using the dara(R) SARS-CoV-2 test.  This nucleic acid amplification test was developed and its performance  characteristics determined by [employer]. Nucleic acid  amplification tests include RT-PCR and TMA. This test has not been  FDA cleared or approved. This test has been authorized by FDA under  an Emergency Use Authorization (EUA). This test is only authorized  for the duration of time the declaration that circumstances exist  justifying the authorization of the emergency use of in vitro  diagnostic tests for detection of SARS-CoV-2 virus and/or diagnosis  of COVID-19 infection under section 564(b)(1) of the Act, 21 U.S.C.  360bbb-3(b) (1), unless the authorization is terminated or revoked  sooner.  When diagnostic testing is negative, the possibility of a false  negative result should be considered in the  "context of a patient's  recent exposures and the presence of clinical signs and symptoms  consistent with COVID-19. An individual without symptoms of COVID-19  and who is not shedding SARS-CoV-2 virus would expect to have a  negative (not detected) result in this assay.     No results found for: \"HGBA1C\", \"POCGLU\"    MRI Shoulder Right Without Contrast  Narrative: MRI RIGHT SHOULDER WITHOUT CONTRAST     HISTORY: Right shoulder pain. Injury 6 days ago.     TECHNIQUE: MRI right shoulder includes axial PD as well as oblique  coronal PD, T1, T2 fat-sat and oblique sagittal T2 weighted sequences  through the right shoulder without contrast. Patient was in severe pain  during the exam and is claustrophobic. The exam was shortened due to  patient's condition and per the technologist, this is the best possible  exam.     COMPARISON: CT angiogram chest 11/19/2023, right shoulder x-rays  11/19/2023.     FINDINGS: There is intermediate T2 increased signal within the substance  of the supraspinatus tendon representing sprain or tendinopathy. There  is no rotator cuff tear or retraction. AC joint appears within normal  limits. Glenohumeral joint fluid volume is normal. Labral evaluation is  limited by artifact though there is no evidence for labral tear or  paralabral cyst formation. The long head of the biceps tendon is intact.  Periarticular musculature appears normal.     Impression: Supraspinatus tendinopathy or strain. No rotator cuff tear  or fracture or other evidence for internal derangement. Review of  yesterday's CT demonstrates subtle stranding within the soft tissues  surrounding the right sternoclavicular joint which could represent  inflammation related to arthritic changes at the sternoclavicular joint.  Sternoclavicular joint sprain/ligamentous injury is also a  consideration. There is no evidence for dislocation or fracture.     This report was finalized on 11/21/2023 8:16 AM by Dr. Sher Borges M.D on " Workstation: [id number]       I reviewed the patient's daily medications.  Scheduled Medications  amLODIPine, 2.5 mg, Oral, Daily  cefepime, 2,000 mg, Intravenous, Q8H  dexAMETHasone, 6 mg, Intravenous, Daily  omeprazole-sodium bicarbonate, 1 capsule, Oral, QAM AC  potassium chloride ER, 40 mEq, Oral, Q4H  sodium chloride, 10 mL, Intravenous, Q12H    Infusions   Diet  Diet: Regular/House Diet; Texture: Regular Texture (IDDSI 7); Fluid Consistency: Thin (IDDSI 0)         I have personally reviewed:  [x]  Laboratory   [x]  Microbiology   [x]  Radiology   [x]  EKG/Telemetry   []  Cardiology/Vascular   []  Pathology   []  Records     Assessment/Plan     Active Hospital Problems    Diagnosis  POA    **Right shoulder pain [M25.511]  Yes    Sepsis due to pneumonia [J18.9, A41.9]  Yes    Bacteremia [R78.81]  Yes    Sepsis [A41.9]  Yes    Community acquired pneumonia of right lower lobe of lung [J18.9]  Yes    Pain of right clavicle [M89.8X1]  Yes    Hypokalemia [E87.6]  Yes    Essential hypertension [I10]  Yes      Resolved Hospital Problems   No resolved problems to display.       63 y.o. female admitted with Right shoulder pain.    Right lower lobe pneumonia with sepsis (fever, tachycardia)  Acute hypoxic respiratory failure, 86% on room air  Pasteurella multocida bacteremia  -Now on room air  -RVP negative  -CTA chest with right lower lobe pneumonia. No PE. Recommend short-term follow-up CT in 4 to 6 weeks after treatment  -ID consulted-patient had a cat bite her left hand about a week ago.  Now on Unasyn.  Cardiothoracic surgery consulted for possible right sternoclavicular joint septic arthritis.  Echo ordered.     Right shoulder and clavicle pain  -Pain started on Tuesday when she was lifting her grandchild  -Ortho consulted, appreciate recommendations  -MRI right shoulder-supraspinatus adenopathy or strain.  No rotator cuff tear or fracture or dislocation.  Right sternoclavicular joint with subtle stranding that is  likely inflammation versus joint sprain.  -IV Toradol, Norco, IV Dilaudid, Flexeril  -Hold further steroids     Hypokalemia  -Replete per protocol     Hypertension-continue home amlodipine     History of PACs, follows cardiology as an outpatient.  Not being medicated as the burden is low.    SCDs for DVT prophylaxis.  Full code.  Discussed with patient, family, and nursing staff.  Anticipate discharge home with family timing yet to be determined.    Expected Discharge Date: 11/22/2023; Expected Discharge Time:       Alex Zepeda MD  [facility]  11/21/23  13:10 EST

## 2023-11-21 NOTE — PROGRESS NOTES
"  Infectious Diseases Progress Note    Grace Hemphill MD     [facility]  Los: 1 day  Patient Identification:  Name: Madison Acevedo  Age: 63 y.o.  Sex: female  :  1960  MRN: [id number]         Primary Care Physician: Paulette Coulter APRN        Subjective: Denies any fever and chills.  Still having significant discomfort in the right side of her upper chest and root of the neck including sternoclavicular joint and shoulder.  Interval History: See consultation note.  2023 blood culture drawn at the time of admission is now identified as Pasteurella multocida.  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  The last significant bite was in her left hand about a week ago few days prior to onset of the neck pain.  Objective:    Scheduled Meds:amLODIPine, 2.5 mg, Oral, Daily  ampicillin-sulbactam, 3 g, Intravenous, Q6H  dexAMETHasone, 6 mg, Intravenous, Daily  omeprazole-sodium bicarbonate, 1 capsule, Oral, QAM AC  sodium chloride, 10 mL, Intravenous, Q12H      Continuous Infusions:     Vital signs in last 24 hours:  Temp:  [97.5 °F (36.4 °C)-98.4 °F (36.9 °C)] 98.2 °F (36.8 °C)  Heart Rate:  [] 105  Resp:  [18-20] 20  BP: (123-145)/(75-92) 134/75    Intake/Output:  No intake or output data in the 24 hours ending 23 1707      Exam:  /75 (BP Location: Left arm, Patient Position: Sitting)   Pulse 105   Temp 98.2 °F (36.8 °C) (Oral)   Resp 20   Ht 162.6 cm (64\")   Wt 78.2 kg (172 lb 4.8 oz)   SpO2 94%   BMI 29.58 kg/m²   Patient is examined using the personal protective equipment as per guidelines from infection control for this particular patient as enacted.  Hand washing was performed before and after patient interaction.  General Appearance:    Alert, cooperative, no distress, AAOx3                          Head:    " Normocephalic, without obvious abnormality, atraumatic                           Eyes:    PERRL, conjunctivae/corneas clear, EOM's intact, both eyes                         Throat:   Lips, tongue, gums normal; oral mucosa pink and moist                           Neck:   Supple, symmetrical, trachea midline, no JVD                         Lungs:    Clear to auscultation bilaterally, respirations unlabored                 Chest Wall:  Right sternoclavicular joint tenderness                          Heart:  S1-S2 regular no murmur                  abdomen:   Soft nontender                 Extremities: Mild tenderness along the clavicle and supraclavicular area with limited range of motion of the right shoulder.  Significant tenderness in the right sternoclavicular joint and medial clavicle.                        Pulses:   Pulses palpable in all extremities                            Skin:   Skin is warm and dry,  no rashes or palpable lesions                  Neurologic: Grossly nonfocal       Data Review:    I reviewed the patient's new clinical results.  Results from last 7 days   Lab Units 11/21/23  0721 11/20/23  0643 11/19/23  0856 11/17/23  1136   WBC 10*3/mm3 11.51* 10.71 8.48 11.17*   HEMOGLOBIN g/dL 10.9* 10.9* 11.7* 12.4   PLATELETS 10*3/mm3 301 262 301 253     Results from last 7 days   Lab Units 11/21/23  0721 11/20/23  0643 11/19/23  2231 11/19/23  0856 11/17/23  1136   SODIUM mmol/L 136 136  --  141 138   POTASSIUM mmol/L 3.6 4.6 4.6 3.2* 3.6   CHLORIDE mmol/L 102 103  --  103 102   CO2 mmol/L 26.0 24.6  --  25.6 27.0   BUN mg/dL 28* 17  --  21 10   CREATININE mg/dL 0.76 0.67  --  0.89 0.79   CALCIUM mg/dL 8.9 8.9  --  9.1 9.4   GLUCOSE mg/dL 101* 116*  --  120* 108*     Microbiology Results (last 10 days)       Procedure Component Value - Date/Time    Blood Culture - Blood, Arm, Right [[id number]]  (Normal) Collected: 11/20/23 1107    Lab Status: Preliminary result Specimen: Blood from Arm, Right  Updated: 11/21/23 1131     Blood Culture No growth at 24 hours    MRSA Screen, PCR (Inpatient) - Swab, Nares [[id number]]  (Normal) Collected: 11/19/23 1442    Lab Status: Final result Specimen: Swab from Nares Updated: 11/19/23 1609     MRSA PCR No MRSA Detected    Narrative:      The negative predictive value of this diagnostic test is high and should only be used to consider de-escalating anti-MRSA therapy. A positive result may indicate colonization with MRSA and must be correlated clinically.    Blood Culture - Blood, Arm, Left [[id number]]  (Abnormal) Collected: 11/19/23 1003    Lab Status: Preliminary result Specimen: Blood from Arm, Left Updated: 11/21/23 1408     Blood Culture Pasteurella multocida     Isolated from --     Gram Stain Aerobic Bottle Gram negative bacilli      Anaerobic Bottle Gram negative bacilli    Respiratory Panel PCR w/COVID-19(SARS-CoV-2) [facility]/GELA/[facility] In-House, NP Swab in UTM/VTM, 2 HR TAT - Swab, Nasopharynx [[id number]]  (Normal) Collected: 11/19/23 0901    Lab Status: Final result Specimen: Swab from Nasopharynx Updated: 11/19/23 1023     ADENOVIRUS, PCR Not Detected     Coronavirus 229E Not Detected     Coronavirus HKU1 Not Detected     Coronavirus NL63 Not Detected     Coronavirus OC43 Not Detected     COVID19 Not Detected     Human Metapneumovirus Not Detected     Human Rhinovirus/Enterovirus Not Detected     Influenza A PCR Not Detected     Influenza B PCR Not Detected     Parainfluenza Virus 1 Not Detected     Parainfluenza Virus 2 Not Detected     Parainfluenza Virus 3 Not Detected     Parainfluenza Virus 4 Not Detected     RSV, PCR Not Detected     Bordetella pertussis pcr Not Detected     Bordetella parapertussis PCR Not Detected     Chlamydophila pneumoniae PCR Not Detected     Mycoplasma pneumo by PCR Not Detected    Narrative:      In the setting of a positive respiratory panel with a viral infection PLUS a negative procalcitonin without other underlying concern  for bacterial infection, consider observing off antibiotics or discontinuation of antibiotics and continue supportive care. If the respiratory panel is positive for atypical bacterial infection (Bordetella pertussis, Chlamydophila pneumoniae, or Mycoplasma pneumoniae), consider antibiotic de-escalation to target atypical bacterial infection.    Blood Culture - Blood, Arm, Left [[id number]]  (Abnormal) Collected: 11/19/23 0856    Lab Status: Preliminary result Specimen: Blood from Arm, Left Updated: 11/21/23 1408     Blood Culture Pasteurella multocida     Comment: MICs to follow.        Isolated from Aerobic and Anaerobic Bottles     Gram Stain Aerobic Bottle Gram negative bacilli      Anaerobic Bottle Gram negative bacilli    Narrative:      Less than seven (7) mL's of blood was collected.  Insufficient quantity may yield false negative results.    Blood Culture ID, PCR - Blood, Arm, Left [[id number]] Collected: 11/19/23 0856    Lab Status: Final result Specimen: Blood from Arm, Left Updated: 11/20/23 0122     BCID, PCR Negative by BCID PCR. Culture to Follow.     BOTTLE TYPE Anaerobic Bottle        MRI report reviewed      Assessment:    Right shoulder pain    Essential hypertension    Sepsis    Community acquired pneumonia of right lower lobe of lung    Pain of right clavicle    Hypokalemia    Sepsis due to pneumonia    Bacteremia  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  Rule out endocarditis.    Recommendations/discussion:  See my discussion and recommendation on 11/20/2023.  Check 2D echo with Doppler  Follow-up on repeat blood cultures  Get thoracic surgery consultation for evaluation of probable right sternoclavicular joint septic arthritis in the setting of Pasteurella multocida bacteremic sepsis.  Need for steroid therapy would be deferred to our internal medicine colleagues.  Overall concept of care discussed with patient and patient's family  member at the bedside.    Grace Hemphill MD  11/21/2023  17:07 EST    Parts of this note may be an electronic transcription/translation of spoken language to printed text using the Dragon dictation system.

## 2023-11-21 NOTE — PLAN OF CARE
Goal Outcome Evaluation:  Plan of Care Reviewed With: patient        Progress: improving     Pt AO x4. VSS. MRI -(shoulder) results pending. Blood cultures drawn. Pt reports much improvement on the pain.Iv Cefepime given per MAR.Still awaiting ortho consult. All needs met. WCTM

## 2023-11-22 ENCOUNTER — APPOINTMENT (OUTPATIENT)
Dept: CARDIOLOGY | Facility: HOSPITAL | Age: 63
DRG: 867 | End: 2023-11-22
Payer: COMMERCIAL

## 2023-11-22 PROBLEM — A28.0: Status: ACTIVE | Noted: 2023-11-22

## 2023-11-22 LAB
ANION GAP SERPL CALCULATED.3IONS-SCNC: 7.7 MMOL/L (ref 5–15)
AORTIC DIMENSIONLESS INDEX: 0.8 (DI)
ASCENDING AORTA: 2.7 CM
BACTERIA SPEC AEROBE CULT: ABNORMAL
BH CV ECHO MEAS - ACS: 2.11 CM
BH CV ECHO MEAS - AO MAX PG: 11.2 MMHG
BH CV ECHO MEAS - AO MEAN PG: 6 MMHG
BH CV ECHO MEAS - AO ROOT DIAM: 2.7 CM
BH CV ECHO MEAS - AO V2 MAX: 167 CM/SEC
BH CV ECHO MEAS - AO V2 VTI: 30.3 CM
BH CV ECHO MEAS - AVA(I,D): 2.2 CM2
BH CV ECHO MEAS - EDV(CUBED): 81.8 ML
BH CV ECHO MEAS - EDV(MOD-SP2): 33 ML
BH CV ECHO MEAS - EDV(MOD-SP4): 53 ML
BH CV ECHO MEAS - EF(MOD-BP): 65.2 %
BH CV ECHO MEAS - EF(MOD-SP2): 63.6 %
BH CV ECHO MEAS - EF(MOD-SP4): 67.9 %
BH CV ECHO MEAS - ESV(CUBED): 18.6 ML
BH CV ECHO MEAS - ESV(MOD-SP2): 12 ML
BH CV ECHO MEAS - ESV(MOD-SP4): 17 ML
BH CV ECHO MEAS - FS: 38.9 %
BH CV ECHO MEAS - IVS/LVPW: 1.11 CM
BH CV ECHO MEAS - IVSD: 1.19 CM
BH CV ECHO MEAS - LA DIMENSION: 2.8 CM
BH CV ECHO MEAS - LAT PEAK E' VEL: 12.1 CM/SEC
BH CV ECHO MEAS - LV DIASTOLIC VOL/BSA (35-75): 28.9 CM2
BH CV ECHO MEAS - LV MASS(C)D: 172.5 GRAMS
BH CV ECHO MEAS - LV MAX PG: 5.2 MMHG
BH CV ECHO MEAS - LV MEAN PG: 3 MMHG
BH CV ECHO MEAS - LV SYSTOLIC VOL/BSA (12-30): 9.3 CM2
BH CV ECHO MEAS - LV V1 MAX: 114 CM/SEC
BH CV ECHO MEAS - LV V1 VTI: 23.1 CM
BH CV ECHO MEAS - LVIDD: 4.3 CM
BH CV ECHO MEAS - LVIDS: 2.7 CM
BH CV ECHO MEAS - LVOT AREA: 2.9 CM2
BH CV ECHO MEAS - LVOT DIAM: 1.92 CM
BH CV ECHO MEAS - LVPWD: 1.08 CM
BH CV ECHO MEAS - MED PEAK E' VEL: 9.7 CM/SEC
BH CV ECHO MEAS - MR MAX PG: 97.2 MMHG
BH CV ECHO MEAS - MR MAX VEL: 493 CM/SEC
BH CV ECHO MEAS - MV A DUR: 0.15 SEC
BH CV ECHO MEAS - MV A MAX VEL: 99 CM/SEC
BH CV ECHO MEAS - MV DEC SLOPE: 508 CM/SEC2
BH CV ECHO MEAS - MV DEC TIME: 0.18 SEC
BH CV ECHO MEAS - MV E MAX VEL: 96.4 CM/SEC
BH CV ECHO MEAS - MV E/A: 0.97
BH CV ECHO MEAS - MV MAX PG: 4.7 MMHG
BH CV ECHO MEAS - MV MEAN PG: 2.5 MMHG
BH CV ECHO MEAS - MV P1/2T: 67.3 MSEC
BH CV ECHO MEAS - MV V2 VTI: 26.9 CM
BH CV ECHO MEAS - MVA(P1/2T): 3.3 CM2
BH CV ECHO MEAS - MVA(VTI): 2.48 CM2
BH CV ECHO MEAS - PA ACC TIME: 0.16 SEC
BH CV ECHO MEAS - PA V2 MAX: 123.5 CM/SEC
BH CV ECHO MEAS - PULM A REVS DUR: 0.15 SEC
BH CV ECHO MEAS - PULM A REVS VEL: 31.6 CM/SEC
BH CV ECHO MEAS - PULM DIAS VEL: 52.5 CM/SEC
BH CV ECHO MEAS - PULM S/D: 1.44
BH CV ECHO MEAS - PULM SYS VEL: 75.5 CM/SEC
BH CV ECHO MEAS - QP/QS: 1.35
BH CV ECHO MEAS - RAP SYSTOLE: 3 MMHG
BH CV ECHO MEAS - RV MAX PG: 3.2 MMHG
BH CV ECHO MEAS - RV V1 MAX: 89.5 CM/SEC
BH CV ECHO MEAS - RV V1 VTI: 19.1 CM
BH CV ECHO MEAS - RVDD: 1.54 CM
BH CV ECHO MEAS - RVOT DIAM: 2.45 CM
BH CV ECHO MEAS - RVSP: 32.2 MMHG
BH CV ECHO MEAS - SI(MOD-SP2): 11.4 ML/M2
BH CV ECHO MEAS - SI(MOD-SP4): 19.6 ML/M2
BH CV ECHO MEAS - SUP REN AO DIAM: 2 CM
BH CV ECHO MEAS - SV(LVOT): 66.7 ML
BH CV ECHO MEAS - SV(MOD-SP2): 21 ML
BH CV ECHO MEAS - SV(MOD-SP4): 36 ML
BH CV ECHO MEAS - SV(RVOT): 90.1 ML
BH CV ECHO MEAS - TAPSE (>1.6): 2.7 CM
BH CV ECHO MEAS - TR MAX PG: 29.2 MMHG
BH CV ECHO MEAS - TR MAX VEL: 270.1 CM/SEC
BH CV ECHO MEASUREMENTS AVERAGE E/E' RATIO: 8.84
BH CV XLRA - RV BASE: 2.6 CM
BH CV XLRA - RV LENGTH: 6.9 CM
BH CV XLRA - RV MID: 1.66 CM
BH CV XLRA - TDI S': 18.4 CM/SEC
BUN SERPL-MCNC: 19 MG/DL (ref 8–23)
BUN/CREAT SERPL: 27.1 (ref 7–25)
CALCIUM SPEC-SCNC: 8.7 MG/DL (ref 8.6–10.5)
CHLORIDE SERPL-SCNC: 104 MMOL/L (ref 98–107)
CO2 SERPL-SCNC: 26.3 MMOL/L (ref 22–29)
CREAT SERPL-MCNC: 0.7 MG/DL (ref 0.57–1)
DEPRECATED RDW RBC AUTO: 39.5 FL (ref 37–54)
EGFRCR SERPLBLD CKD-EPI 2021: 97.3 ML/MIN/1.73
ERYTHROCYTE [DISTWIDTH] IN BLOOD BY AUTOMATED COUNT: 11.7 % (ref 12.3–15.4)
GLUCOSE SERPL-MCNC: 93 MG/DL (ref 65–99)
GRAM STN SPEC: ABNORMAL
GRAM STN SPEC: ABNORMAL
HCT VFR BLD AUTO: 31.5 % (ref 34–46.6)
HGB BLD-MCNC: 10.6 G/DL (ref 12–15.9)
ISOLATED FROM: ABNORMAL
LEFT ATRIUM VOLUME INDEX: 19.1 ML/M2
MCH RBC QN AUTO: 31 PG (ref 26.6–33)
MCHC RBC AUTO-ENTMCNC: 33.7 G/DL (ref 31.5–35.7)
MCV RBC AUTO: 92.1 FL (ref 79–97)
PLATELET # BLD AUTO: 331 10*3/MM3 (ref 140–450)
PMV BLD AUTO: 9 FL (ref 6–12)
POTASSIUM SERPL-SCNC: 4.4 MMOL/L (ref 3.5–5.2)
RBC # BLD AUTO: 3.42 10*6/MM3 (ref 3.77–5.28)
SINUS: 2.7 CM
SODIUM SERPL-SCNC: 138 MMOL/L (ref 136–145)
STJ: 2.47 CM
WBC NRBC COR # BLD AUTO: 9.22 10*3/MM3 (ref 3.4–10.8)

## 2023-11-22 PROCEDURE — 80048 BASIC METABOLIC PNL TOTAL CA: CPT | Performed by: STUDENT IN AN ORGANIZED HEALTH CARE EDUCATION/TRAINING PROGRAM

## 2023-11-22 PROCEDURE — 99223 1ST HOSP IP/OBS HIGH 75: CPT | Performed by: NURSE PRACTITIONER

## 2023-11-22 PROCEDURE — 25010000002 HYDROMORPHONE 1 MG/ML SOLUTION: Performed by: STUDENT IN AN ORGANIZED HEALTH CARE EDUCATION/TRAINING PROGRAM

## 2023-11-22 PROCEDURE — 93306 TTE W/DOPPLER COMPLETE: CPT

## 2023-11-22 PROCEDURE — 63710000001 PROMETHAZINE PER 12.5 MG: Performed by: STUDENT IN AN ORGANIZED HEALTH CARE EDUCATION/TRAINING PROGRAM

## 2023-11-22 PROCEDURE — 25010000002 KETOROLAC TROMETHAMINE PER 15 MG: Performed by: STUDENT IN AN ORGANIZED HEALTH CARE EDUCATION/TRAINING PROGRAM

## 2023-11-22 PROCEDURE — 85027 COMPLETE CBC AUTOMATED: CPT | Performed by: STUDENT IN AN ORGANIZED HEALTH CARE EDUCATION/TRAINING PROGRAM

## 2023-11-22 PROCEDURE — 25010000002 AMPICILLIN-SULBACTAM PER 1.5 G: Performed by: INTERNAL MEDICINE

## 2023-11-22 PROCEDURE — 25010000002 KETOROLAC TROMETHAMINE PER 15 MG: Performed by: NURSE PRACTITIONER

## 2023-11-22 PROCEDURE — 93306 TTE W/DOPPLER COMPLETE: CPT | Performed by: INTERNAL MEDICINE

## 2023-11-22 RX ORDER — AMOXICILLIN 250 MG
1 CAPSULE ORAL 2 TIMES DAILY
Status: DISCONTINUED | OUTPATIENT
Start: 2023-11-22 | End: 2023-12-01 | Stop reason: HOSPADM

## 2023-11-22 RX ORDER — KETOROLAC TROMETHAMINE 15 MG/ML
15 INJECTION, SOLUTION INTRAMUSCULAR; INTRAVENOUS 3 TIMES DAILY
Status: COMPLETED | OUTPATIENT
Start: 2023-11-22 | End: 2023-11-26

## 2023-11-22 RX ORDER — DIAZEPAM 2 MG/1
2 TABLET ORAL EVERY 6 HOURS PRN
Status: DISPENSED | OUTPATIENT
Start: 2023-11-22 | End: 2023-11-29

## 2023-11-22 RX ADMIN — OXYCODONE AND ACETAMINOPHEN 1 TABLET: 10; 325 TABLET ORAL at 07:05

## 2023-11-22 RX ADMIN — KETOROLAC TROMETHAMINE 15 MG: 15 INJECTION, SOLUTION INTRAMUSCULAR; INTRAVENOUS at 15:54

## 2023-11-22 RX ADMIN — AMPICILLIN SODIUM AND SULBACTAM SODIUM 3 G: 2; 1 INJECTION, POWDER, FOR SOLUTION INTRAMUSCULAR; INTRAVENOUS at 17:24

## 2023-11-22 RX ADMIN — HYDROMORPHONE HYDROCHLORIDE 1 MG: 1 INJECTION, SOLUTION INTRAMUSCULAR; INTRAVENOUS; SUBCUTANEOUS at 11:59

## 2023-11-22 RX ADMIN — OXYCODONE AND ACETAMINOPHEN 1 TABLET: 10; 325 TABLET ORAL at 20:06

## 2023-11-22 RX ADMIN — CYCLOBENZAPRINE 10 MG: 10 TABLET, FILM COATED ORAL at 09:11

## 2023-11-22 RX ADMIN — Medication 10 ML: at 20:10

## 2023-11-22 RX ADMIN — DIAZEPAM 2 MG: 2 TABLET ORAL at 15:54

## 2023-11-22 RX ADMIN — Medication 10 ML: at 09:12

## 2023-11-22 RX ADMIN — AMPICILLIN SODIUM AND SULBACTAM SODIUM 3 G: 2; 1 INJECTION, POWDER, FOR SOLUTION INTRAMUSCULAR; INTRAVENOUS at 05:56

## 2023-11-22 RX ADMIN — KETOROLAC TROMETHAMINE 30 MG: 30 INJECTION, SOLUTION INTRAMUSCULAR; INTRAVENOUS at 00:17

## 2023-11-22 RX ADMIN — KETOROLAC TROMETHAMINE 15 MG: 15 INJECTION, SOLUTION INTRAMUSCULAR; INTRAVENOUS at 20:04

## 2023-11-22 RX ADMIN — AMLODIPINE BESYLATE 2.5 MG: 5 TABLET ORAL at 09:11

## 2023-11-22 RX ADMIN — OMEPRAZOLE AND SODIUM BICARBONATE 1 CAPSULE: 40; 1100 CAPSULE ORAL at 07:01

## 2023-11-22 RX ADMIN — OXYCODONE AND ACETAMINOPHEN 1 TABLET: 10; 325 TABLET ORAL at 13:39

## 2023-11-22 RX ADMIN — AMPICILLIN SODIUM AND SULBACTAM SODIUM 3 G: 2; 1 INJECTION, POWDER, FOR SOLUTION INTRAMUSCULAR; INTRAVENOUS at 12:01

## 2023-11-22 RX ADMIN — HYDROMORPHONE HYDROCHLORIDE 1 MG: 1 INJECTION, SOLUTION INTRAMUSCULAR; INTRAVENOUS; SUBCUTANEOUS at 14:00

## 2023-11-22 RX ADMIN — PROMETHAZINE HYDROCHLORIDE 12.5 MG: 12.5 TABLET ORAL at 07:05

## 2023-11-22 RX ADMIN — DOCUSATE SODIUM 50MG AND SENNOSIDES 8.6MG 1 TABLET: 8.6; 5 TABLET, FILM COATED ORAL at 20:03

## 2023-11-22 NOTE — PROGRESS NOTES
"  Infectious Diseases Progress Note    Grace Hemphill MD     [facility]  Los: 2 days  Patient Identification:  Name: Madison Acevedo  Age: 63 y.o.  Sex: female  :  1960  MRN: [id number]         Primary Care Physician: Paulette Coulter, SIMONE        Subjective: Complaining of discomfort in the neck and clavicle on the right side since it was manipulated as part of the examination by orthopedic surgery service earlier today.  Denies any fever and chills.  Interval History: See consultation note.  2023 blood culture drawn at the time of admission is not Idefirix Pasteurella multocida.  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  The last significant bite was in her left hand about a week ago few days prior to onset of the neck pain.  2020 repeat blood cultures so far showed  no growth.  2023 evaluation by orthopedic surgery service reviewed  2023 echocardiogram performed result pending    Objective:    Scheduled Meds:amLODIPine, 2.5 mg, Oral, Daily  ampicillin-sulbactam, 3 g, Intravenous, Q6H  omeprazole-sodium bicarbonate, 1 capsule, Oral, QAM AC  sodium chloride, 10 mL, Intravenous, Q12H      Continuous Infusions:     Vital signs in last 24 hours:  Temp:  [97.7 °F (36.5 °C)-98.2 °F (36.8 °C)] 98.2 °F (36.8 °C)  Heart Rate:  [] 88  Resp:  [18-20] 18  BP: (117-134)/(75-87) 121/77    Intake/Output:    Intake/Output Summary (Last 24 hours) at 2023 1039  Last data filed at 2023 0900  Gross per 24 hour   Intake 300 ml   Output --   Net 300 ml         Exam:  /77   Pulse 88   Temp 98.2 °F (36.8 °C) (Oral)   Resp 18   Ht 162.6 cm (64\")   Wt 78 kg (172 lb)   SpO2 96%   BMI 29.52 kg/m²   Patient is examined using the personal protective equipment as per guidelines from infection control for this " particular patient as enacted.  Hand washing was performed before and after patient interaction.  General Appearance:    Alert, cooperative, no distress, AAOx3                          Head:    Normocephalic, without obvious abnormality, atraumatic                           Eyes:    PERRL, conjunctivae/corneas clear, EOM's intact, both eyes                         Throat:   Lips, tongue, gums normal; oral mucosa pink and moist                           Neck:   Supple, symmetrical, trachea midline, no JVD                         Lungs:    Clear to auscultation bilaterally, respirations unlabored                 Chest Wall:  Right sternoclavicular joint tenderness and medial clavicle tenderness noted.                          Heart:  S1-S2 regular no murmur                  abdomen:   Soft nontender                 Extremities: Mild tenderness along the clavicle and supraclavicular area with limited range of motion of the right shoulder.  Significant tenderness in the right sternoclavicular joint and medial clavicle.                        Pulses:   Pulses palpable in all extremities                            Skin:   Skin is warm and dry,  no rashes or palpable lesions                  Neurologic: Grossly nonfocal       Data Review:    I reviewed the patient's new clinical results.  Results from last 7 days   Lab Units 11/22/23  0634 11/21/23  0721 11/20/23  0643 11/19/23  0856 11/17/23  1136   WBC 10*3/mm3 9.22 11.51* 10.71 8.48 11.17*   HEMOGLOBIN g/dL 10.6* 10.9* 10.9* 11.7* 12.4   PLATELETS 10*3/mm3 331 301 262 301 253     Results from last 7 days   Lab Units 11/22/23  0634 11/21/23  1857 11/21/23  0721 11/20/23  0643 11/19/23  2231 11/19/23  0856 11/17/23  1136   SODIUM mmol/L 138  --  136 136  --  141 138   POTASSIUM mmol/L 4.4 4.5 3.6 4.6 4.6 3.2* 3.6   CHLORIDE mmol/L 104  --  102 103  --  103 102   CO2 mmol/L 26.3  --  26.0 24.6  --  25.6 27.0   BUN mg/dL 19  --  28* 17  --  21 10   CREATININE mg/dL 0.70  --   0.76 0.67  --  0.89 0.79   CALCIUM mg/dL 8.7  --  8.9 8.9  --  9.1 9.4   GLUCOSE mg/dL 93  --  101* 116*  --  120* 108*     Microbiology Results (last 10 days)       Procedure Component Value - Date/Time    Blood Culture - Blood, Arm, Right [[id number]]  (Normal) Collected: 11/21/23 0002    Lab Status: Preliminary result Specimen: Blood from Arm, Right Updated: 11/22/23 0101     Blood Culture No growth at 24 hours    Blood Culture - Blood, Arm, Right [[id number]]  (Normal) Collected: 11/20/23 1107    Lab Status: Preliminary result Specimen: Blood from Arm, Right Updated: 11/21/23 1131     Blood Culture No growth at 24 hours    MRSA Screen, PCR (Inpatient) - Swab, Nares [[id number]]  (Normal) Collected: 11/19/23 1442    Lab Status: Final result Specimen: Swab from Nares Updated: 11/19/23 1609     MRSA PCR No MRSA Detected    Narrative:      The negative predictive value of this diagnostic test is high and should only be used to consider de-escalating anti-MRSA therapy. A positive result may indicate colonization with MRSA and must be correlated clinically.    Blood Culture - Blood, Arm, Left [[id number]]  (Abnormal) Collected: 11/19/23 1003    Lab Status: Final result Specimen: Blood from Arm, Left Updated: 11/22/23 0746     Blood Culture Pasteurella multocida     Isolated from --     Gram Stain Aerobic Bottle Gram negative bacilli      Anaerobic Bottle Gram negative bacilli    Narrative:      Refer to previous blood culture collected on 11/19/2023 at 0856 for MICs.      Respiratory Panel PCR w/COVID-19(SARS-CoV-2) [facility]ILA/GELA/PAD/[facility] In-House, NP Swab in UTM/VTM, 2 HR TAT - Swab, Nasopharynx [[id number]]  (Normal) Collected: 11/19/23 0901    Lab Status: Final result Specimen: Swab from Nasopharynx Updated: 11/19/23 1023     ADENOVIRUS, PCR Not Detected     Coronavirus 229E Not Detected     Coronavirus HKU1 Not Detected     Coronavirus NL63 Not Detected     Coronavirus OC43 Not Detected     COVID19 Not Detected      Human Metapneumovirus Not Detected     Human Rhinovirus/Enterovirus Not Detected     Influenza A PCR Not Detected     Influenza B PCR Not Detected     Parainfluenza Virus 1 Not Detected     Parainfluenza Virus 2 Not Detected     Parainfluenza Virus 3 Not Detected     Parainfluenza Virus 4 Not Detected     RSV, PCR Not Detected     Bordetella pertussis pcr Not Detected     Bordetella parapertussis PCR Not Detected     Chlamydophila pneumoniae PCR Not Detected     Mycoplasma pneumo by PCR Not Detected    Narrative:      In the setting of a positive respiratory panel with a viral infection PLUS a negative procalcitonin without other underlying concern for bacterial infection, consider observing off antibiotics or discontinuation of antibiotics and continue supportive care. If the respiratory panel is positive for atypical bacterial infection (Bordetella pertussis, Chlamydophila pneumoniae, or Mycoplasma pneumoniae), consider antibiotic de-escalation to target atypical bacterial infection.    Blood Culture - Blood, Arm, Left [[id number]]  (Abnormal)  (Susceptibility) Collected: 11/19/23 0856    Lab Status: Final result Specimen: Blood from Arm, Left Updated: 11/22/23 0741     Blood Culture Pasteurella multocida     Isolated from Aerobic and Anaerobic Bottles     Gram Stain Aerobic Bottle Gram negative bacilli      Anaerobic Bottle Gram negative bacilli    Narrative:      Less than seven (7) mL's of blood was collected.  Insufficient quantity may yield false negative results.    Susceptibility        Pasteurella multocida      Not Specified      Ampicillin Susceptible      Levofloxacin Susceptible      Trimethoprim + Sulfamethoxazole Susceptible                           Blood Culture ID, PCR - Blood, Arm, Left [[id number]] Collected: 11/19/23 0856    Lab Status: Final result Specimen: Blood from Arm, Left Updated: 11/20/23 0122     BCID, PCR Negative by BCID PCR. Culture to Follow.     BOTTLE TYPE Anaerobic Bottle         MRI report reviewed      Assessment:    Right shoulder pain    Essential hypertension    Sepsis    Community acquired pneumonia of right lower lobe of lung    Pain of right clavicle    Hypokalemia    Sepsis due to pneumonia    Bacteremia  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  Rule out endocarditis.    Recommendations/discussion:  See my discussion and recommendation on 11/20/2023.  Check 2D echo with Doppler  Follow-up on repeat blood cultures  Get thoracic surgery consultation for evaluation of probable right sternoclavicular joint septic arthritis in the setting of Pasteurella multocida bacteremic sepsis.  Overall concept of care discussed with patient and patient's family member at the bedside.    Grace Hemphill MD  11/22/2023  10:39 EST    Parts of this note may be an electronic transcription/translation of spoken language to printed text using the Dragon dictation system.

## 2023-11-22 NOTE — SIGNIFICANT NOTE
11/22/23 0818   OTHER   Discipline occupational therapist   Rehab Time/Intention   Session Not Performed other (see comments)  (talked w pt who reports ortho just saw her and may do a need aspiration for her R shoulder. pt has full ROM and no restrictions, just painful w movement. pt is independent to BR and w self care, no OT needs at this time. OT signing off. 0636)

## 2023-11-22 NOTE — CONSULTS
Inpatient Thoracic Surgery Consult  Consult performed by: Isabell Cullen DNP, APRN  Consult ordered by: Grace Hemphill MD          Patient Care Team:  Paulette Coulter APRN as PCP - General (Family Medicine)  Gael Rodriguez MD as Consulting Physician (Cardiology)  Jan Brannon MD as Consulting Physician (Gastroenterology)    Chief Complaint   Patient presents with    Shoulder Pain    Fever       Subjective     History of Present Illness    Ms. Acevedo is a pleasant 63-year-old lady with a past medical history of hypertension.  She presented to [facility] ER on 11/19/2023 with persistent fevers x3-4 days and right shoulder pain that began after she lifted her granddaughter.  She went to urgent care on 11/13/2023 and was given steroids and IV Toradol with moderate relief.  Her pain persisted and she came to the ER for further evaluation.  She was found to have pneumonia and positive blood cultures for Pasteurella.  She was initiated on antibiotics.  She had an MRI of the shoulder which demonstrated mild tendinopathy without apparent joint effusion.  There is some concern for septic arthritis of the right sternoclavicular joint, for which thoracic surgery has been asked to see her.    She continues to have persistent right-sided chest/shoulder pain today.  This is worse since it was manipulated by orthopedic surgery earlier today.  Fevers have improved since admission.  She has had relief with Toradol in the past.  She denies dysphagia or shortness of breath.    Review of Systems   Constitutional:  Positive for fever.   HENT: Negative.     Eyes: Negative.    Cardiovascular:  Positive for chest pain.   Gastrointestinal: Negative.    Endocrine: Negative.    Genitourinary: Negative.    Musculoskeletal:  Negative for joint swelling.        Right shoulder pain   Allergic/Immunologic: Negative.    Neurological: Negative.    Hematological:  Negative for adenopathy. Does not bruise/bleed easily.    Psychiatric/Behavioral: Negative.          Past Medical History:   Diagnosis Date    Arrhythmia     palpitations    Arthritis     Breast cyst     COVID-19 2021    Esophageal reflux     Esophageal spasm     Essential hypertension 2020    Fibrocystic disease of breast     Hyperlipidemia     Palpitations 2020    PONV (postoperative nausea and vomiting)     Vitamin D deficiency      Past Surgical History:   Procedure Laterality Date    BREAST CYST ASPIRATION      CHOLECYSTECTOMY      COLONOSCOPY N/A 2018    Procedure: COLONOSCOPY TO CECUM AND TERMINAL ILEUM;  Surgeon: Jan Brannon MD;  Location: [facility] ENDOSCOPY;  Service:     ENDOSCOPY      X2    ENDOSCOPY N/A 2018    Procedure: ESOPHAGOGASTRODUODENOSCOPY WITH BIOPSIES;  Surgeon: Jan Brannon MD;  Location: [facility] ENDOSCOPY;  Service:     STEREOTACTIC BIOPSY / ASPIRATION / EXCISION Left     Bx breast percutan needle core use imag guide    THUMB ARTHROSCOPY      left thumb joint implant    WISDOM TOOTH EXTRACTION       Family History   Problem Relation Age of Onset    Heart disease Father         MI/CABG early 60s    Hypertension Father     Lung cancer Father     Stroke Father     GIACOMO disease Sister         esophageal reflux    COPD Sister     Hypertension Sister     Esophageal cancer Mother     Malig Hyperthermia Neg Hx      Social History     Socioeconomic History    Marital status:     Number of children: 2   Tobacco Use    Smoking status: Former     Packs/day: 0.00     Years: 15.00     Additional pack years: 0.00     Total pack years: 0.00     Types: Cigarettes     Quit date: 1990     Years since quittin.5    Smokeless tobacco: Never    Tobacco comments:     Have not smoked in 30 years   Vaping Use    Vaping Use: Never used   Substance and Sexual Activity    Alcohol use: Yes     Alcohol/week: 7.0 standard drinks of alcohol     Types: 7 Glasses of wine per week     Comment: vodka daily    Drug use: No    Sexual  activity: Yes     Partners: Male     Birth control/protection: Post-menopausal     Medications Prior to Admission   Medication Sig Dispense Refill Last Dose    acetaminophen (TYLENOL) 325 MG tablet Take 2 tablets by mouth Every 6 (Six) Hours As Needed for Mild Pain.   11/18/2023    amLODIPine (NORVASC) 2.5 MG tablet Take 1 tablet by mouth Daily. 90 tablet 1 11/19/2023    cyclobenzaprine (FLEXERIL) 5 MG tablet Take 1 tablet by mouth 3 (Three) Times a Day As Needed for abdominal wall pain. 30 tablet 1 11/19/2023    omeprazole-sodium bicarbonate (Zegerid)  MG per capsule Take 1 capsule by mouth Daily. 30 capsule 10 11/19/2023    magnesium gluconate (MAGONATE) 500 MG tablet Take 400 mg by mouth Daily As Needed.       VITAMIN D, CHOLECALCIFEROL, PO Take  by mouth Daily.   More than a month     No Known Allergies    Objective      Vital Signs  Temp:  [97.7 °F (36.5 °C)-98.2 °F (36.8 °C)] 98.2 °F (36.8 °C)  Heart Rate:  [74-88] 88  Resp:  [18-20] 18  BP: (117-127)/(75-87) 123/78    Intake & Output (last day)         11/21 0701  11/22 0700 11/22 0701  11/23 0700    P.O. 60 480    Total Intake(mL/kg) 60 (0.8) 480 (6.2)    Net +60 +480          Urine Unmeasured Occurrence 2 x             Physical Exam  Constitutional:       Appearance: Normal appearance. She is not ill-appearing.   HENT:      Head: Normocephalic and atraumatic.   Cardiovascular:      Rate and Rhythm: Normal rate.   Pulmonary:      Effort: Pulmonary effort is normal.   Chest:      Chest wall: Tenderness present. No deformity, swelling, crepitus or edema.          Comments: Tenderness to right supraclavicular area.  No overt swelling, erythema or signs of infection  Musculoskeletal:         General: Tenderness present. No swelling.      Cervical back: Normal range of motion and neck supple.      Comments: Limited ROM to right upper extremity, shoulder joint   Skin:     General: Skin is warm.   Neurological:      General: No focal deficit present.       Mental Status: She is alert.         Results Review:    I reviewed the patient's new clinical results.  I reviewed the patient's new imaging results and agree with the interpretation.  Discussed with patient, RN and Dr. Rodriguez    Imaging Results (Last 24 Hours)       ** No results found for the last 24 hours. **            Lab Results:  Lab Results (last 24 hours)       Procedure Component Value Units Date/Time    Blood Culture - Blood, Arm, Right [[id number]]  (Normal) Collected: 11/20/23 1107    Specimen: Blood from Arm, Right Updated: 11/22/23 1131     Blood Culture No growth at 2 days    Blood Culture - Blood, Arm, Left [[id number]]  (Abnormal) Collected: 11/19/23 1003    Specimen: Blood from Arm, Left Updated: 11/22/23 0746     Blood Culture Pasteurella multocida     Isolated from --     Gram Stain Aerobic Bottle Gram negative bacilli      Anaerobic Bottle Gram negative bacilli    Narrative:      Refer to previous blood culture collected on 11/19/2023 at 0856 for MICs.      Blood Culture - Blood, Arm, Left [[id number]]  (Abnormal)  (Susceptibility) Collected: 11/19/23 0856    Specimen: Blood from Arm, Left Updated: 11/22/23 0741     Blood Culture Pasteurella multocida     Isolated from Aerobic and Anaerobic Bottles     Gram Stain Aerobic Bottle Gram negative bacilli      Anaerobic Bottle Gram negative bacilli    Narrative:      Less than seven (7) mL's of blood was collected.  Insufficient quantity may yield false negative results.    Susceptibility        Pasteurella multocida      Not Specified      Ampicillin Susceptible      Levofloxacin Susceptible      Trimethoprim + Sulfamethoxazole Susceptible                           Basic Metabolic Panel [[id number]]  (Abnormal) Collected: 11/22/23 0634    Specimen: Blood Updated: 11/22/23 0722     Glucose 93 mg/dL      BUN 19 mg/dL      Creatinine 0.70 mg/dL      Sodium 138 mmol/L      Potassium 4.4 mmol/L      Chloride 104 mmol/L      CO2 26.3 mmol/L      Calcium 8.7  mg/dL      BUN/Creatinine Ratio 27.1     Anion Gap 7.7 mmol/L      eGFR 97.3 mL/min/1.73     Narrative:      GFR Normal >60  Chronic Kidney Disease <60  Kidney Failure <15      CBC (No Diff) [[id number]]  (Abnormal) Collected: 11/22/23 0634    Specimen: Blood Updated: 11/22/23 0705     WBC 9.22 10*3/mm3      RBC 3.42 10*6/mm3      Hemoglobin 10.6 g/dL      Hematocrit 31.5 %      MCV 92.1 fL      MCH 31.0 pg      MCHC 33.7 g/dL      RDW 11.7 %      RDW-SD 39.5 fl      MPV 9.0 fL      Platelets 331 10*3/mm3     Blood Culture - Blood, Arm, Right [[id number]]  (Normal) Collected: 11/21/23 0002    Specimen: Blood from Arm, Right Updated: 11/22/23 0101     Blood Culture No growth at 24 hours    Potassium [[id number]]  (Normal) Collected: 11/21/23 1857    Specimen: Blood Updated: 11/21/23 2010     Potassium 4.5 mmol/L                 Assessment & Plan       Right shoulder pain    Essential hypertension    Sepsis    Community acquired pneumonia of right lower lobe of lung    Pain of right clavicle    Hypokalemia    Sepsis due to pneumonia    Bacteremia      Assessment & Plan      Right sternoclavicular joint abscess: Not apparent on imaging or appreciable on physical exam today.  Orthopedics has evaluated and recommended to continue IV antibiotics.  MRI of the chest has been ordered for further evaluation of the sternoclavicular joint.  Add Valium for chest wall spasms and Toradol for right chest/shoulder tenderness as she has had relief with this in the past.    Pneumonia: With blood cell count is improved, he is afebrile and not requiring supplemental oxygen.  Initial blood cultures positive for Pasteurella, repeat blood cultures with no growth to date.  Continue antibiotics per ID recommendations.      I discussed the patients findings and our recommendations with patient, nursing staff, and Dr. Rodriguez .  We will follow-up once MRI results.    Thank you for this consult and allowing us to participate in the care of your  patient.  We will follow along with you during this hospitalization.       Isabell Cullen DNP, APRN  [facility]  11/22/23  14:29 EST    I spent >75 minutes reviewing the patient's chart including medical history, notes, radiographic imaging, labs and performing an assessment and development of a plan and discussion with the patient/family at bedside.

## 2023-11-22 NOTE — PLAN OF CARE
Goal Outcome Evaluation:  Plan of Care Reviewed With: patient           Outcome Evaluation: Pain treated throughout shift. See emar. patient able to shower. VSS, MRI consent and check list on chart, per MD, may not be done until friday.          RX for losartan refilled per Dr. Mccoy's protocol and eprescribed to preferred pharmacy.

## 2023-11-22 NOTE — PLAN OF CARE
Goal Outcome Evaluation:              Outcome Evaluation: Complaints of pain treated with PRNs per mar. Ad raudel in room. VSS. A&Ox4.

## 2023-11-22 NOTE — CONSULTS
Orthopaedic Consult    Maru Mathews MD      Patient: Madison Acevedo    Date of Admission: 11/19/2023  8:36 AM    YOB: 1960    Medical Record Number: [id number]    Attending Physician: James Navas*  Consulting Physician: Maru Mathews MD      Chief Complaints: Sepsis [A41.9]  Acute pain of right shoulder [M25.511]  Pain of right clavicle [M89.8X1]  Pneumonia of right lower lobe due to infectious organism [J18.9]  Sepsis without acute organ dysfunction, due to unspecified organism [A41.9]  Sepsis due to pneumonia [J18.9, A41.9]      History of Present Illness: 63 y.o. female admitted to [facility] with Sepsis [A41.9]  Acute pain of right shoulder [M25.511]  Pain of right clavicle [M89.8X1]  Pneumonia of right lower lobe due to infectious organism [J18.9]  Sepsis without acute organ dysfunction, due to unspecified organism [A41.9]  Sepsis due to pneumonia [J18.9, A41.9]. I was consulted for further evaluation and treatment.  Mrs. Acevedo is complaining of right shoulder and right sternoclavicular joint pain.  She has been admitted to the hospital for her right shoulder pain and was found to have pneumonia and also found to have blood cultures positive for Pasteurella.  She is already had an MRI of her shoulder.  She reports no injury to the shoulder that she can recall however she was picking up her grandchild and did have some pain and strain on her right shoulder several days before admission.  She does have a cat at home that gives her several loved bites every day she reports.  She reports since admission her pain in her shoulder has somewhat improved but is still very difficult to use.  She does report that most of her pain is more proximal towards her sternoclavicular joint in comparison to her shoulder.  She is right-hand dominant.  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  Right shoulder pain    Allergies: No Known Allergies    Medications:   Home  Medications:  Medications Prior to Admission   Medication Sig Dispense Refill Last Dose    acetaminophen (TYLENOL) 325 MG tablet Take 2 tablets by mouth Every 6 (Six) Hours As Needed for Mild Pain.   11/18/2023    amLODIPine (NORVASC) 2.5 MG tablet Take 1 tablet by mouth Daily. 90 tablet 1 11/19/2023    cyclobenzaprine (FLEXERIL) 5 MG tablet Take 1 tablet by mouth 3 (Three) Times a Day As Needed for abdominal wall pain. 30 tablet 1 11/19/2023    omeprazole-sodium bicarbonate (Zegerid)  MG per capsule Take 1 capsule by mouth Daily. 30 capsule 10 11/19/2023    magnesium gluconate (MAGONATE) 500 MG tablet Take 400 mg by mouth Daily As Needed.       VITAMIN D, CHOLECALCIFEROL, PO Take  by mouth Daily.   More than a month       Current Medications:  Scheduled Meds:amLODIPine, 2.5 mg, Oral, Daily  ampicillin-sulbactam, 3 g, Intravenous, Q6H  omeprazole-sodium bicarbonate, 1 capsule, Oral, QAM AC  sodium chloride, 10 mL, Intravenous, Q12H      Continuous Infusions:   PRN Meds:.  acetaminophen    senna-docusate sodium **AND** polyethylene glycol **AND** bisacodyl **AND** bisacodyl    cyclobenzaprine    HYDROmorphone **AND** naloxone    Magnesium Standard Dose Replacement - Follow Nurse / BPA Driven Protocol    melatonin    oxyCODONE-acetaminophen    Potassium Replacement - Follow Nurse / BPA Driven Protocol    promethazine **OR** promethazine    [COMPLETED] Insert Peripheral IV **AND** sodium chloride    sodium chloride    sodium chloride    Past Medical History:   Diagnosis Date    Arrhythmia     palpitations    Arthritis     Breast cyst     COVID-19 09/2021    Esophageal reflux     Esophageal spasm     Essential hypertension 11/19/2020    Fibrocystic disease of breast     Hyperlipidemia     Palpitations 11/19/2020    PONV (postoperative nausea and vomiting)     Vitamin D deficiency      Past Surgical History:   Procedure Laterality Date    BREAST CYST ASPIRATION      CHOLECYSTECTOMY      COLONOSCOPY N/A 01/03/2018     Procedure: COLONOSCOPY TO CECUM AND TERMINAL ILEUM;  Surgeon: Jan Brannon MD;  Location: [facility] ENDOSCOPY;  Service:     ENDOSCOPY      X2    ENDOSCOPY N/A 2018    Procedure: ESOPHAGOGASTRODUODENOSCOPY WITH BIOPSIES;  Surgeon: Jan Brannon MD;  Location: [facility] ENDOSCOPY;  Service:     STEREOTACTIC BIOPSY / ASPIRATION / EXCISION Left     Bx breast percutan needle core use imag guide    THUMB ARTHROSCOPY      left thumb joint implant    WISDOM TOOTH EXTRACTION       Social History     Occupational History    Occupation: REGISTERED NURSE    Tobacco Use    Smoking status: Former     Packs/day: 0.00     Years: 15.00     Additional pack years: 0.00     Total pack years: 0.00     Types: Cigarettes     Quit date: 1990     Years since quittin.5    Smokeless tobacco: Never    Tobacco comments:     Have not smoked in 30 years   Vaping Use    Vaping Use: Never used   Substance and Sexual Activity    Alcohol use: Yes     Alcohol/week: 7.0 standard drinks of alcohol     Types: 7 Glasses of wine per week     Comment: vodka daily    Drug use: No    Sexual activity: Yes     Partners: Male     Birth control/protection: Post-menopausal      Social History     Social History Narrative    Not on file     Family History   Problem Relation Age of Onset    Heart disease Father         MI/CABG early 60s    Hypertension Father     Lung cancer Father     Stroke Father     GIACOMO disease Sister         esophageal reflux    COPD Sister     Hypertension Sister     Esophageal cancer Mother     Malig Hyperthermia Neg Hx          Review of Systems:   Constitutional: Negative for fatigue, fever, or weight loss  HEENT: Patient denies any headaches, vision changes, sore throat. Admits to wearing glasses  Pulmonary: Patient denies any cough, congestion, SOA, or wheezing  Cardiovascular: Patient denies any chest pain, palpitations, weakness,  Gastrointestinal:  Patient denies nausea, vomiting, diarrhea,  constipation  Genital/Urinary: Patient denies dysuria, urinary frequency, urgency, incontinence, retention  Musculoskeletal: Positive for shoulder pain. Negative for muscle cramps  Neurological: Patient denies dizziness, paresthesia, loss of sensation, seizures, syncope  Endocrine system: Patient denies tremors, cold or heat intolerance  Psychological: Patient denies thoughts/plans or harming self or other; hallucinations,  insomnia  Skin: Patient denies any bruising, rashes, lesions, or ulcers   Hematopoietic: Patient denies history of MRSA or slow wound healing,  spontaneous or excessive bleeding    Physical Exam: 63 y.o. female  Vitals:    11/21/23 1311 11/21/23 2020 11/22/23 0020 11/22/23 0721   BP: 134/75 117/77 127/87 126/75   BP Location: Left arm Left arm  Left arm   Patient Position: Sitting Lying  Lying   Pulse: 105 78 74    Resp: 20 20  18   Temp: 98.2 °F (36.8 °C) 97.9 °F (36.6 °C) 97.7 °F (36.5 °C) 98.2 °F (36.8 °C)   TempSrc: Oral Oral Oral Oral   SpO2: 94%  95% 96%   Weight:       Height:                                General Appearance:  Alert, cooperative, in no acute distress    HEENT:    Normocephalic,  Atraumatic, Pupils are equal   Neck:   No adenopathy, supple, trachea midline, no thyromegaly       Lungs:     Clear to auscultation, equal expansion bilaterally, respirations regular, even and               unlabored    Heart:    Regular rhythm and normal rate, normal S1 and S2, no murmur, no gallops   Chest Wall:    Within normal limits   Abdomen:     Normal bowel sounds, no masses,  soft non-tender, non-distended,       Rectal:  Musculoskeletal:     Deferred  On exam of the right upper extremity she is exquisitely tender to palpation over the right sternoclavicular joint.  There is no apparent deformity of this joint.  She is mildly tender to palpation around the shoulder.  She has difficulty with active range of motion but I am able to passively range her shoulder up to 140 degrees of forward  flexion and 140 degrees of abduction.  She is able to externally rotate at 0 to 70 degrees compared to 90 degrees on the contralateral side.  She is neurovascularly intact over the axillary nerve.  Her AIN/PIN/ulnar nerve are motor intact.  She report sensations grossly intact in the median/radial/ulnar nerve distribution.  Her hand is warm and well-perfused.  No tenderness about the elbow or wrist.   Extremities:   No clubbing, no edema, no cyanosis   Pulses  Neurovascular:   Pulses palpable and equal bilaterally in all 4 extremities    Patient was alert and oriented x 3 spheres   Skin:   No lesions, ulcers or rashes   Neurologic:   Cranial nerves 2 - 12 grossly intact, sensation intact            Diagnostic Tests:    I personally reviewed an MRI of the right shoulder that was performed on 11/20/2023.  There is some mild tendinopathy in the supraspinatus tendon.  There is no apparent joint effusion.  Overall relatively normal MRI with no concern for septic arthritis of the right shoulder.    On a chest x-ray I do not visualize any distraction of the sternoclavicular joint on the right side.      Assessment:    Right shoulder pain    Essential hypertension    Sepsis    Community acquired pneumonia of right lower lobe of lung    Pain of right clavicle    Hypokalemia    Sepsis due to pneumonia    Bacteremia      Concern for septic arthritis of the right sternoclavicular joint  Supraspinatus strain right side      Plan:      I will talk with the infectious disease doctor and admitting doctor.  I do not believe she has surgical pathology in her right shoulder.  I am concerned about her right sternoclavicular joint.  I would recommend getting advanced imaging at this starting off with a x-ray of the right clavicle.  I did discuss with the patient that it typically takes weeks for destruction of infection of this joint to show up on radiographs.  We could also get advanced imaging of this including either an MRI or a CT  scan.  She may also benefit from a IR guided aspiration of her right sternoclavicular joint.  I discussed that I usually do not do surgery for septic arthritis of the sternoclavicular joint because I can cause more damage than good by destabilizing the joint.  Usually this is treated with IV antibiotics.  We will continue to follow and see if she demonstrates any improvement with the continued antibiotics.    Date: 11/22/2023  Maru Mathews MD  Orthopaedic Surgeon    [facility]  [phone]  [email or web address]

## 2023-11-22 NOTE — PROGRESS NOTES
Name: Madison Acevedo ADMIT: 2023   : 1960  PCP: Paulette Coulter OBEDSIMONE    MRN: [id number] LOS: 2 days   AGE/SEX: 63 y.o. female  ROOM: [facility]     Subjective   Subjective   Patient with persistent discomfort in the right upper chest.  Worse since shoulder manipulation this morning although shoulder itself does not hurt.  Pain radiates into the right chest and shoulder area       Objective   Objective   Vital Signs  Temp:  [97.7 °F (36.5 °C)-98.2 °F (36.8 °C)] 98.2 °F (36.8 °C)  Heart Rate:  [74-88] 88  Resp:  [18-20] 18  BP: (117-127)/(75-87) 123/78  SpO2:  [95 %-96 %] 96 %  on   ;   Device (Oxygen Therapy): room air  Body mass index is 29.52 kg/m².  Physical Exam  Vitals and nursing note reviewed.   Constitutional:       General: She is not in acute distress.     Appearance: Normal appearance.   HENT:      Head: Normocephalic and atraumatic.   Eyes:      General: No scleral icterus.  Neck:      Vascular: No JVD.   Cardiovascular:      Rate and Rhythm: Normal rate and regular rhythm.      Heart sounds: Normal heart sounds. No murmur heard.  Pulmonary:      Effort: Pulmonary effort is normal. No respiratory distress.      Breath sounds: Normal breath sounds.   Chest:      Comments: Mild tenderness to the right sternocleidomastoid joint  Abdominal:      General: Bowel sounds are normal. There is no distension.      Palpations: Abdomen is soft.      Tenderness: There is no abdominal tenderness.   Musculoskeletal:         General: No swelling or tenderness.      Cervical back: Neck supple.   Skin:     General: Skin is warm and dry.      Coloration: Skin is not jaundiced.      Findings: No rash.   Neurological:      General: No focal deficit present.      Mental Status: She is alert and oriented to person, place, and time. Mental status is at baseline.   Psychiatric:         Mood and Affect: Mood normal.         Behavior: Behavior normal.       Results Review     I reviewed the patient's new clinical  "results.  Results from last 7 days   Lab Units 11/22/23  0634 11/21/23  0721 11/20/23  0643 11/19/23  0856   WBC 10*3/mm3 9.22 11.51* 10.71 8.48   HEMOGLOBIN g/dL 10.6* 10.9* 10.9* 11.7*   PLATELETS 10*3/mm3 331 301 262 301     Results from last 7 days   Lab Units 11/22/23  0634 11/21/23  1857 11/21/23  0721 11/20/23  0643 11/19/23  2231 11/19/23  0856   SODIUM mmol/L 138  --  136 136  --  141   POTASSIUM mmol/L 4.4 4.5 3.6 4.6   < > 3.2*   CHLORIDE mmol/L 104  --  102 103  --  103   CO2 mmol/L 26.3  --  26.0 24.6  --  25.6   BUN mg/dL 19  --  28* 17  --  21   CREATININE mg/dL 0.70  --  0.76 0.67  --  0.89   GLUCOSE mg/dL 93  --  101* 116*  --  120*   EGFR mL/min/1.73 97.3  --  88.2 98.4  --  73.0    < > = values in this interval not displayed.     Results from last 7 days   Lab Units 11/19/23  0856   ALBUMIN g/dL 3.8   BILIRUBIN mg/dL 0.2   ALK PHOS U/L 67   AST (SGOT) U/L 10   ALT (SGPT) U/L 8     Results from last 7 days   Lab Units 11/22/23  0634 11/21/23  0721 11/20/23  0643 11/19/23  1058 11/19/23  0856   CALCIUM mg/dL 8.7 8.9 8.9  --  9.1   ALBUMIN g/dL  --   --   --   --  3.8   MAGNESIUM mg/dL  --   --   --  1.7  --      Results from last 7 days   Lab Units 11/19/23  0856   PROCALCITONIN ng/mL 0.64*   LACTATE mmol/L 2.0     No results found for: \"HGBA1C\", \"POCGLU\"    MRI Shoulder Right Without Contrast    Result Date: 11/21/2023  Supraspinatus tendinopathy or strain. No rotator cuff tear or fracture or other evidence for internal derangement. Review of yesterday's CT demonstrates subtle stranding within the soft tissues surrounding the right sternoclavicular joint which could represent inflammation related to arthritic changes at the sternoclavicular joint. Sternoclavicular joint sprain/ligamentous injury is also a consideration. There is no evidence for dislocation or fracture.  This report was finalized on 11/21/2023 8:16 AM by Dr. Sher Borges M.D on Workstation: [id number]       I have personally " reviewed all medications:  Scheduled Medications  amLODIPine, 2.5 mg, Oral, Daily  ampicillin-sulbactam, 3 g, Intravenous, Q6H  ketorolac, 15 mg, Intravenous, TID  omeprazole-sodium bicarbonate, 1 capsule, Oral, QAM AC  senna-docusate sodium, 1 tablet, Oral, BID  sodium chloride, 10 mL, Intravenous, Q12H    Infusions   Diet  Diet: Regular/House Diet; Texture: Regular Texture (IDDSI 7); Fluid Consistency: Thin (IDDSI 0)    I have personally reviewed:  [x]  Laboratory   [x]  Microbiology   [x]  Radiology   []  EKG/Telemetry  []  Cardiology/Vascular   []  Pathology    []  Records       Assessment/Plan     Active Hospital Problems    Diagnosis  POA    **Right shoulder pain [M25.511]  Yes    Infection, Pasteurella [A28.0]  Unknown    Bacteremia [R78.81]  Yes    Sepsis [A41.9]  Yes    Community acquired pneumonia of right lower lobe of lung [J18.9]  Yes    Pain of right clavicle [M89.8X1]  Yes    Hypokalemia [E87.6]  Yes    Essential hypertension [I10]  Yes      Resolved Hospital Problems   No resolved problems to display.       63 y.o. female admitted with Right shoulder pain, ultimately has been found to have Pasteurella bacteremia    Discussed case with ID and orthopedic doctors.  Thoracic consult noted as well.  Await MRI chest for further evaluation of sternoclavicular joint  - Continue current pain regimen.  Appreciate thoracic recommendations for Valium and Toradol as well  - Lab workup basically unremarkable.  Repeat blood cultures pending, no growth to date    Pneumonia likely incidental and not the real source for sepsis.    Anemia stable    SCDs  Disposition: TBD depending on above workup, probably another day or 2      James Navas MD  [facility]  11/22/23  17:48 EST

## 2023-11-23 ENCOUNTER — APPOINTMENT (OUTPATIENT)
Dept: MRI IMAGING | Facility: HOSPITAL | Age: 63
DRG: 867 | End: 2023-11-23
Payer: COMMERCIAL

## 2023-11-23 LAB
ANION GAP SERPL CALCULATED.3IONS-SCNC: 9.8 MMOL/L (ref 5–15)
BUN SERPL-MCNC: 21 MG/DL (ref 8–23)
BUN/CREAT SERPL: 29.6 (ref 7–25)
CALCIUM SPEC-SCNC: 8.7 MG/DL (ref 8.6–10.5)
CHLORIDE SERPL-SCNC: 103 MMOL/L (ref 98–107)
CO2 SERPL-SCNC: 26.2 MMOL/L (ref 22–29)
CREAT SERPL-MCNC: 0.71 MG/DL (ref 0.57–1)
DEPRECATED RDW RBC AUTO: 38.4 FL (ref 37–54)
EGFRCR SERPLBLD CKD-EPI 2021: 95.7 ML/MIN/1.73
ERYTHROCYTE [DISTWIDTH] IN BLOOD BY AUTOMATED COUNT: 11.5 % (ref 12.3–15.4)
GLUCOSE SERPL-MCNC: 94 MG/DL (ref 65–99)
HCT VFR BLD AUTO: 33.1 % (ref 34–46.6)
HGB BLD-MCNC: 11.5 G/DL (ref 12–15.9)
MCH RBC QN AUTO: 31.7 PG (ref 26.6–33)
MCHC RBC AUTO-ENTMCNC: 34.7 G/DL (ref 31.5–35.7)
MCV RBC AUTO: 91.2 FL (ref 79–97)
PLATELET # BLD AUTO: 379 10*3/MM3 (ref 140–450)
PMV BLD AUTO: 8.8 FL (ref 6–12)
POTASSIUM SERPL-SCNC: 3.8 MMOL/L (ref 3.5–5.2)
RBC # BLD AUTO: 3.63 10*6/MM3 (ref 3.77–5.28)
SODIUM SERPL-SCNC: 139 MMOL/L (ref 136–145)
WBC NRBC COR # BLD AUTO: 9.6 10*3/MM3 (ref 3.4–10.8)

## 2023-11-23 PROCEDURE — 0 GADOBENATE DIMEGLUMINE 529 MG/ML SOLUTION: Performed by: HOSPITALIST

## 2023-11-23 PROCEDURE — 25010000002 KETOROLAC TROMETHAMINE PER 15 MG: Performed by: NURSE PRACTITIONER

## 2023-11-23 PROCEDURE — 71552 MRI CHEST W/O & W/DYE: CPT

## 2023-11-23 PROCEDURE — 63710000001 PROMETHAZINE PER 12.5 MG: Performed by: STUDENT IN AN ORGANIZED HEALTH CARE EDUCATION/TRAINING PROGRAM

## 2023-11-23 PROCEDURE — 25010000002 AMPICILLIN-SULBACTAM PER 1.5 G: Performed by: INTERNAL MEDICINE

## 2023-11-23 PROCEDURE — A9577 INJ MULTIHANCE: HCPCS | Performed by: HOSPITALIST

## 2023-11-23 PROCEDURE — 85027 COMPLETE CBC AUTOMATED: CPT | Performed by: STUDENT IN AN ORGANIZED HEALTH CARE EDUCATION/TRAINING PROGRAM

## 2023-11-23 PROCEDURE — 25010000002 HYDROMORPHONE 1 MG/ML SOLUTION: Performed by: STUDENT IN AN ORGANIZED HEALTH CARE EDUCATION/TRAINING PROGRAM

## 2023-11-23 PROCEDURE — 80048 BASIC METABOLIC PNL TOTAL CA: CPT | Performed by: STUDENT IN AN ORGANIZED HEALTH CARE EDUCATION/TRAINING PROGRAM

## 2023-11-23 RX ORDER — LIDOCAINE 4 G/G
2 PATCH TOPICAL
Qty: 12 PATCH | Refills: 0 | Status: COMPLETED | OUTPATIENT
Start: 2023-11-23 | End: 2023-11-29

## 2023-11-23 RX ORDER — DIAZEPAM 2 MG/1
2 TABLET ORAL ONCE AS NEEDED
Status: COMPLETED | OUTPATIENT
Start: 2023-11-23 | End: 2023-11-23

## 2023-11-23 RX ORDER — LIDOCAINE 4 G/G
1 PATCH TOPICAL
Status: DISCONTINUED | OUTPATIENT
Start: 2023-11-23 | End: 2023-11-23

## 2023-11-23 RX ORDER — OXYCODONE HYDROCHLORIDE 15 MG/1
15 TABLET ORAL EVERY 4 HOURS PRN
Status: DISPENSED | OUTPATIENT
Start: 2023-11-23 | End: 2023-11-30

## 2023-11-23 RX ORDER — LIDOCAINE 4 G/G
2 PATCH TOPICAL
Status: DISCONTINUED | OUTPATIENT
Start: 2023-11-24 | End: 2023-11-23

## 2023-11-23 RX ADMIN — AMPICILLIN SODIUM AND SULBACTAM SODIUM 3 G: 2; 1 INJECTION, POWDER, FOR SOLUTION INTRAMUSCULAR; INTRAVENOUS at 12:02

## 2023-11-23 RX ADMIN — HYDROMORPHONE HYDROCHLORIDE 1 MG: 1 INJECTION, SOLUTION INTRAMUSCULAR; INTRAVENOUS; SUBCUTANEOUS at 08:33

## 2023-11-23 RX ADMIN — DOCUSATE SODIUM 50MG AND SENNOSIDES 8.6MG 1 TABLET: 8.6; 5 TABLET, FILM COATED ORAL at 20:48

## 2023-11-23 RX ADMIN — AMPICILLIN SODIUM AND SULBACTAM SODIUM 3 G: 2; 1 INJECTION, POWDER, FOR SOLUTION INTRAMUSCULAR; INTRAVENOUS at 23:52

## 2023-11-23 RX ADMIN — PROMETHAZINE HYDROCHLORIDE 12.5 MG: 12.5 TABLET ORAL at 20:43

## 2023-11-23 RX ADMIN — DIAZEPAM 2 MG: 2 TABLET ORAL at 15:46

## 2023-11-23 RX ADMIN — CYCLOBENZAPRINE 10 MG: 10 TABLET, FILM COATED ORAL at 05:10

## 2023-11-23 RX ADMIN — HYDROMORPHONE HYDROCHLORIDE 1 MG: 1 INJECTION, SOLUTION INTRAMUSCULAR; INTRAVENOUS; SUBCUTANEOUS at 04:40

## 2023-11-23 RX ADMIN — AMLODIPINE BESYLATE 2.5 MG: 5 TABLET ORAL at 08:03

## 2023-11-23 RX ADMIN — OXYCODONE HYDROCHLORIDE 15 MG: 15 TABLET ORAL at 20:42

## 2023-11-23 RX ADMIN — HYDROMORPHONE HYDROCHLORIDE 1 MG: 1 INJECTION, SOLUTION INTRAMUSCULAR; INTRAVENOUS; SUBCUTANEOUS at 00:41

## 2023-11-23 RX ADMIN — HYDROMORPHONE HYDROCHLORIDE 1 MG: 1 INJECTION, SOLUTION INTRAMUSCULAR; INTRAVENOUS; SUBCUTANEOUS at 18:25

## 2023-11-23 RX ADMIN — CYCLOBENZAPRINE 10 MG: 10 TABLET, FILM COATED ORAL at 13:24

## 2023-11-23 RX ADMIN — AMPICILLIN SODIUM AND SULBACTAM SODIUM 3 G: 2; 1 INJECTION, POWDER, FOR SOLUTION INTRAMUSCULAR; INTRAVENOUS at 00:41

## 2023-11-23 RX ADMIN — CYCLOBENZAPRINE 10 MG: 10 TABLET, FILM COATED ORAL at 23:52

## 2023-11-23 RX ADMIN — AMPICILLIN SODIUM AND SULBACTAM SODIUM 3 G: 2; 1 INJECTION, POWDER, FOR SOLUTION INTRAMUSCULAR; INTRAVENOUS at 16:45

## 2023-11-23 RX ADMIN — OXYCODONE HYDROCHLORIDE 15 MG: 15 TABLET ORAL at 16:48

## 2023-11-23 RX ADMIN — HYDROMORPHONE HYDROCHLORIDE 1 MG: 1 INJECTION, SOLUTION INTRAMUSCULAR; INTRAVENOUS; SUBCUTANEOUS at 23:52

## 2023-11-23 RX ADMIN — PROMETHAZINE HYDROCHLORIDE 12.5 MG: 12.5 TABLET ORAL at 09:41

## 2023-11-23 RX ADMIN — Medication 10 ML: at 08:04

## 2023-11-23 RX ADMIN — KETOROLAC TROMETHAMINE 15 MG: 15 INJECTION, SOLUTION INTRAMUSCULAR; INTRAVENOUS at 15:00

## 2023-11-23 RX ADMIN — LIDOCAINE 2 PATCH: 4 PATCH TOPICAL at 12:02

## 2023-11-23 RX ADMIN — Medication 10 ML: at 20:43

## 2023-11-23 RX ADMIN — HYDROMORPHONE HYDROCHLORIDE 1 MG: 1 INJECTION, SOLUTION INTRAMUSCULAR; INTRAVENOUS; SUBCUTANEOUS at 21:46

## 2023-11-23 RX ADMIN — AMPICILLIN SODIUM AND SULBACTAM SODIUM 3 G: 2; 1 INJECTION, POWDER, FOR SOLUTION INTRAMUSCULAR; INTRAVENOUS at 06:26

## 2023-11-23 RX ADMIN — OXYCODONE AND ACETAMINOPHEN 1 TABLET: 10; 325 TABLET ORAL at 02:49

## 2023-11-23 RX ADMIN — LIDOCAINE 1 PATCH: 4 PATCH TOPICAL at 07:41

## 2023-11-23 RX ADMIN — DIAZEPAM 2 MG: 2 TABLET ORAL at 09:38

## 2023-11-23 RX ADMIN — KETOROLAC TROMETHAMINE 15 MG: 15 INJECTION, SOLUTION INTRAMUSCULAR; INTRAVENOUS at 08:00

## 2023-11-23 RX ADMIN — KETOROLAC TROMETHAMINE 15 MG: 15 INJECTION, SOLUTION INTRAMUSCULAR; INTRAVENOUS at 20:42

## 2023-11-23 RX ADMIN — HYDROMORPHONE HYDROCHLORIDE 1 MG: 1 INJECTION, SOLUTION INTRAMUSCULAR; INTRAVENOUS; SUBCUTANEOUS at 06:26

## 2023-11-23 RX ADMIN — OXYCODONE AND ACETAMINOPHEN 1 TABLET: 10; 325 TABLET ORAL at 06:53

## 2023-11-23 RX ADMIN — OXYCODONE HYDROCHLORIDE 15 MG: 15 TABLET ORAL at 12:05

## 2023-11-23 RX ADMIN — DIAZEPAM 2 MG: 2 TABLET ORAL at 04:45

## 2023-11-23 RX ADMIN — OMEPRAZOLE AND SODIUM BICARBONATE 1 CAPSULE: 40; 1100 CAPSULE ORAL at 08:04

## 2023-11-23 RX ADMIN — GADOBENATE DIMEGLUMINE 20 ML: 529 INJECTION, SOLUTION INTRAVENOUS at 11:04

## 2023-11-23 NOTE — PLAN OF CARE
Goal Outcome Evaluation:                   Patient A&Ox4. Unable to keep pain under control overnight. [facility] made aware. Lidocaine patch ordered. PRN pain medications given. IV abx infusing. Awaiting MRI. VSS.

## 2023-11-23 NOTE — PLAN OF CARE
Goal Outcome Evaluation:      A/O x4, on room air to 2 L NC, NSR on monitor. Pt reported uncontrolled pain this AM, MD notified and pain regimen adjusted. Pt reports improvement in symptoms. MRI completed. IV antibx continue. VSS.

## 2023-11-23 NOTE — PROGRESS NOTES
Subjective :   Alert, responding appropriately to questions.  Difficulty with pain control - receiving medication at the time of rounding.  Denies any new problems overnight.      Objective :    Vital signs in last 24 hours:  Vitals:    11/22/23 1252 11/22/23 1925 11/22/23 2336 11/23/23 0719   BP: 123/78 117/63 109/67 125/72   BP Location: Left arm Left arm Left arm Left arm   Patient Position: Lying Lying Lying Lying   Pulse:  85 85 96   Resp: 18 18 18 18   Temp: 98.2 °F (36.8 °C) 98.2 °F (36.8 °C) 98.4 °F (36.9 °C) 97.9 °F (36.6 °C)   TempSrc: Oral Oral Oral Oral   SpO2: 96% 97% 95% 96%   Weight:       Height:           PHYSICAL EXAM:  Patient is calm, awake and oriented x 3 - subjective reports of pain  Lidocaine patch in place over right sternoclavicular joint  No motion exam due to evident discomfort.  No report of changes to sensation involving the distal RUE.      LABS:  Results from last 7 days   Lab Units 11/23/23  0508   WBC 10*3/mm3 9.60   HEMOGLOBIN g/dL 11.5*   HEMATOCRIT % 33.1*   PLATELETS 10*3/mm3 379     Results from last 7 days   Lab Units 11/23/23  0509   SODIUM mmol/L 139   POTASSIUM mmol/L 3.8   CHLORIDE mmol/L 103   CO2 mmol/L 26.2   BUN mg/dL 21   CREATININE mg/dL 0.71   GLUCOSE mg/dL 94   CALCIUM mg/dL 8.7           ASSESSMENT:    Right shoulder pain    Essential hypertension    Sepsis    Community acquired pneumonia of right lower lobe of lung    Pain of right clavicle    Hypokalemia    Bacteremia    Infection, Pasteurella        Plan:  MRI of the chest pending for further evaluation of the sternoclavicular joint  On IV abx, as per ID  Cardiothoracic surgery, Infectious Disease, and Hospitalist services following    No changes from an orthopedic standpoint.    We will follow peripherally for now.      Jed Patricio, APRN    Date: 11/23/2023  Time: 08:07 EST

## 2023-11-23 NOTE — PROGRESS NOTES
Name: Madison Acevedo ADMIT: 2023   : 1960  PCP: Paulette Coulter OBED, SIMONE    MRN: [id number] LOS: 3 days   AGE/SEX: 63 y.o. female  ROOM: [facility]     Subjective   Subjective   Patient c/o severe pain in right sternoclavicular joint overnight. Finally has settled some with adjustments to meds       Objective   Objective   Vital Signs  Temp:  [97.9 °F (36.6 °C)-98.4 °F (36.9 °C)] 97.9 °F (36.6 °C)  Heart Rate:  [85-96] 96  Resp:  [18] 18  BP: (109-125)/(63-72) 119/68  SpO2:  [95 %-97 %] 95 %  on  Flow (L/min):  [2] 2;   Device (Oxygen Therapy): nasal cannula  Body mass index is 29.52 kg/m².  Physical Exam  Vitals and nursing note reviewed.   Constitutional:       General: She is not in acute distress.     Appearance: Normal appearance.   HENT:      Head: Normocephalic and atraumatic.   Eyes:      General: No scleral icterus.  Neck:      Vascular: No JVD.   Cardiovascular:      Rate and Rhythm: Normal rate and regular rhythm.      Heart sounds: Normal heart sounds. No murmur heard.  Pulmonary:      Effort: Pulmonary effort is normal.   Chest:      Comments: Mild tenderness to the right sternocleidomastoid joint  Abdominal:      General: There is no distension.      Palpations: Abdomen is soft.      Tenderness: There is no abdominal tenderness.   Musculoskeletal:         General: No swelling or tenderness.      Cervical back: Neck supple.   Skin:     General: Skin is warm and dry.      Coloration: Skin is not jaundiced.      Findings: No rash.   Neurological:      General: No focal deficit present.      Mental Status: She is alert and oriented to person, place, and time. Mental status is at baseline.   Psychiatric:         Mood and Affect: Mood normal.         Behavior: Behavior normal.       Results Review     I reviewed the patient's new clinical results.  Results from last 7 days   Lab Units 23  0508 23  0634 23  0721 23  0643   WBC 10*3/mm3 9.60 9.22 11.51* 10.71   HEMOGLOBIN  "g/dL 11.5* 10.6* 10.9* 10.9*   PLATELETS 10*3/mm3 379 331 301 262     Results from last 7 days   Lab Units 11/23/23  0509 11/22/23  0634 11/21/23  1857 11/21/23  0721 11/20/23  0643   SODIUM mmol/L 139 138  --  136 136   POTASSIUM mmol/L 3.8 4.4 4.5 3.6 4.6   CHLORIDE mmol/L 103 104  --  102 103   CO2 mmol/L 26.2 26.3  --  26.0 24.6   BUN mg/dL 21 19  --  28* 17   CREATININE mg/dL 0.71 0.70  --  0.76 0.67   GLUCOSE mg/dL 94 93  --  101* 116*   EGFR mL/min/1.73 95.7 97.3  --  88.2 98.4     Results from last 7 days   Lab Units 11/19/23  0856   ALBUMIN g/dL 3.8   BILIRUBIN mg/dL 0.2   ALK PHOS U/L 67   AST (SGOT) U/L 10   ALT (SGPT) U/L 8     Results from last 7 days   Lab Units 11/23/23  0509 11/22/23  0634 11/21/23  0721 11/20/23  0643 11/19/23  1058 11/19/23  0856   CALCIUM mg/dL 8.7 8.7 8.9 8.9  --  9.1   ALBUMIN g/dL  --   --   --   --   --  3.8   MAGNESIUM mg/dL  --   --   --   --  1.7  --      Results from last 7 days   Lab Units 11/19/23  0856   PROCALCITONIN ng/mL 0.64*   LACTATE mmol/L 2.0     No results found for: \"HGBA1C\", \"POCGLU\"    MRI Chest With & Without Contrast    Result Date: 11/23/2023  1. Right sternoclavicular septic arthritis with synovitis and surrounding soft tissue inflammation associated with myositis of the distal right sternocleidomastoid muscle, right pectoralis sternal attachments. Soft tissue edema/inflammation extends into the subcutaneous fat anterior to the sternoclavicular joint and into the anterior aspect of the superior mediastinum and medial right supraclavicular fossa. There is no evidence for abscess or drainable collection. 2. Moderate right pleural effusion layers dependently. 3. Right lower pole thyroid nodule or cyst measures 1.7 cm.  This report was finalized on 11/23/2023 12:31 PM by Dr. Sher Borges M.D on Workstation: [id number]       I have personally reviewed all medications:  Scheduled Medications  amLODIPine, 2.5 mg, Oral, Daily  ampicillin-sulbactam, 3 g, " Intravenous, Q6H  ketorolac, 15 mg, Intravenous, TID  Lidocaine, 2 patch, Transdermal, Q24H  omeprazole-sodium bicarbonate, 1 capsule, Oral, QAM AC  senna-docusate sodium, 1 tablet, Oral, BID  sodium chloride, 10 mL, Intravenous, Q12H    Infusions   Diet  Diet: Regular/House Diet; Texture: Regular Texture (IDDSI 7); Fluid Consistency: Thin (IDDSI 0)    I have personally reviewed:  [x]  Laboratory   [x]  Microbiology   [x]  Radiology   []  EKG/Telemetry  []  Cardiology/Vascular   []  Pathology    []  Records       Assessment/Plan     Active Hospital Problems    Diagnosis  POA    **Right shoulder pain [M25.511]  Yes    Infection, Pasteurella [A28.0]  Unknown    Bacteremia [R78.81]  Yes    Sepsis [A41.9]  Yes    Community acquired pneumonia of right lower lobe of lung [J18.9]  Yes    Pain of right clavicle [M89.8X1]  Yes    Hypokalemia [E87.6]  Yes    Essential hypertension [I10]  Yes      Resolved Hospital Problems   No resolved problems to display.       63 y.o. female admitted with Right shoulder pain, ultimately has been found to have Pasteurella bacteremia    Await MRI chest for further evaluation of sternoclavicular joint. D/w Dr Hemphill, awaiting decision on surgical needs  - Increase po oxycodone with Dilaudid/Valium avail as needed.    - Lab workup basically unremarkable.  Repeat blood cultures no growth to date    Pneumonia likely incidental and not the real source for sepsis.    Anemia stable    SCDs  Disposition: TBD depending on above workup    James Navas MD  [facility]  11/23/23  14:10 EST

## 2023-11-23 NOTE — PROGRESS NOTES
"  Infectious Diseases Progress Note    Grace Hemphill MD     [facility]  Los: 3 days  Patient Identification:  Name: Madison Acevedo  Age: 63 y.o.  Sex: female  :  1960  MRN: [id number]         Primary Care Physician: Paulette Coulter APRN        Subjective: Complaining of severe pain and discomfort in the right sternoclavicular area.  Denies any fever and chills.  Interval History: See consultation note.  2023 blood culture drawn at the time of admission is now identified as Pasteurella multocida.  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  The last significant bite was in her left hand about a week ago few days prior to onset of the neck pain.  2020 repeat blood cultures so far showed  no growth.  2023 evaluation by orthopedic surgery service reviewed  2023 echocardiogram performed result pending    Objective:    Scheduled Meds:amLODIPine, 2.5 mg, Oral, Daily  ampicillin-sulbactam, 3 g, Intravenous, Q6H  ketorolac, 15 mg, Intravenous, TID  Lidocaine, 2 patch, Transdermal, Q24H  omeprazole-sodium bicarbonate, 1 capsule, Oral, QAM AC  senna-docusate sodium, 1 tablet, Oral, BID  sodium chloride, 10 mL, Intravenous, Q12H      Continuous Infusions:     Vital signs in last 24 hours:  Temp:  [97.9 °F (36.6 °C)-98.4 °F (36.9 °C)] 97.9 °F (36.6 °C)  Heart Rate:  [85-96] 96  Resp:  [18] 18  BP: (109-125)/(63-78) 125/72    Intake/Output:    Intake/Output Summary (Last 24 hours) at 2023 0910  Last data filed at 2023 1252  Gross per 24 hour   Intake 240 ml   Output --   Net 240 ml         Exam:  /72 (BP Location: Left arm, Patient Position: Lying)   Pulse 96   Temp 97.9 °F (36.6 °C) (Oral)   Resp 18   Ht 162.6 cm (64\")   Wt 78 kg (172 lb)   SpO2 96%   BMI 29.52 kg/m²   Patient is examined using the " personal protective equipment as per guidelines from infection control for this particular patient as enacted.  Hand washing was performed before and after patient interaction.  General Appearance:    Alert, cooperative, no distress, AAOx3                          Head:    Normocephalic, without obvious abnormality, atraumatic                           Eyes:    PERRL, conjunctivae/corneas clear, EOM's intact, both eyes                         Throat:   Lips, tongue, gums normal; oral mucosa pink and moist                           Neck:   Supple, symmetrical, trachea midline, no JVD                         Lungs:    Clear to auscultation bilaterally, respirations unlabored                 Chest Wall:  Right sternoclavicular joint tenderness and medial clavicular tenderness unchanged.                          Heart:  S1-S2 regular no murmur                  abdomen:   Soft nontender                 Extremities: Mild tenderness along the clavicle and supraclavicular area with limited range of motion of the right shoulder.  Significant tenderness in the right sternoclavicular joint and medial clavicle.                        Pulses:   Pulses palpable in all extremities                            Skin:   Skin is warm and dry,  no rashes or palpable lesions                  Neurologic: Grossly nonfocal       Data Review:    I reviewed the patient's new clinical results.  Results from last 7 days   Lab Units 11/23/23  0508 11/22/23  0634 11/21/23  0721 11/20/23  0643 11/19/23  0856 11/17/23  1136   WBC 10*3/mm3 9.60 9.22 11.51* 10.71 8.48 11.17*   HEMOGLOBIN g/dL 11.5* 10.6* 10.9* 10.9* 11.7* 12.4   PLATELETS 10*3/mm3 379 331 301 262 301 253     Results from last 7 days   Lab Units 11/23/23  0509 11/22/23  0634 11/21/23  1857 11/21/23  0721 11/20/23  0643 11/19/23  2231 11/19/23  0856 11/17/23  1136   SODIUM mmol/L 139 138  --  136 136  --  141 138   POTASSIUM mmol/L 3.8 4.4 4.5 3.6 4.6 4.6 3.2* 3.6   CHLORIDE mmol/L 103  104  --  102 103  --  103 102   CO2 mmol/L 26.2 26.3  --  26.0 24.6  --  25.6 27.0   BUN mg/dL 21 19  --  28* 17  --  21 10   CREATININE mg/dL 0.71 0.70  --  0.76 0.67  --  0.89 0.79   CALCIUM mg/dL 8.7 8.7  --  8.9 8.9  --  9.1 9.4   GLUCOSE mg/dL 94 93  --  101* 116*  --  120* 108*     Microbiology Results (last 10 days)       Procedure Component Value - Date/Time    Blood Culture - Blood, Arm, Right [[id number]]  (Normal) Collected: 11/21/23 0002    Lab Status: Preliminary result Specimen: Blood from Arm, Right Updated: 11/23/23 0101     Blood Culture No growth at 2 days    Blood Culture - Blood, Arm, Right [[id number]]  (Normal) Collected: 11/20/23 1107    Lab Status: Preliminary result Specimen: Blood from Arm, Right Updated: 11/22/23 1131     Blood Culture No growth at 2 days    MRSA Screen, PCR (Inpatient) - Swab, Nares [[id number]]  (Normal) Collected: 11/19/23 1442    Lab Status: Final result Specimen: Swab from Nares Updated: 11/19/23 1609     MRSA PCR No MRSA Detected    Narrative:      The negative predictive value of this diagnostic test is high and should only be used to consider de-escalating anti-MRSA therapy. A positive result may indicate colonization with MRSA and must be correlated clinically.    Blood Culture - Blood, Arm, Left [[id number]]  (Abnormal) Collected: 11/19/23 1003    Lab Status: Final result Specimen: Blood from Arm, Left Updated: 11/22/23 0746     Blood Culture Pasteurella multocida     Isolated from --     Gram Stain Aerobic Bottle Gram negative bacilli      Anaerobic Bottle Gram negative bacilli    Narrative:      Refer to previous blood culture collected on 11/19/2023 at 0856 for MICs.      Respiratory Panel PCR w/COVID-19(SARS-CoV-2) GIA/NILA/GELA/PAD/COR/HOMAR In-House, NP Swab in UTM/VTM, 2 HR TAT - Swab, Nasopharynx [[id number]]  (Normal) Collected: 11/19/23 0901    Lab Status: Final result Specimen: Swab from Nasopharynx Updated: 11/19/23 1023     ADENOVIRUS, PCR Not Detected      Coronavirus 229E Not Detected     Coronavirus HKU1 Not Detected     Coronavirus NL63 Not Detected     Coronavirus OC43 Not Detected     COVID19 Not Detected     Human Metapneumovirus Not Detected     Human Rhinovirus/Enterovirus Not Detected     Influenza A PCR Not Detected     Influenza B PCR Not Detected     Parainfluenza Virus 1 Not Detected     Parainfluenza Virus 2 Not Detected     Parainfluenza Virus 3 Not Detected     Parainfluenza Virus 4 Not Detected     RSV, PCR Not Detected     Bordetella pertussis pcr Not Detected     Bordetella parapertussis PCR Not Detected     Chlamydophila pneumoniae PCR Not Detected     Mycoplasma pneumo by PCR Not Detected    Narrative:      In the setting of a positive respiratory panel with a viral infection PLUS a negative procalcitonin without other underlying concern for bacterial infection, consider observing off antibiotics or discontinuation of antibiotics and continue supportive care. If the respiratory panel is positive for atypical bacterial infection (Bordetella pertussis, Chlamydophila pneumoniae, or Mycoplasma pneumoniae), consider antibiotic de-escalation to target atypical bacterial infection.    Blood Culture - Blood, Arm, Left [[id number]]  (Abnormal)  (Susceptibility) Collected: 11/19/23 0856    Lab Status: Final result Specimen: Blood from Arm, Left Updated: 11/22/23 0741     Blood Culture Pasteurella multocida     Isolated from Aerobic and Anaerobic Bottles     Gram Stain Aerobic Bottle Gram negative bacilli      Anaerobic Bottle Gram negative bacilli    Narrative:      Less than seven (7) mL's of blood was collected.  Insufficient quantity may yield false negative results.    Susceptibility        Pasteurella multocida      Not Specified      Ampicillin Susceptible      Levofloxacin Susceptible      Trimethoprim + Sulfamethoxazole Susceptible                           Blood Culture ID, PCR - Blood, Arm, Left [[id number]] Collected: 11/19/23 0856    Lab Status:  Final result Specimen: Blood from Arm, Left Updated: 11/20/23 0122     BCID, PCR Negative by BCID PCR. Culture to Follow.     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  Rule out endocarditis.    Recommendations/discussion:  Continue IV Unasyn  Discussed with microbiology to post sensitivity of Pasteurella to ceftriaxone and penicillin  Duration of antibiotic treatment at present open ended and would depend upon when definitive intervention on right sternoclavicular joint septic arthritis is performed.    Grace Hemphill MD  11/23/2023  09:10 EST    Parts of this note may be an electronic transcription/translation of spoken language to printed text using the Dragon dictation system.

## 2023-11-24 ENCOUNTER — APPOINTMENT (OUTPATIENT)
Dept: CT IMAGING | Facility: HOSPITAL | Age: 63
DRG: 867 | End: 2023-11-24
Payer: COMMERCIAL

## 2023-11-24 LAB
ANION GAP SERPL CALCULATED.3IONS-SCNC: 9 MMOL/L (ref 5–15)
BACTERIA SPEC AEROBE CULT: ABNORMAL
BUN SERPL-MCNC: 13 MG/DL (ref 8–23)
BUN/CREAT SERPL: 20 (ref 7–25)
CALCIUM SPEC-SCNC: 8.9 MG/DL (ref 8.6–10.5)
CHLORIDE SERPL-SCNC: 95 MMOL/L (ref 98–107)
CO2 SERPL-SCNC: 29 MMOL/L (ref 22–29)
CREAT SERPL-MCNC: 0.65 MG/DL (ref 0.57–1)
DEPRECATED RDW RBC AUTO: 38.1 FL (ref 37–54)
EGFRCR SERPLBLD CKD-EPI 2021: 99.1 ML/MIN/1.73
ERYTHROCYTE [DISTWIDTH] IN BLOOD BY AUTOMATED COUNT: 11.8 % (ref 12.3–15.4)
GLUCOSE SERPL-MCNC: 95 MG/DL (ref 65–99)
GRAM STN SPEC: ABNORMAL
GRAM STN SPEC: ABNORMAL
HCT VFR BLD AUTO: 31.6 % (ref 34–46.6)
HGB BLD-MCNC: 10.8 G/DL (ref 12–15.9)
ISOLATED FROM: ABNORMAL
MCH RBC QN AUTO: 30.9 PG (ref 26.6–33)
MCHC RBC AUTO-ENTMCNC: 34.2 G/DL (ref 31.5–35.7)
MCV RBC AUTO: 90.5 FL (ref 79–97)
PLATELET # BLD AUTO: 419 10*3/MM3 (ref 140–450)
PMV BLD AUTO: 8.7 FL (ref 6–12)
POTASSIUM SERPL-SCNC: 3.8 MMOL/L (ref 3.5–5.2)
RBC # BLD AUTO: 3.49 10*6/MM3 (ref 3.77–5.28)
SODIUM SERPL-SCNC: 133 MMOL/L (ref 136–145)
WBC NRBC COR # BLD AUTO: 10.82 10*3/MM3 (ref 3.4–10.8)

## 2023-11-24 PROCEDURE — 25010000002 AMPICILLIN-SULBACTAM PER 1.5 G: Performed by: INTERNAL MEDICINE

## 2023-11-24 PROCEDURE — 71250 CT THORAX DX C-: CPT

## 2023-11-24 PROCEDURE — 25010000002 KETOROLAC TROMETHAMINE PER 15 MG: Performed by: NURSE PRACTITIONER

## 2023-11-24 PROCEDURE — 85027 COMPLETE CBC AUTOMATED: CPT | Performed by: STUDENT IN AN ORGANIZED HEALTH CARE EDUCATION/TRAINING PROGRAM

## 2023-11-24 PROCEDURE — 99233 SBSQ HOSP IP/OBS HIGH 50: CPT | Performed by: THORACIC SURGERY (CARDIOTHORACIC VASCULAR SURGERY)

## 2023-11-24 PROCEDURE — 63710000001 PROMETHAZINE PER 12.5 MG: Performed by: STUDENT IN AN ORGANIZED HEALTH CARE EDUCATION/TRAINING PROGRAM

## 2023-11-24 PROCEDURE — 25010000002 HYDROMORPHONE 1 MG/ML SOLUTION: Performed by: STUDENT IN AN ORGANIZED HEALTH CARE EDUCATION/TRAINING PROGRAM

## 2023-11-24 PROCEDURE — 80048 BASIC METABOLIC PNL TOTAL CA: CPT | Performed by: STUDENT IN AN ORGANIZED HEALTH CARE EDUCATION/TRAINING PROGRAM

## 2023-11-24 PROCEDURE — 25010000002 PENICILLIN G POTASSIUM PER 600000 UNITS: Performed by: INTERNAL MEDICINE

## 2023-11-24 RX ORDER — ACETAMINOPHEN 500 MG
1000 TABLET ORAL EVERY 8 HOURS
Status: DISCONTINUED | OUTPATIENT
Start: 2023-11-24 | End: 2023-12-01 | Stop reason: HOSPADM

## 2023-11-24 RX ORDER — POLYETHYLENE GLYCOL 3350 17 G/17G
17 POWDER, FOR SOLUTION ORAL 2 TIMES DAILY
Status: DISCONTINUED | OUTPATIENT
Start: 2023-11-24 | End: 2023-12-01 | Stop reason: HOSPADM

## 2023-11-24 RX ADMIN — HYDROMORPHONE HYDROCHLORIDE 1 MG: 1 INJECTION, SOLUTION INTRAMUSCULAR; INTRAVENOUS; SUBCUTANEOUS at 20:53

## 2023-11-24 RX ADMIN — HYDROMORPHONE HYDROCHLORIDE 1 MG: 1 INJECTION, SOLUTION INTRAMUSCULAR; INTRAVENOUS; SUBCUTANEOUS at 18:27

## 2023-11-24 RX ADMIN — HYDROMORPHONE HYDROCHLORIDE 1 MG: 1 INJECTION, SOLUTION INTRAMUSCULAR; INTRAVENOUS; SUBCUTANEOUS at 08:28

## 2023-11-24 RX ADMIN — LIDOCAINE 2 PATCH: 4 PATCH TOPICAL at 17:13

## 2023-11-24 RX ADMIN — DOCUSATE SODIUM 50MG AND SENNOSIDES 8.6MG 1 TABLET: 8.6; 5 TABLET, FILM COATED ORAL at 17:12

## 2023-11-24 RX ADMIN — PROMETHAZINE HYDROCHLORIDE 12.5 MG: 12.5 TABLET ORAL at 06:09

## 2023-11-24 RX ADMIN — SODIUM CHLORIDE 4 MILLION UNITS: 9 INJECTION, SOLUTION INTRAVENOUS at 21:54

## 2023-11-24 RX ADMIN — KETOROLAC TROMETHAMINE 15 MG: 15 INJECTION, SOLUTION INTRAMUSCULAR; INTRAVENOUS at 15:08

## 2023-11-24 RX ADMIN — AMPICILLIN SODIUM AND SULBACTAM SODIUM 3 G: 2; 1 INJECTION, POWDER, FOR SOLUTION INTRAMUSCULAR; INTRAVENOUS at 06:09

## 2023-11-24 RX ADMIN — HYDROMORPHONE HYDROCHLORIDE 1 MG: 1 INJECTION, SOLUTION INTRAMUSCULAR; INTRAVENOUS; SUBCUTANEOUS at 06:09

## 2023-11-24 RX ADMIN — Medication 10 ML: at 08:47

## 2023-11-24 RX ADMIN — OXYCODONE HYDROCHLORIDE 15 MG: 15 TABLET ORAL at 03:34

## 2023-11-24 RX ADMIN — AMPICILLIN SODIUM AND SULBACTAM SODIUM 3 G: 2; 1 INJECTION, POWDER, FOR SOLUTION INTRAMUSCULAR; INTRAVENOUS at 11:36

## 2023-11-24 RX ADMIN — OXYCODONE HYDROCHLORIDE 15 MG: 15 TABLET ORAL at 17:17

## 2023-11-24 RX ADMIN — OXYCODONE HYDROCHLORIDE 15 MG: 15 TABLET ORAL at 23:45

## 2023-11-24 RX ADMIN — DIAZEPAM 2 MG: 2 TABLET ORAL at 04:08

## 2023-11-24 RX ADMIN — KETOROLAC TROMETHAMINE 15 MG: 15 INJECTION, SOLUTION INTRAMUSCULAR; INTRAVENOUS at 20:46

## 2023-11-24 RX ADMIN — HYDROMORPHONE HYDROCHLORIDE 1 MG: 1 INJECTION, SOLUTION INTRAMUSCULAR; INTRAVENOUS; SUBCUTANEOUS at 04:04

## 2023-11-24 RX ADMIN — HYDROMORPHONE HYDROCHLORIDE 1 MG: 1 INJECTION, SOLUTION INTRAMUSCULAR; INTRAVENOUS; SUBCUTANEOUS at 15:10

## 2023-11-24 RX ADMIN — DIAZEPAM 2 MG: 2 TABLET ORAL at 20:57

## 2023-11-24 RX ADMIN — Medication 10 ML: at 20:47

## 2023-11-24 RX ADMIN — AMPICILLIN SODIUM AND SULBACTAM SODIUM 3 G: 2; 1 INJECTION, POWDER, FOR SOLUTION INTRAMUSCULAR; INTRAVENOUS at 17:10

## 2023-11-24 RX ADMIN — OXYCODONE HYDROCHLORIDE 15 MG: 15 TABLET ORAL at 07:21

## 2023-11-24 RX ADMIN — KETOROLAC TROMETHAMINE 15 MG: 15 INJECTION, SOLUTION INTRAMUSCULAR; INTRAVENOUS at 08:46

## 2023-11-24 RX ADMIN — OMEPRAZOLE AND SODIUM BICARBONATE 1 CAPSULE: 40; 1100 CAPSULE ORAL at 08:48

## 2023-11-24 RX ADMIN — HYDROMORPHONE HYDROCHLORIDE 1 MG: 1 INJECTION, SOLUTION INTRAMUSCULAR; INTRAVENOUS; SUBCUTANEOUS at 23:53

## 2023-11-24 RX ADMIN — DIAZEPAM 2 MG: 2 TABLET ORAL at 10:27

## 2023-11-24 RX ADMIN — AMLODIPINE BESYLATE 2.5 MG: 5 TABLET ORAL at 08:46

## 2023-11-24 NOTE — SIGNIFICANT NOTE
11/24/23 1524   OTHER   Discipline physical therapist   Therapy Assessment/Plan (PT)   Criteria for Skilled Interventions Met (PT) other (see comments)  (pt cont to have work up for septic arthritis R sternoclavicular joint, walking to and from bathroom on her own with assist for IV pole from family, no current PT needs, will sign off. PT can be reordered if needed)

## 2023-11-24 NOTE — PROGRESS NOTES
Name: Madison Acevedo ADMIT: 2023   : 1960  PCP: Paulette Coulter APRN    MRN: [id number] LOS: 4 days   AGE/SEX: 63 y.o. female  ROOM: [facility]     Subjective   Subjective   Laying in bed, continues to complain of significant right sternoclavicular region, reports not well-controlled on current regimen.  Pain is up to 10 out of 10, receiving frequent Dilaudid in addition to as needed oxycodone.  Having muscle spasms as well.  Denies shortness of breath, fevers or chills.    Review of Systems   As above  Objective   Objective   Vital Signs  Temp:  [97.9 °F (36.6 °C)-98.4 °F (36.9 °C)] 98.4 °F (36.9 °C)  Heart Rate:  [86] 86  Resp:  [18] 18  BP: (110-136)/(61-75) 118/66  SpO2:  [93 %-95 %] 93 %  on  Flow (L/min):  [2] 2;   Device (Oxygen Therapy): nasal cannula  Body mass index is 29.52 kg/m².  Physical Exam    General: Alert and oriented x3, in discomfort secondary to pain  HEENT: Normocephalic, atraumatic  CV: Regular rate and rhythm, no murmurs rubs or gallops  Lungs: Right lower lung decreased to auscultation, nonlabored breathing, on 2 L nasal cannula,  Abdomen: Soft, nontender, nondistended  Extremities: No significant peripheral edema , no cyanosis   MSK: Right shoulder/tendon cleared to go region tenderness to palpation, decreased range of motion due to pain    Results Review     I reviewed the patient's new clinical results.  Results from last 7 days   Lab Units 23  0658 23  0508 23  0634 23  0721   WBC 10*3/mm3 10.82* 9.60 9.22 11.51*   HEMOGLOBIN g/dL 10.8* 11.5* 10.6* 10.9*   PLATELETS 10*3/mm3 419 379 331 301     Results from last 7 days   Lab Units 23  0658 23  0509 23  0634 23  1857 11/21/23  0721   SODIUM mmol/L 133* 139 138  --  136   POTASSIUM mmol/L 3.8 3.8 4.4 4.5 3.6   CHLORIDE mmol/L 95* 103 104  --  102   CO2 mmol/L 29.0 26.2 26.3  --  26.0   BUN mg/dL 13 21 19  --  28*   CREATININE mg/dL 0.65 0.71 0.70  --  0.76   GLUCOSE mg/dL 95  94 93  --  101*   Estimated Creatinine Clearance: 89.5 mL/min (by C-G formula based on SCr of 0.65 mg/dL).  Results from last 7 days   Lab Units 11/19/23  0856   ALBUMIN g/dL 3.8   BILIRUBIN mg/dL 0.2   ALK PHOS U/L 67   AST (SGOT) U/L 10   ALT (SGPT) U/L 8     Results from last 7 days   Lab Units 11/24/23  0658 11/23/23  0509 11/22/23  0634 11/21/23  0721 11/20/23  0643 11/19/23  1058 11/19/23  0856   CALCIUM mg/dL 8.9 8.7 8.7 8.9   < >  --  9.1   ALBUMIN g/dL  --   --   --   --   --   --  3.8   MAGNESIUM mg/dL  --   --   --   --   --  1.7  --     < > = values in this interval not displayed.     Results from last 7 days   Lab Units 11/19/23  0856   PROCALCITONIN ng/mL 0.64*   LACTATE mmol/L 2.0     COVID19   Date Value Ref Range Status   11/19/2023 Not Detected Not Detected - Ref. Range Final     SARS-CoV-2, OLMAN   Date Value Ref Range Status   02/10/2022 Not Detected Not Detected Final     Comment:     Testing was performed using the dara(R) SARS-CoV-2 test.  This nucleic acid amplification test was developed and its performance  characteristics determined by [employer]. Nucleic acid  amplification tests include RT-PCR and TMA. This test has not been  FDA cleared or approved. This test has been authorized by FDA under  an Emergency Use Authorization (EUA). This test is only authorized  for the duration of time the declaration that circumstances exist  justifying the authorization of the emergency use of in vitro  diagnostic tests for detection of SARS-CoV-2 virus and/or diagnosis  of COVID-19 infection under section 564(b)(1) of the Act, 21 U.S.C.  360bbb-3(b) (1), unless the authorization is terminated or revoked  sooner.  When diagnostic testing is negative, the possibility of a false  negative result should be considered in the context of a patient's  recent exposures and the presence of clinical signs and symptoms  consistent with COVID-19. An individual without symptoms of COVID-19  and who is not  "shedding SARS-CoV-2 virus would expect to have a  negative (not detected) result in this assay.     No results found for: \"HGBA1C\", \"POCGLU\"        MRI Chest With & Without Contrast  Narrative: MRI STERNOCLAVICULAR JOINTS WITH AND WITHOUT CONTRAST     HISTORY: Right sternoclavicular joint infection/pain.     TECHNIQUE: MRI sternoclavicular joint includes coronal T1, STIR as well  as axial T1 fat-sat, T2 fat-sat, sagittal PD fat-sat infiltrated  sequences. Gadolinium was administered intravenously followed axial and  coronal T1 fat-saturated sequences.     COMPARISON: CT neck soft tissues 11/17/2023, CT angiogram chest  11/19/2023.     FINDINGS: There is abnormal synovial thickening and enhancement  surrounding the right sternoclavicular joint consistent with synovitis.  There is also periarticular soft tissue edema and stranding.  Stranding/inflammation extends into the distal right sternocleidomastoid  muscle and into the right pectoralis major and minor muscle sternal  attachments. Edema extends into the overlying deep subcutaneous fat.  There is also edema and inflammation extending posterior to the  manubrium and into the fat of the superior mediastinum. There is no  evidence for abscess or drainable collection. Bone marrow signal within  the medial clavicle and manubrium appears within normal limits and there  is no evidence to suggest osteomyelitis. Inflammation extends into the  suprasternal notch and extends anterior to an exophytic right lower pole  thyroid nodule or cyst that measures 1.7 cm diameter. Inflammation  extends into the medial supraclavicular fossa. There has developed a  right pleural effusion layering dependently that appears small to  moderate in size.     Impression: 1. Right sternoclavicular septic arthritis with synovitis and  surrounding soft tissue inflammation associated with myositis of the  distal right sternocleidomastoid muscle, right pectoralis sternal  attachments. Soft tissue " edema/inflammation extends into the  subcutaneous fat anterior to the sternoclavicular joint and into the  anterior aspect of the superior mediastinum and medial right  supraclavicular fossa. There is no evidence for abscess or drainable  collection.  2. Moderate right pleural effusion layers dependently.  3. Right lower pole thyroid nodule or cyst measures 1.7 cm.     This report was finalized on 11/23/2023 12:31 PM by Dr. Sher Borges M.D on Workstation: [id number]       Scheduled Medications  acetaminophen, 1,000 mg, Oral, Q8H  ampicillin-sulbactam, 3 g, Intravenous, Q6H  ketorolac, 15 mg, Intravenous, TID  Lidocaine, 2 patch, Transdermal, Q24H  omeprazole-sodium bicarbonate, 1 capsule, Oral, QAM AC  polyethylene glycol, 17 g, Oral, BID  senna-docusate sodium, 1 tablet, Oral, BID  sodium chloride, 10 mL, Intravenous, Q12H    Infusions   Diet  NPO Diet NPO Type: Strict NPO, Sips with Meds    I have personally reviewed     [x]  Laboratory   [x]  Microbiology   [x]  Radiology   []  EKG/Telemetry  []  Cardiology/Vascular   []  Pathology    []  Records       Assessment/Plan     Active Hospital Problems    Diagnosis  POA    **Right shoulder pain [M25.511]  Yes    Infection, Pasteurella [A28.0]  Unknown    Bacteremia [R78.81]  Yes    Sepsis [A41.9]  Yes    Community acquired pneumonia of right lower lobe of lung [J18.9]  Yes    Pain of right clavicle [M89.8X1]  Yes    Hypokalemia [E87.6]  Yes    Essential hypertension [I10]  Yes      Resolved Hospital Problems   No resolved problems to display.       63 y.o. female admitted with Right shoulder pain.      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   -MRI 11/23/2023 showed right sternoclavicular septic arthritis with synovitis and surrounding soft tissue inflammation associated with myositis of the distal right sternocleidomastoid muscle, right pectoralis sternal attachments.  -On IV Unasyn, ID managing  -Pain not  well-controlled on current regimen.  -Currently on Toradol 15 mg 3 times daily for 4 days, omeprazole-sodium bicarbonate in place for GI protection  - IV Dilaudid 1 mg every 2 hours  -Roxicodone 15 mg every 4 hours  -Lidocaine patch  -Initiate on high-dose Tylenol 1000 mg every 8 hours  -Monitor and adjust pain management as indicated  -Aggressive bowel regimen in the setting of opioids., continue docusate senna, add MiraLAX    Pneumonia/right pleural effusion- MRI showed moderate right pleural effusion.  CT scan ordered for further evaluation.  Cardiothoracic surgery following, appreciate recs.      Hypertension: BP soft, hold amlodipine for now.  Monitor.        SCDs for DVT prophylaxis.  Full code.  Discussed with patient and nursing staff.  Anticipate discharge TBD      Copied text in this note has been reviewed and is accurate as of 11/24/23.         Dictated utilizing Dragon dictation        Lorraine Krishna MD  [facility]  11/24/23  11:00 EST       WFL

## 2023-11-24 NOTE — PROGRESS NOTES
Continued Stay Note  [facility]     Patient Name: Madison Acevedo  MRN: [id number]  Today's Date: 11/24/2023    Admit Date: 11/19/2023    Plan: Return home with spouse - may need HH   Discharge Plan       Row Name 11/24/23 1423       Plan    Plan Return home with spouse - may need HH    Patient/Family in Agreement with Plan yes    Provided Post Acute Provider List? Yes    Post Acute Provider List Home Health    Delivered To Patient    Method of Delivery In person    Plan Comments Spoke with patient at bedside.  Waiting decision re: IV antibiotics.  Given List of HH agencies.  CCP continues to follow.  Néstor SHER                      Expected Discharge Date and Time       Expected Discharge Date Expected Discharge Time    Nov 27, 2023               Becky S. [place], RN

## 2023-11-24 NOTE — PAYOR COMM NOTE
"Madison Acevedo (63 y.o. Female)        PLEASE SEE ATTACHED FOR INPT CONTINUED STAY AUTH.     REF#[id number]    PLEASE CALL   OR  [phone]    THANK YOU    NICOLE FITZGERALD LPN CCP   Date of Birth   1960    Social Security Number       Address   [address] [id number]    Home Phone   [phone]    MRN   [id number]       [employer]    Marital Status                               Admission Date   11/19/23    Admission Type   Emergency    Admitting Provider   Alex Zepeda MD    Attending Provider   Lorraine Krishna MD    Department, Room/Bed   [address], S619/1       Discharge Date       Discharge Disposition       Discharge Destination                                 Attending Provider: Lorraien Krishna MD    Allergies: No Known Allergies    Isolation: None   Infection: None   Code Status: CPR    Ht: 162.6 cm (64\")   Wt: 78 kg (172 lb)    Admission Cmt: None   Principal Problem: Right shoulder pain [M25.511]                   Active Insurance as of 11/19/2023       Primary Coverage       Payor Plan Insurance Group Employer/Plan Group    [employer] EMPLOYEE [id number]       Payor Plan Address Payor Plan Phone Number Payor Plan Fax Number Effective Dates    [address] [phone]  12/20/2017 - None Entered    [facility] [id number]         Subscriber Name Subscriber Birth Date Member ID       MADISON ACEVEDO 1960 [id number]                     Emergency Contacts        (Rel.) Home Phone Work Phone Mobile Phone    Gold Acevedo (Spouse) [phone] -- [phone]              Oxygen Therapy (last day)       Date/Time SpO2 Device (Oxygen Therapy) Flow (L/min) Oxygen Concentration (%) ETCO2 (mmHg)    11/24/23 0724 93 nasal cannula 2 -- --    11/24/23 0609 -- nasal cannula 2 -- --    11/24/23 0322 -- nasal cannula 2 -- --    11/23/23 2342 -- room air -- -- --    11/23/23 2146 -- " nasal cannula 2 -- --    23 -- room air -- -- --    23 1311 95 nasal cannula 2 -- --    23 0719 96 room air -- -- --    23 0041 -- room air -- -- --          Intake & Output (last day)          0701   0700  0701   0700    P.O. 240     Total Intake(mL/kg) 240 (3.1)     Net +240           Urine Unmeasured Occurrence 3 x           Lines, Drains & Airways       Active LDAs       Name Placement date Placement time Site Days    Peripheral IV 23 Posterior;Right Wrist 23  182  Wrist  less than 1                    Physician Progress Notes (last 24 hours)        Mohini Rodriguez MD at 23 1612          Patient in MRI on rounds today.  Will await the results of her MRI.    Electronically signed by Mohini Rodriguez MD at 23 1613       James Navas MD at 23 1410              Name: aMdison Acevedo ADMIT: 2023   : 1960  PCP: Paulette Coulter APRN    MRN: [id number] LOS: 3 days   AGE/SEX: 63 y.o. female  ROOM: [facility]     Subjective   Subjective   Patient c/o severe pain in right sternoclavicular joint overnight. Finally has settled some with adjustments to meds      Objective   Objective   Vital Signs  Temp:  [97.9 °F (36.6 °C)-98.4 °F (36.9 °C)] 97.9 °F (36.6 °C)  Heart Rate:  [85-96] 96  Resp:  [18] 18  BP: (109-125)/(63-72) 119/68  SpO2:  [95 %-97 %] 95 %  on  Flow (L/min):  [2] 2;   Device (Oxygen Therapy): nasal cannula  Body mass index is 29.52 kg/m².  Physical Exam  Vitals and nursing note reviewed.   Constitutional:       General: She is not in acute distress.     Appearance: Normal appearance.   HENT:      Head: Normocephalic and atraumatic.   Eyes:      General: No scleral icterus.  Neck:      Vascular: No JVD.   Cardiovascular:      Rate and Rhythm: Normal rate and regular rhythm.      Heart sounds: Normal heart sounds. No murmur heard.  Pulmonary:      Effort: Pulmonary effort is normal.   Chest:       "Comments: Mild tenderness to the right sternocleidomastoid joint  Abdominal:      General: There is no distension.      Palpations: Abdomen is soft.      Tenderness: There is no abdominal tenderness.   Musculoskeletal:         General: No swelling or tenderness.      Cervical back: Neck supple.   Skin:     General: Skin is warm and dry.      Coloration: Skin is not jaundiced.      Findings: No rash.   Neurological:      General: No focal deficit present.      Mental Status: She is alert and oriented to person, place, and time. Mental status is at baseline.   Psychiatric:         Mood and Affect: Mood normal.         Behavior: Behavior normal.       Results Review     I reviewed the patient's new clinical results.  Results from last 7 days   Lab Units 11/23/23  0508 11/22/23  0634 11/21/23  0721 11/20/23  0643   WBC 10*3/mm3 9.60 9.22 11.51* 10.71   HEMOGLOBIN g/dL 11.5* 10.6* 10.9* 10.9*   PLATELETS 10*3/mm3 379 331 301 262     Results from last 7 days   Lab Units 11/23/23  0509 11/22/23  0634 11/21/23  1857 11/21/23  0721 11/20/23  0643   SODIUM mmol/L 139 138  --  136 136   POTASSIUM mmol/L 3.8 4.4 4.5 3.6 4.6   CHLORIDE mmol/L 103 104  --  102 103   CO2 mmol/L 26.2 26.3  --  26.0 24.6   BUN mg/dL 21 19  --  28* 17   CREATININE mg/dL 0.71 0.70  --  0.76 0.67   GLUCOSE mg/dL 94 93  --  101* 116*   EGFR mL/min/1.73 95.7 97.3  --  88.2 98.4     Results from last 7 days   Lab Units 11/19/23  0856   ALBUMIN g/dL 3.8   BILIRUBIN mg/dL 0.2   ALK PHOS U/L 67   AST (SGOT) U/L 10   ALT (SGPT) U/L 8     Results from last 7 days   Lab Units 11/23/23  0509 11/22/23  0634 11/21/23  0721 11/20/23  0643 11/19/23  1058 11/19/23  0856   CALCIUM mg/dL 8.7 8.7 8.9 8.9  --  9.1   ALBUMIN g/dL  --   --   --   --   --  3.8   MAGNESIUM mg/dL  --   --   --   --  1.7  --      Results from last 7 days   Lab Units 11/19/23  0856   PROCALCITONIN ng/mL 0.64*   LACTATE mmol/L 2.0     No results found for: \"HGBA1C\", \"POCGLU\"    MRI Chest With & " Without Contrast    Result Date: 11/23/2023  1. Right sternoclavicular septic arthritis with synovitis and surrounding soft tissue inflammation associated with myositis of the distal right sternocleidomastoid muscle, right pectoralis sternal attachments. Soft tissue edema/inflammation extends into the subcutaneous fat anterior to the sternoclavicular joint and into the anterior aspect of the superior mediastinum and medial right supraclavicular fossa. There is no evidence for abscess or drainable collection. 2. Moderate right pleural effusion layers dependently. 3. Right lower pole thyroid nodule or cyst measures 1.7 cm.  This report was finalized on 11/23/2023 12:31 PM by Dr. Sher Borges M.D on Workstation: [id number]       I have personally reviewed all medications:  Scheduled Medications  amLODIPine, 2.5 mg, Oral, Daily  ampicillin-sulbactam, 3 g, Intravenous, Q6H  ketorolac, 15 mg, Intravenous, TID  Lidocaine, 2 patch, Transdermal, Q24H  omeprazole-sodium bicarbonate, 1 capsule, Oral, QAM AC  senna-docusate sodium, 1 tablet, Oral, BID  sodium chloride, 10 mL, Intravenous, Q12H    Infusions   Diet  Diet: Regular/House Diet; Texture: Regular Texture (IDDSI 7); Fluid Consistency: Thin (IDDSI 0)    I have personally reviewed:  [x]  Laboratory   [x]  Microbiology   [x]  Radiology   []  EKG/Telemetry  []  Cardiology/Vascular   []  Pathology    []  Records      Assessment/Plan     Active Hospital Problems    Diagnosis  POA    **Right shoulder pain [M25.511]  Yes    Infection, Pasteurella [A28.0]  Unknown    Bacteremia [R78.81]  Yes    Sepsis [A41.9]  Yes    Community acquired pneumonia of right lower lobe of lung [J18.9]  Yes    Pain of right clavicle [M89.8X1]  Yes    Hypokalemia [E87.6]  Yes    Essential hypertension [I10]  Yes      Resolved Hospital Problems   No resolved problems to display.       63 y.o. female admitted with Right shoulder pain, ultimately has been found to have Pasteurella bacteremia    Await  MRI chest for further evaluation of sternoclavicular joint. D/w Dr Hemphill, awaiting decision on surgical needs  - Increase po oxycodone with Dilaudid/Valium avail as needed.    - Lab workup basically unremarkable.  Repeat blood cultures no growth to date    Pneumonia likely incidental and not the real source for sepsis.    Anemia stable    SCDs  Disposition: TBD depending on above workup    James Navas MD  [facility]  11/23/23  14:10 EST      Electronically signed by James Navas MD at 11/23/23 1413           MRI RIGHT SHOULDER WITHOUT CONTRAST     HISTORY: Right shoulder pain. Injury 6 days ago.     TECHNIQUE: MRI right shoulder includes axial PD as well as oblique  coronal PD, T1, T2 fat-sat and oblique sagittal T2 weighted sequences  through the right shoulder without contrast. Patient was in severe pain  during the exam and is claustrophobic. The exam was shortened due to  patient's condition and per the technologist, this is the best possible  exam.     COMPARISON: CT angiogram chest 11/19/2023, right shoulder x-rays  11/19/2023.     FINDINGS: There is intermediate T2 increased signal within the substance  of the supraspinatus tendon representing sprain or tendinopathy. There  is no rotator cuff tear or retraction. AC joint appears within normal  limits. Glenohumeral joint fluid volume is normal. Labral evaluation is  limited by artifact though there is no evidence for labral tear or  paralabral cyst formation. The long head of the biceps tendon is intact.  Periarticular musculature appears normal.      Impression:     Supraspinatus tendinopathy or strain. No rotator cuff tear  or fracture or other evidence for internal derangement. Review of  yesterday's CT demonstrates subtle stranding within the soft tissues  surrounding the right sternoclavicular joint which could represent  inflammation related to arthritic changes at the sternoclavicular  joint.  Sternoclavicular joint sprain/ligamentous injury is also a  consideration. There is no evidence for dislocation or fracture.     This report was finalized on 11/21/2023 8:16 AM by Dr. Sher Borges M.D on Workstation: [id number]

## 2023-11-24 NOTE — PLAN OF CARE
Goal Outcome Evaluation:  Plan of Care Reviewed With: patient        Progress: no change  Outcome Evaluation: PRN orders administered for pain control.  CT completed this shift.  VS and labs monitored.    IS at bedside.

## 2023-11-24 NOTE — PLAN OF CARE
Goal Outcome Evaluation:               PRN pain medication given d/t severe pain. Patient very tearful. VSS.

## 2023-11-24 NOTE — PROGRESS NOTES
Infectious Diseases Progress Note    Grace Hemphill MD     [facility]  [place]: 4 days  Patient Identification:  Name: Madison Acevedo  Age: 63 y.o.  Sex: female  :  1960  MRN: [id number]         Primary Care Physician: Paulette Coulter, SIMONE        Subjective: The pain and discomfort in the right sternoclavicular joint area is unbearable and unchanged and not getting better.  Interval History: See consultation note.  2023 blood culture drawn at the time of admission is now identified as Pasteurella multocida.  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  The last significant bite was in her left hand about a week ago few days prior to onset of the neck pain.  2020 repeat blood cultures so far showed  no growth.  2023 evaluation by orthopedic surgery service reviewed  2023 echocardiogram performed result reviewed  2023 MRI of the neck and sternoclavicular joint area reviewed and shows evidence of septic arthritis with adjacent soft tissue infection.    Objective:    Scheduled Meds:amLODIPine, 2.5 mg, Oral, Daily  ampicillin-sulbactam, 3 g, Intravenous, Q6H  ketorolac, 15 mg, Intravenous, TID  Lidocaine, 2 patch, Transdermal, Q24H  omeprazole-sodium bicarbonate, 1 capsule, Oral, QAM AC  senna-docusate sodium, 1 tablet, Oral, BID  sodium chloride, 10 mL, Intravenous, Q12H      Continuous Infusions:     Vital signs in last 24 hours:  Temp:  [97.9 °F (36.6 °C)-98.1 °F (36.7 °C)] 97.9 °F (36.6 °C)  Heart Rate:  [86-96] 86  Resp:  [18] 18  BP: (110-136)/(61-75) 110/61    Intake/Output:    Intake/Output Summary (Last 24 hours) at 2023 0706  Last data filed at 2023 0900  Gross per 24 hour   Intake 240 ml   Output --   Net 240 ml         Exam:  /61 (BP Location: Left arm, Patient Position: Lying)   Pulse  "86   Temp 97.9 °F (36.6 °C) (Oral)   Resp 18   Ht 162.6 cm (64\")   Wt 78 kg (172 lb)   SpO2 95%   BMI 29.52 kg/m²   Patient is examined using the personal protective equipment as per guidelines from infection control for this particular patient as enacted.  Hand washing was performed before and after patient interaction.  General Appearance:    Alert, cooperative, no distress, AAOx3                          Head:    Normocephalic, without obvious abnormality, atraumatic                           Eyes:    PERRL, conjunctivae/corneas clear, EOM's intact, both eyes                         Throat:   Lips, tongue, gums normal; oral mucosa pink and moist                           Neck:   Supple, symmetrical, trachea midline, no JVD                         Lungs:    Clear to auscultation bilaterally, respirations unlabored                 Chest Wall:  Right sternoclavicular joint tenderness and medial clavicular tenderness unchanged.                          Heart:  S1-S2 regular no murmur                  abdomen:   Soft nontender                 Extremities: Mild tenderness along the clavicle and supraclavicular area with limited range of motion of the right shoulder.  Significant tenderness in the right sternoclavicular joint and medial clavicle.                        Pulses:   Pulses palpable in all extremities                            Skin:   Skin is warm and dry,  no rashes or palpable lesions                  Neurologic: Grossly nonfocal       Data Review:    I reviewed the patient's new clinical results.  Results from last 7 days   Lab Units 11/23/23  0508 11/22/23  0634 11/21/23  0721 11/20/23  0643 11/19/23  0856 11/17/23  1136   WBC 10*3/mm3 9.60 9.22 11.51* 10.71 8.48 11.17*   HEMOGLOBIN g/dL 11.5* 10.6* 10.9* 10.9* 11.7* 12.4   PLATELETS 10*3/mm3 379 331 301 262 301 253     Results from last 7 days   Lab Units 11/23/23  0509 11/22/23  0634 11/21/23  1857 11/21/23  0721 11/20/23  0643 11/19/23  2231 " 11/19/23  0856 11/17/23  1136   SODIUM mmol/L 139 138  --  136 136  --  141 138   POTASSIUM mmol/L 3.8 4.4 4.5 3.6 4.6 4.6 3.2* 3.6   CHLORIDE mmol/L 103 104  --  102 103  --  103 102   CO2 mmol/L 26.2 26.3  --  26.0 24.6  --  25.6 27.0   BUN mg/dL 21 19  --  28* 17  --  21 10   CREATININE mg/dL 0.71 0.70  --  0.76 0.67  --  0.89 0.79   CALCIUM mg/dL 8.7 8.7  --  8.9 8.9  --  9.1 9.4   GLUCOSE mg/dL 94 93  --  101* 116*  --  120* 108*     Microbiology Results (last 10 days)       Procedure Component Value - Date/Time    Blood Culture - Blood, Arm, Right [[id number]]  (Normal) Collected: 11/21/23 0002    Lab Status: Preliminary result Specimen: Blood from Arm, Right Updated: 11/24/23 0101     Blood Culture No growth at 3 days    Blood Culture - Blood, Arm, Right [[id number]]  (Normal) Collected: 11/20/23 1107    Lab Status: Preliminary result Specimen: Blood from Arm, Right Updated: 11/23/23 1131     Blood Culture No growth at 3 days    MRSA Screen, PCR (Inpatient) - Swab, Nares [[id number]]  (Normal) Collected: 11/19/23 1442    Lab Status: Final result Specimen: Swab from Nares Updated: 11/19/23 1609     MRSA PCR No MRSA Detected    Narrative:      The negative predictive value of this diagnostic test is high and should only be used to consider de-escalating anti-MRSA therapy. A positive result may indicate colonization with MRSA and must be correlated clinically.    Blood Culture - Blood, Arm, Left [[id number]]  (Abnormal) Collected: 11/19/23 1003    Lab Status: Final result Specimen: Blood from Arm, Left Updated: 11/22/23 0746     Blood Culture Pasteurella multocida     Isolated from --     Gram Stain Aerobic Bottle Gram negative bacilli      Anaerobic Bottle Gram negative bacilli    Narrative:      Refer to previous blood culture collected on 11/19/2023 at 0856 for MICs.      Respiratory Panel PCR w/COVID-19(SARS-CoV-2) G[facility]/NILA/GELA/PAD/COR/H[facility] In-House, NP Swab in UTM/VTM, 2 HR TAT - Swab, Nasopharynx [[id number]]   (Normal) Collected: 11/19/23 0901    Lab Status: Final result Specimen: Swab from Nasopharynx Updated: 11/19/23 1023     ADENOVIRUS, PCR Not Detected     Coronavirus 229E Not Detected     Coronavirus HKU1 Not Detected     Coronavirus NL63 Not Detected     Coronavirus OC43 Not Detected     COVID19 Not Detected     Human Metapneumovirus Not Detected     Human Rhinovirus/Enterovirus Not Detected     Influenza A PCR Not Detected     Influenza B PCR Not Detected     Parainfluenza Virus 1 Not Detected     Parainfluenza Virus 2 Not Detected     Parainfluenza Virus 3 Not Detected     Parainfluenza Virus 4 Not Detected     RSV, PCR Not Detected     Bordetella pertussis pcr Not Detected     Bordetella parapertussis PCR Not Detected     Chlamydophila pneumoniae PCR Not Detected     Mycoplasma pneumo by PCR Not Detected    Narrative:      In the setting of a positive respiratory panel with a viral infection PLUS a negative procalcitonin without other underlying concern for bacterial infection, consider observing off antibiotics or discontinuation of antibiotics and continue supportive care. If the respiratory panel is positive for atypical bacterial infection (Bordetella pertussis, Chlamydophila pneumoniae, or Mycoplasma pneumoniae), consider antibiotic de-escalation to target atypical bacterial infection.    Blood Culture - Blood, Arm, Left [[id number]]  (Abnormal) Collected: 11/19/23 0856    Lab Status: Preliminary result Specimen: Blood from Arm, Left Updated: 11/23/23 1200     Blood Culture Pasteurella multocida     Isolated from Aerobic and Anaerobic Bottles     Gram Stain Aerobic Bottle Gram negative bacilli      Anaerobic Bottle Gram negative bacilli    Narrative:      Less than seven (7) mL's of blood was collected.  Insufficient quantity may yield false negative results.     requested penicillin & ceftriaxone 11/23/23    Blood Culture ID, PCR - Blood, Arm, Left [[id number]] Collected: 11/19/23 0856    Lab Status:  Final result Specimen: Blood from Arm, Left Updated: 11/20/23 0122     BCID, PCR Negative by BCID PCR. Culture to Follow.     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  Rule out endocarditis.    Recommendations/discussion:  Based on the sensitivity of the Pasteurella species simplify antibiotics to IV penicillin to avoid complications of broad-spectrum antibiotic treatment such as evolving C. difficile infection and alteration of her endogenous taco.  Continue to follow with thoracic surgery service for need for surgical intervention as from my perspective her lack of improvement in her pain in the sternoclavicular joint area despite being on antibiotic for last 5 days suggest that her condition is not amenable to medical management alone.    Continued use of antibiotic therapy in this situation without consideration for surgical intervention such as I&D of the sternoclavicular joint and resection of the adjacent clavicle and sternum and debridement of soft tissue may not be successful.  Obviously will defer to our surgical colleague about final determination or eventual decision for intervention versus continued medical management.    Grace Hemphill MD  11/24/2023  07:06 EST    Parts of this note may be an electronic transcription/translation of spoken language to printed text using the Dragon dictation system.

## 2023-11-24 NOTE — PROGRESS NOTES
"    Chief Complaint: Septic arthritis    Subjective:  Symptoms:  Stable.  She reports shortness of breath.    Diet:  No nausea or vomiting.    Activity level: Impaired due to pain.    Pain:  She complains of pain that is moderate.  Pain is partially controlled.            Vital Signs:  Temp:  [97.9 °F (36.6 °C)-98.4 °F (36.9 °C)] 98.1 °F (36.7 °C)  Resp:  [18] 18  BP: (110-136)/(61-75) 114/67    Intake & Output (last day)         11/23 0701  11/24 0700 11/24 0701 11/25 0700    P.O. 240 60    Total Intake(mL/kg) 240 (3.1) 60 (0.8)    Net +240 +60          Urine Unmeasured Occurrence 3 x             Objective:  General Appearance:  Ill-appearing, uncomfortable, in no acute distress and in pain.    Vital signs: (most recent): Blood pressure 114/67, pulse 86, temperature 98.1 °F (36.7 °C), temperature source Oral, resp. rate 18, height 162.6 cm (64\"), weight 78 kg (172 lb), SpO2 93%, not currently breastfeeding.  Vital signs are normal.    Lungs:  Normal effort and normal respiratory rate.    Heart: Normal rate.  Regular rhythm.    Abdomen: Abdomen is soft.    Extremities: Decreased range of motion.    Neurological: Patient is alert and oriented to person, place and time.    Skin:  Warm and dry.            Results Review:     I reviewed the patient's new clinical results.  I reviewed the patient's new imaging results and agree with the interpretation.  I reviewed the patient's other test results and agree with the interpretation  Discussed with patient, family at bedside, Dr. Rodriguez.    Imaging Results (Last 24 Hours)       Procedure Component Value Units Date/Time    CT Chest Without Contrast Diagnostic [[id number]] Resulted: 11/24/23 1607     Updated: 11/24/23 1620            Lab Results:     Lab Results (last 24 hours)       Procedure Component Value Units Date/Time    Blood Culture - Blood, Arm, Right [[id number]]  (Normal) Collected: 11/20/23 1107    Specimen: Blood from Arm, Right Updated: 11/24/23 1130     Blood " Culture No growth at 4 days    Blood Culture - Blood, Arm, Left [[id number]]  (Abnormal)  (Susceptibility) Collected: 11/19/23 0856    Specimen: Blood from Arm, Left Updated: 11/24/23 0822     Blood Culture Pasteurella multocida     Isolated from Aerobic and Anaerobic Bottles     Gram Stain Aerobic Bottle Gram negative bacilli      Anaerobic Bottle Gram negative bacilli    Narrative:      Less than seven (7) mL's of blood was collected.  Insufficient quantity may yield false negative results.     requested penicillin & ceftriaxone 11/23/23    Susceptibility        Pasteurella multocida      Not Specified      Ampicillin Susceptible      Ceftriaxone Susceptible  [1]       Levofloxacin Susceptible      Penicillin G Susceptible  [1]       Trimethoprim + Sulfamethoxazole Susceptible                   [1]  Appended report. These results have been appended to a previously final verified report.                   Basic Metabolic Panel [[id number]]  (Abnormal) Collected: 11/24/23 0658    Specimen: Blood Updated: 11/24/23 0811     Glucose 95 mg/dL      BUN 13 mg/dL      Creatinine 0.65 mg/dL      Sodium 133 mmol/L      Potassium 3.8 mmol/L      Chloride 95 mmol/L      CO2 29.0 mmol/L      Calcium 8.9 mg/dL      BUN/Creatinine Ratio 20.0     Anion Gap 9.0 mmol/L      eGFR 99.1 mL/min/1.73     Narrative:      GFR Normal >60  Chronic Kidney Disease <60  Kidney Failure <15      CBC (No Diff) [[id number]]  (Abnormal) Collected: 11/24/23 0658    Specimen: Blood Updated: 11/24/23 0752     WBC 10.82 10*3/mm3      RBC 3.49 10*6/mm3      Hemoglobin 10.8 g/dL      Hematocrit 31.6 %      MCV 90.5 fL      MCH 30.9 pg      MCHC 34.2 g/dL      RDW 11.8 %      RDW-SD 38.1 fl      MPV 8.7 fL      Platelets 419 10*3/mm3     Blood Culture - Blood, Arm, Right [[id number]]  (Normal) Collected: 11/21/23 0002    Specimen: Blood from Arm, Right Updated: 11/24/23 0101     Blood Culture No growth at 3 days             Assessment & Plan        Right shoulder pain    Essential hypertension    Sepsis    Community acquired pneumonia of right lower lobe of lung    Pain of right clavicle    Hypokalemia    Bacteremia    Infection, Pasteurella       Assessment & Plan    Madison Acevedo is a pleasant 63-year-old female who presented to [facility] with progressive severe right shoulder and chest pain.  Due to concern for sternoclavicular joint abscess, we were asked to see this lady in consult.  The patient underwent MRI of the chest today.      I independently reviewed these images which demonstrated right sternoclavicular septic arthritis with synovitis and some surrounding soft tissue inflammation associated with myositis of the distal right sternocleidomastoid muscle, right pectoralis sternal attachments.  There is some soft tissue edema/inflammation.  There is also concern for a moderate layering right pleural effusion.    Patient appears to have septic arthritis but there is no drainable fluid collection or abscess.  No evidence of osteomyelitis.  No thoracic surgical intervention for the septic arthritis at this point.  However, I will go ahead and order a CT of the chest without contrast to evaluate the pleural effusion.  If the effusion is large enough, she may need to undergo chest tube placement and possibly lytic therapy due to concerns for infectious process given her infectious process and positive blood cultures.    Discussed all this in great detail with the patient and her significant other who is at bedside.    SIMONE Regan  [facility]  11/24/23  17:03 EST    Greater than 52 minutes was spent reviewing the patient's chart, radiographic imaging, labs, provider notes, assessing the patient and developing a plan of care.  This was discussed with the patient and RN.    Addendum: CT of the chest was performed this afternoon and I was able to review these images.  Very small pleural effusion on the right should  not require drainage.  There is however increased consolidation atelectasis in the right middle lobe concerning for infectious/inflammatory process.  There is also some left lower lobe atelectasis with mild bronchiectasis.

## 2023-11-25 LAB
ALBUMIN SERPL-MCNC: 3 G/DL (ref 3.5–5.2)
ALBUMIN/GLOB SERPL: 1 G/DL
ALP SERPL-CCNC: 74 U/L (ref 39–117)
ALT SERPL W P-5'-P-CCNC: 18 U/L (ref 1–33)
ANION GAP SERPL CALCULATED.3IONS-SCNC: 8.8 MMOL/L (ref 5–15)
AST SERPL-CCNC: 17 U/L (ref 1–32)
BACTERIA SPEC AEROBE CULT: NORMAL
BILIRUB SERPL-MCNC: 0.3 MG/DL (ref 0–1.2)
BUN SERPL-MCNC: 13 MG/DL (ref 8–23)
BUN/CREAT SERPL: 17.8 (ref 7–25)
CALCIUM SPEC-SCNC: 8.4 MG/DL (ref 8.6–10.5)
CHLORIDE SERPL-SCNC: 97 MMOL/L (ref 98–107)
CO2 SERPL-SCNC: 29.2 MMOL/L (ref 22–29)
CREAT SERPL-MCNC: 0.73 MG/DL (ref 0.57–1)
DEPRECATED RDW RBC AUTO: 38.2 FL (ref 37–54)
EGFRCR SERPLBLD CKD-EPI 2021: 92.5 ML/MIN/1.73
ERYTHROCYTE [DISTWIDTH] IN BLOOD BY AUTOMATED COUNT: 11.5 % (ref 12.3–15.4)
GLOBULIN UR ELPH-MCNC: 3 GM/DL
GLUCOSE SERPL-MCNC: 95 MG/DL (ref 65–99)
HCT VFR BLD AUTO: 30.2 % (ref 34–46.6)
HGB BLD-MCNC: 10.3 G/DL (ref 12–15.9)
MCH RBC QN AUTO: 31.1 PG (ref 26.6–33)
MCHC RBC AUTO-ENTMCNC: 34.1 G/DL (ref 31.5–35.7)
MCV RBC AUTO: 91.2 FL (ref 79–97)
PLATELET # BLD AUTO: 398 10*3/MM3 (ref 140–450)
PMV BLD AUTO: 8.4 FL (ref 6–12)
POTASSIUM SERPL-SCNC: 4.2 MMOL/L (ref 3.5–5.2)
PROT SERPL-MCNC: 6 G/DL (ref 6–8.5)
RBC # BLD AUTO: 3.31 10*6/MM3 (ref 3.77–5.28)
SODIUM SERPL-SCNC: 135 MMOL/L (ref 136–145)
WBC NRBC COR # BLD AUTO: 10.14 10*3/MM3 (ref 3.4–10.8)

## 2023-11-25 PROCEDURE — 25010000002 KETOROLAC TROMETHAMINE PER 15 MG: Performed by: NURSE PRACTITIONER

## 2023-11-25 PROCEDURE — 25010000002 HYDROMORPHONE 1 MG/ML SOLUTION: Performed by: STUDENT IN AN ORGANIZED HEALTH CARE EDUCATION/TRAINING PROGRAM

## 2023-11-25 PROCEDURE — 25010000002 PENICILLIN G POTASSIUM PER 600000 UNITS: Performed by: INTERNAL MEDICINE

## 2023-11-25 PROCEDURE — 85027 COMPLETE CBC AUTOMATED: CPT | Performed by: STUDENT IN AN ORGANIZED HEALTH CARE EDUCATION/TRAINING PROGRAM

## 2023-11-25 PROCEDURE — 80053 COMPREHEN METABOLIC PANEL: CPT | Performed by: STUDENT IN AN ORGANIZED HEALTH CARE EDUCATION/TRAINING PROGRAM

## 2023-11-25 PROCEDURE — 99232 SBSQ HOSP IP/OBS MODERATE 35: CPT | Performed by: THORACIC SURGERY (CARDIOTHORACIC VASCULAR SURGERY)

## 2023-11-25 RX ORDER — BISACODYL 10 MG
10 SUPPOSITORY, RECTAL RECTAL ONCE
Status: COMPLETED | OUTPATIENT
Start: 2023-11-25 | End: 2023-11-26

## 2023-11-25 RX ADMIN — DIAZEPAM 2 MG: 2 TABLET ORAL at 09:38

## 2023-11-25 RX ADMIN — ACETAMINOPHEN 1000 MG: 500 TABLET ORAL at 18:48

## 2023-11-25 RX ADMIN — HYDROMORPHONE HYDROCHLORIDE 1 MG: 1 INJECTION, SOLUTION INTRAMUSCULAR; INTRAVENOUS; SUBCUTANEOUS at 06:58

## 2023-11-25 RX ADMIN — OXYCODONE HYDROCHLORIDE 15 MG: 15 TABLET ORAL at 03:48

## 2023-11-25 RX ADMIN — KETOROLAC TROMETHAMINE 15 MG: 15 INJECTION, SOLUTION INTRAMUSCULAR; INTRAVENOUS at 20:58

## 2023-11-25 RX ADMIN — KETOROLAC TROMETHAMINE 15 MG: 15 INJECTION, SOLUTION INTRAMUSCULAR; INTRAVENOUS at 15:29

## 2023-11-25 RX ADMIN — DIAZEPAM 2 MG: 2 TABLET ORAL at 17:10

## 2023-11-25 RX ADMIN — ACETAMINOPHEN 1000 MG: 500 TABLET ORAL at 10:56

## 2023-11-25 RX ADMIN — SODIUM CHLORIDE 4 MILLION UNITS: 9 INJECTION, SOLUTION INTRAVENOUS at 13:04

## 2023-11-25 RX ADMIN — SODIUM CHLORIDE 4 MILLION UNITS: 9 INJECTION, SOLUTION INTRAVENOUS at 06:46

## 2023-11-25 RX ADMIN — DOCUSATE SODIUM 50MG AND SENNOSIDES 8.6MG 1 TABLET: 8.6; 5 TABLET, FILM COATED ORAL at 20:59

## 2023-11-25 RX ADMIN — SODIUM CHLORIDE 4 MILLION UNITS: 9 INJECTION, SOLUTION INTRAVENOUS at 21:00

## 2023-11-25 RX ADMIN — OXYCODONE HYDROCHLORIDE 15 MG: 15 TABLET ORAL at 10:58

## 2023-11-25 RX ADMIN — ACETAMINOPHEN 1000 MG: 500 TABLET ORAL at 03:49

## 2023-11-25 RX ADMIN — CYCLOBENZAPRINE 10 MG: 10 TABLET, FILM COATED ORAL at 02:39

## 2023-11-25 RX ADMIN — HYDROMORPHONE HYDROCHLORIDE 1 MG: 1 INJECTION, SOLUTION INTRAMUSCULAR; INTRAVENOUS; SUBCUTANEOUS at 13:03

## 2023-11-25 RX ADMIN — Medication 10 ML: at 21:00

## 2023-11-25 RX ADMIN — DIAZEPAM 2 MG: 2 TABLET ORAL at 03:48

## 2023-11-25 RX ADMIN — KETOROLAC TROMETHAMINE 15 MG: 15 INJECTION, SOLUTION INTRAMUSCULAR; INTRAVENOUS at 08:02

## 2023-11-25 RX ADMIN — POLYETHYLENE GLYCOL 3350 17 G: 17 POWDER, FOR SOLUTION ORAL at 08:08

## 2023-11-25 RX ADMIN — OXYCODONE HYDROCHLORIDE 15 MG: 15 TABLET ORAL at 21:02

## 2023-11-25 RX ADMIN — HYDROMORPHONE HYDROCHLORIDE 1 MG: 1 INJECTION, SOLUTION INTRAMUSCULAR; INTRAVENOUS; SUBCUTANEOUS at 22:19

## 2023-11-25 RX ADMIN — SODIUM CHLORIDE 4 MILLION UNITS: 9 INJECTION, SOLUTION INTRAVENOUS at 09:39

## 2023-11-25 RX ADMIN — OMEPRAZOLE AND SODIUM BICARBONATE 1 CAPSULE: 40; 1100 CAPSULE ORAL at 06:46

## 2023-11-25 RX ADMIN — Medication 10 ML: at 08:08

## 2023-11-25 RX ADMIN — SODIUM CHLORIDE 4 MILLION UNITS: 9 INJECTION, SOLUTION INTRAVENOUS at 17:06

## 2023-11-25 RX ADMIN — CYCLOBENZAPRINE 10 MG: 10 TABLET, FILM COATED ORAL at 13:03

## 2023-11-25 RX ADMIN — SODIUM CHLORIDE 4 MILLION UNITS: 9 INJECTION, SOLUTION INTRAVENOUS at 02:31

## 2023-11-25 RX ADMIN — LIDOCAINE 2 PATCH: 4 PATCH TOPICAL at 08:02

## 2023-11-25 RX ADMIN — HYDROMORPHONE HYDROCHLORIDE 1 MG: 1 INJECTION, SOLUTION INTRAMUSCULAR; INTRAVENOUS; SUBCUTANEOUS at 02:31

## 2023-11-25 RX ADMIN — HYDROMORPHONE HYDROCHLORIDE 1 MG: 1 INJECTION, SOLUTION INTRAMUSCULAR; INTRAVENOUS; SUBCUTANEOUS at 18:48

## 2023-11-25 RX ADMIN — CYCLOBENZAPRINE 10 MG: 10 TABLET, FILM COATED ORAL at 20:58

## 2023-11-25 NOTE — PROGRESS NOTES
"    Chief Complaint: Septic arthritis    Subjective:  Symptoms:  Stable.  She reports shortness of breath.    Diet:  No nausea or vomiting.    Activity level: Impaired due to pain.    Pain:  She complains of pain that is moderate.  Pain is partially controlled.    Pain is improved this morning.        Vital Signs:  Temp:  [97.7 °F (36.5 °C)-98.1 °F (36.7 °C)] 97.7 °F (36.5 °C)  Heart Rate:  [84] 84  Resp:  [18-19] 19  BP: ()/(61-80) 97/62    Intake & Output (last day)         11/24 0701  11/25 0700 11/25 0701  11/26 0700    P.O. 420 240    Total Intake(mL/kg) 420 (5.4) 240 (3.1)    Net +420 +240          Urine Unmeasured Occurrence 3 x             Objective:  General Appearance:  Ill-appearing, uncomfortable, in no acute distress and in pain.    Vital signs: (most recent): Blood pressure 97/62, pulse 84, temperature 97.7 °F (36.5 °C), temperature source Oral, resp. rate 19, height 162.6 cm (64\"), weight 78 kg (172 lb), SpO2 99%, not currently breastfeeding.  Vital signs are normal.    Lungs:  Normal effort and normal respiratory rate.    Heart: Normal rate.  Regular rhythm.    Abdomen: Abdomen is soft.    Extremities: Decreased range of motion.    Neurological: Patient is alert and oriented to person, place and time.    Skin:  Warm and dry.            Results Review:     I reviewed the patient's new clinical results.  I reviewed the patient's new imaging results and agree with the interpretation.  I reviewed the patient's other test results and agree with the interpretation  Discussed with patient, family at bedside, Dr. Rodriguez.    Imaging Results (Last 24 Hours)       Procedure Component Value Units Date/Time    CT Chest Without Contrast Diagnostic [[id number]] Collected: 11/24/23 1755     Updated: 11/24/23 1812    Narrative:      CT CHEST WITHOUT CONTRAST     HISTORY: Increasing pleural effusion. Shortness of air     TECHNIQUE:  CT chest includes axial imaging from the thoracic inlet to  the upper abdomen " without IV contrast. Date reconstructed in coronal and  sagittal planes. Radiation dose reduction techniques were utilized,  including automated exposure control and exposure modulation based on  body size.     COMPARISON: CT angioma chest 11/19/2023     FINDINGS: There is no evidence for mediastinal julia enlargement though  evaluation limited without intravenous contrast. There is a small right  pleural effusion layering dependently. There is dense right lower lobe  consolidation and right lower lobe air bronchograms are present. There  is also consolidation within the right middle lobe where there is volume  loss. Mild linear subsegmental atelectasis and bronchiectasis are  present within the left lower lobe and lingula. Right middle lobe and  lower lobe consolidation and atelectasis have progressed when compared  to the exam 5 days ago.     Imaging through the upper abdomen demonstrates cholecystectomy clips.  Multilevel degenerative disease is present in the mid to lower thoracic  spine.       Impression:      1. When compared to the exam 5 days ago there has developed a new small  right pleural effusion and there is increased consolidation and  atelectasis within the right middle lobe and right lower lobe with air  bronchograms and mild bronchiectasis that may be inflammatory or  infectious in etiology. Recommend follow-up chest CT 4 to 6 weeks.  2. Mild linear subsegmental lingular and left lower lobe atelectasis  with mild bronchiectasis.     Radiation dose reduction techniques were utilized, including automated  exposure control and exposure modulation based on body size.        This report was finalized on 11/24/2023 6:09 PM by Dr. Sher Borges M.D on Workstation: [id number]               Lab Results:     Lab Results (last 24 hours)       Procedure Component Value Units Date/Time    Blood Culture - Blood, Arm, Right [[id number]]  (Normal) Collected: 11/20/23 110    Specimen: Blood from Arm, Right  Updated: 11/25/23 1131     Blood Culture No growth at 5 days    Comprehensive Metabolic Panel [[id number]]  (Abnormal) Collected: 11/25/23 0453    Specimen: Blood Updated: 11/25/23 0548     Glucose 95 mg/dL      BUN 13 mg/dL      Creatinine 0.73 mg/dL      Sodium 135 mmol/L      Potassium 4.2 mmol/L      Chloride 97 mmol/L      CO2 29.2 mmol/L      Calcium 8.4 mg/dL      Total Protein 6.0 g/dL      Albumin 3.0 g/dL      ALT (SGPT) 18 U/L      AST (SGOT) 17 U/L      Alkaline Phosphatase 74 U/L      Total Bilirubin 0.3 mg/dL      Globulin 3.0 gm/dL      A/G Ratio 1.0 g/dL      BUN/Creatinine Ratio 17.8     Anion Gap 8.8 mmol/L      eGFR 92.5 mL/min/1.73     Narrative:      GFR Normal >60  Chronic Kidney Disease <60  Kidney Failure <15      CBC (No Diff) [[id number]]  (Abnormal) Collected: 11/25/23 0453    Specimen: Blood Updated: 11/25/23 0527     WBC 10.14 10*3/mm3      RBC 3.31 10*6/mm3      Hemoglobin 10.3 g/dL      Hematocrit 30.2 %      MCV 91.2 fL      MCH 31.1 pg      MCHC 34.1 g/dL      RDW 11.5 %      RDW-SD 38.2 fl      MPV 8.4 fL      Platelets 398 10*3/mm3     Blood Culture - Blood, Arm, Right [[id number]]  (Normal) Collected: 11/21/23 0002    Specimen: Blood from Arm, Right Updated: 11/25/23 0101     Blood Culture No growth at 4 days             Assessment & Plan       Right shoulder pain    Essential hypertension    Sepsis    Community acquired pneumonia of right lower lobe of lung    Pain of right clavicle    Hypokalemia    Bacteremia    Infection, Pasteurella       Assessment & Plan    Madison Acevedo is a pleasant 63-year-old female who presented to [facility] with progressive severe right shoulder and chest pain.  Due to concern for sternoclavicular joint abscess, we were asked to see this lady in consult.  The patient underwent MRI of the chest which did not demonstrate any evidence of a drainable fluid collection or fluid in her joint.  I do not think instrumentation to her  sternoclavicular joint would help this scenario.  I recommend conservative management with IV antibiotics and anti-inflammatories.  If at any point she does develop a fluid collection, we can plan drainage.      Her CT of the chest does demonstrate a right middle and lower lobe collapse with air bronchograms consistent with pneumonia.  I would recommend treatment of this with antibiotics per ID.  She does have a small associated pleural effusion, which is not large enough to necessitate drainage.  Mohini Rodriguez MD  [facility]  11/25/23  14:16 EST    Greater than 30 minutes was spent reviewing the patient's chart, radiographic imaging, labs, provider notes, assessing the patient and developing a plan of care.  This was discussed with the patient and RN.

## 2023-11-25 NOTE — PROGRESS NOTES
Infectious Diseases Progress Note    Grace Hemphill MD     [facility]  Los: 5 days  Patient Identification:  Name: Madison Acevedo  Age: 63 y.o.  Sex: female  :  1960  MRN: [id number]         Primary Care Physician: Paulette Coulter, SIMONE        Subjective: Pain and discomfort in the right sternoclavicular joint area is slightly better.  Denies any fever and chills.  Tolerating penicillin without any problem.  Interval History: See consultation note.  2023 blood culture drawn at the time of admission is now identified as Pasteurella multocida.  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  The last significant bite was in her left hand about a week ago few days prior to onset of the neck pain.  2020 repeat blood cultures so far showed  no growth.  2023 evaluation by orthopedic surgery service reviewed  2023 echocardiogram performed result reviewed  2023 MRI of the neck and sternoclavicular joint area reviewed and shows evidence of septic arthritis with adjacent soft tissue infection.  2023 antibiotics switched to IV penicillin.    Objective:    Scheduled Meds:acetaminophen, 1,000 mg, Oral, Q8H  ketorolac, 15 mg, Intravenous, TID  Lidocaine, 2 patch, Transdermal, Q24H  omeprazole-sodium bicarbonate, 1 capsule, Oral, QAM AC  penicillin g (potassium), 4 Million Units, Intravenous, Q4H  polyethylene glycol, 17 g, Oral, BID  senna-docusate sodium, 1 tablet, Oral, BID  sodium chloride, 10 mL, Intravenous, Q12H      Continuous Infusions:     Vital signs in last 24 hours:  Temp:  [97.7 °F (36.5 °C)-98.1 °F (36.7 °C)] 97.7 °F (36.5 °C)  Heart Rate:  [84] 84  Resp:  [18] 18  BP: ()/(61-80) 99/61    Intake/Output:    Intake/Output Summary (Last 24 hours) at 2023 0846  Last data filed at 2023  "1700  Gross per 24 hour   Intake 390 ml   Output --   Net 390 ml         Exam:  BP 99/61 (BP Location: Left arm, Patient Position: Lying)   Pulse 84   Temp 97.7 °F (36.5 °C) (Oral)   Resp 18   Ht 162.6 cm (64\")   Wt 78 kg (172 lb)   SpO2 94%   BMI 29.52 kg/m²   Patient is examined using the personal protective equipment as per guidelines from infection control for this particular patient as enacted.  Hand washing was performed before and after patient interaction.  General Appearance:    Alert, cooperative, no distress, AAOx3                          Head:    Normocephalic, without obvious abnormality, atraumatic                           Eyes:    PERRL, conjunctivae/corneas clear, EOM's intact, both eyes                         Throat:   Lips, tongue, gums normal; oral mucosa pink and moist                           Neck:   Supple, symmetrical, trachea midline, no JVD                         Lungs:    Clear to auscultation bilaterally, respirations unlabored                 Chest Wall:  Right sternoclavicular joint tenderness and medial clavicular tenderness unchanged.                          Heart:  S1-S2 regular no murmur                  abdomen:   Soft nontender                 Extremities: Mild tenderness along the clavicle and supraclavicular area with limited range of motion of the right shoulder.  Significant tenderness in the right sternoclavicular joint and medial clavicle.                        Pulses:   Pulses palpable in all extremities                            Skin:   Skin is warm and dry,  no rashes or palpable lesions                  Neurologic: Grossly nonfocal       Data Review:    I reviewed the patient's new clinical results.  Results from last 7 days   Lab Units 11/25/23  0453 11/24/23  0658 11/23/23  0508 11/22/23  0634 11/21/23  0721 11/20/23  0643 11/19/23  0856   WBC 10*3/mm3 10.14 10.82* 9.60 9.22 11.51* 10.71 8.48   HEMOGLOBIN g/dL 10.3* 10.8* 11.5* 10.6* 10.9* 10.9* 11.7* "   PLATELETS 10*3/mm3 398 419 379 331 301 262 301     Results from last 7 days   Lab Units 11/25/23  0453 11/24/23  0658 11/23/23  0509 11/22/23  0634 11/21/23  1857 11/21/23  0721 11/20/23  0643 11/19/23  2231 11/19/23  0856   SODIUM mmol/L 135* 133* 139 138  --  136 136  --  141   POTASSIUM mmol/L 4.2 3.8 3.8 4.4 4.5 3.6 4.6   < > 3.2*   CHLORIDE mmol/L 97* 95* 103 104  --  102 103  --  103   CO2 mmol/L 29.2* 29.0 26.2 26.3  --  26.0 24.6  --  25.6   BUN mg/dL 13 13 21 19  --  28* 17  --  21   CREATININE mg/dL 0.73 0.65 0.71 0.70  --  0.76 0.67  --  0.89   CALCIUM mg/dL 8.4* 8.9 8.7 8.7  --  8.9 8.9  --  9.1   GLUCOSE mg/dL 95 95 94 93  --  101* 116*  --  120*    < > = values in this interval not displayed.     Microbiology Results (last 10 days)       Procedure Component Value - Date/Time    Blood Culture - Blood, Arm, Right [[id number]]  (Normal) Collected: 11/21/23 0002    Lab Status: Preliminary result Specimen: Blood from Arm, Right Updated: 11/25/23 0101     Blood Culture No growth at 4 days    Blood Culture - Blood, Arm, Right [[id number]]  (Normal) Collected: 11/20/23 1107    Lab Status: Preliminary result Specimen: Blood from Arm, Right Updated: 11/24/23 1130     Blood Culture No growth at 4 days    MRSA Screen, PCR (Inpatient) - Swab, Nares [[id number]]  (Normal) Collected: 11/19/23 1442    Lab Status: Final result Specimen: Swab from Nares Updated: 11/19/23 1609     MRSA PCR No MRSA Detected    Narrative:      The negative predictive value of this diagnostic test is high and should only be used to consider de-escalating anti-MRSA therapy. A positive result may indicate colonization with MRSA and must be correlated clinically.    Blood Culture - Blood, Arm, Left [[id number]]  (Abnormal) Collected: 11/19/23 1003    Lab Status: Final result Specimen: Blood from Arm, Left Updated: 11/22/23 0746     Blood Culture Pasteurella multocida     Isolated from --     Gram Stain Aerobic Bottle Gram negative bacilli       Anaerobic Bottle Gram negative bacilli    Narrative:      Refer to previous blood culture collected on 11/19/2023 at 0856 for MICs.      Respiratory Panel PCR w/COVID-19(SARS-CoV-2) GIA/NILA/GELA/PAD/COR/HOMAR In-House, NP Swab in UTM/VTM, 2 HR TAT - Swab, Nasopharynx [[id number]]  (Normal) Collected: 11/19/23 0901    Lab Status: Final result Specimen: Swab from Nasopharynx Updated: 11/19/23 1023     ADENOVIRUS, PCR Not Detected     Coronavirus 229E Not Detected     Coronavirus HKU1 Not Detected     Coronavirus NL63 Not Detected     Coronavirus OC43 Not Detected     COVID19 Not Detected     Human Metapneumovirus Not Detected     Human Rhinovirus/Enterovirus Not Detected     Influenza A PCR Not Detected     Influenza B PCR Not Detected     Parainfluenza Virus 1 Not Detected     Parainfluenza Virus 2 Not Detected     Parainfluenza Virus 3 Not Detected     Parainfluenza Virus 4 Not Detected     RSV, PCR Not Detected     Bordetella pertussis pcr Not Detected     Bordetella parapertussis PCR Not Detected     Chlamydophila pneumoniae PCR Not Detected     Mycoplasma pneumo by PCR Not Detected    Narrative:      In the setting of a positive respiratory panel with a viral infection PLUS a negative procalcitonin without other underlying concern for bacterial infection, consider observing off antibiotics or discontinuation of antibiotics and continue supportive care. If the respiratory panel is positive for atypical bacterial infection (Bordetella pertussis, Chlamydophila pneumoniae, or Mycoplasma pneumoniae), consider antibiotic de-escalation to target atypical bacterial infection.    Blood Culture - Blood, Arm, Left [[id number]]  (Abnormal)  (Susceptibility) Collected: 11/19/23 0856    Lab Status: Edited Result - FINAL Specimen: Blood from Arm, Left Updated: 11/24/23 0822     Blood Culture Pasteurella multocida     Isolated from Aerobic and Anaerobic Bottles     Gram Stain Aerobic Bottle Gram negative bacilli      Anaerobic Bottle  Gram negative bacilli    Narrative:      Less than seven (7) mL's of blood was collected.  Insufficient quantity may yield false negative results.     requested penicillin & ceftriaxone 11/23/23    Susceptibility        Pasteurella multocida      Not Specified      Ampicillin Susceptible      Ceftriaxone Susceptible  [1]       Levofloxacin Susceptible      Penicillin G Susceptible  [1]       Trimethoprim + Sulfamethoxazole Susceptible                   [1]  Appended report. These results have been appended to a previously final verified report.                   Blood Culture ID, PCR - Blood, Arm, Left [[id number]] Collected: 11/19/23 0856    Lab Status: Final result Specimen: Blood from Arm, Left Updated: 11/20/23 0122     BCID, PCR Negative by BCID PCR. Culture to Follow.     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  Rule out endocarditis.    Recommendations/discussion:  Based on the sensitivity of the Pasteurella species simplify antibiotics to IV penicillin to avoid complications of broad-spectrum antibiotic treatment such as evolving C. difficile infection and alteration of her endogenous taco.  Continue to follow with thoracic surgery service for need for surgical intervention as from my perspective her lack of improvement in her pain in the sternoclavicular joint area despite being on antibiotic for last 5 days suggest that her condition is not amenable to medical management alone.    Continued use of antibiotic therapy in this situation without consideration  for surgical intervention such as I&D of the sternoclavicular joint and resection of the adjacent clavicle and sternum and debridement of soft tissue may not be successful.  Obviously will defer to our surgical colleague about final determination or eventual decision for intervention versus continued medical management.    Grace Hemphill MD  11/25/2023  08:46 EST    Parts of this note may be an electronic transcription/translation of spoken language to printed text using the Dragon dictation system.

## 2023-11-25 NOTE — PROGRESS NOTES
Name: Madison Acevedo ADMIT: 2023   : 1960  PCP: Paulette Coulter, SIMONE    MRN: [id number] LOS: 5 days   AGE/SEX: 63 y.o. female  ROOM: [facility]     Subjective   Subjective   Laying in bed, reports pain may be slightly better today can [place] yesterday, but continues to have significant pain intermittently primarily with movement.  Still no BM since approximately last a , denies nausea vomiting, passing gases    Review of Systems   As above  Objective   Objective   Vital Signs  Temp:  [97.7 °F (36.5 °C)-98.1 °F (36.7 °C)] 97.7 °F (36.5 °C)  Heart Rate:  [84] 84  Resp:  [18] 18  BP: ()/(61-80) 99/61  SpO2:  [82 %-94 %] 94 %  on  Flow (L/min):  [2] 2;   Device (Oxygen Therapy): nasal cannula  Body mass index is 29.52 kg/m².  Physical Exam    General: Alert and up in the bed, not in distress,  HEENT: Normocephalic, atraumatic  CV: Regular rate and rhythm, no murmurs rubs or gallops  Lungs: Right lower lung decreased to auscultation, no wheezing, on 2 L nasal cannula  Abdomen: Soft, nontender, nondistended  Extremities: No significant peripheral edema , no cyanosis       Results Review     I reviewed the patient's new clinical results.  Results from last 7 days   Lab Units 23  0508 23  0634   WBC 10*3/mm3 10.14 10.82* 9.60 9.22   HEMOGLOBIN g/dL 10.3* 10.8* 11.5* 10.6*   PLATELETS 10*3/mm3 398 419 379 331     Results from last 7 days   Lab Units 2358 23  0509 23  0634   SODIUM mmol/L 135* 133* 139 138   POTASSIUM mmol/L 4.2 3.8 3.8 4.4   CHLORIDE mmol/L 97* 95* 103 104   CO2 mmol/L 29.2* 29.0 26.2 26.3   BUN mg/dL 13 13 21 19   CREATININE mg/dL 0.73 0.65 0.71 0.70   GLUCOSE mg/dL 95 95 94 93   Estimated Creatinine Clearance: 79.7 mL/min (by C-G formula based on SCr of 0.73 mg/dL).  Results from last 7 days   Lab Units 23  0453 23  0856   ALBUMIN g/dL 3.0* 3.8   BILIRUBIN mg/dL 0.3 0.2   ALK PHOS U/L 74  "67   AST (SGOT) U/L 17 10   ALT (SGPT) U/L 18 8     Results from last 7 days   Lab Units 11/25/23  0453 11/24/23  0658 11/23/23  0509 11/22/23  0634 11/20/23  0643 11/19/23  1058 11/19/23  0856   CALCIUM mg/dL 8.4* 8.9 8.7 8.7   < >  --  9.1   ALBUMIN g/dL 3.0*  --   --   --   --   --  3.8   MAGNESIUM mg/dL  --   --   --   --   --  1.7  --     < > = values in this interval not displayed.     Results from last 7 days   Lab Units 11/19/23  0856   PROCALCITONIN ng/mL 0.64*   LACTATE mmol/L 2.0     COVID19   Date Value Ref Range Status   11/19/2023 Not Detected Not Detected - Ref. Range Final     SARS-CoV-2, OLMAN   Date Value Ref Range Status   02/10/2022 Not Detected Not Detected Final     Comment:     Testing was performed using the dara(R) SARS-CoV-2 test.  This nucleic acid amplification test was developed and its performance  characteristics determined by [employer]. Nucleic acid  amplification tests include RT-PCR and TMA. This test has not been  FDA cleared or approved. This test has been authorized by FDA under  an Emergency Use Authorization (EUA). This test is only authorized  for the duration of time the declaration that circumstances exist  justifying the authorization of the emergency use of in vitro  diagnostic tests for detection of SARS-CoV-2 virus and/or diagnosis  of COVID-19 infection under section 564(b)(1) of the Act, 21 U.S.C.  360bbb-3(b) (1), unless the authorization is terminated or revoked  sooner.  When diagnostic testing is negative, the possibility of a false  negative result should be considered in the context of a patient's  recent exposures and the presence of clinical signs and symptoms  consistent with COVID-19. An individual without symptoms of COVID-19  and who is not shedding SARS-CoV-2 virus would expect to have a  negative (not detected) result in this assay.     No results found for: \"HGBA1C\", \"POCGLU\"        CT Chest Without Contrast Diagnostic  Narrative: CT CHEST " WITHOUT CONTRAST     HISTORY: Increasing pleural effusion. Shortness of air     TECHNIQUE:  CT chest includes axial imaging from the thoracic inlet to  the upper abdomen without IV contrast. Date reconstructed in coronal and  sagittal planes. Radiation dose reduction techniques were utilized,  including automated exposure control and exposure modulation based on  body size.     COMPARISON: CT angioma chest 11/19/2023     FINDINGS: There is no evidence for mediastinal julia enlargement though  evaluation limited without intravenous contrast. There is a small right  pleural effusion layering dependently. There is dense right lower lobe  consolidation and right lower lobe air bronchograms are present. There  is also consolidation within the right middle lobe where there is volume  loss. Mild linear subsegmental atelectasis and bronchiectasis are  present within the left lower lobe and lingula. Right middle lobe and  lower lobe consolidation and atelectasis have progressed when compared  to the exam 5 days ago.     Imaging through the upper abdomen demonstrates cholecystectomy clips.  Multilevel degenerative disease is present in the mid to lower thoracic  spine.     Impression: 1. When compared to the exam 5 days ago there has developed a new small  right pleural effusion and there is increased consolidation and  atelectasis within the right middle lobe and right lower lobe with air  bronchograms and mild bronchiectasis that may be inflammatory or  infectious in etiology. Recommend follow-up chest CT 4 to 6 weeks.  2. Mild linear subsegmental lingular and left lower lobe atelectasis  with mild bronchiectasis.     Radiation dose reduction techniques were utilized, including automated  exposure control and exposure modulation based on body size.        This report was finalized on 11/24/2023 6:09 PM by Dr. Sher Borges M.D on Workstation: [id number]       Scheduled Medications  acetaminophen, 1,000 mg, Oral,  Q8H  bisacodyl, 10 mg, Rectal, Once  ketorolac, 15 mg, Intravenous, TID  Lidocaine, 2 patch, Transdermal, Q24H  omeprazole-sodium bicarbonate, 1 capsule, Oral, QAM AC  penicillin g (potassium), 4 Million Units, Intravenous, Q4H  polyethylene glycol, 17 g, Oral, BID  senna-docusate sodium, 1 tablet, Oral, BID  sodium chloride, 10 mL, Intravenous, Q12H    Infusions   Diet  Diet: Regular/House Diet; Texture: Regular Texture (IDDSI 7); Fluid Consistency: Thin (IDDSI 0)    I have personally reviewed     [x]  Laboratory   [x]  Microbiology   []  Radiology   []  EKG/Telemetry  []  Cardiology/Vascular   []  Pathology    []  Records       Assessment/Plan     Active Hospital Problems    Diagnosis  POA    **Right shoulder pain [M25.511]  Yes    Infection, Pasteurella [A28.0]  Unknown    Bacteremia [R78.81]  Yes    Sepsis [A41.9]  Yes    Community acquired pneumonia of right lower lobe of lung [J18.9]  Yes    Pain of right clavicle [M89.8X1]  Yes    Hypokalemia [E87.6]  Yes    Essential hypertension [I10]  Yes      Resolved Hospital Problems   No resolved problems to display.       63 y.o. female admitted with Right shoulder pain.      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   -MRI 11/23/2023 showed right sternoclavicular septic arthritis with synovitis and surrounding soft tissue inflammation associated with myositis of the distal right sternocleidomastoid muscle, right pectoralis sternal attachments.  -Pain not well-controlled on current regimen.  -Currently on Toradol 15 mg 3 times daily for 4 days, omeprazole-sodium bicarbonate in place for GI protection  - IV Dilaudid 1 mg every 2 hours  -Roxicodone 15 mg every 4 hours pRN   -Lidocaine patch   high-dose Tylenol 1000 mg every 8 hour-LFTSs normal  -Monitor and adjust pain management as indicated  -Infectious disease following, ID transitioned to IV penicillin per sensitivities      Constipation:  Aggressive bowel regimen  in the setting of opioids., continue docusate senna, Lasix twice daily.  Ordered suppository.      Pneumonia/right pleural effusion- MRI showed moderate right pleural effusion.  CT scan of the chest on  11/24/2023 showed new small right pleural effusion and there is increased consolidation and atelectasis within the right middle lobe and right lower lobe with air  bronchograms and mild bronchiectasis that may be inflammatory or infectious in etiology.  follow-up chest CT 4 to 6 weeks was recommended.  cardiothoracic surgery following, appreciate recs.      Hypertension: BP soft, hold amlodipine for now.  Monitor.      Anemia: Likely anemia of chronic disease in the setting of infection.  Fluctuating but stable, no signs of bleeding.  Monitor daily.        SCDs for DVT prophylaxis.  Full code.  Discussed with patient and nursing staff.  Anticipate discharge TBD      Copied text in this note has been reviewed and is accurate as of 11/25/23.         Dictated utilizing Dragon dictation        Lorraine Krishna MD  [facility]  11/25/23  12:01 EST

## 2023-11-25 NOTE — PLAN OF CARE
Goal Outcome Evaluation:  Plan of Care Reviewed With: patient        Progress: no change  Outcome Evaluation: VSS, pt's oxygen sat's would drop into the 80's, 2L placed.  Pt had significant pain this shift, treated with PRN pain meds as well as prn flexeril and valium for cramping.  Continue IV antibiotics.  Pt is a standby assist to the bathroom.  Pt is alert and oriented x4.  Plan is pain control and antibiotics at this time.

## 2023-11-26 LAB
ANION GAP SERPL CALCULATED.3IONS-SCNC: 10 MMOL/L (ref 5–15)
BACTERIA SPEC AEROBE CULT: NORMAL
BUN SERPL-MCNC: 11 MG/DL (ref 8–23)
BUN/CREAT SERPL: 16.9 (ref 7–25)
CALCIUM SPEC-SCNC: 9.1 MG/DL (ref 8.6–10.5)
CHLORIDE SERPL-SCNC: 98 MMOL/L (ref 98–107)
CO2 SERPL-SCNC: 28 MMOL/L (ref 22–29)
CREAT SERPL-MCNC: 0.65 MG/DL (ref 0.57–1)
DEPRECATED RDW RBC AUTO: 38.4 FL (ref 37–54)
EGFRCR SERPLBLD CKD-EPI 2021: 99.1 ML/MIN/1.73
ERYTHROCYTE [DISTWIDTH] IN BLOOD BY AUTOMATED COUNT: 11.5 % (ref 12.3–15.4)
FERRITIN SERPL-MCNC: 292 NG/ML (ref 13–150)
FOLATE SERPL-MCNC: 13 NG/ML (ref 4.78–24.2)
GLUCOSE SERPL-MCNC: 75 MG/DL (ref 65–99)
HCT VFR BLD AUTO: 33 % (ref 34–46.6)
HGB BLD-MCNC: 11.2 G/DL (ref 12–15.9)
IRON 24H UR-MRATE: 26 MCG/DL (ref 37–145)
IRON SATN MFR SERPL: 11 % (ref 20–50)
MCH RBC QN AUTO: 31.1 PG (ref 26.6–33)
MCHC RBC AUTO-ENTMCNC: 33.9 G/DL (ref 31.5–35.7)
MCV RBC AUTO: 91.7 FL (ref 79–97)
PLATELET # BLD AUTO: 442 10*3/MM3 (ref 140–450)
PMV BLD AUTO: 8.4 FL (ref 6–12)
POTASSIUM SERPL-SCNC: 4.4 MMOL/L (ref 3.5–5.2)
RBC # BLD AUTO: 3.6 10*6/MM3 (ref 3.77–5.28)
SODIUM SERPL-SCNC: 136 MMOL/L (ref 136–145)
TIBC SERPL-MCNC: 235 MCG/DL (ref 298–536)
TRANSFERRIN SERPL-MCNC: 158 MG/DL (ref 200–360)
VIT B12 BLD-MCNC: 854 PG/ML (ref 211–946)
WBC NRBC COR # BLD AUTO: 8.51 10*3/MM3 (ref 3.4–10.8)

## 2023-11-26 PROCEDURE — 25010000002 KETOROLAC TROMETHAMINE PER 15 MG: Performed by: NURSE PRACTITIONER

## 2023-11-26 PROCEDURE — 82607 VITAMIN B-12: CPT | Performed by: STUDENT IN AN ORGANIZED HEALTH CARE EDUCATION/TRAINING PROGRAM

## 2023-11-26 PROCEDURE — 82746 ASSAY OF FOLIC ACID SERUM: CPT | Performed by: STUDENT IN AN ORGANIZED HEALTH CARE EDUCATION/TRAINING PROGRAM

## 2023-11-26 PROCEDURE — 25010000002 PENICILLIN G POTASSIUM PER 600000 UNITS: Performed by: INTERNAL MEDICINE

## 2023-11-26 PROCEDURE — 25010000002 HYDROMORPHONE 1 MG/ML SOLUTION: Performed by: STUDENT IN AN ORGANIZED HEALTH CARE EDUCATION/TRAINING PROGRAM

## 2023-11-26 PROCEDURE — 63710000001 PROMETHAZINE PER 12.5 MG: Performed by: STUDENT IN AN ORGANIZED HEALTH CARE EDUCATION/TRAINING PROGRAM

## 2023-11-26 PROCEDURE — 84466 ASSAY OF TRANSFERRIN: CPT | Performed by: STUDENT IN AN ORGANIZED HEALTH CARE EDUCATION/TRAINING PROGRAM

## 2023-11-26 PROCEDURE — 80048 BASIC METABOLIC PNL TOTAL CA: CPT | Performed by: STUDENT IN AN ORGANIZED HEALTH CARE EDUCATION/TRAINING PROGRAM

## 2023-11-26 PROCEDURE — 82728 ASSAY OF FERRITIN: CPT | Performed by: STUDENT IN AN ORGANIZED HEALTH CARE EDUCATION/TRAINING PROGRAM

## 2023-11-26 PROCEDURE — 83540 ASSAY OF IRON: CPT | Performed by: STUDENT IN AN ORGANIZED HEALTH CARE EDUCATION/TRAINING PROGRAM

## 2023-11-26 PROCEDURE — 85027 COMPLETE CBC AUTOMATED: CPT | Performed by: STUDENT IN AN ORGANIZED HEALTH CARE EDUCATION/TRAINING PROGRAM

## 2023-11-26 RX ORDER — MELOXICAM 15 MG/1
15 TABLET ORAL DAILY
Status: DISCONTINUED | OUTPATIENT
Start: 2023-11-26 | End: 2023-11-27

## 2023-11-26 RX ORDER — NALOXONE HCL 0.4 MG/ML
0.4 VIAL (ML) INJECTION
Status: DISCONTINUED | OUTPATIENT
Start: 2023-11-26 | End: 2023-12-01 | Stop reason: HOSPADM

## 2023-11-26 RX ADMIN — SODIUM CHLORIDE 4 MILLION UNITS: 9 INJECTION, SOLUTION INTRAVENOUS at 13:43

## 2023-11-26 RX ADMIN — POLYETHYLENE GLYCOL 3350 17 G: 17 POWDER, FOR SOLUTION ORAL at 21:22

## 2023-11-26 RX ADMIN — Medication 10 ML: at 21:22

## 2023-11-26 RX ADMIN — HYDROMORPHONE HYDROCHLORIDE 1 MG: 1 INJECTION, SOLUTION INTRAMUSCULAR; INTRAVENOUS; SUBCUTANEOUS at 07:23

## 2023-11-26 RX ADMIN — SODIUM CHLORIDE 4 MILLION UNITS: 9 INJECTION, SOLUTION INTRAVENOUS at 05:49

## 2023-11-26 RX ADMIN — SODIUM CHLORIDE 4 MILLION UNITS: 9 INJECTION, SOLUTION INTRAVENOUS at 21:34

## 2023-11-26 RX ADMIN — KETOROLAC TROMETHAMINE 15 MG: 15 INJECTION, SOLUTION INTRAMUSCULAR; INTRAVENOUS at 08:28

## 2023-11-26 RX ADMIN — OXYCODONE HYDROCHLORIDE 15 MG: 15 TABLET ORAL at 05:49

## 2023-11-26 RX ADMIN — OMEPRAZOLE AND SODIUM BICARBONATE 1 CAPSULE: 40; 1100 CAPSULE ORAL at 06:43

## 2023-11-26 RX ADMIN — PROMETHAZINE HYDROCHLORIDE 12.5 MG: 12.5 TABLET ORAL at 21:44

## 2023-11-26 RX ADMIN — HYDROMORPHONE HYDROCHLORIDE 1 MG: 1 INJECTION, SOLUTION INTRAMUSCULAR; INTRAVENOUS; SUBCUTANEOUS at 13:43

## 2023-11-26 RX ADMIN — HYDROMORPHONE HYDROCHLORIDE 1 MG: 1 INJECTION, SOLUTION INTRAMUSCULAR; INTRAVENOUS; SUBCUTANEOUS at 04:15

## 2023-11-26 RX ADMIN — LIDOCAINE 2 PATCH: 4 PATCH TOPICAL at 08:28

## 2023-11-26 RX ADMIN — SODIUM CHLORIDE 4 MILLION UNITS: 9 INJECTION, SOLUTION INTRAVENOUS at 17:11

## 2023-11-26 RX ADMIN — ACETAMINOPHEN 1000 MG: 500 TABLET ORAL at 04:15

## 2023-11-26 RX ADMIN — OXYCODONE HYDROCHLORIDE 15 MG: 15 TABLET ORAL at 10:28

## 2023-11-26 RX ADMIN — BISACODYL 10 MG: 10 SUPPOSITORY RECTAL at 05:57

## 2023-11-26 RX ADMIN — ACETAMINOPHEN 1000 MG: 500 TABLET ORAL at 19:43

## 2023-11-26 RX ADMIN — MELOXICAM 15 MG: 15 TABLET ORAL at 17:07

## 2023-11-26 RX ADMIN — Medication 10 ML: at 08:29

## 2023-11-26 RX ADMIN — CYCLOBENZAPRINE 10 MG: 10 TABLET, FILM COATED ORAL at 15:31

## 2023-11-26 RX ADMIN — OXYCODONE HYDROCHLORIDE 15 MG: 15 TABLET ORAL at 15:31

## 2023-11-26 RX ADMIN — ACETAMINOPHEN 1000 MG: 500 TABLET ORAL at 11:53

## 2023-11-26 RX ADMIN — HYDROMORPHONE HYDROCHLORIDE 1 MG: 1 INJECTION, SOLUTION INTRAMUSCULAR; INTRAVENOUS; SUBCUTANEOUS at 17:11

## 2023-11-26 RX ADMIN — DIAZEPAM 2 MG: 2 TABLET ORAL at 13:43

## 2023-11-26 RX ADMIN — CYCLOBENZAPRINE 10 MG: 10 TABLET, FILM COATED ORAL at 05:49

## 2023-11-26 RX ADMIN — DIAZEPAM 2 MG: 2 TABLET ORAL at 07:23

## 2023-11-26 RX ADMIN — HYDROMORPHONE HYDROCHLORIDE 1 MG: 1 INJECTION, SOLUTION INTRAMUSCULAR; INTRAVENOUS; SUBCUTANEOUS at 19:43

## 2023-11-26 RX ADMIN — PROMETHAZINE HYDROCHLORIDE 12.5 MG: 12.5 TABLET ORAL at 10:28

## 2023-11-26 RX ADMIN — OXYCODONE HYDROCHLORIDE 15 MG: 15 TABLET ORAL at 21:44

## 2023-11-26 RX ADMIN — SODIUM CHLORIDE 4 MILLION UNITS: 9 INJECTION, SOLUTION INTRAVENOUS at 01:14

## 2023-11-26 RX ADMIN — DIAZEPAM 2 MG: 2 TABLET ORAL at 00:29

## 2023-11-26 RX ADMIN — HYDROMORPHONE HYDROCHLORIDE 1 MG: 1 INJECTION, SOLUTION INTRAMUSCULAR; INTRAVENOUS; SUBCUTANEOUS at 23:51

## 2023-11-26 RX ADMIN — SODIUM CHLORIDE 4 MILLION UNITS: 9 INJECTION, SOLUTION INTRAVENOUS at 10:22

## 2023-11-26 RX ADMIN — DOCUSATE SODIUM 50MG AND SENNOSIDES 8.6MG 1 TABLET: 8.6; 5 TABLET, FILM COATED ORAL at 08:29

## 2023-11-26 NOTE — PROGRESS NOTES
Name: Madison Acevedo ADMIT: 2023   : 1960  PCP: Paulette Coulter APRN    MRN: [id number] LOS: 6 days   AGE/SEX: 63 y.o. female  ROOM: [facility]     Subjective   Subjective   Sitting up in the bed, no changes.  Continues to have significant pain, primarily with movement..  But at the same time reports breath pain meds make her drowsy.  No fevers, chills, shortness of breath.  Had small bowel movements after suppository this morning.      Review of Systems   As above  Objective   Objective   Vital Signs  Temp:  [97.7 °F (36.5 °C)-98.4 °F (36.9 °C)] 97.9 °F (36.6 °C)  Heart Rate:  [82-95] 88  Resp:  [18-19] 18  BP: ()/(60-69) 119/66  SpO2:  [94 %-99 %] 95 %  on  Flow (L/min):  [2] 2;   Device (Oxygen Therapy): nasal cannula  Body mass index is 29.52 kg/m².  Physical Exam    General: Alert and up in the bed, not in distress,  HEENT: Normocephalic, atraumatic  CV: Regular rate and rhythm, no murmurs rubs or gallops  Lungs: CTA anteriorly, on 2 L nasal cannula,  Abdomen: Soft, nontender, nondistended  Chest: Tenderness palpation in the right sternocleidomastoid joint region, no significant swelling/erythema seen.      Results Review     I reviewed the patient's new clinical results.  Results from last 7 days   Lab Units 23  0508   WBC 10*3/mm3 8.51 10.14 10.82* 9.60   HEMOGLOBIN g/dL 11.2* 10.3* 10.8* 11.5*   PLATELETS 10*3/mm3 442 398 419 379     Results from last 7 days   Lab Units 2358 23  0509   SODIUM mmol/L 136 135* 133* 139   POTASSIUM mmol/L 4.4 4.2 3.8 3.8   CHLORIDE mmol/L 98 97* 95* 103   CO2 mmol/L 28.0 29.2* 29.0 26.2   BUN mg/dL 11 13 13 21   CREATININE mg/dL 0.65 0.73 0.65 0.71   GLUCOSE mg/dL 75 95 95 94   Estimated Creatinine Clearance: 89.5 mL/min (by C-G formula based on SCr of 0.65 mg/dL).  Results from last 7 days   Lab Units 23  0453   ALBUMIN g/dL 3.0*   BILIRUBIN mg/dL 0.3  "  ALK PHOS U/L 74   AST (SGOT) U/L 17   ALT (SGPT) U/L 18     Results from last 7 days   Lab Units 11/26/23  0542 11/25/23  0453 11/24/23  0658 11/23/23  0509   CALCIUM mg/dL 9.1 8.4* 8.9 8.7   ALBUMIN g/dL  --  3.0*  --   --            COVID19   Date Value Ref Range Status   11/19/2023 Not Detected Not Detected - Ref. Range Final     SARS-CoV-2, OLMAN   Date Value Ref Range Status   02/10/2022 Not Detected Not Detected Final     Comment:     Testing was performed using the dara(R) SARS-CoV-2 test.  This nucleic acid amplification test was developed and its performance  characteristics determined by [employer]. Nucleic acid  amplification tests include RT-PCR and TMA. This test has not been  FDA cleared or approved. This test has been authorized by FDA under  an Emergency Use Authorization (EUA). This test is only authorized  for the duration of time the declaration that circumstances exist  justifying the authorization of the emergency use of in vitro  diagnostic tests for detection of SARS-CoV-2 virus and/or diagnosis  of COVID-19 infection under section 564(b)(1) of the Act, 21 U.S.C.  360bbb-3(b) (1), unless the authorization is terminated or revoked  sooner.  When diagnostic testing is negative, the possibility of a false  negative result should be considered in the context of a patient's  recent exposures and the presence of clinical signs and symptoms  consistent with COVID-19. An individual without symptoms of COVID-19  and who is not shedding SARS-CoV-2 virus would expect to have a  negative (not detected) result in this assay.     No results found for: \"HGBA1C\", \"POCGLU\"        CT Chest Without Contrast Diagnostic  Narrative: CT CHEST WITHOUT CONTRAST     HISTORY: Increasing pleural effusion. Shortness of air     TECHNIQUE:  CT chest includes axial imaging from the thoracic inlet to  the upper abdomen without IV contrast. Date reconstructed in coronal and  sagittal planes. Radiation dose reduction " techniques were utilized,  including automated exposure control and exposure modulation based on  body size.     COMPARISON: CT angioma chest 11/19/2023     FINDINGS: There is no evidence for mediastinal julia enlargement though  evaluation limited without intravenous contrast. There is a small right  pleural effusion layering dependently. There is dense right lower lobe  consolidation and right lower lobe air bronchograms are present. There  is also consolidation within the right middle lobe where there is volume  loss. Mild linear subsegmental atelectasis and bronchiectasis are  present within the left lower lobe and lingula. Right middle lobe and  lower lobe consolidation and atelectasis have progressed when compared  to the exam 5 days ago.     Imaging through the upper abdomen demonstrates cholecystectomy clips.  Multilevel degenerative disease is present in the mid to lower thoracic  spine.     Impression: 1. When compared to the exam 5 days ago there has developed a new small  right pleural effusion and there is increased consolidation and  atelectasis within the right middle lobe and right lower lobe with air  bronchograms and mild bronchiectasis that may be inflammatory or  infectious in etiology. Recommend follow-up chest CT 4 to 6 weeks.  2. Mild linear subsegmental lingular and left lower lobe atelectasis  with mild bronchiectasis.     Radiation dose reduction techniques were utilized, including automated  exposure control and exposure modulation based on body size.        This report was finalized on 11/24/2023 6:09 PM by Dr. Sher Borges M.D on Workstation: [id number]       Scheduled Medications  acetaminophen, 1,000 mg, Oral, Q8H  Lidocaine, 2 patch, Transdermal, Q24H  omeprazole-sodium bicarbonate, 1 capsule, Oral, QAM AC  penicillin g (potassium), 4 Million Units, Intravenous, Q4H  polyethylene glycol, 17 g, Oral, BID  senna-docusate sodium, 1 tablet, Oral, BID  sodium chloride, 10 mL,  Intravenous, Q12H    Infusions   Diet  Diet: Regular/House Diet; Texture: Regular Texture (IDDSI 7); Fluid Consistency: Thin (IDDSI 0)    I have personally reviewed     [x]  Laboratory   []  Microbiology   []  Radiology   []  EKG/Telemetry  []  Cardiology/Vascular   []  Pathology    []  Records       Assessment/Plan     Active Hospital Problems    Diagnosis  POA    **Right shoulder pain [M25.511]  Yes    Infection, Pasteurella [A28.0]  Unknown    Bacteremia [R78.81]  Yes    Sepsis [A41.9]  Yes    Community acquired pneumonia of right lower lobe of lung [J18.9]  Yes    Pain of right clavicle [M89.8X1]  Yes    Hypokalemia [E87.6]  Yes    Essential hypertension [I10]  Yes      Resolved Hospital Problems   No resolved problems to display.       63 y.o. female admitted with Right shoulder pain.      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   -MRI 11/23/2023 showed right sternoclavicular septic arthritis with synovitis and surrounding soft tissue inflammation associated with myositis of the distal right sternocleidomastoid muscle, right pectoralis sternal attachments.  - IV Dilaudid 1 mg every 2 as needed  -Roxicodone 15 mg every 4 hours pRN   -Lidocaine patch   high-dose Tylenol 1000 mg every 8 hour-LFTSs normal  -Pulse post IV Toradol  -Monitor and adjust pain management as indicated  -Infectious disease following, ID transitioned to IV penicillin per sensitivities  -On as needed cyclobenzaprine and Valium for muscle spasms  -Patient continues to complain of significant pain, however also feels drowsy with pain medications and muscle relaxants, hesitant to increase pain meds further.  -Cardiothoracic surgery evaluated, no indication for intervention.  -Add meloxicam for pain.      Constipation:  Aggressive bowel regimen in the setting of opioids., continue docusate senna, MiraLAX twice daily.      Pneumonia/right pleural effusion-   MRI showed moderate right pleural  effusion.    CT scan of the chest on  11/24/2023 showed new small right pleural effusion and there is increased consolidation and atelectasis within the right middle lobe and right lower lobe with air  bronchograms and mild bronchiectasis that may be inflammatory or infectious in etiology.  follow-up chest CT 4 to 6 weeks was recommended.  On IV antibiotic as above.      Hypertension: BP soft, hold amlodipine for now.  Monitor.      Anemia: Likely anemia of chronic disease in the setting of infection.  Fluctuating but stable, no signs of bleeding.  Monitor daily.        SCDs for DVT prophylaxis.  Full code.  Discussed with patient  Anticipate discharge home, once pain improved and cleared by consultants.  Timing to be determined.      Copied text in this note has been reviewed and is accurate as of 11/26/23.         Dictated utilizing Dragon dictation        Lorraine Krishna MD  [facility]  11/26/23  12:07 EST

## 2023-11-26 NOTE — PLAN OF CARE
Goal Outcome Evaluation:  Plan of Care Reviewed With: patient        Progress: no change  Outcome Evaluation: VSS.  Pt with complaints of pain, treated with prn pain medications.  Continue IV antibiotics.  Pt is alert and oriented x4, up ad raudel to the bathroom.  Plan is home at discharge when okay with all.

## 2023-11-27 PROBLEM — M00.9: Status: ACTIVE | Noted: 2023-11-27

## 2023-11-27 LAB
ALBUMIN SERPL-MCNC: 2.8 G/DL (ref 3.5–5.2)
ALBUMIN/GLOB SERPL: 0.9 G/DL
ALP SERPL-CCNC: 84 U/L (ref 39–117)
ALT SERPL W P-5'-P-CCNC: 16 U/L (ref 1–33)
ANION GAP SERPL CALCULATED.3IONS-SCNC: 7.8 MMOL/L (ref 5–15)
AST SERPL-CCNC: 17 U/L (ref 1–32)
BILIRUB SERPL-MCNC: <0.2 MG/DL (ref 0–1.2)
BUN SERPL-MCNC: 7 MG/DL (ref 8–23)
BUN/CREAT SERPL: 8.9 (ref 7–25)
CALCIUM SPEC-SCNC: 8.8 MG/DL (ref 8.6–10.5)
CHLORIDE SERPL-SCNC: 100 MMOL/L (ref 98–107)
CO2 SERPL-SCNC: 29.2 MMOL/L (ref 22–29)
CREAT SERPL-MCNC: 0.79 MG/DL (ref 0.57–1)
DEPRECATED RDW RBC AUTO: 37.8 FL (ref 37–54)
EGFRCR SERPLBLD CKD-EPI 2021: 84.2 ML/MIN/1.73
ERYTHROCYTE [DISTWIDTH] IN BLOOD BY AUTOMATED COUNT: 11.5 % (ref 12.3–15.4)
GLOBULIN UR ELPH-MCNC: 3.2 GM/DL
GLUCOSE SERPL-MCNC: 77 MG/DL (ref 65–99)
HCT VFR BLD AUTO: 30.9 % (ref 34–46.6)
HGB BLD-MCNC: 10.6 G/DL (ref 12–15.9)
MCH RBC QN AUTO: 31.1 PG (ref 26.6–33)
MCHC RBC AUTO-ENTMCNC: 34.3 G/DL (ref 31.5–35.7)
MCV RBC AUTO: 90.6 FL (ref 79–97)
PLATELET # BLD AUTO: 446 10*3/MM3 (ref 140–450)
PMV BLD AUTO: 8.5 FL (ref 6–12)
POTASSIUM SERPL-SCNC: 4.2 MMOL/L (ref 3.5–5.2)
PROT SERPL-MCNC: 6 G/DL (ref 6–8.5)
RBC # BLD AUTO: 3.41 10*6/MM3 (ref 3.77–5.28)
SODIUM SERPL-SCNC: 137 MMOL/L (ref 136–145)
WBC NRBC COR # BLD AUTO: 8.12 10*3/MM3 (ref 3.4–10.8)

## 2023-11-27 PROCEDURE — 99232 SBSQ HOSP IP/OBS MODERATE 35: CPT | Performed by: NURSE PRACTITIONER

## 2023-11-27 PROCEDURE — 85027 COMPLETE CBC AUTOMATED: CPT | Performed by: STUDENT IN AN ORGANIZED HEALTH CARE EDUCATION/TRAINING PROGRAM

## 2023-11-27 PROCEDURE — 84145 PROCALCITONIN (PCT): CPT | Performed by: INTERNAL MEDICINE

## 2023-11-27 PROCEDURE — 25010000002 PENICILLIN G POTASSIUM PER 600000 UNITS: Performed by: INTERNAL MEDICINE

## 2023-11-27 PROCEDURE — 63710000001 PREDNISONE PER 1 MG: Performed by: STUDENT IN AN ORGANIZED HEALTH CARE EDUCATION/TRAINING PROGRAM

## 2023-11-27 PROCEDURE — 63710000001 PROMETHAZINE PER 12.5 MG: Performed by: STUDENT IN AN ORGANIZED HEALTH CARE EDUCATION/TRAINING PROGRAM

## 2023-11-27 PROCEDURE — 25010000002 HYDROMORPHONE 1 MG/ML SOLUTION: Performed by: STUDENT IN AN ORGANIZED HEALTH CARE EDUCATION/TRAINING PROGRAM

## 2023-11-27 PROCEDURE — 80053 COMPREHEN METABOLIC PANEL: CPT | Performed by: STUDENT IN AN ORGANIZED HEALTH CARE EDUCATION/TRAINING PROGRAM

## 2023-11-27 RX ORDER — OXYCODONE HYDROCHLORIDE 15 MG/1
15 TABLET, FILM COATED, EXTENDED RELEASE ORAL EVERY 12 HOURS SCHEDULED
Qty: 14 TABLET | Refills: 0 | Status: DISCONTINUED | OUTPATIENT
Start: 2023-11-27 | End: 2023-11-29

## 2023-11-27 RX ORDER — PREDNISONE 20 MG/1
40 TABLET ORAL ONCE
Status: COMPLETED | OUTPATIENT
Start: 2023-11-27 | End: 2023-11-27

## 2023-11-27 RX ADMIN — NALOXEGOL OXALATE 12.5 MG: 12.5 TABLET, FILM COATED ORAL at 13:50

## 2023-11-27 RX ADMIN — OXYCODONE HYDROCHLORIDE 15 MG: 15 TABLET ORAL at 09:11

## 2023-11-27 RX ADMIN — DIAZEPAM 2 MG: 2 TABLET ORAL at 08:10

## 2023-11-27 RX ADMIN — SODIUM CHLORIDE 4 MILLION UNITS: 9 INJECTION, SOLUTION INTRAVENOUS at 05:32

## 2023-11-27 RX ADMIN — HYDROMORPHONE HYDROCHLORIDE 1 MG: 1 INJECTION, SOLUTION INTRAMUSCULAR; INTRAVENOUS; SUBCUTANEOUS at 06:49

## 2023-11-27 RX ADMIN — Medication 10 ML: at 08:12

## 2023-11-27 RX ADMIN — Medication 10 ML: at 21:50

## 2023-11-27 RX ADMIN — ACETAMINOPHEN 1000 MG: 500 TABLET ORAL at 03:08

## 2023-11-27 RX ADMIN — OMEPRAZOLE AND SODIUM BICARBONATE 1 CAPSULE: 40; 1100 CAPSULE ORAL at 06:50

## 2023-11-27 RX ADMIN — MELOXICAM 15 MG: 15 TABLET ORAL at 08:10

## 2023-11-27 RX ADMIN — PROMETHAZINE HYDROCHLORIDE 12.5 MG: 12.5 TABLET ORAL at 20:06

## 2023-11-27 RX ADMIN — CYCLOBENZAPRINE 10 MG: 10 TABLET, FILM COATED ORAL at 13:50

## 2023-11-27 RX ADMIN — OXYCODONE HYDROCHLORIDE 15 MG: 15 TABLET, FILM COATED, EXTENDED RELEASE ORAL at 12:12

## 2023-11-27 RX ADMIN — PREDNISONE 40 MG: 20 TABLET ORAL at 15:04

## 2023-11-27 RX ADMIN — SODIUM CHLORIDE 4 MILLION UNITS: 9 INJECTION, SOLUTION INTRAVENOUS at 10:10

## 2023-11-27 RX ADMIN — OXYCODONE HYDROCHLORIDE 15 MG: 15 TABLET ORAL at 16:10

## 2023-11-27 RX ADMIN — ACETAMINOPHEN 1000 MG: 500 TABLET ORAL at 12:12

## 2023-11-27 RX ADMIN — SODIUM CHLORIDE 4 MILLION UNITS: 9 INJECTION, SOLUTION INTRAVENOUS at 02:26

## 2023-11-27 RX ADMIN — SODIUM CHLORIDE 4 MILLION UNITS: 9 INJECTION, SOLUTION INTRAVENOUS at 20:08

## 2023-11-27 RX ADMIN — OXYCODONE HYDROCHLORIDE 15 MG: 15 TABLET ORAL at 03:08

## 2023-11-27 RX ADMIN — OXYCODONE HYDROCHLORIDE 15 MG: 15 TABLET ORAL at 20:06

## 2023-11-27 RX ADMIN — ACETAMINOPHEN 1000 MG: 500 TABLET ORAL at 20:06

## 2023-11-27 RX ADMIN — DIAZEPAM 2 MG: 2 TABLET ORAL at 16:10

## 2023-11-27 RX ADMIN — HYDROMORPHONE HYDROCHLORIDE 1 MG: 1 INJECTION, SOLUTION INTRAMUSCULAR; INTRAVENOUS; SUBCUTANEOUS at 13:50

## 2023-11-27 RX ADMIN — LIDOCAINE 2 PATCH: 4 PATCH TOPICAL at 08:10

## 2023-11-27 RX ADMIN — SODIUM CHLORIDE 4 MILLION UNITS: 9 INJECTION, SOLUTION INTRAVENOUS at 13:50

## 2023-11-27 NOTE — PAYOR COMM NOTE
"Madison Acevedo (63 y.o. Female)        PLEASE SEE ATTACHED FOR INPT CONTINUED STAY REVIEW.     REF#[id number]    PLEASE CALL   OR  [phone]    THANK YOU    NICOLE FITZGERALD LPN CCP   Date of Birth   1960    Social Security Number       Address   [address] [id number]    Home Phone   [phone]    MRN   [id number]       Quaker   Scientology    Marital Status                               Admission Date   11/19/23    Admission Type   Emergency    Admitting Provider   Alex Zepeda MD    Attending Provider   Lorraine Krishna MD    Department, Room/Bed   [address], S619/1       Discharge Date       Discharge Disposition       Discharge Destination                                 Attending Provider: Lorraine Krishna MD    Allergies: No Known Allergies    Isolation: None   Infection: None   Code Status: CPR    Ht: 162.6 cm (64\")   Wt: 78 kg (172 lb)    Admission Cmt: None   Principal Problem: Right shoulder pain [M25.511]                   Active Insurance as of 11/19/2023       Primary Coverage       Payor Plan Insurance Group Employer/Plan Group    [employer] [id number]       Payor Plan Address Payor Plan Phone Number Payor Plan Fax Number Effective Dates    [address] [phone]  12/20/2017 - None Entered    [facility] [id number]         Subscriber Name Subscriber Birth Date Member ID       MADISON ACEVEDO 1960 [id number]                     Emergency Contacts        (Rel.) Home Phone Work Phone Mobile Phone    Gold Acevedo (Spouse) [phone] -- [phone]              Oxygen Therapy (last day)       Date/Time SpO2 Device (Oxygen Therapy) Flow (L/min) Oxygen Concentration (%) ETCO2 (mmHg)    11/27/23 0734 97 -- -- -- --    11/26/23 2351 -- nasal cannula 2 -- --    11/26/23 2335 94 nasal cannula 2 -- --    11/26/23 2144 -- nasal cannula 2 -- --    11/26/23 2014 90 nasal " cannula 2 -- --    11/26/23 1256 -- nasal cannula 2 -- --    11/26/23 0744 95 nasal cannula 2 -- --    11/26/23 0029 -- nasal cannula 2 -- --          Intake & Output (last day)         11/26 0701  11/27 0700 11/27 0701  11/28 0700    P.O. 480     Total Intake(mL/kg) 480 (6.2)     Net +480           Urine Unmeasured Occurrence 5 x     Stool Unmeasured Occurrence 1 x           Lines, Drains & Airways       Active LDAs       Name Placement date Placement time Site Days    Peripheral IV 11/26/23 2245 Right;Anterior Forearm 11/26/23 2245  Forearm  less than 1                  Medication Administration Report for Madison Acevedo as of 11/27/23 1103     Legend:    Given Hold Not Given Due Canceled Entry Other Actions    Time Time (Time) Time Time-Action         Discontinued     Completed     Future     MAR Hold     Linked             Medications 11/27/23      acetaminophen (TYLENOL) tablet 1,000 mg  Dose: 1,000 mg  Freq: Every 8 Hours Route: PO  Start: 11/24/23 1145   Admin Instructions:   Based on patient request - if ordered for moderate or severe pain, provider allows for administration of a medication prescribed for a lower pain scale.  Based on patient request - if ordered for moderate or severe pain, provider allows for administration of a medication prescribed for a lower pain scale.    Do not exceed 4 grams of acetaminophen in a 24 hr period. Max dose of 2gm for AST/ALT greater than 120 units/L.    If given for pain, use the following pain scale:   Mild Pain = Pain Score of 1-3, CPOT 1-2  Moderate Pain = Pain Score of 4-6, CPOT 3-4  Severe Pain = Pain Score of 7-10, CPOT 5-8    0308-Given     1141     1949                  sennosides-docusate (PERICOLACE) 8.6-50 MG per tablet 2 tablet  Dose: 2 tablet  Freq: 2 Times Daily PRN Route: PO  PRN Reason: Constipation  Start: 11/19/23 1220   Admin Instructions:   HOLD MEDICATION IF PATIENT HAS HAD BOWEL MOVEMENT. Start bowel management regimen if patient has not had a  bowel movement after 12 hours.       And  polyethylene glycol (MIRALAX) packet 17 g  Dose: 17 g  Freq: Daily PRN Route: PO  PRN Reason: Constipation  PRN Comment: Use if senna-docusate is ineffective  Start: 11/19/23 1214   End: 11/24/23 1055   Admin Instructions:   Use if no bowel movement after 12 hours. Mix in 6-8 ounces of water.  Use 4-8 ounces of water, tea, or juice for each 17 gram dose.       And  bisacodyl (DULCOLAX) EC tablet 5 mg  Dose: 5 mg  Freq: Daily PRN Route: PO  PRN Reason: Constipation  PRN Comment: Use if polyethylene glycol is ineffective  Start: 11/19/23 1214   Admin Instructions:   Use if no bowel movement after 12 hours.  Swallow whole. Do not crush, split, or chew tablet.       And  bisacodyl (DULCOLAX) suppository 10 mg  Dose: 10 mg  Freq: Daily PRN Route: RE  PRN Reason: Constipation  PRN Comment: Use if bisacodyl oral is ineffective  Start: 11/19/23 1214   Admin Instructions:   Use if no bowel movement after 12 hours.  Hold for diarrhea        cyclobenzaprine (FLEXERIL) tablet 10 mg  Dose: 10 mg  Freq: 3 Times Daily PRN Route: PO  PRN Reason: Muscle Spasms  Start: 11/19/23 1215        diazePAM (VALIUM) tablet 2 mg  Dose: 2 mg  Freq: Every 6 Hours PRN Route: PO  PRN Reason: Muscle Spasms  Start: 11/22/23 1353   End: 11/29/23 1352   Admin Instructions:    Caution: Look alike/sound alike drug alert. Avoid use with [place]'s Wort.  Avoid grapefruit juice.    0810-Given                    HYDROmorphone (DILAUDID) injection 1 mg  Dose: 1 mg  Freq: Every 2 Hours PRN Route: IV  PRN Reason: Severe Pain  Start: 11/26/23 1050   End: 12/03/23 1049   Admin Instructions:   Based on patient request - if ordered for moderate or severe pain, provider allows for administration of a medication prescribed for a lower pain scale.      Caution: Look alike/sound alike drug alert    If given for pain, use the following pain scale:  Mild Pain = Pain Score of 1-3, CPOT 1-2  Moderate Pain = Pain Score of 4-6,  "CPOT 3-4  Severe Pain = Pain Score of 7-10, CPOT 5-8    0530-Return to Cabinet     0649-Given                  And  naloxone (NARCAN) injection 0.4 mg  Dose: 0.4 mg  Freq: Every 5 Minutes PRN Route: IV  PRN Reason: Respiratory Depression  Start: 11/26/23 1050   Admin Instructions:   If Respiratory Rate Less Than 8 or Patient is Difficult to Arouse, Stop ALL Narcotics & Contact Provider.  Administer Slow IV Push.  Repeat As Ordered Until Respiratory Rate is Greater Than 12.        Lidocaine 4 % 2 patch  Dose: 2 patch  Freq: Every 24 Hours Scheduled Route: TD  Start: 11/23/23 1000   End: 11/29/23 0859   Admin Instructions:   Apply to skin on right shoulder . Remove patch in 12 hours. Apply patch only once for up to 12 hours in 24 hour period. Based on patient request -  if ordered for moderate or severe pain, provider allows for administration of a medication prescribed for a lower pain scale.    0810-Medication Applied     2010 (Medication Removed)                   Magnesium Standard Dose Replacement - Follow Nurse / BPA Driven Protocol  Freq: As Needed Route: XX  PRN Reason: Other  Start: 11/19/23 1222   Admin Instructions:   Open Order & Select \"[facility]HS Electrolyte Replacement Protocol Algorithm\" to View Details        melatonin tablet 5 mg  Dose: 5 mg  Freq: Nightly PRN Route: PO  PRN Reason: Sleep  Start: 11/19/23 1220        meloxicam (MOBIC) tablet 15 mg  Dose: 15 mg  Freq: Daily Route: PO  Start: 11/26/23 1315   Admin Instructions:   Take with food. Based on patient request - if ordered for moderate or severe pain, provider allows for administration of a medication prescribed for a lower pain scale.  If given for pain, use the following pain scale:  Mild Pain = Pain Score of 1-3, CPOT 1-2  Moderate Pain = Pain Score of 4-6, CPOT 3-4  Severe Pain = Pain Score of 7-10, CPOT 5-8    0810-Given                    Naloxegol Oxalate (MOVANTIK) tablet 12.5 mg  Dose: 12.5 mg  Freq: Every Morning Route: PO  Start: 11/27/23 " 1200   Order specific questions:   Is this a continuation of a home medication? No  Is patient on chronic opioid therapy? Yes      1200                    omeprazole-sodium bicarbonate (ZEGERID)  MG per capsule 1 capsule  Dose: 1 capsule  Freq: Every Morning Before Breakfast Route: PO  Start: 11/20/23 1000   Admin Instructions:   Patient supplied. Do not crush or chew the capsules or tablets. The drug may not work as designed if the capsule or tablet is crushed or chewed. Swallow whole.    0650-Given                    oxyCODONE (ROXICODONE) immediate release tablet 15 mg  Dose: 15 mg  Freq: Every 4 Hours PRN Route: PO  PRN Reason: Moderate Pain  Start: 11/23/23 0736   End: 11/30/23 0735   Admin Instructions:   Based on patient request - if ordered for moderate or severe pain, provider allows for administration of a medication prescribed for a lower pain scale.      If given for pain, use the following pain scale:  Mild Pain = Pain Score of 1-3, CPOT 1-2  Moderate Pain = Pain Score of 4-6, CPOT 3-4  Severe Pain = Pain Score of 7-10, CPOT 5-8    0308-Given     0911-Given                   oxyCODONE ER (oxyCONTIN) 12 hr tablet 15 mg  Dose: 15 mg  Freq: Every 12 Hours Scheduled Route: PO  Start: 11/27/23 1200   End: 12/04/23 0859   Admin Instructions:   Do not crush or chew the capsules or tablets. The drug may not work as designed if the capsule or tablet is crushed or chewed. Swallow whole.  If given for pain, use the following pain scale:  Mild Pain = Pain Score of 1-3, CPOT 1-2  Moderate Pain = Pain Score of 4-6, CPOT 3-4  Severe Pain = Pain Score of 7-10, CPOT 5-8    1200     2100                   penicillin G potassium 4 Million Units in sodium chloride 0.9 % 100 mL IVPB  Dose: 4 Million Units  Freq: Every 4 Hours Route: IV  Indications of Use: BACTEREMIA,BONE AND/OR JOINT INFECTION  Start: 11/24/23 2000   End: 12/22/23 2159   Admin Instructions:   Caution: Look alike/sound alike drug alert    0226-New  "Bag     0532-New Bag     1010-New Bag     1400     1800     2200               polyethylene glycol (MIRALAX) packet 17 g  Dose: 17 g  Freq: 2 Times Daily Route: PO  Start: 11/24/23 1145   Admin Instructions:   Use 4-8 ounces of water, tea, or juice for each 17 gram dose.    0812-Hold 2100                   Potassium Replacement - Follow Nurse / BPA Driven Protocol  Freq: As Needed Route: XX  PRN Reason: Other  Start: 11/19/23 1222   Admin Instructions:   Open Order & Select \"BHS Electrolyte Replacement Protocol Algorithm\" to View Details        promethazine (PHENERGAN) tablet 12.5 mg  Dose: 12.5 mg  Freq: Every 6 Hours PRN Route: PO  PRN Reasons: Nausea,Vomiting  Start: 11/19/23 1221   Admin Instructions:   \"If multiple N/V medications ordered, use in the following order: Ondansetron, Prochlorperazine, Promethazine. Use PO unless patient refuses or patient unable to swallow.\"         Or  promethazine (PHENERGAN) suppository 12.5 mg  Dose: 12.5 mg  Freq: Every 6 Hours PRN Route: RE  PRN Reasons: Nausea,Vomiting  Start: 11/19/23 1221   Admin Instructions:   \"If multiple N/V medications ordered, use in the following order: Ondansetron, Prochlorperazine, Promethazine. Use PO unless patient refuses or patient unable to swallow.\"        sennosides-docusate (PERICOLACE) 8.6-50 MG per tablet 1 tablet  Dose: 1 tablet  Freq: 2 Times Daily Route: PO  Start: 11/22/23 2100 0812-Hold     2100                   sodium chloride 0.9 % flush 10 mL  Dose: 10 mL  Freq: As Needed Route: IV  PRN Reason: Line Care  Start: 11/19/23 1214        sodium chloride 0.9 % flush 10 mL  Dose: 10 mL  Freq: Every 12 Hours Scheduled Route: IV  Start: 11/19/23 1237    0812-Given     2100                   sodium chloride 0.9 % flush 10 mL  Dose: 10 mL  Freq: As Needed Route: IV  PRN Reason: Line Care  Start: 11/19/23 0852        sodium chloride 0.9 % infusion 40 mL  Dose: 40 mL  Freq: As Needed Route: IV  PRN Reason: Line Care  Start: 11/19/23 " 1214   Admin Instructions:   Following administration of an IV intermittent medication, flush line with 40mL NS at 100mL/hr.       Completed Medications  Medications 11/27/23       acetaminophen (TYLENOL) tablet 1,000 mg  Dose: 1,000 mg  Freq: Once Route: PO  Start: 11/19/23 0911   End: 11/19/23 0905   Admin Instructions:   If given for fever, use fever parameter: fever greater than 100.4 °F  Based on patient request - if ordered for moderate or severe pain, provider allows for administration of a medication prescribed for a lower pain scale.    Do not exceed 4 grams of acetaminophen in a 24 hr period. Max dose of 2gm for AST/ALT greater than 120 units/L.    If given for pain, use the following pain scale:   Mild Pain = Pain Score of 1-3, CPOT 1-2  Moderate Pain = Pain Score of 4-6, CPOT 3-4  Severe Pain = Pain Score of 7-10, CPOT 5-8        bisacodyl (DULCOLAX) suppository 10 mg  Dose: 10 mg  Freq: Once Route: RE  Start: 11/25/23 1030   End: 11/26/23 0557   Admin Instructions:   Hold for diarrhea        cefepime 2000 mg IVPB in 100 ml NS (VTB)  Dose: 2,000 mg  Freq: Once Route: IV  Start: 11/20/23 0900   End: 11/20/23 0850        diazePAM (VALIUM) tablet 2 mg  Dose: 2 mg  Freq: Once As Needed Route: PO  PRN Comment: for claustrophobia with MRI  Start: 11/23/23 0658   End: 11/23/23 0938   Admin Instructions:    Caution: Look alike/sound alike drug alert. Avoid use with [place]'s Wort.  Avoid grapefruit juice.        fentaNYL citrate (PF) (SUBLIMAZE) injection 25 mcg  Dose: 25 mcg  Freq: Once Route: IV  Start: 11/19/23 0949   End: 11/19/23 0935   Admin Instructions:   Use filter needle to withdraw dose from ampule. Based on patient request - if ordered for moderate or severe pain, provider allows for administration of a medication prescribed for a lower pain scale.  If given for pain, use the following pain scale:  Mild Pain = Pain Score of 1-3, CPOT 1-2  Moderate Pain = Pain Score of 4-6, CPOT 3-4  Severe Pain =  Pain Score of 7-10, CPOT 5-8        gadobenate dimeglumine (MULTIHANCE) injection 20 mL  Dose: 20 mL  Freq: Once in Imaging Route: IV  Start: 11/23/23 1200   End: 11/23/23 1104   Admin Instructions:   Vesicant; admin as rapid bolus; flush with 5 mL NS after admin or 20 mL for renal or aortoiliofemoral vasculature        HYDROmorphone (DILAUDID) injection 0.5 mg  Dose: 0.5 mg  Freq: Once Route: IV  Start: 11/19/23 1041   End: 11/19/23 1028   Admin Instructions:   Based on patient request - if ordered for moderate or severe pain, provider allows for administration of a medication prescribed for a lower pain scale.  If given for pain, use the following pain scale:  Mild Pain = Pain Score of 1-3, CPOT 1-2  Moderate Pain = Pain Score of 4-6, CPOT 3-4  Severe Pain = Pain Score of 7-10, CPOT 5-8        iopamidol (ISOVUE-370) 76 % injection 100 mL  Dose: 100 mL  Freq: Once in Imaging Route: IV  Start: 11/19/23 1101   End: 11/19/23 1045        ketorolac (TORADOL) injection 15 mg  Dose: 15 mg  Freq: 3 Times Daily Route: IV  Start: 11/22/23 1600   End: 11/26/23 0828   Admin Instructions:       If given for pain, use the following pain scale:  Mild Pain = Pain Score of 1-3, CPOT 1-2  Moderate Pain = Pain Score of 4-6, CPOT 3-4  Severe Pain = Pain Score of 7-10, CPOT 5-8        ketorolac (TORADOL) injection 15 mg  Dose: 15 mg  Freq: Once Route: IV  Start: 11/19/23 1154   End: 11/19/23 1149   Admin Instructions:   Based on patient request - if ordered for moderate or severe pain, provider allows for administration of a medication prescribed for a lower pain scale.      If given for pain, use the following pain scale:  Mild Pain = Pain Score of 1-3, CPOT 1-2  Moderate Pain = Pain Score of 4-6, CPOT 3-4  Severe Pain = Pain Score of 7-10, CPOT 5-8        ketorolac (TORADOL) injection 30 mg  Dose: 30 mg  Freq: Every 6 Hours PRN Route: IV  PRN Reason: Moderate Pain  Start: 11/19/23 1220   End: 11/22/23 0017   Admin Instructions:   Max  "4 doses      If given for pain, use the following pain scale:  Mild Pain = Pain Score of 1-3, CPOT 1-2  Moderate Pain = Pain Score of 4-6, CPOT 3-4  Severe Pain = Pain Score of 7-10, CPOT 5-8        lactated ringers bolus 1,000 mL  Dose: 1,000 mL  Freq: Once Route: IV  Start: 11/19/23 0909   End: 11/19/23 1009        ondansetron (ZOFRAN) injection 4 mg  Dose: 4 mg  Freq: Once Route: IV  Start: 11/19/23 0911   End: 11/19/23 0906   Admin Instructions:   \"If multiple N/V medications ordered, use in the following order: Ondansetron, Prochlorperazine, Promethazine. Use PO unless patient refuses or patient unable to swallow.\"        piperacillin-tazobactam (ZOSYN) 3.375 g in iso-osmotic dextrose 50 ml (premix)  Dose: 3.375 g  Freq: Once Route: IV  Indications of Use: SEPSIS  Start: 11/19/23 1058   End: 11/19/23 1127   Admin Instructions:   Refrigerate        potassium chloride (K-DUR,KLOR-CON) ER tablet 40 mEq  Dose: 40 mEq  Freq: Every 4 Hours Route: PO  Start: 11/21/23 1015   End: 11/21/23 1445   Admin Instructions:   Do not crush or chew the capsules or tablets. The drug may not work as designed if the capsule or tablet is crushed or chewed. Swallow whole.  Swallow whole; do not crush, split, or chew.        potassium chloride (K-DUR,KLOR-CON) ER tablet 40 mEq  Dose: 40 mEq  Freq: Every 4 Hours Route: PO  Start: 11/19/23 1430   End: 11/19/23 1758   Admin Instructions:   Do not crush or chew the capsules or tablets. The drug may not work as designed if the capsule or tablet is crushed or chewed. Swallow whole.  Swallow whole; do not crush, split, or chew.        vancomycin IVPB 1500 mg in 0.9% NaCl (Premix) 500 mL  Dose: 20 mg/kg  Weight Dosing Info: 76.2 kg  Freq: Once Route: IV  Indications of Use: SEPSIS  Start: 11/19/23 1058   End: 11/19/23 1341       Discontinued Medications  Medications 11/27/23       acetaminophen (TYLENOL) tablet 650 mg  Dose: 650 mg  Freq: Every 6 Hours PRN Route: PO  PRN Reasons: Mild " Pain,Fever  Start: 11/19/23 1315   End: 11/24/23 1053   Admin Instructions:   If given for fever, use fever parameter: fever greater than 100.4 °F  Based on patient request - if ordered for moderate or severe pain, provider allows for administration of a medication prescribed for a lower pain scale.    Do not exceed 4 grams of acetaminophen in a 24 hr period. Max dose of 2gm for AST/ALT greater than 120 units/L.    If given for pain, use the following pain scale:   Mild Pain = Pain Score of 1-3, CPOT 1-2  Moderate Pain = Pain Score of 4-6, CPOT 3-4  Severe Pain = Pain Score of 7-10, CPOT 5-8        amLODIPine (NORVASC) tablet 2.5 mg  Dose: 2.5 mg  Freq: Daily Route: PO  Start: 11/20/23 0900   End: 11/24/23 1050   Admin Instructions:   Hold for SBP less than 100, DBP less than 60  Caution: Look alike/sound alike drug alert. Avoid grapefruit juice.        amLODIPine (NORVASC) tablet 2.5 mg  Dose: 2.5 mg  Freq: Daily Route: PO  Start: 11/19/23 1233   End: 11/19/23 1221   Admin Instructions:   Hold for SBP less than 100, DBP less than 60  Caution: Look alike/sound alike drug alert. Avoid grapefruit juice.        ampicillin-sulbactam (UNASYN) 3 g in sodium chloride 0.9 % 100 mL IVPB-VTB  Dose: 3 g  Freq: Every 6 Hours Route: IV  Indications of Use: BACTEREMIA  Start: 11/21/23 1700   End: 11/24/23 1914   Admin Instructions:   Activate vial before using.        cefepime 2000 mg IVPB in 100 ml NS (VTB)  Dose: 2,000 mg  Freq: Every 8 Hours Route: IV  Indications of Use: BACTEREMIA  Start: 11/20/23 1600   End: 11/21/23 1609        cefepime 2000 mg IVPB in 100 ml NS (VTB)  Dose: 2,000 mg  Freq: Every 8 Hours Route: IV  Indications of Use: BACTEREMIA  Start: 11/20/23 1630   End: 11/20/23 1035        cefTRIAXone (ROCEPHIN) 1,000 mg in sodium chloride 0.9 % 100 mL IVPB-VTB  Dose: 1,000 mg  Freq: Every 24 Hours Route: IV  Indications of Use: PNEUMONIA  Start: 11/19/23 1600   End: 11/19/23 9585   Admin Instructions:   LR should be  paused and flushing of the line with NS is recommended prior to and after completion of ceftriaxone infusion due to incompatibility. Do not co-adminster with calcium-containing solutions.  Caution: Look alike/sound alike drug alert        cefTRIAXone (ROCEPHIN) 2,000 mg in sodium chloride 0.9 % 100 mL IVPB-VTB  Dose: 2,000 mg  Freq: Every 24 Hours Route: IV  Indications of Use: PNEUMONIA  Start: 11/20/23 0900   End: 11/20/23 0803   Admin Instructions:   LR should be paused and flushing of the line with NS is recommended prior to and after completion of ceftriaxone infusion due to incompatibility. Do not co-adminster with calcium-containing solutions.  Caution: Look alike/sound alike drug alert        dexAMETHasone (DECADRON) injection 6 mg  Dose: 6 mg  Freq: Daily Route: IV  Start: 11/19/23 1430   End: 11/21/23 1729   Admin Instructions:   If giving IV, may be pushed over a minimum of 1 minute.        fentaNYL citrate (PF) (SUBLIMAZE) injection 25 mcg  Dose: 25 mcg  Freq: Every 1 Hour PRN Route: IV  PRN Reason: Severe Pain  Start: 11/19/23 0854   End: 11/19/23 1343   Admin Instructions:   Use filter needle to withdraw dose from ampule. Based on patient request - if ordered for moderate or severe pain, provider allows for administration of a medication prescribed for a lower pain scale.  If given for pain, use the following pain scale:  Mild Pain = Pain Score of 1-3, CPOT 1-2  Moderate Pain = Pain Score of 4-6, CPOT 3-4  Severe Pain = Pain Score of 7-10, CPOT 5-8        HYDROmorphone (DILAUDID) injection 1 mg  Dose: 1 mg  Freq: Every 2 Hours PRN Route: IV  PRN Reason: Severe Pain  Start: 11/19/23 1221   End: 11/26/23 1050   Admin Instructions:   Based on patient request - if ordered for moderate or severe pain, provider allows for administration of a medication prescribed for a lower pain scale.      Caution: Look alike/sound alike drug alert    If given for pain, use the following pain scale:  Mild Pain = Pain Score  of 1-3, CPOT 1-2  Moderate Pain = Pain Score of 4-6, CPOT 3-4  Severe Pain = Pain Score of 7-10, CPOT 5-8       And  naloxone (NARCAN) injection 0.4 mg  Dose: 0.4 mg  Freq: Every 5 Minutes PRN Route: IV  PRN Reason: Respiratory Depression  Start: 11/19/23 1221   End: 11/26/23 1050   Admin Instructions:   If Respiratory Rate Less Than 8 or Patient is Difficult to Arouse, Stop ALL Narcotics & Contact Provider.  Administer Slow IV Push.  Repeat As Ordered Until Respiratory Rate is Greater Than 12.        Lidocaine 4 % 1 patch  Dose: 1 patch  Freq: Every 24 Hours Scheduled Route: TD  Start: 11/23/23 0900   End: 11/23/23 0810   Admin Instructions:   Apply to skin on right shoulder . Remove patch in 12 hours. Apply patch only once for up to 12 hours in 24 hour period. Based on patient request -  if ordered for moderate or severe pain, provider allows for administration of a medication prescribed for a lower pain scale.        Lidocaine 4 % 2 patch  Dose: 2 patch  Freq: Every 24 Hours Scheduled Route: TD  Start: 11/24/23 0900   End: 11/23/23 0819   Admin Instructions:   Apply to skin on right shoulder . Remove patch in 12 hours. Apply patch only once for up to 12 hours in 24 hour period. Based on patient request -  if ordered for moderate or severe pain, provider allows for administration of a medication prescribed for a lower pain scale.        Non-Formulary / Patient Supplied Medication  Dose: 40 mg  Freq: Daily Route: PO  Start: 11/20/23 0945   End: 11/20/23 0908   Order specific questions:   Can the patient use their own supply during their hospitalization? Yes  Name of Medication: omeprazole-sodium bicarbonate          oxyCODONE-acetaminophen (PERCOCET)  MG per tablet 1 tablet  Dose: 1 tablet  Freq: Every 4 Hours PRN Route: PO  PRN Reason: Severe Pain  Start: 11/19/23 1220   End: 11/23/23 0737   Admin Instructions:   Based on patient request - if ordered for moderate or severe pain, provider allows for  administration of a medication prescribed for a lower pain scale.  [CARLOS]    Do not exceed 4 grams of acetaminophen in a 24 hr period. Max dose of 2gm for AST/ALT greater than 120 units/L        If given for pain, use the following pain scale:   Mild Pain = Pain Score of 1-3, CPOT 1-2  Moderate Pain = Pain Score of 4-6, CPOT 3-4  Severe Pain = Pain Score of 7-10, CPOT 5-8        Pharmacy to Dose Zosyn  Freq: Continuous PRN Route: XX  PRN Reason: Consult  Indications of Use: BACTEREMIA  Start: 23 1034   End: 23 1110                   Operative/Procedure Notes (last 24 hours)  Notes from 23 1103 through 23 1103   No notes of this type exist for this encounter.          Physician Progress Notes (last 24 hours)        Lorraine Krishna MD at 23 1028              Name: Madison Acevedo ADMIT: 2023   : 1960  PCP: Paulette Coulter APRN    MRN: [id number] LOS: 7 days   AGE/SEX: 63 y.o. female  ROOM: [email or web address]     Subjective   Subjective   Laying in bed, family present at bedside.  Just returned from the bathroom, tearful, continues to complain of moderate to severe pain, not well-controlled on current regimen.    Review of Systems  As above  Objective   Objective   Vital Signs  Temp:  [97.7 °F (36.5 °C)-98.2 °F (36.8 °C)] 97.9 °F (36.6 °C)  Heart Rate:  [90-91] 91  Resp:  [18] 18  BP: (101-125)/(61-77) 125/77  SpO2:  [90 %-97 %] 97 %  on  Flow (L/min):  [2] 2;   Device (Oxygen Therapy): nasal cannula  Body mass index is 29.52 kg/m².  Physical Exam    General: Alert laying in bed, tearful, in distress secondary to pain  HEENT: Normocephalic, atraumatic  CV: Regular rate and rhythm, no murmurs rubs or gallops  Lungs: CTA anteriorly, on 2 L nasal cannula,  Abdomen: Soft, nontender, nondistended  Chest: Tenderness palpation in the right sternocleidomastoid joint region, no significant swelling/erythema seen.      Results Review     I reviewed the patient's new clinical results.  Results  from last 7 days   Lab Units 11/27/23  0638 11/26/23  0542 11/25/23 0453 11/24/23  0658   WBC 10*3/mm3 8.12 8.51 10.14 10.82*   HEMOGLOBIN g/dL 10.6* 11.2* 10.3* 10.8*   PLATELETS 10*3/mm3 446 442 398 419     Results from last 7 days   Lab Units 11/27/23  0638 11/26/23  0542 11/25/23 0453 11/24/23  0658   SODIUM mmol/L 137 136 135* 133*   POTASSIUM mmol/L 4.2 4.4 4.2 3.8   CHLORIDE mmol/L 100 98 97* 95*   CO2 mmol/L 29.2* 28.0 29.2* 29.0   BUN mg/dL 7* 11 13 13   CREATININE mg/dL 0.79 0.65 0.73 0.65   GLUCOSE mg/dL 77 75 95 95   Estimated Creatinine Clearance: 73.6 mL/min (by C-G formula based on SCr of 0.79 mg/dL).  Results from last 7 days   Lab Units 11/27/23  0638 11/25/23 0453   ALBUMIN g/dL 2.8* 3.0*   BILIRUBIN mg/dL <0.2 0.3   ALK PHOS U/L 84 74   AST (SGOT) U/L 17 17   ALT (SGPT) U/L 16 18     Results from last 7 days   Lab Units 11/27/23  0638 11/26/23  0542 11/25/23 0453 11/24/23  0658   CALCIUM mg/dL 8.8 9.1 8.4* 8.9   ALBUMIN g/dL 2.8*  --  3.0*  --            COVID19   Date Value Ref Range Status   11/19/2023 Not Detected Not Detected - Ref. Range Final     SARS-CoV-2, OLMAN   Date Value Ref Range Status   02/10/2022 Not Detected Not Detected Final     Comment:     Testing was performed using the dara(R) SARS-CoV-2 test.  This nucleic acid amplification test was developed and its performance  characteristics determined by [employer]. Nucleic acid  amplification tests include RT-PCR and TMA. This test has not been  FDA cleared or approved. This test has been authorized by FDA under  an Emergency Use Authorization (EUA). This test is only authorized  for the duration of time the declaration that circumstances exist  justifying the authorization of the emergency use of in vitro  diagnostic tests for detection of SARS-CoV-2 virus and/or diagnosis  of COVID-19 infection under section 564(b)(1) of the Act, 21 U.S.C.  360bbb-3(b) (1), unless the authorization is terminated or  "revoked  sooner.  When diagnostic testing is negative, the possibility of a false  negative result should be considered in the context of a patient's  recent exposures and the presence of clinical signs and symptoms  consistent with COVID-19. An individual without symptoms of COVID-19  and who is not shedding SARS-CoV-2 virus would expect to have a  negative (not detected) result in this assay.     No results found for: \"HGBA1C\", \"POCGLU\"        CT Chest Without Contrast Diagnostic  Narrative: CT CHEST WITHOUT CONTRAST     HISTORY: Increasing pleural effusion. Shortness of air     TECHNIQUE:  CT chest includes axial imaging from the thoracic inlet to  the upper abdomen without IV contrast. Date reconstructed in coronal and  sagittal planes. Radiation dose reduction techniques were utilized,  including automated exposure control and exposure modulation based on  body size.     COMPARISON: CT angioma chest 11/19/2023     FINDINGS: There is no evidence for mediastinal julia enlargement though  evaluation limited without intravenous contrast. There is a small right  pleural effusion layering dependently. There is dense right lower lobe  consolidation and right lower lobe air bronchograms are present. There  is also consolidation within the right middle lobe where there is volume  loss. Mild linear subsegmental atelectasis and bronchiectasis are  present within the left lower lobe and lingula. Right middle lobe and  lower lobe consolidation and atelectasis have progressed when compared  to the exam 5 days ago.     Imaging through the upper abdomen demonstrates cholecystectomy clips.  Multilevel degenerative disease is present in the mid to lower thoracic  spine.     Impression: 1. When compared to the exam 5 days ago there has developed a new small  right pleural effusion and there is increased consolidation and  atelectasis within the right middle lobe and right lower lobe with air  bronchograms and mild bronchiectasis " that may be inflammatory or  infectious in etiology. Recommend follow-up chest CT 4 to 6 weeks.  2. Mild linear subsegmental lingular and left lower lobe atelectasis  with mild bronchiectasis.     Radiation dose reduction techniques were utilized, including automated  exposure control and exposure modulation based on body size.        This report was finalized on 11/24/2023 6:09 PM by Dr. Sher Borges M.D on Workstation: [id number]       Scheduled Medications  acetaminophen, 1,000 mg, Oral, Q8H  Lidocaine, 2 patch, Transdermal, Q24H  meloxicam, 15 mg, Oral, Daily  Naloxegol Oxalate, 12.5 mg, Oral, QAM  omeprazole-sodium bicarbonate, 1 capsule, Oral, QAM AC  oxyCODONE, 15 mg, Oral, Q12H  penicillin g (potassium), 4 Million Units, Intravenous, Q4H  polyethylene glycol, 17 g, Oral, BID  senna-docusate sodium, 1 tablet, Oral, BID  sodium chloride, 10 mL, Intravenous, Q12H    Infusions   Diet  Diet: Regular/House Diet; Texture: Regular Texture (IDDSI 7); Fluid Consistency: Thin (IDDSI 0)    I have personally reviewed     [x]  Laboratory   []  Microbiology   []  Radiology   []  EKG/Telemetry  []  Cardiology/Vascular   []  Pathology    []  Records      Assessment/Plan     Active Hospital Problems    Diagnosis  POA    **Right shoulder pain [M25.511]  Yes    Infection, Pasteurella [A28.0]  Unknown    Bacteremia [R78.81]  Yes    Sepsis [A41.9]  Yes    Community acquired pneumonia of right lower lobe of lung [J18.9]  Yes    Pain of right clavicle [M89.8X1]  Yes    Hypokalemia [E87.6]  Yes    Essential hypertension [I10]  Yes      Resolved Hospital Problems   No resolved problems to display.       63 y.o. female admitted with Right shoulder pain.      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   -MRI 11/23/2023 showed right sternoclavicular septic arthritis with synovitis and surrounding soft tissue inflammation associated with myositis of the distal right  sternocleidomastoid muscle, right pectoralis sternal attachments.  -IV Dilaudid 1 mg every 2 as needed  -Roxicodone 15 mg every 4 hours pRN   -Lidocaine patch   -high-dose Tylenol 1000 mg every 8 hour-LFTSs normal  -Continue meloxicam.  -Status post IV Toradol.  -Infectious disease following, ID transitioned to IV penicillin per sensitivities  -On as needed cyclobenzaprine and Valium for muscle spasms  -Cardiothoracic surgery evaluated, no indication for surgical intervention  -Pain still not well-controlled, add OxyContin 15 mg twice daily to current regimen.        Constipation:  Aggressive bowel regimen in the setting of opioids., continue docusate senna, MiraLAX twice daily.  Added Movantik 12.5 mg daily as constipation primarily induced by opiates.      Pneumonia/right pleural effusion-   MRI showed moderate right pleural effusion.    CT scan of the chest on  2023 showed new small right pleural effusion and there is increased consolidation and atelectasis within the right middle lobe and right lower lobe with air  bronchograms and mild bronchiectasis that may be inflammatory or infectious in etiology.  follow-up chest CT 4 to 6 weeks was recommended.  On IV antibiotic as above.      Hypertension: BP soft, hold amlodipine for now.  Monitor.      Anemia: Iron panel consistent with anemia of chronic disease.  B12 and folate normal.  Hemoglobin stable.  Monitor.      SCDs for DVT prophylaxis.  Full code.  Discussed with patient  Anticipate discharge home, once pain improved and cleared by consultants.  Timing to be determined.      Copied text in this note has been reviewed and is accurate as of 23.         Dictated utilizing Dragon dictation        Lorraine Krishna MD  [facility]  23  10:32 EST        Electronically signed by Lorraine Krishna MD at 23 1032       Lorraine Krishna MD at 23 1200              Name: Madison Acevedo ADMIT: 2023   :  1960  PCP: Paulette Coulter APRN    MRN: [id number] LOS: 6 days   AGE/SEX: 63 y.o. female  ROOM: [facility]/1     Subjective   Subjective   Sitting up in the bed, no changes.  Continues to have significant pain, primarily with movement..  But at the same time reports breath pain meds make her drowsy.  No fevers, chills, shortness of breath.  Had small bowel movements after suppository this morning.      Review of Systems  As above  Objective   Objective   Vital Signs  Temp:  [97.7 °F (36.5 °C)-98.4 °F (36.9 °C)] 97.9 °F (36.6 °C)  Heart Rate:  [82-95] 88  Resp:  [18-19] 18  BP: ()/(60-69) 119/66  SpO2:  [94 %-99 %] 95 %  on  Flow (L/min):  [2] 2;   Device (Oxygen Therapy): nasal cannula  Body mass index is 29.52 kg/m².  Physical Exam    General: Alert and up in the bed, not in distress,  HEENT: Normocephalic, atraumatic  CV: Regular rate and rhythm, no murmurs rubs or gallops  Lungs: CTA anteriorly, on 2 L nasal cannula,  Abdomen: Soft, nontender, nondistended  Chest: Tenderness palpation in the right sternocleidomastoid joint region, no significant swelling/erythema seen.      Results Review     I reviewed the patient's new clinical results.  Results from last 7 days   Lab Units 11/26/23 0542 11/25/23 0453 11/24/23 0658 11/23/23  0508   WBC 10*3/mm3 8.51 10.14 10.82* 9.60   HEMOGLOBIN g/dL 11.2* 10.3* 10.8* 11.5*   PLATELETS 10*3/mm3 442 398 419 379     Results from last 7 days   Lab Units 11/26/23 0542 11/25/23 0453 11/24/23 0658 11/23/23  0509   SODIUM mmol/L 136 135* 133* 139   POTASSIUM mmol/L 4.4 4.2 3.8 3.8   CHLORIDE mmol/L 98 97* 95* 103   CO2 mmol/L 28.0 29.2* 29.0 26.2   BUN mg/dL 11 13 13 21   CREATININE mg/dL 0.65 0.73 0.65 0.71   GLUCOSE mg/dL 75 95 95 94   Estimated Creatinine Clearance: 89.5 mL/min (by C-G formula based on SCr of 0.65 mg/dL).  Results from last 7 days   Lab Units 11/25/23  0453   ALBUMIN g/dL 3.0*   BILIRUBIN mg/dL 0.3   ALK PHOS U/L 74   AST (SGOT) U/L 17   ALT (SGPT) U/L  "18     Results from last 7 days   Lab Units 11/26/23  0542 11/25/23  0453 11/24/23  0658 11/23/23  0509   CALCIUM mg/dL 9.1 8.4* 8.9 8.7   ALBUMIN g/dL  --  3.0*  --   --            COVID19   Date Value Ref Range Status   11/19/2023 Not Detected Not Detected - Ref. Range Final     SARS-CoV-2, OLMAN   Date Value Ref Range Status   02/10/2022 Not Detected Not Detected Final     Comment:     Testing was performed using the dara(R) SARS-CoV-2 test.  This nucleic acid amplification test was developed and its performance  characteristics determined by [employer]. Nucleic acid  amplification tests include RT-PCR and TMA. This test has not been  FDA cleared or approved. This test has been authorized by FDA under  an Emergency Use Authorization (EUA). This test is only authorized  for the duration of time the declaration that circumstances exist  justifying the authorization of the emergency use of in vitro  diagnostic tests for detection of SARS-CoV-2 virus and/or diagnosis  of COVID-19 infection under section 564(b)(1) of the Act, 21 U.S.C.  360bbb-3(b) (1), unless the authorization is terminated or revoked  sooner.  When diagnostic testing is negative, the possibility of a false  negative result should be considered in the context of a patient's  recent exposures and the presence of clinical signs and symptoms  consistent with COVID-19. An individual without symptoms of COVID-19  and who is not shedding SARS-CoV-2 virus would expect to have a  negative (not detected) result in this assay.     No results found for: \"HGBA1C\", \"POCGLU\"        CT Chest Without Contrast Diagnostic  Narrative: CT CHEST WITHOUT CONTRAST     HISTORY: Increasing pleural effusion. Shortness of air     TECHNIQUE:  CT chest includes axial imaging from the thoracic inlet to  the upper abdomen without IV contrast. Date reconstructed in coronal and  sagittal planes. Radiation dose reduction techniques were utilized,  including automated " exposure control and exposure modulation based on  body size.     COMPARISON: CT angioma chest 11/19/2023     FINDINGS: There is no evidence for mediastinal julia enlargement though  evaluation limited without intravenous contrast. There is a small right  pleural effusion layering dependently. There is dense right lower lobe  consolidation and right lower lobe air bronchograms are present. There  is also consolidation within the right middle lobe where there is volume  loss. Mild linear subsegmental atelectasis and bronchiectasis are  present within the left lower lobe and lingula. Right middle lobe and  lower lobe consolidation and atelectasis have progressed when compared  to the exam 5 days ago.     Imaging through the upper abdomen demonstrates cholecystectomy clips.  Multilevel degenerative disease is present in the mid to lower thoracic  spine.     Impression: 1. When compared to the exam 5 days ago there has developed a new small  right pleural effusion and there is increased consolidation and  atelectasis within the right middle lobe and right lower lobe with air  bronchograms and mild bronchiectasis that may be inflammatory or  infectious in etiology. Recommend follow-up chest CT 4 to 6 weeks.  2. Mild linear subsegmental lingular and left lower lobe atelectasis  with mild bronchiectasis.     Radiation dose reduction techniques were utilized, including automated  exposure control and exposure modulation based on body size.        This report was finalized on 11/24/2023 6:09 PM by Dr. Sher Borges M.D on Workstation: [id number]       Scheduled Medications  acetaminophen, 1,000 mg, Oral, Q8H  Lidocaine, 2 patch, Transdermal, Q24H  omeprazole-sodium bicarbonate, 1 capsule, Oral, QAM AC  penicillin g (potassium), 4 Million Units, Intravenous, Q4H  polyethylene glycol, 17 g, Oral, BID  senna-docusate sodium, 1 tablet, Oral, BID  sodium chloride, 10 mL, Intravenous, Q12H    Infusions   Diet  Diet:  Regular/House Diet; Texture: Regular Texture (IDDSI 7); Fluid Consistency: Thin (IDDSI 0)    I have personally reviewed     [x]  Laboratory   []  Microbiology   []  Radiology   []  EKG/Telemetry  []  Cardiology/Vascular   []  Pathology    []  Records      Assessment/Plan     Active Hospital Problems    Diagnosis  POA    **Right shoulder pain [M25.511]  Yes    Infection, Pasteurella [A28.0]  Unknown    Bacteremia [R78.81]  Yes    Sepsis [A41.9]  Yes    Community acquired pneumonia of right lower lobe of lung [J18.9]  Yes    Pain of right clavicle [M89.8X1]  Yes    Hypokalemia [E87.6]  Yes    Essential hypertension [I10]  Yes      Resolved Hospital Problems   No resolved problems to display.       63 y.o. female admitted with Right shoulder pain.      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   -MRI 11/23/2023 showed right sternoclavicular septic arthritis with synovitis and surrounding soft tissue inflammation associated with myositis of the distal right sternocleidomastoid muscle, right pectoralis sternal attachments.  - IV Dilaudid 1 mg every 2 as needed  -Roxicodone 15 mg every 4 hours pRN   -Lidocaine patch   high-dose Tylenol 1000 mg every 8 hour-LFTSs normal  -Pulse post IV Toradol  -Monitor and adjust pain management as indicated  -Infectious disease following, ID transitioned to IV penicillin per sensitivities  -On as needed cyclobenzaprine and Valium for muscle spasms  -Patient continues to complain of significant pain, however also feels drowsy with pain medications and muscle relaxants, hesitant to increase pain meds further.  -Cardiothoracic surgery evaluated, no indication for intervention.  -Add meloxicam for pain.      Constipation:  Aggressive bowel regimen in the setting of opioids., continue docusate senna, MiraLAX twice daily.      Pneumonia/right pleural effusion-   MRI showed moderate right pleural effusion.    CT scan of the chest on  11/24/2023  showed new small right pleural effusion and there is increased consolidation and atelectasis within the right middle lobe and right lower lobe with air  bronchograms and mild bronchiectasis that may be inflammatory or infectious in etiology.  follow-up chest CT 4 to 6 weeks was recommended.  On IV antibiotic as above.      Hypertension: BP soft, hold amlodipine for now.  Monitor.      Anemia: Likely anemia of chronic disease in the setting of infection.  Fluctuating but stable, no signs of bleeding.  Monitor daily.        SCDs for DVT prophylaxis.  Full code.  Discussed with patient  Anticipate discharge home, once pain improved and cleared by consultants.  Timing to be determined.      Copied text in this note has been reviewed and is accurate as of 11/26/23.         Dictated utilizing Dragon dictation        Lorraine Krishna MD  [facility]  11/26/23  12:07 EST        Electronically signed by Lorraine Krishna MD at 11/26/23 1228       Consult Notes (last 24 hours)  Notes from 11/26/23 1103 through 11/27/23 1103   No notes of this type exist for this encounter.

## 2023-11-27 NOTE — PLAN OF CARE
Goal Outcome Evaluation:      VSS, 2L NC while sleeping,IV abx continued, pain difficult to control. Patient ambulates to bathroom with standby assistance.

## 2023-11-27 NOTE — PROGRESS NOTES
Name: Madison Acevedo ADMIT: 2023   : 1960  PCP: Paulette Coulter APRN    MRN: [id number] LOS: 7 days   AGE/SEX: 63 y.o. female  ROOM: [facility]     Subjective   Subjective   Laying in bed, family present at bedside.  Just returned from the bathroom, tearful, continues to complain of moderate to severe pain, not well-controlled on current regimen.    Review of Systems   As above  Objective   Objective   Vital Signs  Temp:  [97.7 °F (36.5 °C)-98.2 °F (36.8 °C)] 97.9 °F (36.6 °C)  Heart Rate:  [90-91] 91  Resp:  [18] 18  BP: (101-125)/(61-77) 125/77  SpO2:  [90 %-97 %] 97 %  on  Flow (L/min):  [2] 2;   Device (Oxygen Therapy): nasal cannula  Body mass index is 29.52 kg/m².  Physical Exam    General: Alert laying in bed, tearful, in distress secondary to pain  HEENT: Normocephalic, atraumatic  CV: Regular rate and rhythm, no murmurs rubs or gallops  Lungs: CTA anteriorly, on 2 L nasal cannula,  Abdomen: Soft, nontender, nondistended  Chest: Tenderness palpation in the right sternocleidomastoid joint region, no significant swelling/erythema seen.      Results Review     I reviewed the patient's new clinical results.  Results from last 7 days   Lab Units 23  06   WBC 10*3/mm3 8.12 8.51 10.14 10.82*   HEMOGLOBIN g/dL 10.6* 11.2* 10.3* 10.8*   PLATELETS 10*3/mm3 446 442 398 419     Results from last 7 days   Lab Units 2342 2358   SODIUM mmol/L 137 136 135* 133*   POTASSIUM mmol/L 4.2 4.4 4.2 3.8   CHLORIDE mmol/L 100 98 97* 95*   CO2 mmol/L 29.2* 28.0 29.2* 29.0   BUN mg/dL 7* 11 13 13   CREATININE mg/dL 0.79 0.65 0.73 0.65   GLUCOSE mg/dL 77 75 95 95   Estimated Creatinine Clearance: 73.6 mL/min (by C-G formula based on SCr of 0.79 mg/dL).  Results from last 7 days   Lab Units 23  0638 23  0453   ALBUMIN g/dL 2.8* 3.0*   BILIRUBIN mg/dL <0.2 0.3   ALK PHOS U/L 84 74   AST (SGOT) U/L 17 17   ALT  "(SGPT) U/L 16 18     Results from last 7 days   Lab Units 11/27/23  0638 11/26/23  0542 11/25/23  0453 11/24/23  0658   CALCIUM mg/dL 8.8 9.1 8.4* 8.9   ALBUMIN g/dL 2.8*  --  3.0*  --            COVID19   Date Value Ref Range Status   11/19/2023 Not Detected Not Detected - Ref. Range Final     SARS-CoV-2, OLMAN   Date Value Ref Range Status   02/10/2022 Not Detected Not Detected Final     Comment:     Testing was performed using the dara(R) SARS-CoV-2 test.  This nucleic acid amplification test was developed and its performance  characteristics determined by [facility]. Nucleic acid  amplification tests include RT-PCR and TMA. This test has not been  FDA cleared or approved. This test has been authorized by FDA under  an Emergency Use Authorization (EUA). This test is only authorized  for the duration of time the declaration that circumstances exist  justifying the authorization of the emergency use of in vitro  diagnostic tests for detection of SARS-CoV-2 virus and/or diagnosis  of COVID-19 infection under section 564(b)(1) of the Act, 21 U.S.C.  360bbb-3(b) (1), unless the authorization is terminated or revoked  sooner.  When diagnostic testing is negative, the possibility of a false  negative result should be considered in the context of a patient's  recent exposures and the presence of clinical signs and symptoms  consistent with COVID-19. An individual without symptoms of COVID-19  and who is not shedding SARS-CoV-2 virus would expect to have a  negative (not detected) result in this assay.     No results found for: \"HGBA1C\", \"POCGLU\"        CT Chest Without Contrast Diagnostic  Narrative: CT CHEST WITHOUT CONTRAST     HISTORY: Increasing pleural effusion. Shortness of air     TECHNIQUE:  CT chest includes axial imaging from the thoracic inlet to  the upper abdomen without IV contrast. Date reconstructed in coronal and  sagittal planes. Radiation dose reduction techniques were utilized,  including " automated exposure control and exposure modulation based on  body size.     COMPARISON: CT angioma chest 11/19/2023     FINDINGS: There is no evidence for mediastinal julia enlargement though  evaluation limited without intravenous contrast. There is a small right  pleural effusion layering dependently. There is dense right lower lobe  consolidation and right lower lobe air bronchograms are present. There  is also consolidation within the right middle lobe where there is volume  loss. Mild linear subsegmental atelectasis and bronchiectasis are  present within the left lower lobe and lingula. Right middle lobe and  lower lobe consolidation and atelectasis have progressed when compared  to the exam 5 days ago.     Imaging through the upper abdomen demonstrates cholecystectomy clips.  Multilevel degenerative disease is present in the mid to lower thoracic  spine.     Impression: 1. When compared to the exam 5 days ago there has developed a new small  right pleural effusion and there is increased consolidation and  atelectasis within the right middle lobe and right lower lobe with air  bronchograms and mild bronchiectasis that may be inflammatory or  infectious in etiology. Recommend follow-up chest CT 4 to 6 weeks.  2. Mild linear subsegmental lingular and left lower lobe atelectasis  with mild bronchiectasis.     Radiation dose reduction techniques were utilized, including automated  exposure control and exposure modulation based on body size.        This report was finalized on 11/24/2023 6:09 PM by Dr. Sher Borges M.D on Workstation: [id number]       Scheduled Medications  acetaminophen, 1,000 mg, Oral, Q8H  Lidocaine, 2 patch, Transdermal, Q24H  meloxicam, 15 mg, Oral, Daily  Naloxegol Oxalate, 12.5 mg, Oral, QAM  omeprazole-sodium bicarbonate, 1 capsule, Oral, QAM AC  oxyCODONE, 15 mg, Oral, Q12H  penicillin g (potassium), 4 Million Units, Intravenous, Q4H  polyethylene glycol, 17 g, Oral, BID  senna-docusate  sodium, 1 tablet, Oral, BID  sodium chloride, 10 mL, Intravenous, Q12H    Infusions   Diet  Diet: Regular/House Diet; Texture: Regular Texture (IDDSI 7); Fluid Consistency: Thin (IDDSI 0)    I have personally reviewed     [x]  Laboratory   []  Microbiology   []  Radiology   []  EKG/Telemetry  []  Cardiology/Vascular   []  Pathology    []  Records       Assessment/Plan     Active Hospital Problems    Diagnosis  POA    **Right shoulder pain [M25.511]  Yes    Infection, Pasteurella [A28.0]  Unknown    Bacteremia [R78.81]  Yes    Sepsis [A41.9]  Yes    Community acquired pneumonia of right lower lobe of lung [J18.9]  Yes    Pain of right clavicle [M89.8X1]  Yes    Hypokalemia [E87.6]  Yes    Essential hypertension [I10]  Yes      Resolved Hospital Problems   No resolved problems to display.       63 y.o. female admitted with Right shoulder pain.      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   -MRI 11/23/2023 showed right sternoclavicular septic arthritis with synovitis and surrounding soft tissue inflammation associated with myositis of the distal right sternocleidomastoid muscle, right pectoralis sternal attachments.  -IV Dilaudid 1 mg every 2 as needed  -Roxicodone 15 mg every 4 hours pRN   -Lidocaine patch   -high-dose Tylenol 1000 mg every 8 hour-LFTSs normal  -Continue meloxicam.  -Status post IV Toradol.  -Infectious disease following, ID transitioned to IV penicillin per sensitivities  -On as needed cyclobenzaprine and Valium for muscle spasms  -Cardiothoracic surgery evaluated, no indication for surgical intervention  -Pain still not well-controlled, add OxyContin 15 mg twice daily to current regimen.        Constipation:  Aggressive bowel regimen in the setting of opioids., continue docusate senna, MiraLAX twice daily.  Added Movantik 12.5 mg daily as constipation primarily induced by opiates.      Pneumonia/right pleural effusion-   MRI showed moderate right  pleural effusion.    CT scan of the chest on  11/24/2023 showed new small right pleural effusion and there is increased consolidation and atelectasis within the right middle lobe and right lower lobe with air  bronchograms and mild bronchiectasis that may be inflammatory or infectious in etiology.  follow-up chest CT 4 to 6 weeks was recommended.  On IV antibiotic as above.      Hypertension: BP soft, hold amlodipine for now.  Monitor.      Anemia: Iron panel consistent with anemia of chronic disease.  B12 and folate normal.  Hemoglobin stable.  Monitor.      SCDs for DVT prophylaxis.  Full code.  Discussed with patient  Anticipate discharge home, once pain improved and cleared by consultants.  Timing to be determined.      Copied text in this note has been reviewed and is accurate as of 11/27/23.         Dictated utilizing Dragon dictation        Lorraine Krishna MD  [facility]  11/27/23  10:32 EST

## 2023-11-27 NOTE — PROGRESS NOTES
"    Chief Complaint: Septic arthritis    Subjective:  Symptoms:  Stable.  No shortness of breath.  (Right shoulder pain).    Diet:  No nausea or vomiting.    Activity level: Impaired due to pain.    Pain:  She complains of pain that is moderate.  Pain is partially controlled.    She reports that she is not ambulating much due to pain.      Vital Signs:  Temp:  [97.7 °F (36.5 °C)-98.1 °F (36.7 °C)] 97.9 °F (36.6 °C)  Heart Rate:  [90-91] 91  Resp:  [18] 18  BP: (108-125)/(68-77) 125/77    Intake & Output (last day)         11/26 0701  11/27 0700 11/27 0701 11/28 0700    P.O. 480     Total Intake(mL/kg) 480 (6.2)     Net +480           Urine Unmeasured Occurrence 5 x     Stool Unmeasured Occurrence 1 x             Objective:  General Appearance:  Uncomfortable, in no acute distress and in pain.    Vital signs: (most recent): Blood pressure 125/77, pulse 91, temperature 97.9 °F (36.6 °C), temperature source Oral, resp. rate 18, height 162.6 cm (64\"), weight 78 kg (172 lb), SpO2 97%, not currently breastfeeding.  Vital signs are normal.    Lungs:  Normal effort and normal respiratory rate.    Heart: Normal rate.  Regular rhythm.    Abdomen: Abdomen is soft.    Extremities: Decreased range of motion.    Neurological: Patient is alert and oriented to person, place and time.    Skin:  Warm and dry.            Results Review:     I reviewed the patient's new clinical results.  I reviewed the patient's new imaging results and agree with the interpretation.  I reviewed the patient's other test results and agree with the interpretation  Discussed with patient, family at bedside, Dr. Rodriguez.    Imaging Results (Last 24 Hours)       ** No results found for the last 24 hours. **            Lab Results:     Lab Results (last 24 hours)       Procedure Component Value Units Date/Time    CBC (No Diff) [[id number]]  (Abnormal) Collected: 11/27/23 0638    Specimen: Blood Updated: 11/27/23 0832     WBC 8.12 10*3/mm3      RBC 3.41 " 10*6/mm3      Hemoglobin 10.6 g/dL      Hematocrit 30.9 %      MCV 90.6 fL      MCH 31.1 pg      MCHC 34.3 g/dL      RDW 11.5 %      RDW-SD 37.8 fl      MPV 8.5 fL      Platelets 446 10*3/mm3     Comprehensive Metabolic Panel [[id number]]  (Abnormal) Collected: 11/27/23 0638    Specimen: Blood Updated: 11/27/23 0758     Glucose 77 mg/dL      BUN 7 mg/dL      Creatinine 0.79 mg/dL      Sodium 137 mmol/L      Potassium 4.2 mmol/L      Chloride 100 mmol/L      CO2 29.2 mmol/L      Calcium 8.8 mg/dL      Total Protein 6.0 g/dL      Albumin 2.8 g/dL      ALT (SGPT) 16 U/L      AST (SGOT) 17 U/L      Alkaline Phosphatase 84 U/L      Total Bilirubin <0.2 mg/dL      Globulin 3.2 gm/dL      A/G Ratio 0.9 g/dL      BUN/Creatinine Ratio 8.9     Anion Gap 7.8 mmol/L      eGFR 84.2 mL/min/1.73     Narrative:      GFR Normal >60  Chronic Kidney Disease <60  Kidney Failure <15               Assessment & Plan       Right shoulder pain    Essential hypertension    Sepsis    Community acquired pneumonia of right lower lobe of lung    Pain of right clavicle    Hypokalemia    Bacteremia    Infection, Pasteurella       Assessment & Plan    Madison Acevedo is a pleasant 63-year-old female who presented to [facility] with progressive severe right shoulder and chest pain.  Due to concern for sternoclavicular joint abscess, we were asked to see this lady in consult.  The patient underwent MRI of the chest followed by a CT of the chest.    Her images demonstrated right sternoclavicular septic arthritis with synovitis with some surrounding soft tissue inflammation associated with myositis of the distal right sternocleidomastoid muscle, right pectoralis sternal attachments.  There is soft tissue inflammation/edema.  No evidence of abscess or fluid collection.    Septic arthritis of sternoclavicular joint: No fluid collection or abscess as well as no evidence of osteomyelitis which would prohibit any type of surgical intervention as  this would likely not help the patient and only destabilize her joint.    Pleural effusion: This is actually small on CT of the chest.  She has significant lung consolidation, however and will need to improve her pulmonary hygiene.  I have encouraged her to perform incentive spirometry 10 times per hour while awake.  Additionally, I had a discussion with both the patient and RN that she will need to ambulate at least 3 times per day around the nurses desk to reduce her risk of worsening pneumonia and risk of DVT.  Patient verbalizes understanding.    Patient may discharge from our standpoint when okay with all others.  We will follow her on an as-needed basis.  Please call our office if needed.  [phone].  I have arranged for the patient to follow-up with Dr. Rodriguez in 2 weeks.  No further imaging is needed prior to her appointment.    SIMONE Regan  [facility]  11/27/23  13:07 EST    Greater than 28 minutes was spent reviewing the patient's chart, radiographic imaging, labs, provider notes, assessing the patient and developing a plan of care.  This was discussed with the patient and RN.

## 2023-11-28 ENCOUNTER — APPOINTMENT (OUTPATIENT)
Dept: GENERAL RADIOLOGY | Facility: HOSPITAL | Age: 63
DRG: 867 | End: 2023-11-28
Payer: COMMERCIAL

## 2023-11-28 LAB
DEPRECATED RDW RBC AUTO: 37.4 FL (ref 37–54)
ERYTHROCYTE [DISTWIDTH] IN BLOOD BY AUTOMATED COUNT: 11.5 % (ref 12.3–15.4)
HCT VFR BLD AUTO: 30.5 % (ref 34–46.6)
HGB BLD-MCNC: 10.6 G/DL (ref 12–15.9)
MCH RBC QN AUTO: 31.2 PG (ref 26.6–33)
MCHC RBC AUTO-ENTMCNC: 34.8 G/DL (ref 31.5–35.7)
MCV RBC AUTO: 89.7 FL (ref 79–97)
PLATELET # BLD AUTO: 506 10*3/MM3 (ref 140–450)
PMV BLD AUTO: 8.4 FL (ref 6–12)
PROCALCITONIN SERPL-MCNC: 0.64 NG/ML (ref 0–0.25)
RBC # BLD AUTO: 3.4 10*6/MM3 (ref 3.77–5.28)
WBC NRBC COR # BLD AUTO: 11.14 10*3/MM3 (ref 3.4–10.8)

## 2023-11-28 PROCEDURE — 25010000002 HYDROMORPHONE 1 MG/ML SOLUTION: Performed by: STUDENT IN AN ORGANIZED HEALTH CARE EDUCATION/TRAINING PROGRAM

## 2023-11-28 PROCEDURE — 85027 COMPLETE CBC AUTOMATED: CPT | Performed by: STUDENT IN AN ORGANIZED HEALTH CARE EDUCATION/TRAINING PROGRAM

## 2023-11-28 PROCEDURE — 71046 X-RAY EXAM CHEST 2 VIEWS: CPT

## 2023-11-28 PROCEDURE — 63710000001 PROMETHAZINE PER 12.5 MG: Performed by: STUDENT IN AN ORGANIZED HEALTH CARE EDUCATION/TRAINING PROGRAM

## 2023-11-28 PROCEDURE — 25010000002 PENICILLIN G POTASSIUM PER 600000 UNITS: Performed by: INTERNAL MEDICINE

## 2023-11-28 RX ORDER — SODIUM CHLORIDE 0.9 % (FLUSH) 0.9 %
10 SYRINGE (ML) INJECTION EVERY 12 HOURS SCHEDULED
Status: CANCELLED | OUTPATIENT
Start: 2023-11-28

## 2023-11-28 RX ORDER — SODIUM CHLORIDE 0.9 % (FLUSH) 0.9 %
20 SYRINGE (ML) INJECTION AS NEEDED
Status: CANCELLED | OUTPATIENT
Start: 2023-11-28

## 2023-11-28 RX ORDER — SODIUM CHLORIDE 0.9 % (FLUSH) 0.9 %
10 SYRINGE (ML) INJECTION AS NEEDED
Status: CANCELLED | OUTPATIENT
Start: 2023-11-28

## 2023-11-28 RX ADMIN — OXYCODONE HYDROCHLORIDE 15 MG: 15 TABLET, FILM COATED, EXTENDED RELEASE ORAL at 08:02

## 2023-11-28 RX ADMIN — PROMETHAZINE HYDROCHLORIDE 12.5 MG: 12.5 TABLET ORAL at 20:17

## 2023-11-28 RX ADMIN — SODIUM CHLORIDE 4 MILLION UNITS: 9 INJECTION, SOLUTION INTRAVENOUS at 04:02

## 2023-11-28 RX ADMIN — HYDROMORPHONE HYDROCHLORIDE 1 MG: 1 INJECTION, SOLUTION INTRAMUSCULAR; INTRAVENOUS; SUBCUTANEOUS at 12:16

## 2023-11-28 RX ADMIN — OMEPRAZOLE AND SODIUM BICARBONATE 1 CAPSULE: 40; 1100 CAPSULE ORAL at 06:38

## 2023-11-28 RX ADMIN — ACETAMINOPHEN 1000 MG: 500 TABLET ORAL at 04:02

## 2023-11-28 RX ADMIN — SODIUM CHLORIDE 4 MILLION UNITS: 9 INJECTION, SOLUTION INTRAVENOUS at 08:12

## 2023-11-28 RX ADMIN — SODIUM CHLORIDE 4 MILLION UNITS: 9 INJECTION, SOLUTION INTRAVENOUS at 12:16

## 2023-11-28 RX ADMIN — NALOXEGOL OXALATE 12.5 MG: 12.5 TABLET, FILM COATED ORAL at 06:38

## 2023-11-28 RX ADMIN — ACETAMINOPHEN 1000 MG: 500 TABLET ORAL at 12:16

## 2023-11-28 RX ADMIN — SODIUM CHLORIDE 4 MILLION UNITS: 9 INJECTION, SOLUTION INTRAVENOUS at 00:06

## 2023-11-28 RX ADMIN — OXYCODONE HYDROCHLORIDE 15 MG: 15 TABLET, FILM COATED, EXTENDED RELEASE ORAL at 20:16

## 2023-11-28 RX ADMIN — SODIUM CHLORIDE 4 MILLION UNITS: 9 INJECTION, SOLUTION INTRAVENOUS at 20:58

## 2023-11-28 RX ADMIN — HYDROMORPHONE HYDROCHLORIDE 1 MG: 1 INJECTION, SOLUTION INTRAMUSCULAR; INTRAVENOUS; SUBCUTANEOUS at 18:26

## 2023-11-28 RX ADMIN — ACETAMINOPHEN 1000 MG: 500 TABLET ORAL at 18:57

## 2023-11-28 RX ADMIN — Medication 10 ML: at 20:16

## 2023-11-28 RX ADMIN — SODIUM CHLORIDE 4 MILLION UNITS: 9 INJECTION, SOLUTION INTRAVENOUS at 16:47

## 2023-11-28 RX ADMIN — OXYCODONE HYDROCHLORIDE 15 MG: 15 TABLET ORAL at 14:25

## 2023-11-28 RX ADMIN — Medication 10 ML: at 08:02

## 2023-11-28 RX ADMIN — LIDOCAINE 2 PATCH: 4 PATCH TOPICAL at 12:16

## 2023-11-28 RX ADMIN — CYCLOBENZAPRINE 10 MG: 10 TABLET, FILM COATED ORAL at 14:41

## 2023-11-28 RX ADMIN — DOCUSATE SODIUM 50MG AND SENNOSIDES 8.6MG 1 TABLET: 8.6; 5 TABLET, FILM COATED ORAL at 20:16

## 2023-11-28 NOTE — PROGRESS NOTES
Continued Stay Note  [facility]     Patient Name: Madison Acevedo  MRN: [id number]  Today's Date: 11/28/2023    Admit Date: 11/19/2023    Plan: Home with  Home Infusion   Discharge Plan       Row Name 11/28/23 1617       Plan    Plan Home with  Home Infusion    Plan Comments CCP consult/orders noted for home IV antibiotics; referrals sent to [facility] and  Infusion.                   Discharge Codes    No documentation.                 Expected Discharge Date and Time       Expected Discharge Date Expected Discharge Time    Nov 29, 2023               Mag Mathis RN

## 2023-11-28 NOTE — PLAN OF CARE
Goal Outcome Evaluation:      VSS, IV abx continued, patient reports less pain this shift and has been able to walk herself to the bathroom. All needs met.

## 2023-11-28 NOTE — PROGRESS NOTES
Infectious Diseases Progress Note    Grace Hemphill MD     [facility]  [place]: 8 days  Patient Identification:  Name: Madison Acevedo  Age: 63 y.o.  Sex: female  :  1960  MRN: [id number]         Primary Care Physician: Paulette Coulter, SIMONE        Subjective: Pain and discomfort in the right sternoclavicular area is much better.  Denies any fever and chills.  Did have some issues with hypoxia earlier but patient did not have any respiratory distress.  Interval History: See consultation note.  2023 blood culture drawn at the time of admission is now identified as Pasteurella multocida.  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  The last significant bite was in her left hand about a week ago few days prior to onset of the neck pain.  2020 repeat blood cultures so far showed  no growth.  2023 evaluation by orthopedic surgery service reviewed  2023 echocardiogram performed result reviewed  2023 MRI of the neck and sternoclavicular joint area reviewed and shows evidence of septic arthritis with adjacent soft tissue infection.  2023 antibiotics switched to IV penicillin.    Objective:    Scheduled Meds:acetaminophen, 1,000 mg, Oral, Q8H  Lidocaine, 2 patch, Transdermal, Q24H  Naloxegol Oxalate, 12.5 mg, Oral, QAM  omeprazole-sodium bicarbonate, 1 capsule, Oral, QAM AC  oxyCODONE, 15 mg, Oral, Q12H  penicillin g (potassium), 4 Million Units, Intravenous, Q4H  polyethylene glycol, 17 g, Oral, BID  senna-docusate sodium, 1 tablet, Oral, BID  sodium chloride, 10 mL, Intravenous, Q12H      Continuous Infusions:     Vital signs in last 24 hours:  Temp:  [97.5 °F (36.4 °C)-98.2 °F (36.8 °C)] 97.5 °F (36.4 °C)  Heart Rate:  [] 103  Resp:  [18] 18  BP: (119-133)/(73-78) 119/76    Intake/Output:  No intake or  "output data in the 24 hours ending 11/28/23 0716        Exam:  /76 (BP Location: Left arm, Patient Position: Lying)   Pulse 103   Temp 97.5 °F (36.4 °C) (Oral)   Resp 18   Ht 162.6 cm (64\")   Wt 78 kg (172 lb)   SpO2 (!) 88%   BMI 29.52 kg/m²   Patient is examined using the personal protective equipment as per guidelines from infection control for this particular patient as enacted.  Hand washing was performed before and after patient interaction.  General Appearance:    Alert, cooperative, no distress, AAOx3                          Head:    Normocephalic, without obvious abnormality, atraumatic                           Eyes:    PERRL, conjunctivae/corneas clear, EOM's intact, both eyes                         Throat:   Lips, tongue, gums normal; oral mucosa pink and moist                           Neck:   Supple, symmetrical, trachea midline, no JVD                         Lungs:    Clear to auscultation bilaterally, respirations unlabored                 Chest Wall:  Significant improvement the tenderness and erythema of the right sternoclavicular joint.                          Heart:  S1-S2 regular no murmur                  abdomen:   Soft nontender                 Extremities: Resolved tenderness in the neck area.                        Pulses:   Pulses palpable in all extremities                            Skin:   Skin is warm and dry,  no rashes or palpable lesions                  Neurologic: Grossly nonfocal       Data Review:    I reviewed the patient's new clinical results.  Results from last 7 days   Lab Units 11/28/23 0623 11/27/23 0638 11/26/23 0542 11/25/23 0453 11/24/23 0658 11/23/23  0508 11/22/23  0634   WBC 10*3/mm3 11.14* 8.12 8.51 10.14 10.82* 9.60 9.22   HEMOGLOBIN g/dL 10.6* 10.6* 11.2* 10.3* 10.8* 11.5* 10.6*   PLATELETS 10*3/mm3 506* 446 442 398 419 379 331     Results from last 7 days   Lab Units 11/27/23 0638 11/26/23 0542 11/25/23 0453 11/24/23 0658 11/23/23  0509 " 11/22/23  0634 11/21/23  1857 11/21/23  0721   SODIUM mmol/L 137 136 135* 133* 139 138  --  136   POTASSIUM mmol/L 4.2 4.4 4.2 3.8 3.8 4.4 4.5 3.6   CHLORIDE mmol/L 100 98 97* 95* 103 104  --  102   CO2 mmol/L 29.2* 28.0 29.2* 29.0 26.2 26.3  --  26.0   BUN mg/dL 7* 11 13 13 21 19  --  28*   CREATININE mg/dL 0.79 0.65 0.73 0.65 0.71 0.70  --  0.76   CALCIUM mg/dL 8.8 9.1 8.4* 8.9 8.7 8.7  --  8.9   GLUCOSE mg/dL 77 75 95 95 94 93  --  101*     Microbiology Results (last 10 days)       Procedure Component Value - Date/Time    Blood Culture - Blood, Arm, Right [[id number]]  (Normal) Collected: 11/21/23 0002    Lab Status: Final result Specimen: Blood from Arm, Right Updated: 11/26/23 0101     Blood Culture No growth at 5 days    Blood Culture - Blood, Arm, Right [[id number]]  (Normal) Collected: 11/20/23 1107    Lab Status: Final result Specimen: Blood from Arm, Right Updated: 11/25/23 1131     Blood Culture No growth at 5 days    MRSA Screen, PCR (Inpatient) - Swab, Nares [[id number]]  (Normal) Collected: 11/19/23 1442    Lab Status: Final result Specimen: Swab from Nares Updated: 11/19/23 1609     MRSA PCR No MRSA Detected    Narrative:      The negative predictive value of this diagnostic test is high and should only be used to consider de-escalating anti-MRSA therapy. A positive result may indicate colonization with MRSA and must be correlated clinically.    Blood Culture - Blood, Arm, Left [[id number]]  (Abnormal) Collected: 11/19/23 1003    Lab Status: Final result Specimen: Blood from Arm, Left Updated: 11/22/23 0746     Blood Culture Pasteurella multocida     Isolated from --     Gram Stain Aerobic Bottle Gram negative bacilli      Anaerobic Bottle Gram negative bacilli    Narrative:      Refer to previous blood culture collected on 11/19/2023 at 0856 for MICs.      Respiratory Panel PCR w/COVID-19(SARS-CoV-2) [facility]/GELA/[facility] In-House, NP Swab in UTM/VTM, 2 HR TAT - Swab, Nasopharynx [[id number]]  (Normal)  Collected: 11/19/23 0901    Lab Status: Final result Specimen: Swab from Nasopharynx Updated: 11/19/23 1023     ADENOVIRUS, PCR Not Detected     Coronavirus 229E Not Detected     Coronavirus HKU1 Not Detected     Coronavirus NL63 Not Detected     Coronavirus OC43 Not Detected     COVID19 Not Detected     Human Metapneumovirus Not Detected     Human Rhinovirus/Enterovirus Not Detected     Influenza A PCR Not Detected     Influenza B PCR Not Detected     Parainfluenza Virus 1 Not Detected     Parainfluenza Virus 2 Not Detected     Parainfluenza Virus 3 Not Detected     Parainfluenza Virus 4 Not Detected     RSV, PCR Not Detected     Bordetella pertussis pcr Not Detected     Bordetella parapertussis PCR Not Detected     Chlamydophila pneumoniae PCR Not Detected     Mycoplasma pneumo by PCR Not Detected    Narrative:      In the setting of a positive respiratory panel with a viral infection PLUS a negative procalcitonin without other underlying concern for bacterial infection, consider observing off antibiotics or discontinuation of antibiotics and continue supportive care. If the respiratory panel is positive for atypical bacterial infection (Bordetella pertussis, Chlamydophila pneumoniae, or Mycoplasma pneumoniae), consider antibiotic de-escalation to target atypical bacterial infection.    Blood Culture - Blood, Arm, Left [[id number]]  (Abnormal)  (Susceptibility) Collected: 11/19/23 0856    Lab Status: Edited Result - FINAL Specimen: Blood from Arm, Left Updated: 11/24/23 0822     Blood Culture Pasteurella multocida     Isolated from Aerobic and Anaerobic Bottles     Gram Stain Aerobic Bottle Gram negative bacilli      Anaerobic Bottle Gram negative bacilli    Narrative:      Less than seven (7) mL's of blood was collected.  Insufficient quantity may yield false negative results.     requested penicillin & ceftriaxone 11/23/23    Susceptibility        Pasteurella multocida      Not Specified      Ampicillin  Susceptible      Ceftriaxone Susceptible  [1]       Levofloxacin Susceptible      Penicillin G Susceptible  [1]       Trimethoprim + Sulfamethoxazole Susceptible                   [1]  Appended report. These results have been appended to a previously final verified report.                   Blood Culture ID, PCR - Blood, Arm, Left [[id number]] Collected: 11/19/23 0856    Lab Status: Final result Specimen: Blood from Arm, Left Updated: 11/20/23 0122     BCID, PCR Negative by BCID PCR. Culture to Follow.     BOTTLE TYPE Anaerobic Bottle        MRI of the right shoulder report reviewed  MRI of the clavicle and sternoclavicular joint on the right pending  Echocardiogram reviewed    Assessment:    Right shoulder pain    Essential hypertension    Sepsis    Community acquired pneumonia of right lower lobe of lung    Pain of right clavicle    Hypokalemia    Bacteremia    Infection, Pasteurella    Septic arthritis of right sternoclavicular joint  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  Rule out endocarditis-repeat blood cultures are negative and echocardiogram does not show any evidence of valvular process and hence less likely at this time.  Worsening respiratory status despite being on IV antibiotic therapy for 7 days initially consisting of Zosyn and vancomycin followed by ceftriaxone followed by cefepime and Unasyn with de-escalation to penicillin on day #7 of hospitalization-etiology unclear and could very well be due to recurrent aspiration or progressive structural pulmonary process.  My leukocytosis could very well be due to the dose of steroid she received yesterday.    Recommendations/discussion:  As far as the Pasteurella sepsis is concerned patient is on adequate treatment and has been closely followed by thoracic surgery service for need for intervention and currently the care plan appears to be conservative management with close follow-up for need for  intervention down the road.  In this scenario would recommend 4 weeks of IV penicillin from last negative blood culture with assessment by thoracic surgery service for need for intervention.  If it is decided that patient would need intervention such as I&D of the sternoclavicular joint and resection of the clavicle and debridement of surrounding tissue then first if the effective treatment would be the day of surgical intervention if it is performed.  Her declining respiratory status and hypoxia despite adequate course of antibiotic therapy that should address common community-acquired pneumonia based on what she has received in the first 5 days of treatment is concerning.  Agree with plans for pulmonary hygiene with incentive spirometry and keeping an eye on her need for oxygen supplementation.  Would recommend pulmonary consultation and repeat CT scan of the chest.  Midline/PICC line anytime.  Discussed with   Following orders are written for case management:  Please arrange for 28 days of IV penicillin 4,000,000 units every 4 hour as intermittent infusion or daily continuous infusion starting 11/20/2023 with end of the treatment would be 12/18/2023.  Please check weekly CBC CMP and CRP and call abnormal results to Dr. Hemphill at [phone].  Please make sure the patient has scheduled follow-up with thoracic surgery service to decide follow-up imaging of the right sternoclavicular joint area and to decide whether or not patient would need intervention.  I have educated patient to call me on a weekly basis at [phone] to go over review system to monitor antibiotic toxicity and side effects.  Diagnosis: Right sternoclavicular joint septic arthritis with Pasteurella multocida bacteremia and sepsis.    Grace Hemphill MD  11/28/2023  07:16 EST    Parts of this note may be an electronic transcription/translation of spoken language to printed text using the Dragon dictation system.

## 2023-11-28 NOTE — CONSULTS
Pulmonary Consultation     Patient Name: Madison Acevedo  Age/Sex: 63 y.o. female  : 1960  MRN: [id number]    Date of Admission: 2023  Date of Encounter Visit: 23  Encounter Provider: Rai Boyd MD  Referring Provider: No ref. provider found  Place of Service: [facility]  Patient Care Team:  Paulette Coulter APRN as PCP - General (Family Medicine)  Gael Rodriguez MD as Consulting Physician (Cardiology)  Jan Brannon MD as Consulting Physician (Gastroenterology)  Kourtney Farnsworth, NIKKY as Ambulatory  ([facility])      Subjective:     Consulted for: worsening RLL pneumonia     Chief Complaint: Fever and right shoulder pain     History of Present Illness:  Madison Acevedo is a 63 y.o. female with no history of any underlying lung disease, non-smoker, never smoked, with no history of asthma as a child or any recurrent infection who came in because of right shoulder pain that was felt to be due to joint infection.  She had the fluid drained and antibiotic are being managed by infectious disease.  Patient has been having some worsening changes on her follow-up CAT scan and we were consulted for further recommendations.  The patient denies any pleurisy  She is already followed by thoracic surgery and they did not recommend any surgical debridement of the clavicular joint.  On the most recent CAT scan she also has developed a new onset right-sided pleural effusion.  Patient has been afebrile, her white count did improve, it is slightly higher today but is in the borderline range.  She denies any more chills or productive cough, she is using the incentive spirometer and able to pull up to 2000 cc.  She does spend most of the day laying in the bed and need to work on sitting in chair more often  No hemoptysis  No nausea or vomiting  Herblood culture grew Pasteurella multocida with no growth on repeat blood culture after antibiotic initiation.  Echocardiogram did not however have  "any evidence of vegetation the MRI showed some evidence of inflammation with septic arthritis with adjacent soft tissue infection.  She is on penicillin IV.  Decreased breath sounds with dullness to percussion over the right    Pulmonary Functions Testing Results:    No results found for: \"FEV1\", \"FVC\", \"MBX7PTQ\", \"TLC\", \"DLCO\"    Review of Systems:   Review of Systems per admission note  Constitutional:  Negative for chills and fever.   HENT:  Negative for rhinorrhea and sore throat.    Respiratory:  Negative for cough and shortness of breath.    Cardiovascular:  Negative for chest pain and leg swelling.   Gastrointestinal:  Negative for abdominal pain, constipation, diarrhea, nausea and vomiting.   Genitourinary:  Negative for dysuria, frequency and urgency.   Musculoskeletal:  Negative for back pain and myalgias.        Right shoulder pain   Skin:  Negative for rash.   Neurological:  Negative for dizziness and headaches.    Past Medical History:  Past Medical History:   Diagnosis Date    Arrhythmia     palpitations    Arthritis     Breast cyst     COVID-19 09/2021    Esophageal reflux     Esophageal spasm     Essential hypertension 11/19/2020    Fibrocystic disease of breast     Hyperlipidemia     Palpitations 11/19/2020    PONV (postoperative nausea and vomiting)     Vitamin D deficiency        Past Surgical History:   Procedure Laterality Date    BREAST CYST ASPIRATION      CHOLECYSTECTOMY      COLONOSCOPY N/A 01/03/2018    Procedure: COLONOSCOPY TO CECUM AND TERMINAL ILEUM;  Surgeon: Jan Brannon MD;  Location: [facility] ENDOSCOPY;  Service:     ENDOSCOPY      X2    ENDOSCOPY N/A 01/03/2018    Procedure: ESOPHAGOGASTRODUODENOSCOPY WITH BIOPSIES;  Surgeon: Jan Brannon MD;  Location: [facility] ENDOSCOPY;  Service:     STEREOTACTIC BIOPSY / ASPIRATION / EXCISION Left     Bx breast percutan needle core use imag guide    THUMB ARTHROSCOPY      left thumb joint implant    WISDOM TOOTH EXTRACTION         Home " Medications:   Medications Prior to Admission   Medication Sig Dispense Refill Last Dose    acetaminophen (TYLENOL) 325 MG tablet Take 2 tablets by mouth Every 6 (Six) Hours As Needed for Mild Pain.   11/18/2023    amLODIPine (NORVASC) 2.5 MG tablet Take 1 tablet by mouth Daily. 90 tablet 1 11/19/2023    cyclobenzaprine (FLEXERIL) 5 MG tablet Take 1 tablet by mouth 3 (Three) Times a Day As Needed for abdominal wall pain. 30 tablet 1 11/19/2023    omeprazole-sodium bicarbonate (Zegerid)  MG per capsule Take 1 capsule by mouth Daily. 30 capsule 10 11/19/2023    magnesium gluconate (MAGONATE) 500 MG tablet Take 400 mg by mouth Daily As Needed.       VITAMIN D, CHOLECALCIFEROL, PO Take  by mouth Daily.   More than a month       Inpatient Medications:  Scheduled Meds:acetaminophen, 1,000 mg, Oral, Q8H  Lidocaine, 2 patch, Transdermal, Q24H  Naloxegol Oxalate, 12.5 mg, Oral, QAM  omeprazole-sodium bicarbonate, 1 capsule, Oral, QAM AC  oxyCODONE, 15 mg, Oral, Q12H  penicillin g (potassium), 4 Million Units, Intravenous, Q4H  polyethylene glycol, 17 g, Oral, BID  senna-docusate sodium, 1 tablet, Oral, BID  sodium chloride, 10 mL, Intravenous, Q12H      Continuous Infusions:   PRN Meds:.  senna-docusate sodium **AND** [DISCONTINUED] polyethylene glycol **AND** bisacodyl **AND** bisacodyl    cyclobenzaprine    diazePAM    HYDROmorphone **AND** naloxone    Magnesium Standard Dose Replacement - Follow Nurse / BPA Driven Protocol    melatonin    oxyCODONE    Potassium Replacement - Follow Nurse / BPA Driven Protocol    promethazine **OR** promethazine    [COMPLETED] Insert Peripheral IV **AND** sodium chloride    sodium chloride    sodium chloride    Allergies:  No Known Allergies    Past Social History:  Social History     Socioeconomic History    Marital status:     Number of children: 2   Tobacco Use    Smoking status: Former     Packs/day: 0.00     Years: 15.00     Additional pack years: 0.00     Total pack  years: 0.00     Types: Cigarettes     Quit date: 1990     Years since quittin.6    Smokeless tobacco: Never    Tobacco comments:     Have not smoked in 30 years   Vaping Use    Vaping Use: Never used   Substance and Sexual Activity    Alcohol use: Yes     Alcohol/week: 7.0 standard drinks of alcohol     Types: 7 Glasses of wine per week     Comment: vodka daily    Drug use: No    Sexual activity: Yes     Partners: Male     Birth control/protection: Post-menopausal       Past Family History:  Family History   Problem Relation Age of Onset    Heart disease Father         MI/CABG early 60s    Hypertension Father     Lung cancer Father     Stroke Father     GIACOMO disease Sister         esophageal reflux    COPD Sister     Hypertension Sister     Esophageal cancer Mother     Malig Hyperthermia Neg Hx            Objective:   Temp:  [97.5 °F (36.4 °C)-98.4 °F (36.9 °C)] 98.4 °F (36.9 °C)  Heart Rate:  [] 91  Resp:  [18] 18  BP: (119-139)/(73-90) 139/90  SpO2:  [88 %-99 %] 92 %  on  Flow (L/min):  [2] 2 Device (Oxygen Therapy): room air   No intake or output data in the 24 hours ending 23 1113  Body mass index is 29.52 kg/m².      23  0839 23  1431 23  1023   Weight: 76.2 kg (168 lb) 78.2 kg (172 lb 4.8 oz) 78 kg (172 lb)     Weight change:     Physical Exam:   Physical Exam   General:    No acute distress, alert and oriented x4, pleasant                   Head:    Normocephalic, atraumatic. External ears and nose are normal   Eyes:          Conjunctivae and sclerae normal, no icterus, PERRLA, no  discharge   Throat:   No oral lesions, no thrush, oral mucosa moist.    Neck:   Supple, trachea midline. No JVD, no cervical or supraclavicular lymphadenopathy    Lungs:     Tender to palpation and slightly warmer to touch over the right sternoclavicular joint decreased breath sounds with dullness to percussion over the right lower half posteriorly with minimal crackles, no wheezing.  "Respirations regular, even and unlabored.      Heart:    Regular rhythm and normal rate.  No murmurs, gallops, or rubs noted.   Abdomen:     Soft, nontender, nondistended, positive bowel sounds. No hepatosplenomegaly    Extremities:   No clubbing, cyanosis, or edema.     Pulses:   Pulses palpable and equal bilaterally.    Skin:   No bleeding or rash. No bumps, good turgor pressure    Neuro:   Nonfocal.  Moves all extremities well. Strength 5/5 and symmetrical, no sensory deficit    Psychiatric:   Normal mood and affect.     Lab Review:   Results from last 7 days   Lab Units 11/27/23  0638 11/26/23  0542 11/25/23  0453 11/24/23  0658 11/23/23  0509 11/22/23  0634 11/21/23  1857   SODIUM mmol/L 137 136 135* 133* 139 138  --    POTASSIUM mmol/L 4.2 4.4 4.2 3.8 3.8 4.4 4.5   CHLORIDE mmol/L 100 98 97* 95* 103 104  --    CO2 mmol/L 29.2* 28.0 29.2* 29.0 26.2 26.3  --    BUN mg/dL 7* 11 13 13 21 19  --    CREATININE mg/dL 0.79 0.65 0.73 0.65 0.71 0.70  --    GLUCOSE mg/dL 77 75 95 95 94 93  --    CALCIUM mg/dL 8.8 9.1 8.4* 8.9 8.7 8.7  --    AST (SGOT) U/L 17  --  17  --   --   --   --    ALT (SGPT) U/L 16  --  18  --   --   --   --    ALBUMIN g/dL 2.8*  --  3.0*  --   --   --   --          Results from last 7 days   Lab Units 11/28/23  0623 11/27/23  0638 11/26/23  0542 11/25/23  0453 11/24/23  0658 11/23/23  0508 11/22/23  0634   WBC 10*3/mm3 11.14* 8.12 8.51 10.14 10.82* 9.60 9.22   HEMOGLOBIN g/dL 10.6* 10.6* 11.2* 10.3* 10.8* 11.5* 10.6*   HEMATOCRIT % 30.5* 30.9* 33.0* 30.2* 31.6* 33.1* 31.5*   PLATELETS 10*3/mm3 506* 446 442 398 419 379 331   MCV fL 89.7 90.6 91.7 91.2 90.5 91.2 92.1   MCH pg 31.2 31.1 31.1 31.1 30.9 31.7 31.0   MCHC g/dL 34.8 34.3 33.9 34.1 34.2 34.7 33.7   RDW % 11.5* 11.5* 11.5* 11.5* 11.8* 11.5* 11.7*   RDW-SD fl 37.4 37.8 38.4 38.2 38.1 38.4 39.5   MPV fL 8.4 8.5 8.4 8.4 8.7 8.8 9.0                   Invalid input(s): \"LDLCALC\"                                              Imaging:  Imaging " Results (Most Recent)       Procedure Component Value Units Date/Time    CT Chest Without Contrast Diagnostic [[id number]] Collected: 11/24/23 1755     Updated: 11/24/23 1812    Narrative:      CT CHEST WITHOUT CONTRAST     HISTORY: Increasing pleural effusion. Shortness of air     TECHNIQUE:  CT chest includes axial imaging from the thoracic inlet to  the upper abdomen without IV contrast. Date reconstructed in coronal and  sagittal planes. Radiation dose reduction techniques were utilized,  including automated exposure control and exposure modulation based on  body size.     COMPARISON: CT angioma chest 11/19/2023     FINDINGS: There is no evidence for mediastinal julia enlargement though  evaluation limited without intravenous contrast. There is a small right  pleural effusion layering dependently. There is dense right lower lobe  consolidation and right lower lobe air bronchograms are present. There  is also consolidation within the right middle lobe where there is volume  loss. Mild linear subsegmental atelectasis and bronchiectasis are  present within the left lower lobe and lingula. Right middle lobe and  lower lobe consolidation and atelectasis have progressed when compared  to the exam 5 days ago.     Imaging through the upper abdomen demonstrates cholecystectomy clips.  Multilevel degenerative disease is present in the mid to lower thoracic  spine.       Impression:      1. When compared to the exam 5 days ago there has developed a new small  right pleural effusion and there is increased consolidation and  atelectasis within the right middle lobe and right lower lobe with air  bronchograms and mild bronchiectasis that may be inflammatory or  infectious in etiology. Recommend follow-up chest CT 4 to 6 weeks.  2. Mild linear subsegmental lingular and left lower lobe atelectasis  with mild bronchiectasis.     Radiation dose reduction techniques were utilized, including automated  exposure control and exposure  modulation based on body size.        This report was finalized on 11/24/2023 6:09 PM by Dr. Sher Borges M.D on Workstation: [id number]       MRI Chest With & Without Contrast [[id number]] Collected: 11/23/23 1219     Updated: 11/23/23 1234    Narrative:      MRI STERNOCLAVICULAR JOINTS WITH AND WITHOUT CONTRAST     HISTORY: Right sternoclavicular joint infection/pain.     TECHNIQUE: MRI sternoclavicular joint includes coronal T1, STIR as well  as axial T1 fat-sat, T2 fat-sat, sagittal PD fat-sat infiltrated  sequences. Gadolinium was administered intravenously followed axial and  coronal T1 fat-saturated sequences.     COMPARISON: CT neck soft tissues 11/17/2023, CT angiogram chest  11/19/2023.     FINDINGS: There is abnormal synovial thickening and enhancement  surrounding the right sternoclavicular joint consistent with synovitis.  There is also periarticular soft tissue edema and stranding.  Stranding/inflammation extends into the distal right sternocleidomastoid  muscle and into the right pectoralis major and minor muscle sternal  attachments. Edema extends into the overlying deep subcutaneous fat.  There is also edema and inflammation extending posterior to the  manubrium and into the fat of the superior mediastinum. There is no  evidence for abscess or drainable collection. Bone marrow signal within  the medial clavicle and manubrium appears within normal limits and there  is no evidence to suggest osteomyelitis. Inflammation extends into the  suprasternal notch and extends anterior to an exophytic right lower pole  thyroid nodule or cyst that measures 1.7 cm diameter. Inflammation  extends into the medial supraclavicular fossa. There has developed a  right pleural effusion layering dependently that appears small to  moderate in size.       Impression:      1. Right sternoclavicular septic arthritis with synovitis and  surrounding soft tissue inflammation associated with myositis of the  distal right  sternocleidomastoid muscle, right pectoralis sternal  attachments. Soft tissue edema/inflammation extends into the  subcutaneous fat anterior to the sternoclavicular joint and into the  anterior aspect of the superior mediastinum and medial right  supraclavicular fossa. There is no evidence for abscess or drainable  collection.  2. Moderate right pleural effusion layers dependently.  3. Right lower pole thyroid nodule or cyst measures 1.7 cm.     This report was finalized on 11/23/2023 12:31 PM by Dr. Sher Borges M.D on Workstation: [id number]       MRI Shoulder Right Without Contrast [[id number]] Collected: 11/21/23 0755     Updated: 11/21/23 0819    Narrative:      MRI RIGHT SHOULDER WITHOUT CONTRAST     HISTORY: Right shoulder pain. Injury 6 days ago.     TECHNIQUE: MRI right shoulder includes axial PD as well as oblique  coronal PD, T1, T2 fat-sat and oblique sagittal T2 weighted sequences  through the right shoulder without contrast. Patient was in severe pain  during the exam and is claustrophobic. The exam was shortened due to  patient's condition and per the technologist, this is the best possible  exam.     COMPARISON: CT angiogram chest 11/19/2023, right shoulder x-rays  11/19/2023.     FINDINGS: There is intermediate T2 increased signal within the substance  of the supraspinatus tendon representing sprain or tendinopathy. There  is no rotator cuff tear or retraction. AC joint appears within normal  limits. Glenohumeral joint fluid volume is normal. Labral evaluation is  limited by artifact though there is no evidence for labral tear or  paralabral cyst formation. The long head of the biceps tendon is intact.  Periarticular musculature appears normal.       Impression:      Supraspinatus tendinopathy or strain. No rotator cuff tear  or fracture or other evidence for internal derangement. Review of  yesterday's CT demonstrates subtle stranding within the soft tissues  surrounding the right  sternoclavicular joint which could represent  inflammation related to arthritic changes at the sternoclavicular joint.  Sternoclavicular joint sprain/ligamentous injury is also a  consideration. There is no evidence for dislocation or fracture.     This report was finalized on 11/21/2023 8:16 AM by Dr. Sher Borges M.D on Workstation: [id number]       CT Angiogram Chest [[id number]] Collected: 11/19/23 1111     Updated: 11/19/23 1122    Narrative:      CT ANGIOGRAM CHEST-     Radiation dose reduction techniques were utilized, including automated  exposure control and exposure modulation based on body size.     Clinical: Right-sided chest pain     CT scan of the chest performed with intravenous contrast, pulmonary  embolus protocol to include coronal, sagittal and three-dimensional  reconstruction.     FINDINGS:  1. No pulmonary embolus, aortic aneurysm nor dissection. Cardiac size  within normal limits, no pericardial abnormality.     2. There are linear areas of discoid atelectasis demonstrated at both  lung bases. In addition there is curvilinear subpleural atelectasis  along each costophrenic sulcus, greater on the right than the left.  Additionally within the right lower lobe there is an area worrisome for  possible pneumonia extending from the infrahilar region to the lateral  pleural surface. It is somewhat curvilinear in shape. Right upper lobe  and right middle lobe are clear. Left upper lobe is clear. Short-term  follow-up CT chest advised in 4 to 6 weeks after treatment.     3. Tracheobronchial tree is satisfactory in appearance. The esophagus is  normal in course and caliber. No adenopathy. The breast parenchyma is  symmetric and satisfactory in appearance, no axillary adenopathy. The  remainder is unremarkable.                    This report was finalized on 11/19/2023 11:19 AM by Dr. Timmy Hebert M.D on Workstation: [id number]       XR Shoulder 2+ View Right [[id number]] Collected: 11/19/23 0943      Updated: 11/19/23 0947    Narrative:      XR SHOULDER 2+ VW RIGHT-     INDICATIONS: Trauma.     TECHNIQUE: 2 VIEWS OF THE RIGHT SHOULDER     COMPARISON: None available     FINDINGS:     No acute fracture, erosion, or dislocation is identified,  follow-up/further evaluation can be obtained as indications persist.  Small opacity at the right lung base, please see report from chest x-ray  from same date.       Impression:         As described.           This report was finalized on 11/19/2023 9:44 AM by Dr. Lisandro Ruff M.D on Workstation: [id number]       XR Chest 1 View [[id number]] Collected: 11/19/23 0916     Updated: 11/19/23 0924    Narrative:      XR CHEST 1 VW-     HISTORY: Female who is 63 years-old, chest pain     TECHNIQUE: Frontal view of the chest     COMPARISON: 5/24/2020     FINDINGS: Heart, mediastinum and pulmonary vasculature are unremarkable.  Small opacity at the right base may be atelectasis or infiltrate,  follow-up recommended to characterize resolution and to exclude the  possibility of an underlying lesion, CT correlation can be obtained as  indicated. Minimal likely atelectasis at the left base. No pleural  effusion, or pneumothorax. Right hemidiaphragm is elevated. No acute  osseous process.       Impression:      As described.                 This report was finalized on 11/19/2023 9:21 AM by Dr. Lisandro Ruff M.D on Workstation: [id number]                 I personally viewed and interpreted the patient's imaging studies.    Assessment:   Right-sided pneumonia with progression of follow-up x-ray, cannot differentiate pneumonia from atelectasis  Worsening right-sided pleural effusion  Right sternoclavicular joint infection  Sepsis with septic arthritis    Plan:     Antibiotic per infectious disease  At this point no recommendation to proceed with a debridement by the surgical team  The CAT scan from 1124 showed definite progression of the atelectasis/pneumonic infiltrate with  air bronchogram over the right lower lobe with worsening pleural effusion and the patient has no clinical improvement since and may actually be slightly worse.  She is still on room air, and she has good reserve and we we will proceed with repeat chest x-ray and consider further measures accordingly  The amount of pleural effusion is minimal at this point but potentially it can be drained, she have up to 2 cm separation between the lung and the chest wall on some of the cuts based on the CAT scan.  Draining of the effusion should be either for  therapeutic purposes which is not indicated at this point or for diagnostic purposes would need to be entertained if the patient is having clinical sign of infection.  On exam she does have absent breath sounds on the right side, she also have increase in the warmth over the right sternoclavicular joint which is 1 potential source as well.  Patient is encouraged to work better on the pulmonary hygiene measures well we will pursue further work-up,  as a start we will proceed with a chest x-ray and the repeat procalcitonin and then will discuss with the surgical team and the infectious disease team for further recommendation  On the upper part of the CT as well as on the MRI of the chest there  is a fluid collection, will defer to the surgical team and infectious disease if drainage of that fluid collection/possible synovitis would be of any clinical benefit.    Thank you for allowing me to participate in the care of Madison YEN Curtis. Feel free to contact me directly with any further questions or concerns.    Rai Boyd MD  [facility]   11/28/23  11:13 EST    Dictated utilizing Dragon dictation

## 2023-11-28 NOTE — DISCHARGE INSTRUCTIONS
patient to call me on a weekly basis at [phone] to go over review system to monitor antibiotic toxicity and side effects.     Management of pain and pain medication prescription per primary care provider and thoracic surgery service.

## 2023-11-28 NOTE — NURSING NOTE
Spoke with primary RN regarding PICC. Let her know that a midline may be more appropriate line due to the abx only being 20 days, but that if they want a PICC, a single lumen PICC is all that would be needed. RN to message MD and let IV team know.

## 2023-11-28 NOTE — DISCHARGE PLACEMENT REQUEST
"Madison Acevedo (63 y.o. Female)       Date of Birth   1960    Social Security Number       Address   [address] [address]    Home Phone   [phone]    MRN   [id number]       [employer]   [employer]    Marital Status                               Admission Date   11/19/23    Admission Type   Emergency    Admitting Provider   Alex Zepeda MD    Attending Provider   Lorraine Krishna MD    Department, Room/Bed   [address], S619/1       Discharge Date       Discharge Disposition       Discharge Destination                                 Attending Provider: Lorraine Krishna MD    Allergies: No Known Allergies    Isolation: None   Infection: None   Code Status: CPR    Ht: 162.6 cm (64\")   Wt: 78 kg (172 lb)    Admission Cmt: None   Principal Problem: Right shoulder pain [M25.511]                   Active Insurance as of 11/19/2023       Primary Coverage       Payor Plan Insurance Group Employer/Plan Group    [employer] [employer] [id number]       Payor Plan Address Payor Plan Phone Number Payor Plan Fax Number Effective Dates    [address] [phone]  12/20/2017 - None Entered    [facility] [address]         Subscriber Name Subscriber Birth Date Member ID       MADISON ACEVEDO 1960 [id number]                     Emergency Contacts        (Rel.) Home Phone Work Phone Mobile Phone    Gold Acevedo (Spouse) [phone] -- [phone]              "

## 2023-11-28 NOTE — PROGRESS NOTES
Name: Madison Acevedo ADMIT: 2023   : 1960  PCP: Paulette Coulter, SIMONE    MRN: [id number] LOS: 8 days   AGE/SEX: 63 y.o. female  ROOM: [facility], [place]     Subjective   Subjective   Patient seen this morning, reports pain i significantly improved compared to yesterday.  Denies fevers or chills.  Denies shortness of breath, does have nonproductive cough.  Required oxygen at night.      Review of Systems   As above  Objective   Objective   Vital Signs  Temp:  [97.5 °F (36.4 °C)-98.4 °F (36.9 °C)] 97.9 °F (36.6 °C)  Heart Rate:  [] 101  Resp:  [18] 18  BP: (112-139)/(69-90) 112/69  SpO2:  [88 %-97 %] 95 %  on  Flow (L/min):  [2] 2;   Device (Oxygen Therapy): room air  Body mass index is 29.52 kg/m².  Physical Exam    General: Laying in bed, more comfortable, not in distress,  HEENT: Normocephalic, atraumatic  CV: Regular rate and rhythm, no murmurs rubs or gallops  Lungs: CTA anteriorly, decreased in the right lower lung field.  Abdomen: Soft, nontender, nondistended  Extremities: No significant peripheral edema, no cyanosis      Results Review     I reviewed the patient's new clinical results.  Results from last 7 days   Lab Units 23   WBC 10*3/mm3 11.14* 8.12 8.51 10.14   HEMOGLOBIN g/dL 10.6* 10.6* 11.2* 10.3*   PLATELETS 10*3/mm3 506* 446 442 398     Results from last 7 days   Lab Units 2338 2342 2358   SODIUM mmol/L 137 136 135* 133*   POTASSIUM mmol/L 4.2 4.4 4.2 3.8   CHLORIDE mmol/L 100 98 97* 95*   CO2 mmol/L 29.2* 28.0 29.2* 29.0   BUN mg/dL 7* 11 13 13   CREATININE mg/dL 0.79 0.65 0.73 0.65   GLUCOSE mg/dL 77 75 95 95   Estimated Creatinine Clearance: 73.6 mL/min (by C-G formula based on SCr of 0.79 mg/dL).  Results from last 7 days   Lab Units 23  0638 23  0453   ALBUMIN g/dL 2.8* 3.0*   BILIRUBIN mg/dL <0.2 0.3   ALK PHOS U/L 84 74   AST (SGOT) U/L 17 17   ALT (SGPT) U/L 16 18  "    Results from last 7 days   Lab Units 11/27/23  0638 11/26/23  0542 11/25/23  0453 11/24/23  0658   CALCIUM mg/dL 8.8 9.1 8.4* 8.9   ALBUMIN g/dL 2.8*  --  3.0*  --      Results from last 7 days   Lab Units 11/27/23  0638   PROCALCITONIN ng/mL 0.64*       COVID19   Date Value Ref Range Status   11/19/2023 Not Detected Not Detected - Ref. Range Final     SARS-CoV-2, OLMAN   Date Value Ref Range Status   02/10/2022 Not Detected Not Detected Final     Comment:     Testing was performed using the dara(R) SARS-CoV-2 test.  This nucleic acid amplification test was developed and its performance  characteristics determined by [employer]. Nucleic acid  amplification tests include RT-PCR and TMA. This test has not been  FDA cleared or approved. This test has been authorized by FDA under  an Emergency Use Authorization (EUA). This test is only authorized  for the duration of time the declaration that circumstances exist  justifying the authorization of the emergency use of in vitro  diagnostic tests for detection of SARS-CoV-2 virus and/or diagnosis  of COVID-19 infection under section 564(b)(1) of the Act, 21 U.S.C.  360bbb-3(b) (1), unless the authorization is terminated or revoked  sooner.  When diagnostic testing is negative, the possibility of a false  negative result should be considered in the context of a patient's  recent exposures and the presence of clinical signs and symptoms  consistent with COVID-19. An individual without symptoms of COVID-19  and who is not shedding SARS-CoV-2 virus would expect to have a  negative (not detected) result in this assay.     No results found for: \"HGBA1C\", \"POCGLU\"        XR Chest PA & Lateral  Narrative: XR CHEST PA AND LATERAL-     HISTORY: Female who is 63 years-old, pleural effusion     TECHNIQUE: Frontal and lateral views of the chest     COMPARISON: 11/19/2023     FINDINGS: Heart, mediastinum and pulmonary vasculature are unremarkable.  Mild atelectasis or " infiltrate at the lung bases, mild right pleural  effusion, continued follow-up suggested. No pneumothorax. No acute  osseous process.     Impression: As described.     This report was finalized on 11/28/2023 4:18 PM by Dr. Lisandro Ruff M.D on Workstation: [id number]       Scheduled Medications  acetaminophen, 1,000 mg, Oral, Q8H  Lidocaine, 2 patch, Transdermal, Q24H  Naloxegol Oxalate, 12.5 mg, Oral, QAM  omeprazole-sodium bicarbonate, 1 capsule, Oral, QAM AC  oxyCODONE, 15 mg, Oral, Q12H  penicillin g (potassium), 4 Million Units, Intravenous, Q4H  polyethylene glycol, 17 g, Oral, BID  senna-docusate sodium, 1 tablet, Oral, BID  sodium chloride, 10 mL, Intravenous, Q12H    Infusions   Diet  Diet: Regular/House Diet; Texture: Regular Texture (IDDSI 7); Fluid Consistency: Thin (IDDSI 0)    I have personally reviewed     [x]  Laboratory   []  Microbiology   []  Radiology   []  EKG/Telemetry  []  Cardiology/Vascular   []  Pathology    []  Records       Assessment/Plan     Active Hospital Problems    Diagnosis  POA    **Right shoulder pain [M25.511]  Yes    Septic arthritis of right sternoclavicular joint [M00.9]  Unknown    Infection, Pasteurella [A28.0]  Unknown    Bacteremia [R78.81]  Yes    Sepsis [A41.9]  Yes    Community acquired pneumonia of right lower lobe of lung [J18.9]  Yes    Pain of right clavicle [M89.8X1]  Yes    Hypokalemia [E87.6]  Yes    Essential hypertension [I10]  Yes      Resolved Hospital Problems   No resolved problems to display.       63 y.o. female admitted with Right shoulder pain.      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   -MRI 11/23/2023 showed right sternoclavicular septic arthritis with synovitis and surrounding soft tissue inflammation associated with myositis of the distal right sternocleidomastoid muscle, right pectoralis sternal attachments.  -IV Dilaudid 1 mg every 2 as needed  -Roxicodone 15 mg every 4 hours pRN    -Lidocaine patch -high-dose Tylenol 1000 mg every 8 hour-LFTSs normal  -Status post IV Toradol.  -Infectious disease following,  -Continue scheduled OxyContin 15 mg twice daily  -Status post prednisone 40 mg x 1 on 11/27, for anti-inflammatory effect, patient with infection and steroid used as a last resort for anti-inflammatory effects as patient had continued to complain of significant pain despite multiple pain regimens, and no room for increasing opioids due to sedation.  Seems like prednisone helped significantly with the pain.   ID transitioned to IV penicillin per sensitivities  -Continue current pain regimen,  -WBC slightly elevated today, secondary to steroids      Constipation:  Aggressive bowel regimen in the setting of opioids., continue docusate senna, MiraLAX twice daily, Movantik    Pneumonia/right pleural effusion/nocturnal hypoxemia  MRI showed moderate right pleural effusion.    CT scan of the chest on  11/24/2023 showed new small right pleural effusion and there is increased consolidation and atelectasis within the right middle lobe and right lower lobe with air  bronchograms and mild bronchiectasis that may be inflammatory or infectious in etiology.  follow-up chest CT 4 to 6 weeks was recommended.  On IV antibiotic as above.  -Pulmonology consulted,      Hypertension: Stable, on the higher side, may resume home amlodipine if continues to trend up.      Anemia: Iron panel consistent with anemia of chronic disease.  B12 and folate normal.  Hemoglobin stable.  Monitor.    Thyroid nodule versus cyst?: MRI showed right lower pole thyroid nodule or cyst hat  measures 1.7 cm.  TSH ordered.      SCDs for DVT prophylaxis.  Full code.  Discussed with patient  Anticipate discharge home with home health on IV antibiotics, once pain improved and cleared by consultants.  Timing to be determined.      Copied text in this note has been reviewed and is accurate as of 11/28/23.         Dictated utilizing Dragon  dictation        Lorraine Krishna MD  [facility]  11/28/23  21:15 EST

## 2023-11-29 ENCOUNTER — APPOINTMENT (OUTPATIENT)
Dept: GENERAL RADIOLOGY | Facility: HOSPITAL | Age: 63
DRG: 867 | End: 2023-11-29
Payer: COMMERCIAL

## 2023-11-29 ENCOUNTER — APPOINTMENT (OUTPATIENT)
Dept: ULTRASOUND IMAGING | Facility: HOSPITAL | Age: 63
DRG: 867 | End: 2023-11-29
Payer: COMMERCIAL

## 2023-11-29 PROBLEM — K59.00 CONSTIPATION: Status: ACTIVE | Noted: 2023-11-29

## 2023-11-29 LAB
DEPRECATED RDW RBC AUTO: 39.2 FL (ref 37–54)
ERYTHROCYTE [DISTWIDTH] IN BLOOD BY AUTOMATED COUNT: 11.6 % (ref 12.3–15.4)
HCT VFR BLD AUTO: 29.3 % (ref 34–46.6)
HGB BLD-MCNC: 9.9 G/DL (ref 12–15.9)
MCH RBC QN AUTO: 30.8 PG (ref 26.6–33)
MCHC RBC AUTO-ENTMCNC: 33.8 G/DL (ref 31.5–35.7)
MCV RBC AUTO: 91.3 FL (ref 79–97)
PLATELET # BLD AUTO: 506 10*3/MM3 (ref 140–450)
PMV BLD AUTO: 8.4 FL (ref 6–12)
RBC # BLD AUTO: 3.21 10*6/MM3 (ref 3.77–5.28)
TSH SERPL DL<=0.05 MIU/L-ACNC: 2.97 UIU/ML (ref 0.27–4.2)
WBC NRBC COR # BLD AUTO: 8.05 10*3/MM3 (ref 3.4–10.8)

## 2023-11-29 PROCEDURE — 85027 COMPLETE CBC AUTOMATED: CPT | Performed by: STUDENT IN AN ORGANIZED HEALTH CARE EDUCATION/TRAINING PROGRAM

## 2023-11-29 PROCEDURE — 84443 ASSAY THYROID STIM HORMONE: CPT | Performed by: STUDENT IN AN ORGANIZED HEALTH CARE EDUCATION/TRAINING PROGRAM

## 2023-11-29 PROCEDURE — 25010000002 PENICILLIN G POTASSIUM PER 600000 UNITS: Performed by: INTERNAL MEDICINE

## 2023-11-29 PROCEDURE — 76536 US EXAM OF HEAD AND NECK: CPT

## 2023-11-29 PROCEDURE — 02HV33Z INSERTION OF INFUSION DEVICE INTO SUPERIOR VENA CAVA, PERCUTANEOUS APPROACH: ICD-10-PCS | Performed by: HOSPITALIST

## 2023-11-29 PROCEDURE — C1751 CATH, INF, PER/CENT/MIDLINE: HCPCS

## 2023-11-29 PROCEDURE — 25010000002 HYDROMORPHONE 1 MG/ML SOLUTION: Performed by: STUDENT IN AN ORGANIZED HEALTH CARE EDUCATION/TRAINING PROGRAM

## 2023-11-29 RX ORDER — MINERAL OIL 100 G/100G
1 OIL RECTAL ONCE
Status: DISCONTINUED | OUTPATIENT
Start: 2023-11-29 | End: 2023-12-01 | Stop reason: HOSPADM

## 2023-11-29 RX ORDER — OXYCODONE HCL 10 MG/1
20 TABLET, FILM COATED, EXTENDED RELEASE ORAL EVERY 12 HOURS SCHEDULED
Status: DISCONTINUED | OUTPATIENT
Start: 2023-11-29 | End: 2023-12-01 | Stop reason: HOSPADM

## 2023-11-29 RX ORDER — LIDOCAINE 4 G/G
2 PATCH TOPICAL
Status: DISCONTINUED | OUTPATIENT
Start: 2023-11-29 | End: 2023-12-01 | Stop reason: HOSPADM

## 2023-11-29 RX ORDER — SODIUM CHLORIDE 0.9 % (FLUSH) 0.9 %
10 SYRINGE (ML) INJECTION AS NEEDED
Status: DISCONTINUED | OUTPATIENT
Start: 2023-11-29 | End: 2023-12-01 | Stop reason: HOSPADM

## 2023-11-29 RX ORDER — MELOXICAM 15 MG/1
15 TABLET ORAL DAILY
Status: DISCONTINUED | OUTPATIENT
Start: 2023-11-29 | End: 2023-12-01 | Stop reason: HOSPADM

## 2023-11-29 RX ORDER — SODIUM CHLORIDE 0.9 % (FLUSH) 0.9 %
20 SYRINGE (ML) INJECTION AS NEEDED
Status: DISCONTINUED | OUTPATIENT
Start: 2023-11-29 | End: 2023-12-01 | Stop reason: HOSPADM

## 2023-11-29 RX ORDER — OXYCODONE HCL 10 MG/1
10 TABLET, FILM COATED, EXTENDED RELEASE ORAL ONCE
Status: COMPLETED | OUTPATIENT
Start: 2023-11-29 | End: 2023-11-29

## 2023-11-29 RX ORDER — SODIUM CHLORIDE 0.9 % (FLUSH) 0.9 %
10 SYRINGE (ML) INJECTION EVERY 12 HOURS SCHEDULED
Status: DISCONTINUED | OUTPATIENT
Start: 2023-11-29 | End: 2023-12-01 | Stop reason: HOSPADM

## 2023-11-29 RX ORDER — SODIUM CHLORIDE 9 MG/ML
40 INJECTION, SOLUTION INTRAVENOUS AS NEEDED
Status: DISCONTINUED | OUTPATIENT
Start: 2023-11-29 | End: 2023-12-01 | Stop reason: HOSPADM

## 2023-11-29 RX ORDER — BISACODYL 10 MG
10 SUPPOSITORY, RECTAL RECTAL ONCE
Status: COMPLETED | OUTPATIENT
Start: 2023-11-29 | End: 2023-11-29

## 2023-11-29 RX ADMIN — BISACODYL 10 MG: 10 SUPPOSITORY RECTAL at 10:34

## 2023-11-29 RX ADMIN — HYDROMORPHONE HYDROCHLORIDE 1 MG: 1 INJECTION, SOLUTION INTRAMUSCULAR; INTRAVENOUS; SUBCUTANEOUS at 11:01

## 2023-11-29 RX ADMIN — CYCLOBENZAPRINE 10 MG: 10 TABLET, FILM COATED ORAL at 00:44

## 2023-11-29 RX ADMIN — OMEPRAZOLE AND SODIUM BICARBONATE 1 CAPSULE: 40; 1100 CAPSULE ORAL at 06:43

## 2023-11-29 RX ADMIN — OXYCODONE HYDROCHLORIDE 15 MG: 15 TABLET ORAL at 19:11

## 2023-11-29 RX ADMIN — CYCLOBENZAPRINE 10 MG: 10 TABLET, FILM COATED ORAL at 23:41

## 2023-11-29 RX ADMIN — MELOXICAM 15 MG: 15 TABLET ORAL at 10:34

## 2023-11-29 RX ADMIN — SODIUM CHLORIDE 4 MILLION UNITS: 9 INJECTION, SOLUTION INTRAVENOUS at 20:47

## 2023-11-29 RX ADMIN — HYDROMORPHONE HYDROCHLORIDE 1 MG: 1 INJECTION, SOLUTION INTRAMUSCULAR; INTRAVENOUS; SUBCUTANEOUS at 00:44

## 2023-11-29 RX ADMIN — Medication 10 ML: at 20:48

## 2023-11-29 RX ADMIN — HYDROMORPHONE HYDROCHLORIDE 1 MG: 1 INJECTION, SOLUTION INTRAMUSCULAR; INTRAVENOUS; SUBCUTANEOUS at 16:15

## 2023-11-29 RX ADMIN — DOCUSATE SODIUM 50MG AND SENNOSIDES 8.6MG 1 TABLET: 8.6; 5 TABLET, FILM COATED ORAL at 20:48

## 2023-11-29 RX ADMIN — Medication 10 ML: at 09:10

## 2023-11-29 RX ADMIN — NALOXEGOL OXALATE 12.5 MG: 12.5 TABLET, FILM COATED ORAL at 06:43

## 2023-11-29 RX ADMIN — SODIUM CHLORIDE 4 MILLION UNITS: 9 INJECTION, SOLUTION INTRAVENOUS at 00:38

## 2023-11-29 RX ADMIN — SODIUM CHLORIDE 4 MILLION UNITS: 9 INJECTION, SOLUTION INTRAVENOUS at 15:26

## 2023-11-29 RX ADMIN — SODIUM CHLORIDE 4 MILLION UNITS: 9 INJECTION, SOLUTION INTRAVENOUS at 11:01

## 2023-11-29 RX ADMIN — Medication 10 ML: at 15:27

## 2023-11-29 RX ADMIN — CYCLOBENZAPRINE 10 MG: 10 TABLET, FILM COATED ORAL at 16:15

## 2023-11-29 RX ADMIN — ACETAMINOPHEN 1000 MG: 500 TABLET ORAL at 04:08

## 2023-11-29 RX ADMIN — OXYCODONE HYDROCHLORIDE 15 MG: 15 TABLET ORAL at 23:41

## 2023-11-29 RX ADMIN — SODIUM CHLORIDE 4 MILLION UNITS: 9 INJECTION, SOLUTION INTRAVENOUS at 04:08

## 2023-11-29 RX ADMIN — OXYCODONE HYDROCHLORIDE 10 MG: 10 TABLET, FILM COATED, EXTENDED RELEASE ORAL at 12:33

## 2023-11-29 RX ADMIN — LIDOCAINE 2 PATCH: 4 PATCH TOPICAL at 15:55

## 2023-11-29 RX ADMIN — OXYCODONE HYDROCHLORIDE 15 MG: 15 TABLET, FILM COATED, EXTENDED RELEASE ORAL at 09:10

## 2023-11-29 NOTE — PLAN OF CARE
Goal Outcome Evaluation:      VSS, IV abx continued, pain meds PRN. Patient used IS and ambulated around the unit. Her posterior lung sounds have improved somewhat.

## 2023-11-29 NOTE — PLAN OF CARE
Goal Outcome Evaluation:  Plan of Care Reviewed With: patient        Progress: improving  Outcome Evaluation: A&Ox4, RA. Complaints of pain, PRN pain medications given. PICC placed this shift. IV abx continued. NSR-ST on monitor. VSS

## 2023-11-29 NOTE — PAYOR COMM NOTE
"Madison Acevedo (63 y.o. Female)        PLEASE SEE ATTACHED FOR CONTINUED STAY AUTH.     REF#[id number]    PLEASE CALL   OR  [phone]    THANK YOU    NICOLE FITZGERALD LPN CCP   Date of Birth   1960    Social Security Number       Address   [address] [facility] [address] [facility] [address]    Home Phone   [phone]    MRN   [id number]       Orthodoxy   Tenriism    Marital Status                               Admission Date   11/19/23    Admission Type   Emergency    Admitting Provider   Alex Zepeda MD    Attending Provider   Lorraine Krishna MD    Department, Room/Bed   [address], S619/1       Discharge Date       Discharge Disposition       Discharge Destination                                 Attending Provider: Lorraine Krishna MD    Allergies: No Known Allergies    Isolation: None   Infection: None   Code Status: CPR    Ht: 162.6 cm (64\")   Wt: 78 kg (172 lb)    Admission Cmt: None   Principal Problem: Right shoulder pain [M25.511]                   Active Insurance as of 11/19/2023       Primary Coverage       Payor Plan Insurance Group Employer/Plan Group    [employer] [facility] [employer] EMPLOYEE [id number]       Payor Plan Address Payor Plan Phone Number Payor Plan Fax Number Effective Dates    [address] [phone]  12/20/2017 - None Entered    [facility] [address]         Subscriber Name Subscriber Birth Date Member ID       MADISON ACEVEDO 1960 [id number]                     Emergency Contacts        (Rel.) Home Phone Work Phone Mobile Phone    Gold Acevedo (Spouse) [phone] -- [phone]              Oxygen Therapy (last day)       Date/Time SpO2 Device (Oxygen Therapy) Flow (L/min) Oxygen Concentration (%) ETCO2 (mmHg)    11/29/23 1245 97 -- -- -- --    11/29/23 0905 -- room air -- -- --    11/29/23 0755 92 -- -- -- --    11/29/23 0030 -- room air -- -- --    11/29/23 0028 93 -- -- -- --    11/28/23 2020 -- " room air -- -- --    23 2012 95 -- -- -- --    23 1413 -- room air -- -- --    23 1400 97 -- -- -- --    23 0800 -- room air -- -- --    23 0726 92 -- -- -- --    23 0010 -- nasal cannula 2 -- --          Intake & Output (last day)          07 07 07 07    P.O. 240     Total Intake(mL/kg) 240 (3.1)     Net +240           Urine Unmeasured Occurrence  1 x          Lines, Drains & Airways       Active LDAs       Name Placement date Placement time Site Days    Peripheral IV 23 1500 Anterior;Right Forearm 23  1500  Forearm  less than 1                  Operative/Procedure Notes (last 24 hours)  Notes from 23 1337 through 23 1337   No notes of this type exist for this encounter.          Physician Progress Notes (last 24 hours)        Lorraine Krishna MD at 23 1153              Name: Madison Acevedo ADMIT: 2023   : 1960  PCP: Paulette Coulter APRN    MRN: [id number] LOS: 9 days   AGE/SEX: 63 y.o. female  ROOM: [facility]     Subjective   Subjective   Patient tearful, continues to complain of severe pain despite increase in pain regimen.  Pain worse with right upper extremity movement.  Discussed steroids, explained that it is not a good long-term option for pain management in the setting of septic arthritis.        Review of Systems  As above  Objective   Objective   Vital Signs  Temp:  [97.9 °F (36.6 °C)-98.4 °F (36.9 °C)] 98.1 °F (36.7 °C)  Heart Rate:  [] 102  Resp:  [18] 18  BP: (112-125)/(69-76) 125/76  SpO2:  [92 %-97 %] 92 %  on   ;   Device (Oxygen Therapy): room air  Body mass index is 29.52 kg/m².  Physical Exam    General: Sitting up in the bed, not in distress, tearful  HEENT: Normocephalic, atraumatic  CV: Tachycardic, regular rhythm  Lungs: Clear anteriorly, on room air,  Abdomen: Soft, nontender, nondistended  Extremities: No significant peripheral edema, no cyanosis  Skin: Right  sternoclavicular region tenderness to palpation.    Results Review     I reviewed the patient's new clinical results.  Results from last 7 days   Lab Units 11/29/23  0643 11/28/23 0623 11/27/23 0638 11/26/23  0542   WBC 10*3/mm3 8.05 11.14* 8.12 8.51   HEMOGLOBIN g/dL 9.9* 10.6* 10.6* 11.2*   PLATELETS 10*3/mm3 506* 506* 446 442     Results from last 7 days   Lab Units 11/27/23  0638 11/26/23  0542 11/25/23 0453 11/24/23 0658   SODIUM mmol/L 137 136 135* 133*   POTASSIUM mmol/L 4.2 4.4 4.2 3.8   CHLORIDE mmol/L 100 98 97* 95*   CO2 mmol/L 29.2* 28.0 29.2* 29.0   BUN mg/dL 7* 11 13 13   CREATININE mg/dL 0.79 0.65 0.73 0.65   GLUCOSE mg/dL 77 75 95 95   Estimated Creatinine Clearance: 73.6 mL/min (by C-G formula based on SCr of 0.79 mg/dL).  Results from last 7 days   Lab Units 11/27/23 0638 11/25/23 0453   ALBUMIN g/dL 2.8* 3.0*   BILIRUBIN mg/dL <0.2 0.3   ALK PHOS U/L 84 74   AST (SGOT) U/L 17 17   ALT (SGPT) U/L 16 18     Results from last 7 days   Lab Units 11/27/23 0638 11/26/23 0542 11/25/23 0453 11/24/23 0658   CALCIUM mg/dL 8.8 9.1 8.4* 8.9   ALBUMIN g/dL 2.8*  --  3.0*  --      Results from last 7 days   Lab Units 11/27/23 0638   PROCALCITONIN ng/mL 0.64*       COVID19   Date Value Ref Range Status   11/19/2023 Not Detected Not Detected - Ref. Range Final     SARS-CoV-2, OLMAN   Date Value Ref Range Status   02/10/2022 Not Detected Not Detected Final     Comment:     Testing was performed using the dara(R) SARS-CoV-2 test.  This nucleic acid amplification test was developed and its performance  characteristics determined by [employer]. Nucleic acid  amplification tests include RT-PCR and TMA. This test has not been  FDA cleared or approved. This test has been authorized by FDA under  an Emergency Use Authorization (EUA). This test is only authorized  for the duration of time the declaration that circumstances exist  justifying the authorization of the emergency use of in vitro  diagnostic  "tests for detection of SARS-CoV-2 virus and/or diagnosis  of COVID-19 infection under section 564(b)(1) of the Act, 21 U.S.C.  360bbb-3(b) (1), unless the authorization is terminated or revoked  sooner.  When diagnostic testing is negative, the possibility of a false  negative result should be considered in the context of a patient's  recent exposures and the presence of clinical signs and symptoms  consistent with COVID-19. An individual without symptoms of COVID-19  and who is not shedding SARS-CoV-2 virus would expect to have a  negative (not detected) result in this assay.     No results found for: \"HGBA1C\", \"POCGLU\"        XR Chest PA & Lateral  Narrative: XR CHEST PA AND LATERAL-     HISTORY: Female who is 63 years-old, pleural effusion     TECHNIQUE: Frontal and lateral views of the chest     COMPARISON: 11/19/2023     FINDINGS: Heart, mediastinum and pulmonary vasculature are unremarkable.  Mild atelectasis or infiltrate at the lung bases, mild right pleural  effusion, continued follow-up suggested. No pneumothorax. No acute  osseous process.     Impression: As described.     This report was finalized on 11/28/2023 4:18 PM by Dr. Lisandro Ruff M.D on Workstation: [id number]       Scheduled Medications  acetaminophen, 1,000 mg, Oral, Q8H  Lidocaine, 2 patch, Transdermal, Q24H  meloxicam, 15 mg, Oral, Daily  Naloxegol Oxalate, 12.5 mg, Oral, QAM  omeprazole-sodium bicarbonate, 1 capsule, Oral, QAM AC  oxyCODONE, 10 mg, Oral, Once  oxyCODONE, 20 mg, Oral, Q12H  penicillin g (potassium), 4 Million Units, Intravenous, Q4H  polyethylene glycol, 17 g, Oral, BID  senna-docusate sodium, 1 tablet, Oral, BID  sodium chloride, 10 mL, Intravenous, Q12H    Infusions   Diet  Diet: Regular/House Diet; Texture: Regular Texture (IDDSI 7); Fluid Consistency: Thin (IDDSI 0)    I have personally reviewed     [x]  Laboratory   []  Microbiology   [x]  Radiology   []  EKG/Telemetry  []  Cardiology/Vascular   []  Pathology    []  " Records      Assessment/Plan     Active Hospital Problems    Diagnosis  POA    **Right shoulder pain [M25.511]  Yes    Constipation [K59.00]  Unknown    Septic arthritis of right sternoclavicular joint [M00.9]  Unknown    Infection, Pasteurella [A28.0]  Unknown    Bacteremia [R78.81]  Yes    Sepsis [A41.9]  Yes    Community acquired pneumonia of right lower lobe of lung [J18.9]  Yes    Pain of right clavicle [M89.8X1]  Yes    Hypokalemia [E87.6]  Yes    Essential hypertension [I10]  Yes      Resolved Hospital Problems   No resolved problems to display.       63 y.o. female admitted with Right shoulder pain.      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   -MRI 11/23/2023 showed right sternoclavicular septic arthritis with synovitis and surrounding soft tissue inflammation associated with myositis of the distal right sternocleidomastoid muscle, right pectoralis sternal attachments.  -IV Dilaudid 1 mg every 2 as needed  -Lidocaine patch -high-dose Tylenol 1000 mg every 8 hour-LFTSs normal  -Status post IV Toradol.  -Continue meloxicam 15 mg daily  -Infectious disease following,  -On IV penicillin per sensitivities, plan to continue for 6 weeks through 12/18/2023  -Cardiothoracic surgery evaluated, no indication for surgical intervention  -Pain not better controlled continues to complain of severe pain, increase scheduled OxyContin to 20 mg every 12 hours.  Continue as needed Dilaudid and oxycodone.  -Placed consult for second opinion from [facility] infectious disease group per patient's request.    Constipation:  Aggressive bowel regimen in the setting of opioids., continue docusate senna, MiraLAX twice daily, Movantik.  No BM for the last few days, ordered suppository.    Pneumonia/right pleural effusion/nocturnal hypoxemia  MRI showed moderate right pleural effusion.    CT scan of the chest on  11/24/2023 showed new small right pleural effusion and there is increased  consolidation and atelectasis within the right middle lobe and right lower lobe with air  bronchograms and mild bronchiectasis that may be inflammatory or infectious in etiology.  follow-up chest CT 4 to 6 weeks was recommended.  On IV antibiotic as above.  -repeat x-ray -Mild atelectasis or infiltrate at the lung bases, mild right pleural effusion,  -    Hypertension: Stable, continue to hold home amlodipine.      Anemia: Iron panel consistent with anemia of chronic disease.  B12 and folate normal.  Hemoglobin trended down to 9.9 this morning.  Monitor daily.    Thyroid nodule versus cyst?: MRI showed right lower pole thyroid nodule or cyst hat  measures 1.7 cm.  TSH normal.  Ordered follow-up ultrasound.      SCDs for DVT prophylaxis.  Full code.  Discussed with patient  Discussed with Dr. Hemphill  Discussed in multidisciplinary rounds with nursing staff, CCP, pharmacy  Anticipate discharge home with home health on IV antibiotics, once pain improved and cleared by consultants.  Timing to be determined.  Likely in 1-2 days.      Copied text in this note has been reviewed and is accurate as of 23.         Dictated utilizing Dragon dictation        Lorraine Krishna MD  [facility]  23  11:59 EST        Electronically signed by Lorraine Krishna MD at 23 1202       Rai Boyd MD at 23 1034            PROGRESS NOTE  Patient Name: Madison Acevedo  Age/Sex: 63 y.o. female  : 1960  MRN: [id number]    Date of Admission: 2023  Date of Encounter Visit: 23   LOS: 9 days   Patient Care Team:  Paulette Coulter APRN as PCP - General (Family Medicine)  Gael Rodrigeuz MD as Consulting Physician (Cardiology)  Jan Brannon MD as Consulting Physician (Gastroenterology)  Kourtney Farnsworth, NIKKY as Ambulatory  (Population Health)    Chief Complaint: Pleural effusion, septic arthritis    Hospital course: Patient is still complaining of the arthritis  "pain in the right shoulder otherwise she is in no distress, on room air.  She did have a chest x-ray that showed persistent pleural effusion, it is worse compared to the prior film on the 19th but I think has not been any worse since the 24th when to the CAT scan from that date.  She is afebrile, her white count is down to normal and besides the shoulder pain she is asymptomatic.  Denies any dyspnea, on room air  .    REVIEW OF SYSTEMS:   CONSTITUTIONAL: no fever or chills, positive for right shoulder pain at the sternoclavicular joint  CARDIOVASCULAR: No chest pain, chest pressure or chest discomfort. No palpitations or edema.   RESPIRATORY: No shortness of breath, cough or sputum.   GASTROINTESTINAL: No anorexia, nausea, vomiting or diarrhea. No abdominal pain or blood.   HEMATOLOGIC: No bleeding or bruising.     Ventilator/Non-Invasive Ventilation Settings (From admission, onward)      None              Vital Signs  Temp:  [97.9 °F (36.6 °C)-98.4 °F (36.9 °C)] 98.1 °F (36.7 °C)  Heart Rate:  [] 102  Resp:  [18] 18  BP: (112-125)/(69-76) 125/76  SpO2:  [92 %-97 %] 92 %  on    Device (Oxygen Therapy): room air    Intake/Output Summary (Last 24 hours) at 11/29/2023 1035  Last data filed at 11/28/2023 2033  Gross per 24 hour   Intake 240 ml   Output --   Net 240 ml     Flowsheet Rows      Flowsheet Row First Filed Value   Admission Height 162.6 cm (64\") Documented at 11/19/2023 0839   Admission Weight 76.2 kg (168 lb) Documented at 11/19/2023 0839          Body mass index is 29.52 kg/m².      11/19/23  0839 11/19/23  1431 11/22/23  1023   Weight: 76.2 kg (168 lb) 78.2 kg (172 lb 4.8 oz) 78 kg (172 lb)       Physical Exam:  GEN:  No acute distress, alert, cooperative, well developed   EYES:   Sclerae clear. No icterus. PERRL. Normal EOM  ENT:   External ears/nose normal, no oral lesions, no thrush, mucous membranes moist  NECK:  Supple, midline trachea, no JVD  LUNGS: Normal chest on inspection, slightly warm to " "touch over the lower right sternoclavicular joint area CTAB, no wheezes. No rhonchi. No crackles.  Diminished breath sounds on the right side posteriorly with some dullness to percussion over the lower half respirations regular, even and unlabored.   CV:  Regular rhythm and rate. Normal S1/S2. No murmurs, gallops, or rubs noted.  ABD:  Soft, nontender and nondistended. Normal bowel sounds. No guarding  EXT:  Moves all extremities well. No cyanosis. No redness. No edema.   Skin: Dry, intact, no bleeding    Results Review:    Results From Last 14 Days   Lab Units 11/26/23  0542 11/19/23  1058 11/19/23  0857 11/19/23  0856   CRP mg/dL  --  4.83*  --   --    D DIMER QUANT MCGFEU/mL  --   --  0.82*  --    FERRITIN ng/mL 292.00*  --   --   --    LACTATE mmol/L  --   --   --  2.0   SED RATE mm/hr  --   --   --  25     Results from last 7 days   Lab Units 11/27/23  0638 11/26/23  0542 11/25/23  0453 11/24/23  0658 11/23/23  0509   SODIUM mmol/L 137 136 135* 133* 139   POTASSIUM mmol/L 4.2 4.4 4.2 3.8 3.8   CHLORIDE mmol/L 100 98 97* 95* 103   CO2 mmol/L 29.2* 28.0 29.2* 29.0 26.2   BUN mg/dL 7* 11 13 13 21   CREATININE mg/dL 0.79 0.65 0.73 0.65 0.71   CALCIUM mg/dL 8.8 9.1 8.4* 8.9 8.7   AST (SGOT) U/L 17  --  17  --   --    ALT (SGPT) U/L 16  --  18  --   --    ANION GAP mmol/L 7.8 10.0 8.8 9.0 9.8   ALBUMIN g/dL 2.8*  --  3.0*  --   --          Results from last 7 days   Lab Units 11/29/23  0643   TSH uIU/mL 2.970         Results from last 7 days   Lab Units 11/29/23  0643 11/28/23  0623 11/27/23  0638 11/26/23  0542 11/25/23  0453 11/24/23  0658 11/23/23  0508   WBC 10*3/mm3 8.05 11.14* 8.12 8.51 10.14 10.82* 9.60   HEMOGLOBIN g/dL 9.9* 10.6* 10.6* 11.2* 10.3* 10.8* 11.5*   HEMATOCRIT % 29.3* 30.5* 30.9* 33.0* 30.2* 31.6* 33.1*   PLATELETS 10*3/mm3 506* 506* 446 442 398 419 379   MCV fL 91.3 89.7 90.6 91.7 91.2 90.5 91.2                   Invalid input(s): \"LDLCALC\"          No results found for: \"POCGLU\"  Results from " last 7 days   Lab Units 11/27/23  0638   PROCALCITONIN ng/mL 0.64*                           Imaging:   Imaging Results (All)                     I reviewed the patient's new clinical results.  I personally viewed and interpreted the patient's imaging results:        Medication Review:   acetaminophen, 1,000 mg, Oral, Q8H  meloxicam, 15 mg, Oral, Daily  Naloxegol Oxalate, 12.5 mg, Oral, QAM  omeprazole-sodium bicarbonate, 1 capsule, Oral, QAM AC  oxyCODONE, 10 mg, Oral, Once  oxyCODONE, 20 mg, Oral, Q12H  penicillin g (potassium), 4 Million Units, Intravenous, Q4H  polyethylene glycol, 17 g, Oral, BID  senna-docusate sodium, 1 tablet, Oral, BID  sodium chloride, 10 mL, Intravenous, Q12H             ASSESSMENT:   Right-sided pneumonia with progression of follow-up x-ray, cannot differentiate pneumonia from atelectasis  Worsening right-sided pleural effusion  Right sternoclavicular joint infection  Sepsis with septic arthritis    PLAN:  Patient had elevated right hemidiaphragm to start with so assessing the level of the pleural effusion on the chest x-ray alone is misleading however it is definitely worse compared to the x-ray from 11/19/2023.  The most recent CAT scan was from 11/24/2023 and she did have  small effusion that was not that compelling to attempt thoracentesis at the time.  Since the patient is not symptomatic from the respiratory standpoint and does not have any evidence of sepsis as far as vital signs and blood test, I do not see any reason to pursue any thoracentesis at this point.  As far as the joint infection, I will defer that to the infectious disease and the thoracic surgery team.  Discussed with patient  Labs/Notes/films were independently reviewed and pertinent results are summarized above  The copied texts in this note were reviewed and they are accurate as of 11/29/23    Disposition: Home    Rai Boyd MD  11/29/23  10:35 EST           Dictated utilizing Erica  dictation    Electronically signed by Rai Boyd MD at 23 1234       Grace Hemphill MD at 23 4305            Infectious Diseases Progress Note    Grace Hemphill MD     [facility]  [place]: 9 days  Patient Identification:  Name: Madison Acevedo  Age: 63 y.o.  Sex: female  :  1960  MRN: [id number]         Primary Care Physician: Paulette Coulter, SIMONE        Subjective: Pain and discomfort in her right sternoclavicular joint and arm and shoulder area and upper chest is worse as her steroid was discontinued.  Interval History: See consultation note.  2023 blood culture drawn at the time of admission is now identified as Pasteurella multocida.  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  The last significant bite was in her left hand about a week ago few days prior to onset of the neck pain.  2020 repeat blood cultures so far showed  no growth.  2023 evaluation by orthopedic surgery service reviewed  2023 echocardiogram performed result reviewed  2023 MRI of the neck and sternoclavicular joint area reviewed and shows evidence of septic arthritis with adjacent soft tissue infection.  2023 antibiotics switched to IV penicillin.    Objective:    Scheduled Meds:acetaminophen, 1,000 mg, Oral, Q8H  Naloxegol Oxalate, 12.5 mg, Oral, QAM  omeprazole-sodium bicarbonate, 1 capsule, Oral, QAM AC  oxyCODONE, 15 mg, Oral, Q12H  penicillin g (potassium), 4 Million Units, Intravenous, Q4H  polyethylene glycol, 17 g, Oral, BID  senna-docusate sodium, 1 tablet, Oral, BID  sodium chloride, 10 mL, Intravenous, Q12H      Continuous Infusions:     Vital signs in last 24 hours:  Temp:  [97.9 °F (36.6 °C)-98.4 °F (36.9 °C)] 98.2 °F (36.8 °C)  Heart Rate:  [] 97  Resp:  [18] 18  BP: (112-122)/(69-74)  "122/74    Intake/Output:    Intake/Output Summary (Last 24 hours) at 11/29/2023 0752  Last data filed at 11/28/2023 2033  Gross per 24 hour   Intake 240 ml   Output --   Net 240 ml           Exam:  /74 (BP Location: Left arm, Patient Position: Lying)   Pulse 97   Temp 98.2 °F (36.8 °C) (Oral)   Resp 18   Ht 162.6 cm (64\")   Wt 78 kg (172 lb)   SpO2 93%   BMI 29.52 kg/m²   Patient is examined using the personal protective equipment as per guidelines from infection control for this particular patient as enacted.  Hand washing was performed before and after patient interaction.  General Appearance:    Alert, cooperative, no distress, AAOx3                          Head:    Normocephalic, without obvious abnormality, atraumatic                           Eyes:    PERRL, conjunctivae/corneas clear, EOM's intact, both eyes                         Throat:   Lips, tongue, gums normal; oral mucosa pink and moist                           Neck:   Supple, symmetrical, trachea midline, no JVD                         Lungs:    Clear to auscultation bilaterally, respirations unlabored                 Chest Wall:  Significant improvement the tenderness and erythema of the right sternoclavicular joint.                          Heart:  S1-S2 regular no murmur                  abdomen:   Soft nontender                 Extremities: Resolved tenderness in the neck area.                        Pulses:   Pulses palpable in all extremities                            Skin:   Skin is warm and dry,  no rashes or palpable lesions                  Neurologic: Grossly nonfocal       Data Review:    I reviewed the patient's new clinical results.  Results from last 7 days   Lab Units 11/29/23  0643 11/28/23  0623 11/27/23  0638 11/26/23  0542 11/25/23  0453 11/24/23  0658 11/23/23  0508   WBC 10*3/mm3 8.05 11.14* 8.12 8.51 10.14 10.82* 9.60   HEMOGLOBIN g/dL 9.9* 10.6* 10.6* 11.2* 10.3* 10.8* 11.5*   PLATELETS 10*3/mm3 506* 506* 446 " 442 398 419 379     Results from last 7 days   Lab Units 11/27/23  0638 11/26/23  0542 11/25/23  0453 11/24/23  0658 11/23/23  0509   SODIUM mmol/L 137 136 135* 133* 139   POTASSIUM mmol/L 4.2 4.4 4.2 3.8 3.8   CHLORIDE mmol/L 100 98 97* 95* 103   CO2 mmol/L 29.2* 28.0 29.2* 29.0 26.2   BUN mg/dL 7* 11 13 13 21   CREATININE mg/dL 0.79 0.65 0.73 0.65 0.71   CALCIUM mg/dL 8.8 9.1 8.4* 8.9 8.7   GLUCOSE mg/dL 77 75 95 95 94     Microbiology Results (last 10 days)       Procedure Component Value - Date/Time    Blood Culture - Blood, Arm, Right [[id number]]  (Normal) Collected: 11/21/23 0002    Lab Status: Final result Specimen: Blood from Arm, Right Updated: 11/26/23 0101     Blood Culture No growth at 5 days    Blood Culture - Blood, Arm, Right [[id number]]  (Normal) Collected: 11/20/23 1107    Lab Status: Final result Specimen: Blood from Arm, Right Updated: 11/25/23 1131     Blood Culture No growth at 5 days    MRSA Screen, PCR (Inpatient) - Swab, Nares [[id number]]  (Normal) Collected: 11/19/23 1442    Lab Status: Final result Specimen: Swab from Nares Updated: 11/19/23 1609     MRSA PCR No MRSA Detected    Narrative:      The negative predictive value of this diagnostic test is high and should only be used to consider de-escalating anti-MRSA therapy. A positive result may indicate colonization with MRSA and must be correlated clinically.    Blood Culture - Blood, Arm, Left [[id number]]  (Abnormal) Collected: 11/19/23 1003    Lab Status: Final result Specimen: Blood from Arm, Left Updated: 11/22/23 0746     Blood Culture Pasteurella multocida     Isolated from --     Gram Stain Aerobic Bottle Gram negative bacilli      Anaerobic Bottle Gram negative bacilli    Narrative:      Refer to previous blood culture collected on 11/19/2023 at 0856 for MICs.      Respiratory Panel PCR w/COVID-19(SARS-CoV-2) GIA/NILA/GELA/PAD/COR/HOMAR In-House, NP Swab in UTM/VTM, 2 HR TAT - Swab, Nasopharynx [[id number]]  (Normal) Collected: 11/19/23  0901    Lab Status: Final result Specimen: Swab from Nasopharynx Updated: 11/19/23 1023     ADENOVIRUS, PCR Not Detected     Coronavirus 229E Not Detected     Coronavirus HKU1 Not Detected     Coronavirus NL63 Not Detected     Coronavirus OC43 Not Detected     COVID19 Not Detected     Human Metapneumovirus Not Detected     Human Rhinovirus/Enterovirus Not Detected     Influenza A PCR Not Detected     Influenza B PCR Not Detected     Parainfluenza Virus 1 Not Detected     Parainfluenza Virus 2 Not Detected     Parainfluenza Virus 3 Not Detected     Parainfluenza Virus 4 Not Detected     RSV, PCR Not Detected     Bordetella pertussis pcr Not Detected     Bordetella parapertussis PCR Not Detected     Chlamydophila pneumoniae PCR Not Detected     Mycoplasma pneumo by PCR Not Detected    Narrative:      In the setting of a positive respiratory panel with a viral infection PLUS a negative procalcitonin without other underlying concern for bacterial infection, consider observing off antibiotics or discontinuation of antibiotics and continue supportive care. If the respiratory panel is positive for atypical bacterial infection (Bordetella pertussis, Chlamydophila pneumoniae, or Mycoplasma pneumoniae), consider antibiotic de-escalation to target atypical bacterial infection.    Blood Culture - Blood, Arm, Left [[id number]]  (Abnormal)  (Susceptibility) Collected: 11/19/23 0856    Lab Status: Edited Result - FINAL Specimen: Blood from Arm, Left Updated: 11/24/23 0822     Blood Culture Pasteurella multocida     Isolated from Aerobic and Anaerobic Bottles     Gram Stain Aerobic Bottle Gram negative bacilli      Anaerobic Bottle Gram negative bacilli    Narrative:      Less than seven (7) mL's of blood was collected.  Insufficient quantity may yield false negative results.     requested penicillin & ceftriaxone 11/23/23    Susceptibility        Pasteurella multocida      Not Specified      Ampicillin Susceptible       Ceftriaxone Susceptible  [1]       Levofloxacin Susceptible      Penicillin G Susceptible  [1]       Trimethoprim + Sulfamethoxazole Susceptible                   [1]  Appended report. These results have been appended to a previously final verified report.                   Blood Culture ID, PCR - Blood, Arm, Left [[id number]] Collected: 11/19/23 0856    Lab Status: Final result Specimen: Blood from Arm, Left Updated: 11/20/23 0122     BCID, PCR Negative by BCID PCR. Culture to Follow.     BOTTLE TYPE Anaerobic Bottle        MRI of the right shoulder report reviewed  MRI of the clavicle and sternoclavicular joint on the right pending  Echocardiogram reviewed    Assessment:    Right shoulder pain    Essential hypertension    Sepsis    Community acquired pneumonia of right lower lobe of lung    Pain of right clavicle    Hypokalemia    Bacteremia    Infection, Pasteurella    Septic arthritis of right sternoclavicular joint  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  Rule out endocarditis-repeat blood cultures are negative and echocardiogram does not show any evidence of valvular process and hence less likely at this time.  Worsening respiratory status despite being on IV antibiotic therapy for 7 days initially consisting of Zosyn and vancomycin followed by ceftriaxone followed by cefepime and Unasyn with de-escalation to penicillin on day #7 of hospitalization-etiology unclear and could very well be due to recurrent aspiration or progressive structural pulmonary process.  My leukocytosis could very well be due to the dose of steroid she received yesterday.    Recommendations/discussion:  See my discussion and recommendations on 11/28/2023.  Because of her worsening pain and discomfort I again tried to emphasize the importance of a joint surgical intervention besides antibiotic therapy to get her better.  Patient has reservations about surgery and she thinks that  her shoulder is going to be unstable.  Moreover it appears that thoracic surgery service also wants to treat her with medication only with follow-up to decide if the surgical intervention is needed.  Her lack of progress and dependency on steroid to feel better is concerning despite the fact that she has received 10 days of adequate antibiotic treatment guided by the culture data and identified infectious syndrome.  I have explained to her that revisiting the need for surgery and its indication is important.  At that point in time I also offered her that if this recommendation of definitive surgery along with antibiotic therapy is not considered as ideal recommendation then second opinion from another infectious disease group is also a good idea to get her through this difficult time.  Patient verbalized understanding of that and would like to have a second infectious disease opinion.  Midline/PICC line anytime.  Discussed with   Following orders are written for case management:  Please arrange for 28 days of IV penicillin 4,000,000 units every 4 hour as intermittent infusion or daily continuous infusion starting 11/20/2023 with end of the treatment would be 12/18/2023.  Please check weekly CBC CMP and CRP and call abnormal results to Dr. Hemphill at [phone].  Please make sure the patient has scheduled follow-up with thoracic surgery service to decide follow-up imaging of the right sternoclavicular joint area and to decide whether or not patient would need intervention.  I have educated patient to call me on a weekly basis at [phone] to go over review system to monitor antibiotic toxicity and side effects.  Diagnosis: Right sternoclavicular joint septic arthritis with Pasteurella multocida bacteremia and sepsis.    Grace Hemphill MD  11/29/2023  07:52 EST    Parts of this note may be an electronic transcription/translation of spoken language to printed text using the Dragon dictation  system.      Electronically signed by Grace Hemphill MD at 11/29/23 0997       Consult Notes (last 24 hours)  Notes from 11/28/23 1337 through 11/29/23 1337   No notes of this type exist for this encounter.

## 2023-11-29 NOTE — PROGRESS NOTES
Infectious Diseases Progress Note    Grace Hemphill MD     [facility]  [place]: 9 days  Patient Identification:  Name: Madison Acevedo  Age: 63 y.o.  Sex: female  :  1960  MRN: [id number]         Primary Care Physician: Paulette Coulter APRN        Subjective: Pain and discomfort in her right sternoclavicular joint and arm and shoulder area and upper chest is worse as her steroid was discontinued.  Interval History: See consultation note.  2023 blood culture drawn at the time of admission is now identified as Pasteurella multocida.  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  The last significant bite was in her left hand about a week ago few days prior to onset of the neck pain.  2020 repeat blood cultures so far showed  no growth.  2023 evaluation by orthopedic surgery service reviewed  2023 echocardiogram performed result reviewed  2023 MRI of the neck and sternoclavicular joint area reviewed and shows evidence of septic arthritis with adjacent soft tissue infection.  2023 antibiotics switched to IV penicillin.    Objective:    Scheduled Meds:acetaminophen, 1,000 mg, Oral, Q8H  Naloxegol Oxalate, 12.5 mg, Oral, QAM  omeprazole-sodium bicarbonate, 1 capsule, Oral, QAM AC  oxyCODONE, 15 mg, Oral, Q12H  penicillin g (potassium), 4 Million Units, Intravenous, Q4H  polyethylene glycol, 17 g, Oral, BID  senna-docusate sodium, 1 tablet, Oral, BID  sodium chloride, 10 mL, Intravenous, Q12H      Continuous Infusions:     Vital signs in last 24 hours:  Temp:  [97.9 °F (36.6 °C)-98.4 °F (36.9 °C)] 98.2 °F (36.8 °C)  Heart Rate:  [] 97  Resp:  [18] 18  BP: (112-122)/(69-74) 122/74    Intake/Output:    Intake/Output Summary (Last 24 hours) at 2023 0752  Last data filed at 2023  Gross per 24  "hour   Intake 240 ml   Output --   Net 240 ml           Exam:  /74 (BP Location: Left arm, Patient Position: Lying)   Pulse 97   Temp 98.2 °F (36.8 °C) (Oral)   Resp 18   Ht 162.6 cm (64\")   Wt 78 kg (172 lb)   SpO2 93%   BMI 29.52 kg/m²   Patient is examined using the personal protective equipment as per guidelines from infection control for this particular patient as enacted.  Hand washing was performed before and after patient interaction.  General Appearance:    Alert, cooperative, no distress, AAOx3                          Head:    Normocephalic, without obvious abnormality, atraumatic                           Eyes:    PERRL, conjunctivae/corneas clear, EOM's intact, both eyes                         Throat:   Lips, tongue, gums normal; oral mucosa pink and moist                           Neck:   Supple, symmetrical, trachea midline, no JVD                         Lungs:    Clear to auscultation bilaterally, respirations unlabored                 Chest Wall:  Significant improvement the tenderness and erythema of the right sternoclavicular joint.                          Heart:  S1-S2 regular no murmur                  abdomen:   Soft nontender                 Extremities: Resolved tenderness in the neck area.                        Pulses:   Pulses palpable in all extremities                            Skin:   Skin is warm and dry,  no rashes or palpable lesions                  Neurologic: Grossly nonfocal       Data Review:    I reviewed the patient's new clinical results.  Results from last 7 days   Lab Units 11/29/23 0643 11/28/23 0623 11/27/23 0638 11/26/23 0542 11/25/23 0453 11/24/23 0658 11/23/23  0508   WBC 10*3/mm3 8.05 11.14* 8.12 8.51 10.14 10.82* 9.60   HEMOGLOBIN g/dL 9.9* 10.6* 10.6* 11.2* 10.3* 10.8* 11.5*   PLATELETS 10*3/mm3 506* 506* 446 442 398 419 379     Results from last 7 days   Lab Units 11/27/23 0638 11/26/23  0542 11/25/23 0453 11/24/23 0658 11/23/23  0509 "   SODIUM mmol/L 137 136 135* 133* 139   POTASSIUM mmol/L 4.2 4.4 4.2 3.8 3.8   CHLORIDE mmol/L 100 98 97* 95* 103   CO2 mmol/L 29.2* 28.0 29.2* 29.0 26.2   BUN mg/dL 7* 11 13 13 21   CREATININE mg/dL 0.79 0.65 0.73 0.65 0.71   CALCIUM mg/dL 8.8 9.1 8.4* 8.9 8.7   GLUCOSE mg/dL 77 75 95 95 94     Microbiology Results (last 10 days)       Procedure Component Value - Date/Time    Blood Culture - Blood, Arm, Right [[id number]]  (Normal) Collected: 11/21/23 0002    Lab Status: Final result Specimen: Blood from Arm, Right Updated: 11/26/23 0101     Blood Culture No growth at 5 days    Blood Culture - Blood, Arm, Right [[id number]]  (Normal) Collected: 11/20/23 1107    Lab Status: Final result Specimen: Blood from Arm, Right Updated: 11/25/23 1131     Blood Culture No growth at 5 days    MRSA Screen, PCR (Inpatient) - Swab, Nares [[id number]]  (Normal) Collected: 11/19/23 1442    Lab Status: Final result Specimen: Swab from Nares Updated: 11/19/23 1609     MRSA PCR No MRSA Detected    Narrative:      The negative predictive value of this diagnostic test is high and should only be used to consider de-escalating anti-MRSA therapy. A positive result may indicate colonization with MRSA and must be correlated clinically.    Blood Culture - Blood, Arm, Left [[id number]]  (Abnormal) Collected: 11/19/23 1003    Lab Status: Final result Specimen: Blood from Arm, Left Updated: 11/22/23 0746     Blood Culture Pasteurella multocida     Isolated from --     Gram Stain Aerobic Bottle Gram negative bacilli      Anaerobic Bottle Gram negative bacilli    Narrative:      Refer to previous blood culture collected on 11/19/2023 at 0856 for MICs.      Respiratory Panel PCR w/COVID-19(SARS-CoV-2) [facility]/GELA/[facility] In-House, NP Swab in UTM/VTM, 2 HR TAT - Swab, Nasopharynx [[id number]]  (Normal) Collected: 11/19/23 0901    Lab Status: Final result Specimen: Swab from Nasopharynx Updated: 11/19/23 1023     ADENOVIRUS, PCR Not Detected      Coronavirus 229E Not Detected     Coronavirus HKU1 Not Detected     Coronavirus NL63 Not Detected     Coronavirus OC43 Not Detected     COVID19 Not Detected     Human Metapneumovirus Not Detected     Human Rhinovirus/Enterovirus Not Detected     Influenza A PCR Not Detected     Influenza B PCR Not Detected     Parainfluenza Virus 1 Not Detected     Parainfluenza Virus 2 Not Detected     Parainfluenza Virus 3 Not Detected     Parainfluenza Virus 4 Not Detected     RSV, PCR Not Detected     Bordetella pertussis pcr Not Detected     Bordetella parapertussis PCR Not Detected     Chlamydophila pneumoniae PCR Not Detected     Mycoplasma pneumo by PCR Not Detected    Narrative:      In the setting of a positive respiratory panel with a viral infection PLUS a negative procalcitonin without other underlying concern for bacterial infection, consider observing off antibiotics or discontinuation of antibiotics and continue supportive care. If the respiratory panel is positive for atypical bacterial infection (Bordetella pertussis, Chlamydophila pneumoniae, or Mycoplasma pneumoniae), consider antibiotic de-escalation to target atypical bacterial infection.    Blood Culture - Blood, Arm, Left [[id number]]  (Abnormal)  (Susceptibility) Collected: 11/19/23 0856    Lab Status: Edited Result - FINAL Specimen: Blood from Arm, Left Updated: 11/24/23 0822     Blood Culture Pasteurella multocida     Isolated from Aerobic and Anaerobic Bottles     Gram Stain Aerobic Bottle Gram negative bacilli      Anaerobic Bottle Gram negative bacilli    Narrative:      Less than seven (7) mL's of blood was collected.  Insufficient quantity may yield false negative results.     requested penicillin & ceftriaxone 11/23/23    Susceptibility        Pasteurella multocida      Not Specified      Ampicillin Susceptible      Ceftriaxone Susceptible  [1]       Levofloxacin Susceptible      Penicillin G Susceptible  [1]       Trimethoprim +  Sulfamethoxazole Susceptible                   [1]  Appended report. These results have been appended to a previously final verified report.                   Blood Culture ID, PCR - Blood, Arm, Left [[id number]] Collected: 11/19/23 0856    Lab Status: Final result Specimen: Blood from Arm, Left Updated: 11/20/23 0122     BCID, PCR Negative by BCID PCR. Culture to Follow.     BOTTLE TYPE Anaerobic Bottle        MRI of the right shoulder report reviewed  MRI of the clavicle and sternoclavicular joint on the right pending  Echocardiogram reviewed    Assessment:    Right shoulder pain    Essential hypertension    Sepsis    Community acquired pneumonia of right lower lobe of lung    Pain of right clavicle    Hypokalemia    Bacteremia    Infection, Pasteurella    Septic arthritis of right sternoclavicular joint  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  Rule out endocarditis-repeat blood cultures are negative and echocardiogram does not show any evidence of valvular process and hence less likely at this time.  Worsening respiratory status despite being on IV antibiotic therapy for 7 days initially consisting of Zosyn and vancomycin followed by ceftriaxone followed by cefepime and Unasyn with de-escalation to penicillin on day #7 of hospitalization-etiology unclear and could very well be due to recurrent aspiration or progressive structural pulmonary process.  My leukocytosis could very well be due to the dose of steroid she received yesterday.    Recommendations/discussion:  See my discussion and recommendations on 11/28/2023.  Because of her worsening pain and discomfort I again tried to emphasize the importance of a joint surgical intervention besides antibiotic therapy to get her better.  Patient has reservations about surgery and she thinks that her shoulder is going to be unstable.  Moreover it appears that thoracic surgery service also wants to treat her  with medication only with follow-up to decide if the surgical intervention is needed.  Her lack of progress and dependency on steroid to feel better is concerning despite the fact that she has received 10 days of adequate antibiotic treatment guided by the culture data and identified infectious syndrome.  I have explained to her that revisiting the need for surgery and its indication is important.  At that point in time I also offered her that if this recommendation of definitive surgery along with antibiotic therapy is not considered as ideal recommendation then second opinion from another infectious disease group is also a good idea to get her through this difficult time.  Patient verbalized understanding of that and would like to have a second infectious disease opinion.  Midline/PICC line anytime.  Discussed with   Following orders are written for case management:  Please arrange for 28 days of IV penicillin 4,000,000 units every 4 hour as intermittent infusion or daily continuous infusion starting 11/20/2023 with end of the treatment would be 12/18/2023.  Please check weekly CBC CMP and CRP and call abnormal results to Dr. Hemphill at [phone].  Please make sure the patient has scheduled follow-up with thoracic surgery service to decide follow-up imaging of the right sternoclavicular joint area and to decide whether or not patient would need intervention.  I have educated patient to call me on a weekly basis at [phone] to go over review system to monitor antibiotic toxicity and side effects.  Diagnosis: Right sternoclavicular joint septic arthritis with Pasteurella multocida bacteremia and sepsis.    Grace Hemphill MD  11/29/2023  07:52 EST    Parts of this note may be an electronic transcription/translation of spoken language to printed text using the Dragon dictation system.

## 2023-11-29 NOTE — SIGNIFICANT NOTE
"   11/29/23 1438   PICC Single Lumen 11/29/23 Left Basilic   Placement date: If unknown, DO NOT use \"Add Comment\" note/Placement time: If unknown, DO NOT use \"Add Comment\" note: 11/29/23 1400   Hand Hygiene Completed: Yes  Size (Fr): 4  Description (optional): SL PICC  Length (cm): 42 cm  Orientation: Left  Loc...   Site Assessment Clean;Dry;Intact   #1 Lumen Status Blood return noted;Capped;Flushed;Normal saline locked   Length lexx (cm) 0 cm   Line Care Connections checked and tightened   Extremity Circumference (cm) 33 cm   Dressing Type Border Dressing;Transparent;Antimicrobial dressing/disc;Securing device   Dressing Status Clean;Dry;Intact   Dressing Intervention New dressing   Liquid Adhesive Applied   Dressing Change Due 12/06/23   Indication/Daily Review of Necessity long-term IV access >7 days       PICC LINE PLACED, CONFIRMATION VIA CHEST X RAY   PRIMARY RN NOTIFIED PICC LINE IS READY TO BE USED     LOT - [id number]  EX - 12/31/24    FINAL COUNT - 3 NEEDLES, 2 GUIDEWIRES, 1 SCALPEL ACCOUNTED FOR AT PROCEDURE END         "

## 2023-11-29 NOTE — PROGRESS NOTES
PROGRESS NOTE  Patient Name: Madison Acevedo  Age/Sex: 63 y.o. female  : 1960  MRN: [id number]    Date of Admission: 2023  Date of Encounter Visit: 23   LOS: 9 days   Patient Care Team:  Paulette Coulter APRN as PCP - General (Family Medicine)  Gael Rodriguez MD as Consulting Physician (Cardiology)  Jan Brannon MD as Consulting Physician (Gastroenterology)  Kourtney Farnsworth, NIKKY as Ambulatory  ([facility]    Chief Complaint: Pleural effusion, septic arthritis    Hospital course: Patient is still complaining of the arthritis pain in the right shoulder otherwise she is in no distress, on room air.  She did have a chest x-ray that showed persistent pleural effusion, it is worse compared to the prior film on the  but I think has not been any worse since the  when to the CAT scan from that date.  She is afebrile, her white count is down to normal and besides the shoulder pain she is asymptomatic.  Denies any dyspnea, on room air  .    REVIEW OF SYSTEMS:   CONSTITUTIONAL: no fever or chills, positive for right shoulder pain at the sternoclavicular joint  CARDIOVASCULAR: No chest pain, chest pressure or chest discomfort. No palpitations or edema.   RESPIRATORY: No shortness of breath, cough or sputum.   GASTROINTESTINAL: No anorexia, nausea, vomiting or diarrhea. No abdominal pain or blood.   HEMATOLOGIC: No bleeding or bruising.     Ventilator/Non-Invasive Ventilation Settings (From admission, onward)      None              Vital Signs  Temp:  [97.9 °F (36.6 °C)-98.4 °F (36.9 °C)] 98.1 °F (36.7 °C)  Heart Rate:  [] 102  Resp:  [18] 18  BP: (112-125)/(69-76) 125/76  SpO2:  [92 %-97 %] 92 %  on    Device (Oxygen Therapy): room air    Intake/Output Summary (Last 24 hours) at 2023 1035  Last data filed at 2023  Gross per 24 hour   Intake 240 ml   Output --   Net 240 ml     Flowsheet Rows      Flowsheet Row First Filed Value   Admission Height 162.6 cm  "(64\") Documented at 11/19/2023 0839   Admission Weight 76.2 kg (168 lb) Documented at 11/19/2023 0839          Body mass index is 29.52 kg/m².      11/19/23  0839 11/19/23  1431 11/22/23  1023   Weight: 76.2 kg (168 lb) 78.2 kg (172 lb 4.8 oz) 78 kg (172 lb)       Physical Exam:  GEN:  No acute distress, alert, cooperative, well developed   EYES:   Sclerae clear. No icterus. PERRL. Normal EOM  ENT:   External ears/nose normal, no oral lesions, no thrush, mucous membranes moist  NECK:  Supple, midline trachea, no JVD  LUNGS: Normal chest on inspection, slightly warm to touch over the lower right sternoclavicular joint area CTAB, no wheezes. No rhonchi. No crackles.  Diminished breath sounds on the right side posteriorly with some dullness to percussion over the lower half respirations regular, even and unlabored.   CV:  Regular rhythm and rate. Normal S1/S2. No murmurs, gallops, or rubs noted.  ABD:  Soft, nontender and nondistended. Normal bowel sounds. No guarding  EXT:  Moves all extremities well. No cyanosis. No redness. No edema.   Skin: Dry, intact, no bleeding    Results Review:    Results From Last 14 Days   Lab Units 11/26/23  0542 11/19/23  1058 11/19/23  0857 11/19/23  0856   CRP mg/dL  --  4.83*  --   --    D DIMER QUANT MCGFEU/mL  --   --  0.82*  --    FERRITIN ng/mL 292.00*  --   --   --    LACTATE mmol/L  --   --   --  2.0   SED RATE mm/hr  --   --   --  25     Results from last 7 days   Lab Units 11/27/23  0638 11/26/23  0542 11/25/23  0453 11/24/23  0658 11/23/23  0509   SODIUM mmol/L 137 136 135* 133* 139   POTASSIUM mmol/L 4.2 4.4 4.2 3.8 3.8   CHLORIDE mmol/L 100 98 97* 95* 103   CO2 mmol/L 29.2* 28.0 29.2* 29.0 26.2   BUN mg/dL 7* 11 13 13 21   CREATININE mg/dL 0.79 0.65 0.73 0.65 0.71   CALCIUM mg/dL 8.8 9.1 8.4* 8.9 8.7   AST (SGOT) U/L 17  --  17  --   --    ALT (SGPT) U/L 16  --  18  --   --    ANION GAP mmol/L 7.8 10.0 8.8 9.0 9.8   ALBUMIN g/dL 2.8*  --  3.0*  --   --          Results from " "last 7 days   Lab Units 11/29/23  0643   TSH uIU/mL 2.970         Results from last 7 days   Lab Units 11/29/23  0643 11/28/23  0623 11/27/23  0638 11/26/23  0542 11/25/23  0453 11/24/23  0658 11/23/23  0508   WBC 10*3/mm3 8.05 11.14* 8.12 8.51 10.14 10.82* 9.60   HEMOGLOBIN g/dL 9.9* 10.6* 10.6* 11.2* 10.3* 10.8* 11.5*   HEMATOCRIT % 29.3* 30.5* 30.9* 33.0* 30.2* 31.6* 33.1*   PLATELETS 10*3/mm3 506* 506* 446 442 398 419 379   MCV fL 91.3 89.7 90.6 91.7 91.2 90.5 91.2                   Invalid input(s): \"LDLCALC\"          No results found for: \"POCGLU\"  Results from last 7 days   Lab Units 11/27/23  0638   PROCALCITONIN ng/mL 0.64*                           Imaging:   Imaging Results (All)                     I reviewed the patient's new clinical results.  I personally viewed and interpreted the patient's imaging results:        Medication Review:   acetaminophen, 1,000 mg, Oral, Q8H  meloxicam, 15 mg, Oral, Daily  Naloxegol Oxalate, 12.5 mg, Oral, QAM  omeprazole-sodium bicarbonate, 1 capsule, Oral, QAM AC  oxyCODONE, 10 mg, Oral, Once  oxyCODONE, 20 mg, Oral, Q12H  penicillin g (potassium), 4 Million Units, Intravenous, Q4H  polyethylene glycol, 17 g, Oral, BID  senna-docusate sodium, 1 tablet, Oral, BID  sodium chloride, 10 mL, Intravenous, Q12H             ASSESSMENT:   Right-sided pneumonia with progression of follow-up x-ray, cannot differentiate pneumonia from atelectasis  Worsening right-sided pleural effusion  Right sternoclavicular joint infection  Sepsis with septic arthritis    PLAN:  Patient had elevated right hemidiaphragm to start with so assessing the level of the pleural effusion on the chest x-ray alone is misleading however it is definitely worse compared to the x-ray from 11/19/2023.  The most recent CAT scan was from 11/24/2023 and she did have  small effusion that was not that compelling to attempt thoracentesis at the time.  Since the patient is not symptomatic from the respiratory standpoint " and does not have any evidence of sepsis as far as vital signs and blood test, I do not see any reason to pursue any thoracentesis at this point.  As far as the joint infection, I will defer that to the infectious disease and the thoracic surgery team.  Discussed with patient  Labs/Notes/films were independently reviewed and pertinent results are summarized above  The copied texts in this note were reviewed and they are accurate as of 11/29/23    Disposition: Home    Rai Byod MD  11/29/23  10:35 EST           Dictated utilizing Dragon dictation

## 2023-11-29 NOTE — PROGRESS NOTES
Name: Madison Acevedo ADMIT: 2023   : 1960  PCP: Paulette Coulter APRN    MRN: [id number] LOS: 9 days   AGE/SEX: 63 y.o. female  ROOM: [facility] [[email or web address]     Subjective   Subjective   Patient tearful, continues to complain of severe pain despite increase in pain regimen.  Pain worse with right upper extremity movement.  Discussed steroids, explained that it is not a good long-term option for pain management in the setting of septic arthritis.        Review of Systems   As above  Objective   Objective   Vital Signs  Temp:  [97.9 °F (36.6 °C)-98.4 °F (36.9 °C)] 98.1 °F (36.7 °C)  Heart Rate:  [] 102  Resp:  [18] 18  BP: (112-125)/(69-76) 125/76  SpO2:  [92 %-97 %] 92 %  on   ;   Device (Oxygen Therapy): room air  Body mass index is 29.52 kg/m².  Physical Exam    General: Sitting up in the bed, not in distress, tearful  HEENT: Normocephalic, atraumatic  CV: Tachycardic, regular rhythm  Lungs: Clear anteriorly, on room air,  Abdomen: Soft, nontender, nondistended  Extremities: No significant peripheral edema, no cyanosis  Skin: Right sternoclavicular region tenderness to palpation.    Results Review     I reviewed the patient's new clinical results.  Results from last 7 days   Lab Units 23  0643 23  0623 2338 23  0542   WBC 10*3/mm3 8.05 11.14* 8.12 8.51   HEMOGLOBIN g/dL 9.9* 10.6* 10.6* 11.2*   PLATELETS 10*3/mm3 506* 506* 446 442     Results from last 7 days   Lab Units 23  0638 23  0542 23  0453 23  0658   SODIUM mmol/L 137 136 135* 133*   POTASSIUM mmol/L 4.2 4.4 4.2 3.8   CHLORIDE mmol/L 100 98 97* 95*   CO2 mmol/L 29.2* 28.0 29.2* 29.0   BUN mg/dL 7* 11 13 13   CREATININE mg/dL 0.79 0.65 0.73 0.65   GLUCOSE mg/dL 77 75 95 95   Estimated Creatinine Clearance: 73.6 mL/min (by C-G formula based on SCr of 0.79 mg/dL).  Results from last 7 days   Lab Units 23  0638 23  0453   ALBUMIN g/dL 2.8* 3.0*   BILIRUBIN mg/dL <0.2 0.3   ALK PHOS U/L 84  "74   AST (SGOT) U/L 17 17   ALT (SGPT) U/L 16 18     Results from last 7 days   Lab Units 11/27/23  0638 11/26/23  0542 11/25/23  0453 11/24/23  0658   CALCIUM mg/dL 8.8 9.1 8.4* 8.9   ALBUMIN g/dL 2.8*  --  3.0*  --      Results from last 7 days   Lab Units 11/27/23  0638   PROCALCITONIN ng/mL 0.64*       COVID19   Date Value Ref Range Status   11/19/2023 Not Detected Not Detected - Ref. Range Final     SARS-CoV-2, OLMAN   Date Value Ref Range Status   02/10/2022 Not Detected Not Detected Final     Comment:     Testing was performed using the dara(R) SARS-CoV-2 test.  This nucleic acid amplification test was developed and its performance  characteristics determined by [employer]. Nucleic acid  amplification tests include RT-PCR and TMA. This test has not been  FDA cleared or approved. This test has been authorized by FDA under  an Emergency Use Authorization (EUA). This test is only authorized  for the duration of time the declaration that circumstances exist  justifying the authorization of the emergency use of in vitro  diagnostic tests for detection of SARS-CoV-2 virus and/or diagnosis  of COVID-19 infection under section 564(b)(1) of the Act, 21 U.S.C.  360bbb-3(b) (1), unless the authorization is terminated or revoked  sooner.  When diagnostic testing is negative, the possibility of a false  negative result should be considered in the context of a patient's  recent exposures and the presence of clinical signs and symptoms  consistent with COVID-19. An individual without symptoms of COVID-19  and who is not shedding SARS-CoV-2 virus would expect to have a  negative (not detected) result in this assay.     No results found for: \"HGBA1C\", \"POCGLU\"        XR Chest PA & Lateral  Narrative: XR CHEST PA AND LATERAL-     HISTORY: Female who is 63 years-old, pleural effusion     TECHNIQUE: Frontal and lateral views of the chest     COMPARISON: 11/19/2023     FINDINGS: Heart, mediastinum and pulmonary " vasculature are unremarkable.  Mild atelectasis or infiltrate at the lung bases, mild right pleural  effusion, continued follow-up suggested. No pneumothorax. No acute  osseous process.     Impression: As described.     This report was finalized on 11/28/2023 4:18 PM by Dr. Lisandro Ruff M.D on Workstation: [id number]       Scheduled Medications  acetaminophen, 1,000 mg, Oral, Q8H  Lidocaine, 2 patch, Transdermal, Q24H  meloxicam, 15 mg, Oral, Daily  Naloxegol Oxalate, 12.5 mg, Oral, QAM  omeprazole-sodium bicarbonate, 1 capsule, Oral, QAM AC  oxyCODONE, 10 mg, Oral, Once  oxyCODONE, 20 mg, Oral, Q12H  penicillin g (potassium), 4 Million Units, Intravenous, Q4H  polyethylene glycol, 17 g, Oral, BID  senna-docusate sodium, 1 tablet, Oral, BID  sodium chloride, 10 mL, Intravenous, Q12H    Infusions   Diet  Diet: Regular/House Diet; Texture: Regular Texture (IDDSI 7); Fluid Consistency: Thin (IDDSI 0)    I have personally reviewed     [x]  Laboratory   []  Microbiology   [x]  Radiology   []  EKG/Telemetry  []  Cardiology/Vascular   []  Pathology    []  Records       Assessment/Plan     Active Hospital Problems    Diagnosis  POA    **Right shoulder pain [M25.511]  Yes    Constipation [K59.00]  Unknown    Septic arthritis of right sternoclavicular joint [M00.9]  Unknown    Infection, Pasteurella [A28.0]  Unknown    Bacteremia [R78.81]  Yes    Sepsis [A41.9]  Yes    Community acquired pneumonia of right lower lobe of lung [J18.9]  Yes    Pain of right clavicle [M89.8X1]  Yes    Hypokalemia [E87.6]  Yes    Essential hypertension [I10]  Yes      Resolved Hospital Problems   No resolved problems to display.       63 y.o. female admitted with Right shoulder pain.      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   -MRI 11/23/2023 showed right sternoclavicular septic arthritis with synovitis and surrounding soft tissue inflammation associated with myositis of the distal right  sternocleidomastoid muscle, right pectoralis sternal attachments.  -IV Dilaudid 1 mg every 2 as needed  -Lidocaine patch -high-dose Tylenol 1000 mg every 8 hour-LFTSs normal  -Status post IV Toradol.  -Continue meloxicam 15 mg daily  -Infectious disease following,  -On IV penicillin per sensitivities, plan to continue for 6 weeks through 12/18/2023  -Cardiothoracic surgery evaluated, no indication for surgical intervention  -Pain not better controlled continues to complain of severe pain, increase scheduled OxyContin to 20 mg every 12 hours.  Continue as needed Dilaudid and oxycodone.  -Placed consult for second opinion from [facility] infectious disease group per patient's request.    Constipation:  Aggressive bowel regimen in the setting of opioids., continue docusate senna, MiraLAX twice daily, Movantik.  No BM for the last few days, ordered suppository.    Pneumonia/right pleural effusion/nocturnal hypoxemia  MRI showed moderate right pleural effusion.    CT scan of the chest on  11/24/2023 showed new small right pleural effusion and there is increased consolidation and atelectasis within the right middle lobe and right lower lobe with air  bronchograms and mild bronchiectasis that may be inflammatory or infectious in etiology.  follow-up chest CT 4 to 6 weeks was recommended.  On IV antibiotic as above.  -repeat chest x-ray 11/28-Mild atelectasis or infiltrate at the lung bases, mild right pleural effusion,  -Pulmonology following    Hypertension: Stable, continue to hold home amlodipine.      Anemia: Iron panel consistent with anemia of chronic disease.  B12 and folate normal.  Hemoglobin trended down to 9.9 this morning.  Monitor daily.    Thyroid nodule versus cyst?: MRI showed right lower pole thyroid nodule or cyst hat  measures 1.7 cm.  TSH normal.  Ordered follow-up ultrasound.      SCDs for DVT prophylaxis.  Full code.  Discussed with patient  Discussed with Dr. Hemphill  Discussed in multidisciplinary rounds  with nursing staff, CCP, pharmacy  Anticipate discharge home with home health on IV antibiotics, once pain improved and cleared by consultants.  Timing to be determined.  Likely in 1-2 days.      Copied text in this note has been reviewed and is accurate as of 11/29/23.         Dictated utilizing Dragon dictation        Lorraine Krishna MD  [facility]  11/29/23  11:59 EST

## 2023-11-30 ENCOUNTER — APPOINTMENT (OUTPATIENT)
Dept: CT IMAGING | Facility: HOSPITAL | Age: 63
DRG: 867 | End: 2023-11-30
Payer: COMMERCIAL

## 2023-11-30 LAB
ALBUMIN SERPL-MCNC: 3 G/DL (ref 3.5–5.2)
ALBUMIN/GLOB SERPL: 1 G/DL
ALP SERPL-CCNC: 79 U/L (ref 39–117)
ALT SERPL W P-5'-P-CCNC: 62 U/L (ref 1–33)
ANION GAP SERPL CALCULATED.3IONS-SCNC: 9.6 MMOL/L (ref 5–15)
AST SERPL-CCNC: 49 U/L (ref 1–32)
BILIRUB SERPL-MCNC: 0.2 MG/DL (ref 0–1.2)
BUN SERPL-MCNC: 6 MG/DL (ref 8–23)
BUN/CREAT SERPL: 8.3 (ref 7–25)
CALCIUM SPEC-SCNC: 9.1 MG/DL (ref 8.6–10.5)
CHLORIDE SERPL-SCNC: 104 MMOL/L (ref 98–107)
CO2 SERPL-SCNC: 23.4 MMOL/L (ref 22–29)
CREAT SERPL-MCNC: 0.72 MG/DL (ref 0.57–1)
DEPRECATED RDW RBC AUTO: 35.7 FL (ref 37–54)
EGFRCR SERPLBLD CKD-EPI 2021: 94.1 ML/MIN/1.73
ERYTHROCYTE [DISTWIDTH] IN BLOOD BY AUTOMATED COUNT: 11.4 % (ref 12.3–15.4)
GLOBULIN UR ELPH-MCNC: 3 GM/DL
GLUCOSE SERPL-MCNC: 86 MG/DL (ref 65–99)
HCT VFR BLD AUTO: 30.1 % (ref 34–46.6)
HGB BLD-MCNC: 9.9 G/DL (ref 12–15.9)
MCH RBC QN AUTO: 28.9 PG (ref 26.6–33)
MCHC RBC AUTO-ENTMCNC: 32.9 G/DL (ref 31.5–35.7)
MCV RBC AUTO: 88 FL (ref 79–97)
PLATELET # BLD AUTO: 554 10*3/MM3 (ref 140–450)
PMV BLD AUTO: 8.1 FL (ref 6–12)
POTASSIUM SERPL-SCNC: 3.8 MMOL/L (ref 3.5–5.2)
PROT SERPL-MCNC: 6 G/DL (ref 6–8.5)
RBC # BLD AUTO: 3.42 10*6/MM3 (ref 3.77–5.28)
SODIUM SERPL-SCNC: 137 MMOL/L (ref 136–145)
WBC NRBC COR # BLD AUTO: 6.99 10*3/MM3 (ref 3.4–10.8)

## 2023-11-30 PROCEDURE — 80053 COMPREHEN METABOLIC PANEL: CPT | Performed by: STUDENT IN AN ORGANIZED HEALTH CARE EDUCATION/TRAINING PROGRAM

## 2023-11-30 PROCEDURE — 85027 COMPLETE CBC AUTOMATED: CPT | Performed by: STUDENT IN AN ORGANIZED HEALTH CARE EDUCATION/TRAINING PROGRAM

## 2023-11-30 PROCEDURE — 25510000001 IOPAMIDOL 61 % SOLUTION: Performed by: HOSPITALIST

## 2023-11-30 PROCEDURE — 71260 CT THORAX DX C+: CPT

## 2023-11-30 PROCEDURE — 25010000002 PENICILLIN G POTASSIUM PER 600000 UNITS: Performed by: INTERNAL MEDICINE

## 2023-11-30 PROCEDURE — 99232 SBSQ HOSP IP/OBS MODERATE 35: CPT | Performed by: THORACIC SURGERY (CARDIOTHORACIC VASCULAR SURGERY)

## 2023-11-30 RX ORDER — OXYCODONE HYDROCHLORIDE 15 MG/1
15 TABLET ORAL EVERY 4 HOURS PRN
Status: DISCONTINUED | OUTPATIENT
Start: 2023-11-30 | End: 2023-12-01 | Stop reason: HOSPADM

## 2023-11-30 RX ADMIN — POLYETHYLENE GLYCOL 3350 17 G: 17 POWDER, FOR SOLUTION ORAL at 20:58

## 2023-11-30 RX ADMIN — SODIUM CHLORIDE 4 MILLION UNITS: 9 INJECTION, SOLUTION INTRAVENOUS at 08:43

## 2023-11-30 RX ADMIN — SODIUM CHLORIDE 4 MILLION UNITS: 9 INJECTION, SOLUTION INTRAVENOUS at 04:50

## 2023-11-30 RX ADMIN — SODIUM CHLORIDE 4 MILLION UNITS: 9 INJECTION, SOLUTION INTRAVENOUS at 20:57

## 2023-11-30 RX ADMIN — ACETAMINOPHEN 1000 MG: 500 TABLET ORAL at 11:58

## 2023-11-30 RX ADMIN — DOCUSATE SODIUM 50MG AND SENNOSIDES 8.6MG 1 TABLET: 8.6; 5 TABLET, FILM COATED ORAL at 20:57

## 2023-11-30 RX ADMIN — NALOXEGOL OXALATE 12.5 MG: 12.5 TABLET, FILM COATED ORAL at 06:13

## 2023-11-30 RX ADMIN — Medication 10 ML: at 20:58

## 2023-11-30 RX ADMIN — MELOXICAM 15 MG: 15 TABLET ORAL at 08:43

## 2023-11-30 RX ADMIN — CYCLOBENZAPRINE 10 MG: 10 TABLET, FILM COATED ORAL at 08:44

## 2023-11-30 RX ADMIN — OMEPRAZOLE AND SODIUM BICARBONATE 1 CAPSULE: 40; 1100 CAPSULE ORAL at 08:50

## 2023-11-30 RX ADMIN — LIDOCAINE 2 PATCH: 4 PATCH TOPICAL at 08:43

## 2023-11-30 RX ADMIN — ACETAMINOPHEN 1000 MG: 500 TABLET ORAL at 19:27

## 2023-11-30 RX ADMIN — OXYCODONE HYDROCHLORIDE 20 MG: 10 TABLET, FILM COATED, EXTENDED RELEASE ORAL at 09:33

## 2023-11-30 RX ADMIN — OXYCODONE HYDROCHLORIDE 20 MG: 10 TABLET, FILM COATED, EXTENDED RELEASE ORAL at 20:57

## 2023-11-30 RX ADMIN — ACETAMINOPHEN 1000 MG: 500 TABLET ORAL at 04:30

## 2023-11-30 RX ADMIN — IOPAMIDOL 75 ML: 612 INJECTION, SOLUTION INTRAVENOUS at 12:31

## 2023-11-30 RX ADMIN — CYCLOBENZAPRINE 10 MG: 10 TABLET, FILM COATED ORAL at 16:30

## 2023-11-30 RX ADMIN — SODIUM CHLORIDE 4 MILLION UNITS: 9 INJECTION, SOLUTION INTRAVENOUS at 11:58

## 2023-11-30 RX ADMIN — SODIUM CHLORIDE 4 MILLION UNITS: 9 INJECTION, SOLUTION INTRAVENOUS at 16:23

## 2023-11-30 RX ADMIN — SODIUM CHLORIDE 4 MILLION UNITS: 9 INJECTION, SOLUTION INTRAVENOUS at 00:39

## 2023-11-30 RX ADMIN — POLYETHYLENE GLYCOL 3350 17 G: 17 POWDER, FOR SOLUTION ORAL at 08:43

## 2023-11-30 NOTE — PROGRESS NOTES
"  PROGRESS NOTE  Patient Name: Madison Acevedo  Age/Sex: 63 y.o. female  : 1960  MRN: [id number]    Date of Admission: 2023  Date of Encounter Visit: 23   LOS: 10 days   Patient Care Team:  Paulette Coulter APRN as PCP - General (Family Medicine)  Gael Rodriguez MD as Consulting Physician (Cardiology)  Jan Brannon MD as Consulting Physician (Gastroenterology)  Kourtney Farnsworth RN as Ambulatory  ([facility])    Chief Complaint: Pleural effusion, septic arthritis    Hospital course: Patient is feeling better, remains on room air and did not require any oxygen  She had a follow-up CAT scan today that showed significant improvement with near complete resolution of the pleural effusion, the atelectatic changes has resolved further consistent with the clinical suspicion that this was not pneumonia  She still having the pain however from the sternoclavicular joint that is already being managed by the other consultants.    REVIEW OF SYSTEMS:   CONSTITUTIONAL: no fever or chills, positive for right shoulder pain at the sternoclavicular joint  CARDIOVASCULAR: No chest pain, chest pressure or chest discomfort. No palpitations or edema.   RESPIRATORY: No shortness of breath, cough or sputum.   GASTROINTESTINAL: No anorexia, nausea, vomiting or diarrhea. No abdominal pain or blood.   HEMATOLOGIC: No bleeding or bruising.     Ventilator/Non-Invasive Ventilation Settings (From admission, onward)      None              Vital Signs  Temp:  [97.5 °F (36.4 °C)-99.3 °F (37.4 °C)] 97.5 °F (36.4 °C)  Heart Rate:  [] 90  Resp:  [18] 18  BP: (116-147)/(74-88) 116/79  SpO2:  [88 %-96 %] 88 %  on    Device (Oxygen Therapy): room air  No intake or output data in the 24 hours ending 23 1338    Flowsheet Rows      Flowsheet Row First Filed Value   Admission Height 162.6 cm (64\") Documented at 2023 0839   Admission Weight 76.2 kg (168 lb) Documented at 2023 0839          Body mass " index is 29.52 kg/m².      11/19/23  0839 11/19/23  1431 11/22/23  1023   Weight: 76.2 kg (168 lb) 78.2 kg (172 lb 4.8 oz) 78 kg (172 lb)       Physical Exam:  GEN:  No acute distress, alert, cooperative, well developed   EYES:   Sclerae clear. No icterus. PERRL. Normal EOM  ENT:   External ears/nose normal, no oral lesions, no thrush, mucous membranes moist  NECK:  Supple, midline trachea, no JVD  LUNGS: Normal chest on inspection, still slightly warm to touch over the lower right sternoclavicular joint area CTAB, no wheezes. No rhonchi. No crackles.   Clear to auscultation bilaterally, respirations regular, even and unlabored.   CV:  Regular rhythm and rate. Normal S1/S2. No murmurs, gallops, or rubs noted.  ABD:  Soft, nontender and nondistended. Normal bowel sounds. No guarding  EXT:  Moves all extremities well. No cyanosis. No redness. No edema.   Skin: Dry, intact, no bleeding    Results Review:    Results From Last 14 Days   Lab Units 11/26/23  0542 11/19/23  1058 11/19/23  0857 11/19/23  0856   CRP mg/dL  --  4.83*  --   --    D DIMER QUANT MCGFEU/mL  --   --  0.82*  --    FERRITIN ng/mL 292.00*  --   --   --    LACTATE mmol/L  --   --   --  2.0   SED RATE mm/hr  --   --   --  25     Results from last 7 days   Lab Units 11/30/23  0742 11/27/23  0638 11/26/23  0542 11/25/23  0453 11/24/23  0658   SODIUM mmol/L 137 137 136 135* 133*   POTASSIUM mmol/L 3.8 4.2 4.4 4.2 3.8   CHLORIDE mmol/L 104 100 98 97* 95*   CO2 mmol/L 23.4 29.2* 28.0 29.2* 29.0   BUN mg/dL 6* 7* 11 13 13   CREATININE mg/dL 0.72 0.79 0.65 0.73 0.65   CALCIUM mg/dL 9.1 8.8 9.1 8.4* 8.9   AST (SGOT) U/L 49* 17  --  17  --    ALT (SGPT) U/L 62* 16  --  18  --    ANION GAP mmol/L 9.6 7.8 10.0 8.8 9.0   ALBUMIN g/dL 3.0* 2.8*  --  3.0*  --          Results from last 7 days   Lab Units 11/29/23  0643   TSH uIU/mL 2.970         Results from last 7 days   Lab Units 11/30/23  0742 11/29/23  0643 11/28/23  0623 11/27/23  0638 11/26/23  0542  "11/25/23  0453 11/24/23  0658   WBC 10*3/mm3 6.99 8.05 11.14* 8.12 8.51 10.14 10.82*   HEMOGLOBIN g/dL 9.9* 9.9* 10.6* 10.6* 11.2* 10.3* 10.8*   HEMATOCRIT % 30.1* 29.3* 30.5* 30.9* 33.0* 30.2* 31.6*   PLATELETS 10*3/mm3 554* 506* 506* 446 442 398 419   MCV fL 88.0 91.3 89.7 90.6 91.7 91.2 90.5                   Invalid input(s): \"LDLCALC\"          No results found for: \"POCGLU\"  Results from last 7 days   Lab Units 11/27/23  0638   PROCALCITONIN ng/mL 0.64*                           Imaging:   Imaging Results (All)                   I reviewed the patient's new clinical results.  I personally viewed and interpreted the patient's imaging results:        Medication Review:   acetaminophen, 1,000 mg, Oral, Q8H  Lidocaine, 2 patch, Transdermal, Q24H  meloxicam, 15 mg, Oral, Daily  mineral oil, 1 enema, Rectal, Once  Naloxegol Oxalate, 12.5 mg, Oral, QAM  omeprazole-sodium bicarbonate, 1 capsule, Oral, QAM AC  oxyCODONE, 20 mg, Oral, Q12H  penicillin g (potassium), 4 Million Units, Intravenous, Q4H  polyethylene glycol, 17 g, Oral, BID  senna-docusate sodium, 1 tablet, Oral, BID  sodium chloride, 10 mL, Intravenous, Q12H  sodium chloride, 10 mL, Intravenous, Q12H             ASSESSMENT:   Right-sided pneumonia with progression of follow-up x-ray, cannot differentiate pneumonia from atelectasis  Worsening right-sided pleural effusion  Right sternoclavicular joint infection  Sepsis with septic arthritis    PLAN:  Patient had significant improvement with near normalization of the pleural effusion and the atelectatic changes on the CAT scan of the chest  She is on room air and she is not having any dyspnea and I do not have any more issues from the pulmonary standpoint to follow-up on.  I will defer the management of the joint infection to the other consultants on the case  Discussed with patient, discussed with thoracic surgery team, infectious disease note reviewed  Labs/Notes/films were independently reviewed and " pertinent results are summarized above    I did review the CAT scan finding with the patient  We will sign off  The copied texts in this note were reviewed and they are accurate as of 11/30/23    Disposition: Home    Rai Boyd MD  11/30/23  13:38 EST           Dictated utilizing Dragon dictation

## 2023-11-30 NOTE — PROGRESS NOTES
The patient is being followed by Dr. Hemphill of infectious diseases.  We have nothing further to add.  We will defer management to his expertise.

## 2023-11-30 NOTE — PROGRESS NOTES
Name: Madison Acevedo ADMIT: 2023   : 1960  PCP: Paulette Coulter APRN    MRN: [id number] LOS: 10 days   AGE/SEX: 63 y.o. female  ROOM: [facility]     Subjective   Subjective   shoulder pain worse with movement. better today though. she would like to try and avoid IV pain medications.     Objective   Objective   Vital Signs  Temp:  [97.5 °F (36.4 °C)-99.3 °F (37.4 °C)] 97.5 °F (36.4 °C)  Heart Rate:  [] 90  Resp:  [18] 18  BP: (116-127)/(74-83) 116/79  SpO2:  [88 %-96 %] 88 %  on   ;   Device (Oxygen Therapy): room air  Body mass index is 29.52 kg/m².    Physical Exam  Constitutional:       General: She is not in acute distress.     Appearance: Normal appearance. She is not toxic-appearing.   HENT:      Head: Normocephalic and atraumatic.   Eyes:      Extraocular Movements: Extraocular movements intact.   Cardiovascular:      Rate and Rhythm: Normal rate and regular rhythm.   Pulmonary:      Effort: Pulmonary effort is normal. No respiratory distress.      Breath sounds: Normal breath sounds. No wheezing or rhonchi.   Abdominal:      General: Bowel sounds are normal.      Palpations: Abdomen is soft.      Tenderness: There is no abdominal tenderness. There is no guarding or rebound.   Musculoskeletal:      Comments: reduced ROM RUE d/t pain   Skin:     General: Skin is warm and dry.   Neurological:      General: No focal deficit present.      Mental Status: She is alert and oriented to person, place, and time.   Psychiatric:         Mood and Affect: Mood normal.         Behavior: Behavior normal.       Results Review  I reviewed the patient's new clinical results.  Results from last 7 days   Lab Units 23  0742 23  0643 23  0623 23  0638   WBC 10*3/mm3 6.99 8.05 11.14* 8.12   HEMOGLOBIN g/dL 9.9* 9.9* 10.6* 10.6*   PLATELETS 10*3/mm3 554* 506* 506* 446     Results from last 7 days   Lab Units 23  0742 23  0638 23  0542 23  0453   SODIUM mmol/L 137 137  "136 135*   POTASSIUM mmol/L 3.8 4.2 4.4 4.2   CHLORIDE mmol/L 104 100 98 97*   CO2 mmol/L 23.4 29.2* 28.0 29.2*   BUN mg/dL 6* 7* 11 13   CREATININE mg/dL 0.72 0.79 0.65 0.73   GLUCOSE mg/dL 86 77 75 95     Lab Results   Component Value Date    ANIONGAP 9.6 11/30/2023     Estimated Creatinine Clearance: 80.8 mL/min (by C-G formula based on SCr of 0.72 mg/dL).   Lab Results   Component Value Date    EGFR 94.1 11/30/2023     Results from last 7 days   Lab Units 11/30/23  0742 11/27/23  0638 11/25/23  0453   ALBUMIN g/dL 3.0* 2.8* 3.0*   BILIRUBIN mg/dL 0.2 <0.2 0.3   ALK PHOS U/L 79 84 74   AST (SGOT) U/L 49* 17 17   ALT (SGPT) U/L 62* 16 18     Results from last 7 days   Lab Units 11/30/23  0742 11/27/23  0638 11/26/23  0542 11/25/23  0453   CALCIUM mg/dL 9.1 8.8 9.1 8.4*   ALBUMIN g/dL 3.0* 2.8*  --  3.0*     Results from last 7 days   Lab Units 11/27/23  0638   PROCALCITONIN ng/mL 0.64*     No results found for: \"HGBA1C\", \"POCGLU\"    CT Chest With Contrast Diagnostic    Result Date: 11/30/2023   1.  Minimally improved trace right pleural effusion with improved right basilar consolidation. 2.  No thoracic lymphadenopathy. 3.  Additional findings, as above.  This report was finalized on 11/30/2023 1:37 PM by Dr. Piedad Natarajan M.D on Workstation: [id number]      US Thyroid    Result Date: 11/30/2023  1. Bilateral thyroid nodules as described. Largest of these is mostly cystic. 2. These do not meet criteria for fine-needle aspiration. At least 1 of these is borderline for recommended follow-up using TI-RADS evaluation. 3. Follow-up thyroid ultrasound in 1 year suggested.    This report was finalized on 11/30/2023 7:46 AM by Dr. Vikash Vincent M.D on Workstation: [id number]      XR Chest Post CVA Port    Result Date: 11/29/2023   Left PICC extends to the superior vena cava.    This report was finalized on 11/29/2023 2:21 PM by Dr. Lisandro Ruff M.D on Workstation: [id number]       Scheduled Meds  acetaminophen, 1,000 " mg, Oral, Q8H  Lidocaine, 2 patch, Transdermal, Q24H  meloxicam, 15 mg, Oral, Daily  mineral oil, 1 enema, Rectal, Once  Naloxegol Oxalate, 12.5 mg, Oral, QAM  omeprazole-sodium bicarbonate, 1 capsule, Oral, QAM AC  oxyCODONE, 20 mg, Oral, Q12H  penicillin g (potassium), 4 Million Units, Intravenous, Q4H  polyethylene glycol, 17 g, Oral, BID  senna-docusate sodium, 1 tablet, Oral, BID  sodium chloride, 10 mL, Intravenous, Q12H  sodium chloride, 10 mL, Intravenous, Q12H    Continuous Infusions   PRN Meds    senna-docusate sodium **AND** [DISCONTINUED] polyethylene glycol **AND** bisacodyl **AND** bisacodyl    cyclobenzaprine    HYDROmorphone **AND** naloxone    Magnesium Standard Dose Replacement - Follow Nurse / BPA Driven Protocol    melatonin    oxyCODONE    Potassium Replacement - Follow Nurse / BPA Driven Protocol    promethazine **OR** promethazine    [COMPLETED] Insert Peripheral IV **AND** sodium chloride    sodium chloride    sodium chloride    sodium chloride    sodium chloride    sodium chloride     Diet  Diet: Regular/House Diet; Texture: Regular Texture (IDDSI 7); Fluid Consistency: Thin (IDDSI 0)    I have personally reviewed:  [x]  Medications  [x]  Laboratory   [x]  Microbiology   [x]  Radiology   [x]  EKG/Telemetry SR on tele  []  Cardiology/Vascular   []  Pathology   []  Records      Assessment/Plan     Active Hospital Problems    Diagnosis  POA    **Right shoulder pain [M25.511]  Yes    Constipation [K59.00]  Unknown    Septic arthritis of right sternoclavicular joint [M00.9]  Unknown    Infection, Pasteurella [A28.0]  Unknown    Bacteremia [R78.81]  Yes    Sepsis [A41.9]  Yes    Community acquired pneumonia of right lower lobe of lung [J18.9]  Yes    Pain of right clavicle [M89.8X1]  Yes    Hypokalemia [E87.6]  Yes    Essential hypertension [I10]  Yes      Resolved Hospital Problems   No resolved problems to display.     63 y.o. female presented with right shoulder pain.        Pasteurella  multocida bacteremic sepsis with right sternoclavicular joint septic arthritis.   MRI 11/23/2023 showed right sternoclavicular septic arthritis with synovitis and surrounding soft tissue inflammation associated with myositis of the distal right sternocleidomastoid muscle, right pectoralis sternal attachments.  Pain control: On scheduled tylenol and oxycontin, continue PRN oxycodone and dilaudid. Lidoderm patch. Also received IV toradol. on meloxicam. Pain per RN improved today.  thoracic surgery re-eval does not recommend OR.  d/w Dr. Murphy nothing further to add therefore will not see.   On IV penicillin per sensitivities, plan to continue for 6 weeks through 12/18/2023     Constipation   bowel regimen in the setting of opioids  Large BM documented yesterday PM     Pneumonia/right pleural effusion/nocturnal hypoxemia  MRI showed moderate right pleural effusion.    CT today improved R base consolidation and min improved trace R pleural effusion. Pulmonology signed off     Hypertension  Stable, continue to hold home amlodipine.     Anemia  Iron panel consistent with anemia of chronic disease.   B12 and folate normal.    Hemoglobin low but stable last couple of day     Thyroid nodule versus cyst  MRI showed right lower pole thyroid nodule or cyst that measures 1.7 cm.   TSH normal.    Thyroid us this morning bilateral mostly cystic nodules, not meeting criteria for FNA. radiology recs f/u u/s 1 year.    SCDs for DVT prophylaxis    Discussed with patient, family, and nursing staff and Dr. Murphy    Expected Discharge Date: 12/1/2023; Expected Discharge Time:     Justin Hope MD  [facility]  11/30/23

## 2023-11-30 NOTE — PROGRESS NOTES
Infectious Diseases Progress Note    Grace Hemphill MD     [facility]  Los: 10 days  Patient Identification:  Name: Madison Acevedo  Age: 63 y.o.  Sex: female  :  1960  MRN: [id number]         Primary Care Physician: Paulette Coulter APRN        Subjective: Able to move around a little bit but after the activity her right upper chest lower neck area and right shoulder is still very painful.  Denies any fever and chills.  Interval History: See consultation note.  2023 blood culture drawn at the time of admission is now identified as Pasteurella multocida.  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  The last significant bite was in her left hand about a week ago few days prior to onset of the neck pain.  2020 repeat blood cultures so far showed  no growth.  2023 evaluation by orthopedic surgery service reviewed  2023 echocardiogram performed result reviewed  2023 MRI of the neck and sternoclavicular joint area reviewed and shows evidence of septic arthritis with adjacent soft tissue infection.  2023 antibiotics switched to IV penicillin.    Objective:    Scheduled Meds:acetaminophen, 1,000 mg, Oral, Q8H  Lidocaine, 2 patch, Transdermal, Q24H  meloxicam, 15 mg, Oral, Daily  mineral oil, 1 enema, Rectal, Once  Naloxegol Oxalate, 12.5 mg, Oral, QAM  omeprazole-sodium bicarbonate, 1 capsule, Oral, QAM AC  oxyCODONE, 20 mg, Oral, Q12H  penicillin g (potassium), 4 Million Units, Intravenous, Q4H  polyethylene glycol, 17 g, Oral, BID  senna-docusate sodium, 1 tablet, Oral, BID  sodium chloride, 10 mL, Intravenous, Q12H  sodium chloride, 10 mL, Intravenous, Q12H      Continuous Infusions:     Vital signs in last 24 hours:  Temp:  [97.5 °F (36.4 °C)-99.3 °F (37.4 °C)] 97.5 °F (36.4 °C)  Heart Rate:  []  "90  Resp:  [18] 18  BP: (116-147)/(74-88) 116/79    Intake/Output:  No intake or output data in the 24 hours ending 11/30/23 0907          Exam:  /79 (BP Location: Right arm, Patient Position: Lying)   Pulse 90   Temp 97.5 °F (36.4 °C) (Oral)   Resp 18   Ht 162.6 cm (64\")   Wt 78 kg (172 lb)   SpO2 (!) 88%   BMI 29.52 kg/m²   Patient is examined using the personal protective equipment as per guidelines from infection control for this particular patient as enacted.  Hand washing was performed before and after patient interaction.  General Appearance:    Alert, cooperative, no distress, AAOx3                          Head:    Normocephalic, without obvious abnormality, atraumatic                           Eyes:    PERRL, conjunctivae/corneas clear, EOM's intact, both eyes                         Throat:   Lips, tongue, gums normal; oral mucosa pink and moist                           Neck:   Supple, symmetrical, trachea midline, no JVD                         Lungs:    Clear to auscultation bilaterally, respirations unlabored                 Chest Wall:  Still have tenderness and discomfort upon palpation with Lidoderm patch in place.                          Heart:  S1-S2 regular no murmur                  abdomen:   Soft nontender                 Extremities: Resolved tenderness in the neck area.                        Pulses:   Pulses palpable in all extremities                            Skin:   Skin is warm and dry,  no rashes or palpable lesions                  Neurologic: Grossly nonfocal       Data Review:    I reviewed the patient's new clinical results.  Results from last 7 days   Lab Units 11/30/23  0742 11/29/23  0643 11/28/23  0623 11/27/23  0638 11/26/23  0542 11/25/23  0453 11/24/23  0658   WBC 10*3/mm3 6.99 8.05 11.14* 8.12 8.51 10.14 10.82*   HEMOGLOBIN g/dL 9.9* 9.9* 10.6* 10.6* 11.2* 10.3* 10.8*   PLATELETS 10*3/mm3 554* 506* 506* 446 442 398 419     Results from last 7 days   Lab " Units 11/30/23  0742 11/27/23  0638 11/26/23  0542 11/25/23  0453 11/24/23  0658   SODIUM mmol/L 137 137 136 135* 133*   POTASSIUM mmol/L 3.8 4.2 4.4 4.2 3.8   CHLORIDE mmol/L 104 100 98 97* 95*   CO2 mmol/L 23.4 29.2* 28.0 29.2* 29.0   BUN mg/dL 6* 7* 11 13 13   CREATININE mg/dL 0.72 0.79 0.65 0.73 0.65   CALCIUM mg/dL 9.1 8.8 9.1 8.4* 8.9   GLUCOSE mg/dL 86 77 75 95 95     Microbiology Results (last 10 days)       Procedure Component Value - Date/Time    Blood Culture - Blood, Arm, Right [[id number]]  (Normal) Collected: 11/21/23 0002    Lab Status: Final result Specimen: Blood from Arm, Right Updated: 11/26/23 0101     Blood Culture No growth at 5 days    Blood Culture - Blood, Arm, Right [[id number]]  (Normal) Collected: 11/20/23 1107    Lab Status: Final result Specimen: Blood from Arm, Right Updated: 11/25/23 1131     Blood Culture No growth at 5 days        MRI of the right shoulder report reviewed  MRI of the clavicle and sternoclavicular joint on the right pending  Echocardiogram reviewed    Assessment:    Right shoulder pain    Essential hypertension    Sepsis    Community acquired pneumonia of right lower lobe of lung    Pain of right clavicle    Hypokalemia    Bacteremia    Infection, Pasteurella    Septic arthritis of right sternoclavicular joint    Constipation  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  Rule out endocarditis-repeat blood cultures are negative and echocardiogram does not show any evidence of valvular process and hence less likely at this time.  Worsening respiratory status despite being on IV antibiotic therapy for 7 days initially consisting of Zosyn and vancomycin followed by ceftriaxone followed by cefepime and Unasyn with de-escalation to penicillin on day #7 of hospitalization-etiology unclear and could very well be due to recurrent aspiration or progressive structural pulmonary process.  My leukocytosis could very well be  due to the dose of steroid she received yesterday.    Recommendations/discussion:  See my discussion and recommendation on 11/29/2023.  Patient lack of progression despite being on antibiotic therapy is very concerning and does require reevaluation by thoracic surgery service for consideration for resected/definitive surgery for sternoclavicular joint septic arthritis due to hematogenous seeding from Pasteurella bacteremia originating from cat bite as detailed above.  Patient has improved from the systemic sepsis standpoint but continued discomfort despite antibiotic therapy for the last 12 days suggests that the likelihood of this medical management without intervention is not going to be successful.  I have reached out to thoracic surgery team SIMONE Mcmahon for reevaluation of Ms. Acevedo after the repeat CT scan is done.  From infectious disease standpoint would recommend:  -Another infectious disease opinion to see if above recommendation is in line for the management of this disease entity - requested on 11/29/2023.  -Repeat CT scan of the neck and chest to document any progression that may require or convince our thoracic surgery colleagues to consider surgical intervention  -Another thoracic surgery evaluation from different physician just to make sure that all the avenues of care has been looked at in this difficult situation where patient has failed to show progress despite being on medical management as recommended.  -Would not recommend discharge on IV antibiotics as planned earlier unless above issues are addressed.    Grace Hemphill MD  11/30/2023  09:07 EST    Parts of this note may be an electronic transcription/translation of spoken language to printed text using the Dragon dictation system.

## 2023-11-30 NOTE — PLAN OF CARE
Goal Outcome Evaluation:      VSS, IV abx continued, pain meds PRN. Ice packs used on right shoulder PRN. Patient was able to have a large bowel movement prior to receiving enema. Enema not given.

## 2023-11-30 NOTE — PLAN OF CARE
Problem: Adjustment to Illness (Sepsis/Septic Shock)  Goal: Optimal Coping  Outcome: Ongoing, Progressing     Problem: Bleeding (Sepsis/Septic Shock)  Goal: Absence of Bleeding  Outcome: Ongoing, Progressing     Problem: Glycemic Control Impaired (Sepsis/Septic Shock)  Goal: Blood Glucose Level Within Desired Range  Outcome: Ongoing, Progressing     Problem: Infection Progression (Sepsis/Septic Shock)  Goal: Absence of Infection Signs and Symptoms  Outcome: Ongoing, Progressing  Intervention: Initiate Sepsis Management  Recent Flowsheet Documentation  Taken 11/30/2023 1201 by Leigh Ann Iglesias RN  Infection Prevention:   hand hygiene promoted   rest/sleep promoted  Taken 11/30/2023 1000 by Leigh Ann Iglesias RN  Infection Prevention:   hand hygiene promoted   rest/sleep promoted  Taken 11/30/2023 0840 by Leigh Ann Iglesias RN  Infection Prevention:   hand hygiene promoted   rest/sleep promoted  Intervention: Promote Recovery  Recent Flowsheet Documentation  Taken 11/30/2023 1201 by Leigh Ann Iglesias RN  Activity Management:   activity encouraged   up in chair   ambulated to bathroom  Taken 11/30/2023 1000 by Leigh Ann Iglesias RN  Activity Management: activity encouraged  Taken 11/30/2023 0840 by Leigh Ann Iglesias RN  Activity Management: activity encouraged     Problem: Nutrition Impaired (Sepsis/Septic Shock)  Goal: Optimal Nutrition Intake  Outcome: Ongoing, Progressing     Problem: Adult Inpatient Plan of Care  Goal: Plan of Care Review  Outcome: Ongoing, Progressing  Flowsheets  Taken 11/30/2023 1603 by Leigh Ann Iglesias RN  Progress: improving  Outcome Evaluation: Pt awaiting discharge pending reevaluation. Pain better controlled.  Taken 11/29/2023 1731 by Piedad Desouza RN  Plan of Care Reviewed With: patient  Goal: Patient-Specific Goal (Individualized)  Outcome: Ongoing, Progressing  Goal: Absence of Hospital-Acquired Illness or Injury  Outcome: Ongoing, Progressing  Intervention: Identify and Manage Fall Risk  Recent  Flowsheet Documentation  Taken 11/30/2023 1201 by Leigh Ann Iglesias RN  Safety Promotion/Fall Prevention:   assistive device/personal items within reach   clutter free environment maintained   nonskid shoes/slippers when out of bed   safety round/check completed  Taken 11/30/2023 1000 by Leigh Ann Iglesias RN  Safety Promotion/Fall Prevention:   assistive device/personal items within reach   clutter free environment maintained   nonskid shoes/slippers when out of bed   safety round/check completed  Taken 11/30/2023 0840 by Leigh Ann Iglesias RN  Safety Promotion/Fall Prevention:   assistive device/personal items within reach   clutter free environment maintained   nonskid shoes/slippers when out of bed   safety round/check completed  Intervention: Prevent and Manage VTE (Venous Thromboembolism) Risk  Recent Flowsheet Documentation  Taken 11/30/2023 1201 by Leigh Ann Iglesias RN  Activity Management:   activity encouraged   up in chair   ambulated to bathroom  Taken 11/30/2023 1000 by Leigh Ann Iglesias RN  Activity Management: activity encouraged  Taken 11/30/2023 0840 by Leigh Ann Iglesias RN  Activity Management: activity encouraged  Intervention: Prevent Infection  Recent Flowsheet Documentation  Taken 11/30/2023 1201 by Leigh Ann Iglesias RN  Infection Prevention:   hand hygiene promoted   rest/sleep promoted  Taken 11/30/2023 1000 by Leigh Ann Iglesias RN  Infection Prevention:   hand hygiene promoted   rest/sleep promoted  Taken 11/30/2023 0840 by Leigh Ann Iglesias RN  Infection Prevention:   hand hygiene promoted   rest/sleep promoted  Goal: Optimal Comfort and Wellbeing  Outcome: Ongoing, Progressing  Goal: Readiness for Transition of Care  Outcome: Ongoing, Progressing     Problem: Fall Injury Risk  Goal: Absence of Fall and Fall-Related Injury  Outcome: Ongoing, Progressing  Intervention: Promote Injury-Free Environment  Recent Flowsheet Documentation  Taken 11/30/2023 1201 by Leigh Ann Iglesias RN  Safety Promotion/Fall  Prevention:   assistive device/personal items within reach   clutter free environment maintained   nonskid shoes/slippers when out of bed   safety round/check completed  Taken 11/30/2023 1000 by Leigh Ann Iglesias RN  Safety Promotion/Fall Prevention:   assistive device/personal items within reach   clutter free environment maintained   nonskid shoes/slippers when out of bed   safety round/check completed  Taken 11/30/2023 0840 by Leigh Ann Iglesias, RN  Safety Promotion/Fall Prevention:   assistive device/personal items within reach   clutter free environment maintained   nonskid shoes/slippers when out of bed   safety round/check completed     Problem: Pain Acute  Goal: Acceptable Pain Control and Functional Ability  Outcome: Ongoing, Progressing   Goal Outcome Evaluation:           Progress: improving  Outcome Evaluation: Pt awaiting discharge pending reevaluation. Pain better controlled.

## 2023-11-30 NOTE — PROGRESS NOTES
"    Chief Complaint: Septic arthritis    Subjective:  Symptoms:  Stable.  No shortness of breath.  (Right shoulder pain).    Diet:  No nausea or vomiting.    Activity level: Impaired due to pain.    Pain:  She complains of pain that is moderate.  Pain is partially controlled.    She reports that she is not ambulating much due to pain.      Vital Signs:  Temp:  [97.5 °F (36.4 °C)-99.3 °F (37.4 °C)] 97.5 °F (36.4 °C)  Heart Rate:  [] 90  Resp:  [18] 18  BP: (116-127)/(74-83) 116/79    Intake & Output (last day)         11/29 0701  11/30 0700 11/30 0701 12/01 0700    P.O.      Total Intake(mL/kg)      Net            Urine Unmeasured Occurrence 1 x     Stool Unmeasured Occurrence 1 x             Objective:  General Appearance:  Uncomfortable, in no acute distress and in pain.    Vital signs: (most recent): Blood pressure 116/79, pulse 90, temperature 97.5 °F (36.4 °C), temperature source Oral, resp. rate 18, height 162.6 cm (64\"), weight 78 kg (172 lb), SpO2 (!) 88%, not currently breastfeeding.  Vital signs are normal.    Lungs:  Normal effort and normal respiratory rate.    Heart: Normal rate.  Regular rhythm.    Abdomen: Abdomen is soft.    Extremities: Decreased range of motion.    Neurological: Patient is alert and oriented to person, place and time.    Skin:  Warm and dry.            Results Review:     I reviewed the patient's new clinical results.  I reviewed the patient's new imaging results and agree with the interpretation.  I reviewed the patient's other test results and agree with the interpretation  Discussed with patient, family at bedside, Dr. Rodriguez.    Imaging Results (Last 24 Hours)       Procedure Component Value Units Date/Time    CT Chest With Contrast Diagnostic [[id number]] Collected: 11/30/23 1324     Updated: 11/30/23 1340    Narrative:      CT CHEST W CONTRAST DIAGNOSTIC-     HISTORY: Pain in upper right chest/lower neck area and right shoulder,  fever. Taking care of daughter's cat who " reportedly scratches and bites  a lot. Please review right sternoclavicular joint and subpectoral area.     TECHNIQUE: CT of the chest was performed following administration of  intravenous contrast. Reformatted images were reviewed. Radiation dose  reduction techniques were utilized, including automated exposure control  and exposure modulation based on body size.     COMPARISON: Chest radiograph 11/29/2023. CT chest 11/24/2023.     FINDINGS:       There is a left PICC with the tip in the upper SVC. There is a hypodense  right thyroid nodule, better assessed on recent dedicated thyroid  ultrasound.  No pathologically enlarged thoracic lymph nodes identified. Heart size  is normal. There is no pericardial effusion. There is incompletely  assessed calcific coronary artery atherosclerosis. There is mild aortic  atherosclerosis. There is a trace right pleural effusion, minimally  improved.  Limited imaging through the upper abdomen demonstrate surgical clips at  the gallbladder fossa. There is diffuse biliary ductal dilatation,  likely postoperative. There is calcific atherosclerosis.  There is multilevel degenerative disc disease.  The trachea is clear. There is mild consolidation in the right middle  and lower lobes, which has improved, favored reflect atelectasis. There  is mild consolidation and trace fluid along the right major fissure.  Additional curvilinear opacities are favored to reflect subsegmental  atelectasis and/or scarring.          Impression:         1.  Minimally improved trace right pleural effusion with improved right  basilar consolidation.  2.  No thoracic lymphadenopathy.  3.  Additional findings, as above.     This report was finalized on 11/30/2023 1:37 PM by Dr. Piedad Natarajan M.D  on Workstation: [id number]       US Thyroid [[id number]] Collected: 11/30/23 0742     Updated: 11/30/23 0749    Narrative:      US THYROID-11/29/2023     HISTORY: Follow-up thyroid nodule seen on MRI.     The right  thyroid lobe measures 4.6 cm in length. Left thyroid lobe  measures 4.1 cm in length.     In the right mid thyroid lobe there is a 2.4 cm x 1.7 cm x 1.4 cm mostly  cystic nodule. TR 1     In the upper right thyroid lobe there is a 9 mm x 8 mm x 5 mm slightly  hypoechoic mostly solid-appearing nodule. TR 4     Another tiny 7 mm in greatest dimension slightly hypoechoic mixed cystic  and solid nodule. TR 3     A hypoechoic mostly solid 6 mm thyroid isthmus nodule is seen TR 4     In the left thyroid lobe there is an approximately 1 cm x 8 mm x 8 mm  mostly solid isoechoic nodule TR 3     There are no previous thyroid ultrasound studies available for review.       Impression:      1. Bilateral thyroid nodules as described. Largest of these is mostly  cystic.  2. These do not meet criteria for fine-needle aspiration. At least 1 of  these is borderline for recommended follow-up using TI-RADS evaluation.  3. Follow-up thyroid ultrasound in 1 year suggested.           This report was finalized on 11/30/2023 7:46 AM by Dr. Vikash Vincent M.D on Workstation: [id number]               Lab Results:     Lab Results (last 24 hours)       Procedure Component Value Units Date/Time    Comprehensive Metabolic Panel [[id number]]  (Abnormal) Collected: 11/30/23 0742    Specimen: Blood Updated: 11/30/23 0838     Glucose 86 mg/dL      BUN 6 mg/dL      Creatinine 0.72 mg/dL      Sodium 137 mmol/L      Potassium 3.8 mmol/L      Chloride 104 mmol/L      CO2 23.4 mmol/L      Calcium 9.1 mg/dL      Total Protein 6.0 g/dL      Albumin 3.0 g/dL      ALT (SGPT) 62 U/L      AST (SGOT) 49 U/L      Alkaline Phosphatase 79 U/L      Total Bilirubin 0.2 mg/dL      Globulin 3.0 gm/dL      A/G Ratio 1.0 g/dL      BUN/Creatinine Ratio 8.3     Anion Gap 9.6 mmol/L      eGFR 94.1 mL/min/1.73     Narrative:      GFR Normal >60  Chronic Kidney Disease <60  Kidney Failure <15      CBC (No Diff) [[id number]]  (Abnormal) Collected: 11/30/23 0742    Specimen:  Blood Updated: 11/30/23 0816     WBC 6.99 10*3/mm3      RBC 3.42 10*6/mm3      Hemoglobin 9.9 g/dL      Hematocrit 30.1 %      MCV 88.0 fL      MCH 28.9 pg      MCHC 32.9 g/dL      RDW 11.4 %      RDW-SD 35.7 fl      MPV 8.1 fL      Platelets 554 10*3/mm3              Assessment & Plan       Right shoulder pain    Essential hypertension    Sepsis    Community acquired pneumonia of right lower lobe of lung    Pain of right clavicle    Hypokalemia    Bacteremia    Infection, Pasteurella    Septic arthritis of right sternoclavicular joint    Constipation       Assessment & Plan    Madison Acevedo is a pleasant 63-year-old female who presented to [facility] with progressive severe right shoulder and chest pain.  Due to concern for sternoclavicular joint abscess, we were asked to see this lady in consult.  The patient underwent MRI of the chest followed by a CT of the chest.    She had a repeat CT of the chest done today which does not demonstrate any evidence of fluid or abscess.  We had a long discussion regarding surgical intervention for removal of the clavicular head without a drainable fluid collection or evidence of osteomyelitis of the clavicle.  Again, I do NOT recommend surgical intervention for this problem.  I do not agree with our infectious disease colleague, Dr. Hemphill.  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  The patient understands this and does not wish to proceed with any surgical intervention.  I have discussed her case with multiple colleagues including my partner, Dr. Gonzales.  I would continue to recommend conservative management with IV antibiotics and pain control.  I also think that she would feel better with more movement of her shoulder joint.    Patient may discharge from our standpoint when okay with all others.  We will  follow her on an as-needed basis.  Please call our office if needed.  [phone].  I have arranged for the patient to follow-up with me in 2 weeks.  No further imaging is needed prior to her appointment.    Mohini Rodriguez MD  [facility]  11/30/23  17:00 EST    Greater than 45 minutes was spent reviewing the patient's chart, radiographic imaging, labs, provider notes, assessing the patient and developing a plan of care.  This was discussed with the patient and RN.

## 2023-12-01 ENCOUNTER — READMISSION MANAGEMENT (OUTPATIENT)
Dept: CALL CENTER | Facility: HOSPITAL | Age: 63
End: 2023-12-01
Payer: COMMERCIAL

## 2023-12-01 ENCOUNTER — DOCUMENTATION (OUTPATIENT)
Dept: HOME HEALTH SERVICES | Facility: HOME HEALTHCARE | Age: 63
End: 2023-12-01
Payer: COMMERCIAL

## 2023-12-01 ENCOUNTER — HOME HEALTH ADMISSION (OUTPATIENT)
Dept: HOME HEALTH SERVICES | Facility: HOME HEALTHCARE | Age: 63
End: 2023-12-01
Payer: COMMERCIAL

## 2023-12-01 ENCOUNTER — TRANSCRIBE ORDERS (OUTPATIENT)
Dept: HOME HEALTH SERVICES | Facility: HOME HEALTHCARE | Age: 63
End: 2023-12-01
Payer: COMMERCIAL

## 2023-12-01 ENCOUNTER — TELEPHONE (OUTPATIENT)
Dept: FAMILY MEDICINE CLINIC | Facility: CLINIC | Age: 63
End: 2023-12-01
Payer: COMMERCIAL

## 2023-12-01 VITALS
HEIGHT: 64 IN | TEMPERATURE: 97.9 F | DIASTOLIC BLOOD PRESSURE: 75 MMHG | BODY MASS INDEX: 29.37 KG/M2 | WEIGHT: 172 LBS | RESPIRATION RATE: 20 BRPM | HEART RATE: 90 BPM | SYSTOLIC BLOOD PRESSURE: 120 MMHG | OXYGEN SATURATION: 92 %

## 2023-12-01 DIAGNOSIS — M00.9 SEPTIC ARTHRITIS OF RIGHT STERNOCLAVICULAR JOINT: Primary | ICD-10-CM

## 2023-12-01 DIAGNOSIS — J18.9 PNEUMONIA OF RIGHT LUNG DUE TO INFECTIOUS ORGANISM, UNSPECIFIED PART OF LUNG: ICD-10-CM

## 2023-12-01 DIAGNOSIS — J90 RECURRENT PLEURAL EFFUSION ON RIGHT: ICD-10-CM

## 2023-12-01 PROCEDURE — 25010000002 PENICILLIN G POTASSIUM PER 600000 UNITS: Performed by: INTERNAL MEDICINE

## 2023-12-01 RX ORDER — OXYCODONE HYDROCHLORIDE 15 MG/1
15 TABLET ORAL EVERY 4 HOURS PRN
Qty: 12 TABLET | Refills: 0 | Status: SHIPPED | OUTPATIENT
Start: 2023-12-01

## 2023-12-01 RX ORDER — POLYETHYLENE GLYCOL 3350 17 G/17G
17 POWDER, FOR SOLUTION ORAL 2 TIMES DAILY
Qty: 238 G | Refills: 0 | Status: SHIPPED | OUTPATIENT
Start: 2023-12-01

## 2023-12-01 RX ORDER — PENICILLIN V POTASSIUM 500 MG/1
500 TABLET ORAL EVERY 6 HOURS SCHEDULED
Status: DISCONTINUED | OUTPATIENT
Start: 2023-12-17 | End: 2023-12-01 | Stop reason: HOSPADM

## 2023-12-01 RX ORDER — CYCLOBENZAPRINE HCL 10 MG
10 TABLET ORAL 3 TIMES DAILY PRN
Qty: 24 TABLET | Refills: 0 | Status: SHIPPED | OUTPATIENT
Start: 2023-12-01 | End: 2023-12-05 | Stop reason: SDUPTHER

## 2023-12-01 RX ORDER — PENICILLIN V POTASSIUM 500 MG/1
500 TABLET ORAL EVERY 6 HOURS SCHEDULED
Qty: 56 TABLET | Refills: 0 | Status: SHIPPED | OUTPATIENT
Start: 2023-12-17 | End: 2023-12-31

## 2023-12-01 RX ORDER — NALOXONE HYDROCHLORIDE 4 MG/.1ML
SPRAY NASAL
Qty: 2 EACH | Refills: 0 | Status: SHIPPED | OUTPATIENT
Start: 2023-12-01

## 2023-12-01 RX ORDER — MELOXICAM 15 MG/1
15 TABLET ORAL DAILY
Qty: 10 TABLET | Refills: 0 | Status: SHIPPED | OUTPATIENT
Start: 2023-12-02 | End: 2023-12-05 | Stop reason: SDUPTHER

## 2023-12-01 RX ORDER — AMOXICILLIN 250 MG
1 CAPSULE ORAL 2 TIMES DAILY
Qty: 60 TABLET | Refills: 0 | Status: SHIPPED | OUTPATIENT
Start: 2023-12-01

## 2023-12-01 RX ORDER — ACETAMINOPHEN 500 MG
1000 TABLET ORAL EVERY 8 HOURS
Qty: 60 TABLET | Refills: 0 | Status: SHIPPED | OUTPATIENT
Start: 2023-12-01 | End: 2023-12-11

## 2023-12-01 RX ORDER — OXYCODONE HCL 20 MG/1
20 TABLET, FILM COATED, EXTENDED RELEASE ORAL EVERY 12 HOURS SCHEDULED
Qty: 6 TABLET | Refills: 0 | Status: SHIPPED | OUTPATIENT
Start: 2023-12-01 | End: 2023-12-05

## 2023-12-01 RX ORDER — LIDOCAINE 4 G/G
2 PATCH TOPICAL
Qty: 20 EACH | Refills: 0 | Status: SHIPPED | OUTPATIENT
Start: 2023-12-02

## 2023-12-01 RX ADMIN — MELOXICAM 15 MG: 15 TABLET ORAL at 08:24

## 2023-12-01 RX ADMIN — SODIUM CHLORIDE 4 MILLION UNITS: 9 INJECTION, SOLUTION INTRAVENOUS at 00:54

## 2023-12-01 RX ADMIN — CYCLOBENZAPRINE 10 MG: 10 TABLET, FILM COATED ORAL at 11:17

## 2023-12-01 RX ADMIN — POLYETHYLENE GLYCOL 3350 17 G: 17 POWDER, FOR SOLUTION ORAL at 08:24

## 2023-12-01 RX ADMIN — Medication 10 ML: at 10:34

## 2023-12-01 RX ADMIN — ACETAMINOPHEN 1000 MG: 500 TABLET ORAL at 11:17

## 2023-12-01 RX ADMIN — NALOXEGOL OXALATE 12.5 MG: 12.5 TABLET, FILM COATED ORAL at 08:25

## 2023-12-01 RX ADMIN — Medication 10 ML: at 10:33

## 2023-12-01 RX ADMIN — SODIUM CHLORIDE 4 MILLION UNITS: 9 INJECTION, SOLUTION INTRAVENOUS at 04:32

## 2023-12-01 RX ADMIN — OXYCODONE HYDROCHLORIDE 20 MG: 10 TABLET, FILM COATED, EXTENDED RELEASE ORAL at 08:24

## 2023-12-01 RX ADMIN — DOCUSATE SODIUM 50MG AND SENNOSIDES 8.6MG 1 TABLET: 8.6; 5 TABLET, FILM COATED ORAL at 08:24

## 2023-12-01 RX ADMIN — OMEPRAZOLE AND SODIUM BICARBONATE 1 CAPSULE: 40; 1100 CAPSULE ORAL at 06:40

## 2023-12-01 RX ADMIN — SODIUM CHLORIDE 4 MILLION UNITS: 9 INJECTION, SOLUTION INTRAVENOUS at 08:50

## 2023-12-01 RX ADMIN — ACETAMINOPHEN 1000 MG: 500 TABLET ORAL at 04:32

## 2023-12-01 RX ADMIN — LIDOCAINE 2 PATCH: 4 PATCH TOPICAL at 08:25

## 2023-12-01 NOTE — TELEPHONE ENCOUNTER
I called [facility] and gave verbal orders for home health care. I had spoken with Rosey

## 2023-12-01 NOTE — PROGRESS NOTES
Case Management Discharge Note      Final Note: DC'd home with [facility] following and [facility] infusions for IV antibiotics    Provided Post Acute Provider List?: Yes  Post Acute Provider List: Home Health  Delivered To: Patient  Method of Delivery: In person         Dialysis/Infusion Coordination complete.      Service Provider Selected Services Address Phone Fax Patient Preferred    [facility] [address], [facility] [address] [phone] --              Home Medical Care Coordination complete.      Service Provider Selected Services Address Phone Fax Patient Preferred     [facility] [address] [address] [phone] --                          Final Discharge Disposition Code: 06 - home with home health care

## 2023-12-01 NOTE — PROGRESS NOTES
Infectious Diseases Progress Note    Grace Hemphill MD     [facility]  Los: 11 days  Patient Identification:  Name: Madison Acevedo  Age: 63 y.o.  Sex: female  :  1960  MRN: [id number]         Primary Care Physician: Paulette Coulter APRN        Subjective: Able to move around a little bit but after the activity her right upper chest lower neck area and right shoulder is still very painful.  Denies any fever and chills.  Interval History: See consultation note.  2023 blood culture drawn at the time of admission is now identified as Pasteurella multocida.  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  The last significant bite was in her left hand about a week ago few days prior to onset of the neck pain.  2020 repeat blood cultures so far showed  no growth.  2023 evaluation by orthopedic surgery service reviewed  2023 echocardiogram performed result reviewed  2023 MRI of the neck and sternoclavicular joint area reviewed and shows evidence of septic arthritis with adjacent soft tissue infection.  2023 antibiotics switched to IV penicillin.    Objective:    Scheduled Meds:acetaminophen, 1,000 mg, Oral, Q8H  Lidocaine, 2 patch, Transdermal, Q24H  meloxicam, 15 mg, Oral, Daily  mineral oil, 1 enema, Rectal, Once  Naloxegol Oxalate, 12.5 mg, Oral, QAM  omeprazole-sodium bicarbonate, 1 capsule, Oral, QAM AC  oxyCODONE, 20 mg, Oral, Q12H  penicillin g (potassium), 4 Million Units, Intravenous, Q4H  polyethylene glycol, 17 g, Oral, BID  senna-docusate sodium, 1 tablet, Oral, BID  sodium chloride, 10 mL, Intravenous, Q12H  sodium chloride, 10 mL, Intravenous, Q12H      Continuous Infusions:     Vital signs in last 24 hours:  Temp:  [97.9 °F (36.6 °C)-98.8 °F (37.1 °C)] 97.9 °F (36.6 °C)  Heart Rate:  []  "90  Resp:  [20] 20  BP: (120-136)/(75-84) 120/75    Intake/Output:  No intake or output data in the 24 hours ending 12/01/23 0839          Exam:  /75 (BP Location: Right arm, Patient Position: Lying)   Pulse 90   Temp 97.9 °F (36.6 °C) (Oral)   Resp 20   Ht 162.6 cm (64\")   Wt 78 kg (172 lb)   SpO2 92%   BMI 29.52 kg/m²   Patient is examined using the personal protective equipment as per guidelines from infection control for this particular patient as enacted.  Hand washing was performed before and after patient interaction.  General Appearance:    Alert, cooperative, no distress, AAOx3                          Head:    Normocephalic, without obvious abnormality, atraumatic                           Eyes:    PERRL, conjunctivae/corneas clear, EOM's intact, both eyes                         Throat:   Lips, tongue, gums normal; oral mucosa pink and moist                           Neck:   Supple, symmetrical, trachea midline, no JVD                         Lungs:    Clear to auscultation bilaterally, respirations unlabored                 Chest Wall:  Still have tenderness and discomfort upon palpation with Lidoderm patch in place.                          Heart:  S1-S2 regular no murmur                  abdomen:   Soft nontender                 Extremities: Resolved tenderness in the neck area.                        Pulses:   Pulses palpable in all extremities                            Skin:   Skin is warm and dry,  no rashes or palpable lesions                  Neurologic: Grossly nonfocal       Data Review:    I reviewed the patient's new clinical results.  Results from last 7 days   Lab Units 11/30/23  0742 11/29/23  0643 11/28/23  0623 11/27/23  0638 11/26/23  0542 11/25/23  0453   WBC 10*3/mm3 6.99 8.05 11.14* 8.12 8.51 10.14   HEMOGLOBIN g/dL 9.9* 9.9* 10.6* 10.6* 11.2* 10.3*   PLATELETS 10*3/mm3 554* 506* 506* 446 442 398     Results from last 7 days   Lab Units 11/30/23  0742 11/27/23  0638 " "11/26/23  0542 11/25/23  0453   SODIUM mmol/L 137 137 136 135*   POTASSIUM mmol/L 3.8 4.2 4.4 4.2   CHLORIDE mmol/L 104 100 98 97*   CO2 mmol/L 23.4 29.2* 28.0 29.2*   BUN mg/dL 6* 7* 11 13   CREATININE mg/dL 0.72 0.79 0.65 0.73   CALCIUM mg/dL 9.1 8.8 9.1 8.4*   GLUCOSE mg/dL 86 77 75 95     Microbiology Results (last 10 days)       ** No results found for the last 240 hours. 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  Ruled out endocarditis-repeat blood cultures are negative and echocardiogram does not show any evidence of valvular process and hence less likely at this time.        Recommendations/discussion:  See my discussion and recommendation 11/29/2023 and 11/30/2023.  Evaluation and follow-up from thoracic surgery service reviewed and appreciated.  Since there is no plan for intervention-as documented in the follow-up note by thoracic surgery service on 11/30/2023 :\"She had a repeat CT of the chest done today which does not demonstrate any evidence of fluid or abscess.  We had a long discussion regarding surgical intervention for removal of the clavicular head without a drainable fluid collection or evidence of osteomyelitis of the clavicle.  Again, I do NOT recommend surgical intervention for this problem.  I do not agree with our infectious disease colleague, Dr. Hemphill.  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  The patient understands this and does not wish to proceed with any surgical intervention.  I have discussed her case with multiple colleagues including my partner, Dr. Gonzales.  I would continue to recommend conservative management with IV antibiotics and pain control.  I also think that she would feel better with more movement of her shoulder joint\" , would recommend 6 weeks of antibiotics with first four week with IV penicillin at 4,000,000 units every 4 hours as intermittent fusion or continuous infusion from last negative blood culture which was drawn on 11/20/2023.  At the end of IV antibiotic treatment patient would be switched to oral penicillin 500 mg every 6 hours for 2 more weeks to complete 6 total of 6 weeks of treatment for Pasteurella multocida bacteremia with septic arthritis of the right sternoclavicular joint.  Following orders are written for case management:  Please arrange for 28 days of IV penicillin at 4,000,000 units every 4 hours either as continuous every 24 hour infusion or intermittent infusion which ever is logistically possible for this patient starting 11/20/2023.  Check weekly CBC CMP and CRP and call abnormal results to Dr. Hemphill at [phone].  Patient to call Dr. Hemphill on a weekly basis at [phone] to go over the review systems to monitor antibiotic toxicity and effectiveness of treatment.  Patient need to be educated and I have also educated the patient that management of her right sternoclavicular joint pain would be carried out either by her primary care provider or thoracic surgery service with out of hospital follow-up for sternoclavicular joint septic arthritis with thoracic surgery service as per their recommendations.  Please remove PICC line after completion of IV antibiotics.  Prescription for oral penicillin 500 mg every 6 hours for 2 weeks to be started on 12/18/2023 has been placed electronically and can be made " part of her discharge summary and discharge med rec.  Once above is accomplished then patient can be discharged with close outpatient follow-up with infectious disease service with mechanism of follow-up as listed above, with primary care provider as per their recommendation including management of joint pain involving the sternoclavicular joint thoracic surgery service per their recommendations.    Grace Hemphill MD  12/1/2023  08:39 EST    Parts of this note may be an electronic transcription/translation of spoken language to printed text using the Dragon dictation system.

## 2023-12-01 NOTE — TELEPHONE ENCOUNTER
Caller:ORLANDO UNDERWOOD WITH [facility]     Relationship: IN HOME HEALTH CARE SERVICES    Best call back number: [phone]     What orders are you requesting (i.e. lab or imaging): HOME HEALTH EVALUATION AND NURSING VERBAL ORDERS    In what timeframe would the patient need to come in: POSSIBLY ONCE A WEEK FOR IV ANTIBIOTIC EDUCATION, PICC DRESSING, LAB WORK, AND VITAL SIGNS AS NEEDED    Where will you receive your lab/imaging services: IN HOME    Additional notes: ORLANDO UNDERWOOD WITH [facility]  STATED PATIENT IS DISCHARGING TODAY AND IS REQUESTING THE ABOVE ORDERS FROM [facility] IF POSSIBLE ASAP    PLEASE CALL ORLANDO UNDERWOOD WITH [facility]

## 2023-12-01 NOTE — DISCHARGE SUMMARY
[facility]    Date of Discharge:  12/1/2023    PCP: Paulette Coulter, APRN    Discharge Diagnosis:   Active Hospital Problems    Diagnosis  POA    **Right shoulder pain [M25.511]  Yes    Constipation [K59.00]  Unknown    Septic arthritis of right sternoclavicular joint [M00.9]  Unknown    Infection, Pasteurella [A28.0]  Unknown    Bacteremia [R78.81]  Yes    Sepsis [A41.9]  Yes    Community acquired pneumonia of right lower lobe of lung [J18.9]  Yes    Pain of right clavicle [M89.8X1]  Yes    Hypokalemia [E87.6]  Yes    Essential hypertension [I10]  Yes      Resolved Hospital Problems   No resolved problems to display.          Consults       Date and Time Order Name Status Description    11/30/2023  9:40 AM Inpatient Thoracic Surgery Consult      11/29/2023  9:24 AM Inpatient Infectious Diseases Consult      11/29/2023  9:20 AM Inpatient Infectious Diseases Consult      11/28/2023  9:02 AM Inpatient Pulmonology Consult Completed     11/21/2023  4:39 PM Inpatient Thoracic Surgery Consult Completed     11/20/2023 10:35 AM Inpatient Infectious Diseases Consult Completed     11/19/2023 12:22 PM Inpatient Orthopedic Surgery Consult Completed     11/19/2023 11:23 AM [facility] (on-call MD unless specified) Details            Hospital Course  63 y.o. female presented with right clavicular pain and fever. She had Pasteurella bacteremia. She has a cat at home. She was diagnosed with Pasteurella bacteremia/sepsis with right sternoclavicular joint septic arthritis. MRI showed right sternoclavicular septic arthritis with synovitis and surrounding soft tissue inflammation associated with myositis of the distal right external cleidomastoid muscle, right pectoralis sternal attachment. She was started on antibiotics and pain medication. She was seen by infectious diseases as well as thoracic surgery. Thoracic surgery did not recommend any surgery and ID recommends 6 weeks of  antibiotics with first four week with IV penicillin at 4,000,000 units every 4 hours as intermittent fusion or continuous infusion from last negative blood culture which was drawn on 11/20/2023.  At the end of IV antibiotic treatment patient would be switched to oral penicillin 500 mg every 6 hours for 2 more weeks to complete 6 total of 6 weeks of treatment for Pasteurella multocida bacteremia with septic arthritis of the right sternoclavicular joint. Per ID:    Please arrange for 28 days of IV penicillin at 4,000,000 units every 4 hours either as continuous every 24 hour infusion or intermittent infusion which ever is logistically possible for this patient starting 11/20/2023.  Check weekly CBC CMP and CRP and call abnormal results to Dr. Hemphill at [phone].  Patient to call Dr. Hemphill on a weekly basis at [phone] to go over the review systems to monitor antibiotic toxicity and effectiveness of treatment.  Patient need to be educated and I have also educated the patient that management of her right sternoclavicular joint pain would be carried out either by her primary care provider or thoracic surgery service with out of hospital follow-up for sternoclavicular joint septic arthritis with thoracic surgery service as per their recommendations.  Please remove PICC line after completion of IV antibiotics.  Prescription for oral penicillin 500 mg every 6 hours for 2 weeks to be started on 12/18/2023 has been placed electronically and can be made part of her discharge summary and discharge med rec.    There was findings consistent with pneumonia on x-ray. Follow-up CT scan showed near normalization of pleural effusion and atelectasis. Pulmonology signed off.    She was noted to have a thyroid nodule versus cyst on imaging. Thyroid ultrasound showed bilateral mostly cystic nodules not meeting requirement for FNA and radiology recommended follow-up ultrasound in a year. Patient is aware.    Her pain and range of motion  have improved though she is requiring significant amounts of medication. She has had a bowel movement. She will be discharged on scheduled bowel medications.    I discussed the patient's findings and my recommendations with patient, family, and nursing staff and Dr. Hemphill    Temp:  [97.9 °F (36.6 °C)-98.8 °F (37.1 °C)] 97.9 °F (36.6 °C)  Heart Rate:  [] 90  Resp:  [20] 20  BP: (120-136)/(75-84) 120/75  Body mass index is 29.52 kg/m².    Physical Exam  Constitutional:       General: She is not in acute distress.     Appearance: Normal appearance. She is not toxic-appearing.   HENT:      Head: Normocephalic and atraumatic.   Cardiovascular:      Rate and Rhythm: Normal rate and regular rhythm.   Pulmonary:      Effort: Pulmonary effort is normal. No respiratory distress.      Breath sounds: Normal breath sounds.   Abdominal:      General: Bowel sounds are normal.      Palpations: Abdomen is soft.      Tenderness: There is no abdominal tenderness.   Musculoskeletal:      Comments: Decreased ROM right shoulder due to pain but patient states improving   Skin:     General: Skin is warm and dry.   Neurological:      General: No focal deficit present.      Mental Status: She is alert.   Psychiatric:         Mood and Affect: Mood normal.         Behavior: Behavior normal.       Disposition: Home or Self Care       Discharge Medications        New Medications        Instructions Start Date   Lidocaine 4 %   2 patches, Transdermal, Every 24 Hours Scheduled, Remove & Discard patch within 12 hours or as directed by MD   Start Date: December 2, 2023     meloxicam 15 MG tablet  Commonly known as: MOBIC   15 mg, Oral, Daily   Start Date: December 2, 2023     naloxone 4 MG/0.1ML nasal spray  Commonly known as: NARCAN   Call 911. Don't prime. [place] in 1 nostril for overdose. Repeat in 2-3 minutes in other nostril if no or minimal breathing/responsiveness.      oxyCODONE ER 20 MG 12 hr tablet  Commonly known as: oxyCONTIN   20  mg, Oral, Every 12 Hours Scheduled      oxyCODONE 15 MG immediate release tablet  Commonly known as: ROXICODONE   15 mg, Oral, Every 4 Hours PRN      penicillin G potassium 4 Million Units in sodium chloride 0.9 % 100 mL IVPB   4 Million Units, Intravenous, Every 4 Hours      penicillin v potassium 500 MG tablet  Commonly known as: VEETID   500 mg, Oral, Every 6 Hours Scheduled   Start Date: December 17, 2023     polyethylene glycol 17 GM/SCOOP powder  Commonly known as: MIRALAX   17 g, Oral, 2 Times Daily      sennosides-docusate 8.6-50 MG per tablet  Commonly known as: PERICOLACE   1 tablet, Oral, 2 Times Daily             Changes to Medications        Instructions Start Date   acetaminophen 500 MG tablet  Commonly known as: TYLENOL  What changed:   medication strength  how much to take  when to take this  reasons to take this   1,000 mg, Oral, Every 8 Hours      cyclobenzaprine 10 MG tablet  Commonly known as: FLEXERIL  What changed:   medication strength  how much to take  reasons to take this   10 mg, Oral, 3 Times Daily PRN             Continue These Medications        Instructions Start Date   amLODIPine 2.5 MG tablet  Commonly known as: NORVASC   2.5 mg, Oral, Daily      omeprazole-sodium bicarbonate  MG per capsule  Commonly known as: Zegerid   1 capsule, Oral, Daily      VITAMIN D (CHOLECALCIFEROL) PO   Oral, Daily             Stop These Medications      magnesium gluconate 500 MG tablet  Commonly known as: MAGONATE             Diet Instructions       Diet: Regular/House Diet      Discharge Diet: Regular/House Diet    Texture: Regular Texture (IDDSI 7)    Fluid Consistency: Thin (IDDSI 0)           Activity Instructions       Activity as Tolerated             Additional Instructions for the Follow-ups that You Need to Schedule       Call MD for problems / concerns.   As directed             Follow-up Information       Mohini Rodriguez MD. Go on 12/18/2023.    Specialty: Thoracic Surgery  Why:  3:15PM  Contact information:  [address] [facility]  [address]  [facility] [address]  [phone]               Grace Hemphill MD. Call.    Specialties: Infectious Diseases, Hospitalist  Why: patient to call me on a weekly basis at [phone] to go over review system to monitor antibiotic toxicity and side effects.  Contact information:  [address]  [place] [id number] [phone]               Paulette Coulter, APRN .    Specialty: Family Medicine  Contact information:  [address] [place] [id number]  [phone]                            Future Appointments   Date Time Provider Department Center   12/18/2023  3:15 PM Mohini Rodriguez MD [facility] TS [facility] MD [place]  [facility]  12/01/23    Discharge time spent greater than 30 minutes.

## 2023-12-01 NOTE — PROGRESS NOTES
[facility] following for home IV PCN administration education/PICC dressing changes/labs per Dr Hemphill's orders.  Noted D/C planned for today.  [facility] to provide home IV Abx and will deliver to bedside and educate patient, hook up to home continuous PCN prior to D/C.  Noted  IV penicillin at 4,000,000 units as continuous every 24 hour infusion starting 11/20/2023 for duration of 28 days.  Per Dr Hemphill, PICC line may be removed after completion of IV ABX. Check weekly CBC with diff, CMP and CRP and call abnormal results to Dr. Hemphill at [phone].  Verbal authorization given for home health by PCP Paulette NICHOLS.  Verified all info with patient and she is agreeable for our services and is willing to learn the IV ABX regimen at home.

## 2023-12-01 NOTE — PLAN OF CARE
Goal Outcome Evaluation:  Plan of Care Reviewed With: patient        Progress: improving  Outcome Evaluation: VSS, some complaints of pain.  Scheduled pain medications given.  Continue IV antibiotics.  Continue PICC line.  Pt is a possible discharge home later today with IV antibiotics.

## 2023-12-02 ENCOUNTER — HOME CARE VISIT (OUTPATIENT)
Dept: HOME HEALTH SERVICES | Facility: HOME HEALTHCARE | Age: 63
End: 2023-12-02
Payer: COMMERCIAL

## 2023-12-02 VITALS
TEMPERATURE: 97.7 F | HEART RATE: 105 BPM | RESPIRATION RATE: 18 BRPM | HEIGHT: 64 IN | DIASTOLIC BLOOD PRESSURE: 78 MMHG | BODY MASS INDEX: 28.51 KG/M2 | SYSTOLIC BLOOD PRESSURE: 118 MMHG | WEIGHT: 167 LBS | OXYGEN SATURATION: 95 %

## 2023-12-02 PROCEDURE — G0299 HHS/HOSPICE OF RN EA 15 MIN: HCPCS

## 2023-12-02 NOTE — HOME HEALTH
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 Patient presented with right clavicular pain and fever. She had Pasteurella bacteremia. She has a cat at home. She was diagnosed with Pasteurella bacteremia/sepsis with right sternoclavicular joint septic arthritis. Patient has history of HTN. Patients past functional status was independent. Current level of functioning requires some assistance with ADLS. Patient ambulates without assistance but is shakey on her feet. Patient has D[place] S/L PICC LINE and is receiving Penicillin 24mu/240ml via one Medi-Wei Elastomeric pump over 24 hours daily. Patient with stop date of 12/17 and then will switch to oral Penicillin 500mg 1 tablet every 6 hours for additional 2 weeks. Patient lives with her spouse in single family home with stairs. Pcg  willing to administer IV antibiotics daily. SN visits 1w4, 2prn for picc line complications, to teach patient/caregiver IV antibiotic administration. Sn to perform weekly labs and picc line care as ordered by Dr. Hemphill. Sn to perform assessments of all systems, teach medications and med effects, nutrition, hydration and bowel regimen.

## 2023-12-02 NOTE — OUTREACH NOTE
Prep Survey      Flowsheet Row Responses   [facility] facility patient discharged from? [place]   Is LACE score < 7 ? No   Eligibility [facility]   Date of Admission 11/19/23   Date of Discharge 12/01/23   Discharge Disposition Home-Health Care Svc   Discharge diagnosis right shoulder pain, Septic arthritis of right sternoclavicular joint   Does the patient have one of the following disease processes/diagnoses(primary or secondary)? Other   Does the patient have Home health ordered? Yes   What is the Home health agency?  [facility] for IV [facility]x via PICC line   Is there a DME ordered? No   Prep survey completed? Yes            Anna YEN - Registered Nurse

## 2023-12-03 NOTE — PAYOR COMM NOTE
"Madison Acevedo (63 y.o. Female)        PLEASE SEE ATTACHED DC SUMMARY    REF#[id number]     THANK YOU    NICOLE FITZGERALD LPN CCP   Date of Birth   1960    Social Security Number       Address   [address] [id number]    Home Phone   [phone]    MRN   [id number]       Hoahaoism   Samaritan    Marital Status                               Admission Date   11/19/23    Admission Type   Emergency    Admitting Provider   Alex Zepeda MD    Attending Provider       Department, Room/Bed   [address], S619/1       Discharge Date   12/1/2023    Discharge Disposition   Home or Self Care    Discharge Destination                                 Attending Provider: (none)   Allergies: No Known Allergies    Isolation: None   Infection: None   Code Status: CPR    Ht: 162.6 cm (64\")   Wt: 78 kg (172 lb)    Admission Cmt: None   Principal Problem: Right shoulder pain [M25.511]                   Active Insurance as of 11/19/2023       Primary Coverage       Payor Plan Insurance Group Employer/Plan Group    [employer] [facility] [employer] [facility] [employer] EM[employer] [id number]       Payor Plan Address Payor Plan Phone Number Payor Plan Fax Number Effective Dates    [address] [phone]  12/20/2017 - None Entered    [facility] [address]         Subscriber Name Subscriber Birth Date Member ID       MADISON ACEVEDO 1960 [id number]                     Emergency Contacts        (Rel.) Home Phone Work Phone Mobile Phone    Gold Acevedo (Spouse) [phone] -- [phone]                 Discharge Summary        Justin Hope MD at 12/01/23 [address] [place] [address] [facility]    Date of Discharge:  12/1/2023    PCP: Paulette Coulter, APRN    Discharge Diagnosis:   Active Hospital Problems    Diagnosis  POA    **Right shoulder pain [M25.511]  Yes    Constipation [K59.00]  Unknown    Septic arthritis of right " sternoclavicular joint [M00.9]  Unknown    Infection, Pasteurella [A28.0]  Unknown    Bacteremia [R78.81]  Yes    Sepsis [A41.9]  Yes    Community acquired pneumonia of right lower lobe of lung [J18.9]  Yes    Pain of right clavicle [M89.8X1]  Yes    Hypokalemia [E87.6]  Yes    Essential hypertension [I10]  Yes      Resolved Hospital Problems   No resolved problems to display.          Consults       Date and Time Order Name Status Description    11/30/2023  9:40 AM Inpatient Thoracic Surgery Consult      11/29/2023  9:24 AM Inpatient Infectious Diseases Consult      11/29/2023  9:20 AM Inpatient Infectious Diseases Consult      11/28/2023  9:02 AM Inpatient Pulmonology Consult Completed     11/21/2023  4:39 PM Inpatient Thoracic Surgery Consult Completed     11/20/2023 10:35 AM Inpatient Infectious Diseases Consult Completed     11/19/2023 12:22 PM Inpatient Orthopedic Surgery Consult Completed     11/19/2023 11:23 AM [facility] (on-call MD unless specified) Details            Hospital Course  63 y.o. female presented with right clavicular pain and fever. She had Pasteurella bacteremia. She has a cat at home. She was diagnosed with Pasteurella bacteremia/sepsis with right sternoclavicular joint septic arthritis. MRI showed right sternoclavicular septic arthritis with synovitis and surrounding soft tissue inflammation associated with myositis of the distal right external cleidomastoid muscle, right pectoralis sternal attachment. She was started on antibiotics and pain medication. She was seen by infectious diseases as well as thoracic surgery. Thoracic surgery did not recommend any surgery and ID recommends 6 weeks of antibiotics with first four week with IV penicillin at 4,000,000 units every 4 hours as intermittent fusion or continuous infusion from last negative blood culture which was drawn on 11/20/2023.  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 Per ID:    Please arrange for 28 days of IV penicillin at 4,000,000 units every 4 hours either as continuous every 24 hour infusion or intermittent infusion which ever is logistically possible for this patient starting 11/20/2023.  Check weekly CBC CMP and CRP and call abnormal results to Dr. Hemphill at [phone].  Patient to call Dr. Hemphill on a weekly basis at [phone] to go over the review systems to monitor antibiotic toxicity and effectiveness of treatment.  Patient need to be educated and I have also educated the patient that management of her right sternoclavicular joint pain would be carried out either by her primary care provider or thoracic surgery service with out of hospital follow-up for sternoclavicular joint septic arthritis with thoracic surgery service as per their recommendations.  Please remove PICC line after completion of IV antibiotics.  Prescription for oral penicillin 500 mg every 6 hours for 2 weeks to be started on 12/18/2023 has been placed electronically and can be made part of her discharge summary and discharge med rec.    There was findings consistent with pneumonia on x-ray. Follow-up CT scan showed near normalization of pleural effusion and atelectasis. Pulmonology signed off.    She was noted to have a thyroid nodule versus cyst on imaging. Thyroid ultrasound showed bilateral mostly cystic nodules not meeting requirement for FNA and radiology recommended follow-up ultrasound in a year. Patient is aware.    Her pain and range of motion have improved though she is requiring significant amounts of medication. She has had a bowel movement. She will be discharged on scheduled bowel medications.    I discussed the patient's findings and my recommendations with patient, family, and nursing staff and Dr. Hemphill    Temp:  [97.9 °F (36.6 °C)-98.8 °F  (37.1 °C)] 97.9 °F (36.6 °C)  Heart Rate:  [] 90  Resp:  [20] 20  BP: (120-136)/(75-84) 120/75  Body mass index is 29.52 kg/m².    Physical Exam  Constitutional:       General: She is not in acute distress.     Appearance: Normal appearance. She is not toxic-appearing.   HENT:      Head: Normocephalic and atraumatic.   Cardiovascular:      Rate and Rhythm: Normal rate and regular rhythm.   Pulmonary:      Effort: Pulmonary effort is normal. No respiratory distress.      Breath sounds: Normal breath sounds.   Abdominal:      General: Bowel sounds are normal.      Palpations: Abdomen is soft.      Tenderness: There is no abdominal tenderness.   Musculoskeletal:      Comments: Decreased ROM right shoulder due to pain but patient states improving   Skin:     General: Skin is warm and dry.   Neurological:      General: No focal deficit present.      Mental Status: She is alert.   Psychiatric:         Mood and Affect: Mood normal.         Behavior: Behavior normal.       Disposition: Home or Self Care       Discharge Medications        New Medications        Instructions Start Date   Lidocaine 4 %   2 patches, Transdermal, Every 24 Hours Scheduled, Remove & Discard patch within 12 hours or as directed by MD   Start Date: December 2, 2023     meloxicam 15 MG tablet  Commonly known as: MOBIC   15 mg, Oral, Daily   Start Date: December 2, 2023     naloxone 4 MG/0.1ML nasal spray  Commonly known as: NARCAN   Call 911. Don't prime. [place] in 1 nostril for overdose. Repeat in 2-3 minutes in other nostril if no or minimal breathing/responsiveness.      oxyCODONE ER 20 MG 12 hr tablet  Commonly known as: oxyCONTIN   20 mg, Oral, Every 12 Hours Scheduled      oxyCODONE 15 MG immediate release tablet  Commonly known as: ROXICODONE   15 mg, Oral, Every 4 Hours PRN      penicillin G potassium 4 Million Units in sodium chloride 0.9 % 100 mL IVPB   4 Million Units, Intravenous, Every 4 Hours      penicillin v potassium 500 MG  tablet  Commonly known as: VEETID   500 mg, Oral, Every 6 Hours Scheduled   Start Date: December 17, 2023     polyethylene glycol 17 GM/SCOOP powder  Commonly known as: MIRALAX   17 g, Oral, 2 Times Daily      sennosides-docusate 8.6-50 MG per tablet  Commonly known as: PERICOLACE   1 tablet, Oral, 2 Times Daily             Changes to Medications        Instructions Start Date   acetaminophen 500 MG tablet  Commonly known as: TYLENOL  What changed:   medication strength  how much to take  when to take this  reasons to take this   1,000 mg, Oral, Every 8 Hours      cyclobenzaprine 10 MG tablet  Commonly known as: FLEXERIL  What changed:   medication strength  how much to take  reasons to take this   10 mg, Oral, 3 Times Daily PRN             Continue These Medications        Instructions Start Date   amLODIPine 2.5 MG tablet  Commonly known as: NORVASC   2.5 mg, Oral, Daily      omeprazole-sodium bicarbonate  MG per capsule  Commonly known as: Zegerid   1 capsule, Oral, Daily      VITAMIN D (CHOLECALCIFEROL) PO   Oral, Daily             Stop These Medications      magnesium gluconate 500 MG tablet  Commonly known as: MAGONATE             Diet Instructions       Diet: Regular/House Diet      Discharge Diet: Regular/House Diet    Texture: Regular Texture (IDDSI 7)    Fluid Consistency: Thin (IDDSI 0)           Activity Instructions       Activity as Tolerated             Additional Instructions for the Follow-ups that You Need to Schedule       Call MD for problems / concerns.   As directed             Follow-up Information       Mohini Rodriguez MD. Go on 12/18/2023.    Specialty: Thoracic Surgery  Why: 3:15PM  Contact information:  [address] [place] [address]  [phone]               Grace Hemphill MD. Call.    Specialties: Infectious Diseases, Hospitalist  Why: patient to call me on a weekly basis at [phone] to go over review system to monitor antibiotic toxicity and side  effects.  Contact information:  [address]  [facility] [id number] [phone]               Paulette Coulter, APRN .    Specialty: Family Medicine  Contact information:  [address] [facility]  [address]  [facility] [id number] [phone]                            Future Appointments   Date Time Provider Department Center   12/18/2023  3:15 PM Mohini Rodriguez MD [facility] TS GIA [facility]        Justin Hope MD  [facility]  12/01/23    Discharge time spent greater than 30 minutes.      Electronically signed by Justin Hoep MD at 12/01/23 1019       Discharge Order (From admission, onward)       Start     Ordered    12/01/23 0903  Discharge patient  Once        Expected Discharge Date: 12/01/23   Discharge Disposition: Home or Self Care   Physician of Record for Attribution - Please select from Treatment Team: SOCRATES CERNA [[id number]]   Review needed by CMO to determine Physician of Record: No      Question Answer Comment   Physician of Record for Attribution - Please select from Treatment Team SOCRATES CERNA    Review needed by CMO to determine Physician of Record No        12/01/23 0902

## 2023-12-04 ENCOUNTER — HOME CARE VISIT (OUTPATIENT)
Dept: HOME HEALTH SERVICES | Facility: HOME HEALTHCARE | Age: 63
End: 2023-12-04
Payer: COMMERCIAL

## 2023-12-04 ENCOUNTER — TRANSITIONAL CARE MANAGEMENT TELEPHONE ENCOUNTER (OUTPATIENT)
Dept: CALL CENTER | Facility: HOSPITAL | Age: 63
End: 2023-12-04
Payer: COMMERCIAL

## 2023-12-04 ENCOUNTER — LAB REQUISITION (OUTPATIENT)
Dept: LAB | Facility: HOSPITAL | Age: 63
End: 2023-12-04
Payer: COMMERCIAL

## 2023-12-04 ENCOUNTER — TELEPHONE (OUTPATIENT)
Dept: FAMILY MEDICINE CLINIC | Facility: CLINIC | Age: 63
End: 2023-12-04

## 2023-12-04 VITALS
HEART RATE: 105 BPM | RESPIRATION RATE: 16 BRPM | SYSTOLIC BLOOD PRESSURE: 110 MMHG | OXYGEN SATURATION: 99 % | TEMPERATURE: 97.4 F | DIASTOLIC BLOOD PRESSURE: 76 MMHG

## 2023-12-04 DIAGNOSIS — M00.9 PYOGENIC ARTHRITIS, UNSPECIFIED: ICD-10-CM

## 2023-12-04 LAB
ALBUMIN SERPL-MCNC: 3.7 G/DL (ref 3.5–5.2)
ALBUMIN/GLOB SERPL: 1.1 G/DL
ALP SERPL-CCNC: 100 U/L (ref 39–117)
ALT SERPL W P-5'-P-CCNC: 29 U/L (ref 1–33)
ANION GAP SERPL CALCULATED.3IONS-SCNC: 11.6 MMOL/L (ref 5–15)
AST SERPL-CCNC: 17 U/L (ref 1–32)
BASOPHILS # BLD AUTO: 0.08 10*3/MM3 (ref 0–0.2)
BASOPHILS NFR BLD AUTO: 0.9 % (ref 0–1.5)
BILIRUB SERPL-MCNC: 0.3 MG/DL (ref 0–1.2)
BUN SERPL-MCNC: 11 MG/DL (ref 8–23)
BUN/CREAT SERPL: 13.6 (ref 7–25)
CALCIUM SPEC-SCNC: 9.7 MG/DL (ref 8.6–10.5)
CHLORIDE SERPL-SCNC: 98 MMOL/L (ref 98–107)
CO2 SERPL-SCNC: 25.4 MMOL/L (ref 22–29)
CREAT SERPL-MCNC: 0.81 MG/DL (ref 0.57–1)
CRP SERPL-MCNC: 4.26 MG/DL (ref 0–0.5)
DEPRECATED RDW RBC AUTO: 37.6 FL (ref 37–54)
EGFRCR SERPLBLD CKD-EPI 2021: 81.7 ML/MIN/1.73
EOSINOPHIL # BLD AUTO: 0.24 10*3/MM3 (ref 0–0.4)
EOSINOPHIL NFR BLD AUTO: 2.6 % (ref 0.3–6.2)
ERYTHROCYTE [DISTWIDTH] IN BLOOD BY AUTOMATED COUNT: 11.5 % (ref 12.3–15.4)
GLOBULIN UR ELPH-MCNC: 3.4 GM/DL
GLUCOSE SERPL-MCNC: 93 MG/DL (ref 65–99)
HCT VFR BLD AUTO: 32.3 % (ref 34–46.6)
HGB BLD-MCNC: 10.8 G/DL (ref 12–15.9)
IMM GRANULOCYTES # BLD AUTO: 0.03 10*3/MM3 (ref 0–0.05)
IMM GRANULOCYTES NFR BLD AUTO: 0.3 % (ref 0–0.5)
LYMPHOCYTES # BLD AUTO: 1.79 10*3/MM3 (ref 0.7–3.1)
LYMPHOCYTES NFR BLD AUTO: 19.6 % (ref 19.6–45.3)
MCH RBC QN AUTO: 30.2 PG (ref 26.6–33)
MCHC RBC AUTO-ENTMCNC: 33.4 G/DL (ref 31.5–35.7)
MCV RBC AUTO: 90.2 FL (ref 79–97)
MONOCYTES # BLD AUTO: 0.94 10*3/MM3 (ref 0.1–0.9)
MONOCYTES NFR BLD AUTO: 10.3 % (ref 5–12)
NEUTROPHILS NFR BLD AUTO: 6.07 10*3/MM3 (ref 1.7–7)
NEUTROPHILS NFR BLD AUTO: 66.3 % (ref 42.7–76)
NRBC BLD AUTO-RTO: 0 /100 WBC (ref 0–0.2)
PLATELET # BLD AUTO: 605 10*3/MM3 (ref 140–450)
PMV BLD AUTO: 8.6 FL (ref 6–12)
POTASSIUM SERPL-SCNC: 4.5 MMOL/L (ref 3.5–5.2)
PROT SERPL-MCNC: 7.1 G/DL (ref 6–8.5)
RBC # BLD AUTO: 3.58 10*6/MM3 (ref 3.77–5.28)
SODIUM SERPL-SCNC: 135 MMOL/L (ref 136–145)
WBC NRBC COR # BLD AUTO: 9.15 10*3/MM3 (ref 3.4–10.8)

## 2023-12-04 PROCEDURE — 86140 C-REACTIVE PROTEIN: CPT | Performed by: INTERNAL MEDICINE

## 2023-12-04 PROCEDURE — 80053 COMPREHEN METABOLIC PANEL: CPT | Performed by: INTERNAL MEDICINE

## 2023-12-04 PROCEDURE — 85025 COMPLETE CBC W/AUTO DIFF WBC: CPT | Performed by: INTERNAL MEDICINE

## 2023-12-04 PROCEDURE — G0299 HHS/HOSPICE OF RN EA 15 MIN: HCPCS

## 2023-12-04 NOTE — OUTREACH NOTE
Call Center TCM Note      Flowsheet Row Responses   [facility] patient discharged from? [place]   Does the patient have one of the following disease processes/diagnoses(primary or secondary)? Other   TCM attempt successful? Yes   Call start time 1309   Call end time 1314   Discharge diagnosis right shoulder pain, Septic arthritis of right sternoclavicular joint   Meds reviewed with patient/caregiver? Yes   Is the patient having any side effects they believe may be caused by any medication additions or changes? No   Does the patient have all medications ordered at discharge? Yes   Is the patient taking all medications as directed (includes completed medication regime)? Yes   Comments Hosp dc fu apt on 12/5/23 with PCP   Does the patient have an appointment with their PCP within 7-14 days of discharge? Yes   What is the Home health agency?  [facility] for IV abx via PICC line   Has home health visited the patient within 72 hours of discharge? Yes   Home health comments HH visited today, 12/4/23 - PICC line dressing was changed   Psychosocial issues? No   Did the patient receive a copy of their discharge instructions? Yes   Nursing interventions Reviewed instructions with patient   What is the patient's perception of their health status since discharge? Same  [pain still in right shoulder-pt plans to speak to PCP tomorrow, 12/5/23]   Is the patient/caregiver able to teach back signs and symptoms related to disease process for when to call PCP? Yes   Is the patient/caregiver able to teach back signs and symptoms related to disease process for when to call 911? Yes   Is the patient/caregiver able to teach back the hierarchy of who to call/visit for symptoms/problems? PCP, Specialist, Home health nurse, Urgent Care, ED, 911 Yes   If the patient is a current smoker, are they able to teach back resources for cessation? Not a smoker   TCM call completed? Yes   Call end time 1314            Cinthia PEREZ  NIKKY Harris    12/4/2023, 13:17 EST

## 2023-12-04 NOTE — OUTREACH NOTE
Call Center TCM Note      Flowsheet Row Responses   [facility] patient discharged from? [facility]   Does the patient have one of the following disease processes/diagnoses(primary or secondary)? Other   TCM attempt successful? No   Unsuccessful attempts Attempt 1  [No updated verbal release on file from PCP group]            Cinthia Harris RN    12/4/2023, 11:37 EST

## 2023-12-04 NOTE — TELEPHONE ENCOUNTER
Caller: JAIRO [facility]    Relationship to patient: Home Health    Best call back number: [phone]    New or established patient?  [] New  [x] Established    Date of discharge: 12/1/23    Facility discharged from: [facility]    Diagnosis/Symptoms: SEPSIS    Length of stay (If applicable): 11/19/23-12/1/23    Specialty Only: Did you see a [facility] health provider?    [] Yes  [] No  If so, who?

## 2023-12-05 ENCOUNTER — OFFICE VISIT (OUTPATIENT)
Dept: FAMILY MEDICINE CLINIC | Facility: CLINIC | Age: 63
End: 2023-12-05
Payer: COMMERCIAL

## 2023-12-05 VITALS
BODY MASS INDEX: 28.22 KG/M2 | HEIGHT: 64 IN | WEIGHT: 165.3 LBS | OXYGEN SATURATION: 96 % | SYSTOLIC BLOOD PRESSURE: 142 MMHG | DIASTOLIC BLOOD PRESSURE: 86 MMHG | HEART RATE: 120 BPM

## 2023-12-05 DIAGNOSIS — Z09 HOSPITAL DISCHARGE FOLLOW-UP: ICD-10-CM

## 2023-12-05 DIAGNOSIS — A28.0: ICD-10-CM

## 2023-12-05 DIAGNOSIS — M00.9 SEPTIC ARTHRITIS OF RIGHT STERNOCLAVICULAR JOINT: Primary | ICD-10-CM

## 2023-12-05 DIAGNOSIS — R11.0 NAUSEA: ICD-10-CM

## 2023-12-05 RX ORDER — MELOXICAM 15 MG/1
15 TABLET ORAL DAILY
Qty: 30 TABLET | Refills: 0 | Status: SHIPPED | OUTPATIENT
Start: 2023-12-05

## 2023-12-05 RX ORDER — HYDROCODONE BITARTRATE AND ACETAMINOPHEN 5; 325 MG/1; MG/1
1 TABLET ORAL EVERY 8 HOURS PRN
Qty: 90 TABLET | Refills: 0 | Status: SHIPPED | OUTPATIENT
Start: 2023-12-05

## 2023-12-05 RX ORDER — EPINEPHRINE 0.3 MG/.3ML
INJECTION SUBCUTANEOUS
COMMUNITY
Start: 2023-11-29

## 2023-12-05 RX ORDER — ONDANSETRON HYDROCHLORIDE 8 MG/1
8 TABLET, FILM COATED ORAL EVERY 8 HOURS PRN
Qty: 60 TABLET | Refills: 0 | Status: SHIPPED | OUTPATIENT
Start: 2023-12-05

## 2023-12-05 RX ORDER — CYCLOBENZAPRINE HCL 10 MG
10 TABLET ORAL 3 TIMES DAILY PRN
Qty: 90 TABLET | Refills: 0 | Status: SHIPPED | OUTPATIENT
Start: 2023-12-05

## 2023-12-05 NOTE — PROGRESS NOTES
Transitional Care Follow Up Visit  Subjective     Madison Acevedo is a 63 y.o. female who presents for a transitional care management visit.    Within 48 business hours after discharge our office contacted her via telephone to coordinate her care and needs.      I reviewed and discussed the details of that call along with the discharge summary, hospital problems, inpatient lab results, inpatient diagnostic studies, and consultation reports with Madison.     Current outpatient and discharge medications have been reconciled for the patient.  Reviewed by: SIMONE Clements          12/1/2023     7:01 PM   Date of TCM Phone Call   [facility]   Date of Admission 11/19/2023   Date of Discharge 12/1/2023   Discharge Disposition [facility]     Risk for Readmission (LACE) Score: 9 (12/1/2023  6:00 AM)      History of Present Illness  Ms. Acevedo presents to follow up on a recent hospital discharge She was admitted to [facility] for septic arthritis of her right clavicle. She is accompanied by her . She states she feels weak and light-headed at times. She is still in pain and needs her pain medication refilled.      Course During Hospital Stay:  stable     The following portions of the patient's history were reviewed and updated as appropriate: allergies, current medications, past family history, past medical history, past social history, past surgical history, and problem list.    Review of Systems   Constitutional:  Negative for fever. Appetite change: decreased appetite.  Respiratory: Negative.     Cardiovascular: Negative.    Gastrointestinal:  Negative for constipation and nausea.   Genitourinary: Negative.    Musculoskeletal: Negative.    Neurological:  Positive for light-headedness.   Psychiatric/Behavioral: Negative.         Objective   Physical Exam  Vitals reviewed.   Constitutional:       Appearance: Normal appearance.   Cardiovascular:      Rate and Rhythm: Normal rate and regular rhythm.       Pulses: Normal pulses.      Heart sounds: Normal heart sounds.   Pulmonary:      Effort: Pulmonary effort is normal.      Breath sounds: Normal breath sounds.   Skin:     General: Skin is warm and dry.   Neurological:      Mental Status: She is alert and oriented to person, place, and time.   Psychiatric:      Comments: No acute distress         Assessment & Plan   Diagnoses and all orders for this visit:    1. Septic arthritis of right sternoclavicular joint (Primary)    2. Infection, Pasteurella  -     HYDROcodone-acetaminophen (NORCO) 5-325 MG per tablet; Take 1 tablet by mouth Every 8 (Eight) Hours As Needed for Severe Pain.  Dispense: 90 tablet; Refill: 0  -     meloxicam (MOBIC) 15 MG tablet; Take 1 tablet by mouth Daily.  Dispense: 30 tablet; Refill: 0  -     cyclobenzaprine (FLEXERIL) 10 MG tablet; Take 1 tablet by mouth 3 (Three) Times a Day As Needed for Muscle Spasms.  Dispense: 90 tablet; Refill: 0    3. Hospital discharge follow-up    4. Nausea  -     ondansetron (Zofran) 8 MG tablet; Take 1 tablet by mouth Every 8 (Eight) Hours As Needed for Nausea or Vomiting.  Dispense: 60 tablet; Refill: 0    Ms. Acevedo looks like she does not feel well today.   I have reviewed and reconciled her medications with her. I have refilled her discharge medications for her today with the exception of the Oxycodone ER. I have explained that I cannot prescribe that medication. I can however prescribe Hydrocodone-acetaminophen for her. She may take hydrocodone-acetaminophen 5-325 mg every 8 hours as needed for pain. I have also prescribed Ondansetron 8 mg every 8 hours as needed for nausea.   She is to follow up as needed with me.

## 2023-12-05 NOTE — HOME HEALTH
"Routine Visit Note:    Skill/education provided: C/P ASSESS PICC CARE AN DLABS    Patient/caregiver response: \"I AM HAVING ALOT OF PAIN\"    Plan for next visit: C/P ASSESS PICC CARE AND LABS  PT IS AN RN     Other pertinent info: APPT MADE FOR TOMORROW WITH PCP"

## 2023-12-11 ENCOUNTER — LAB REQUISITION (OUTPATIENT)
Dept: LAB | Facility: HOSPITAL | Age: 63
End: 2023-12-11
Payer: COMMERCIAL

## 2023-12-11 ENCOUNTER — HOME CARE VISIT (OUTPATIENT)
Dept: HOME HEALTH SERVICES | Facility: HOME HEALTHCARE | Age: 63
End: 2023-12-11
Payer: COMMERCIAL

## 2023-12-11 VITALS
SYSTOLIC BLOOD PRESSURE: 110 MMHG | HEART RATE: 92 BPM | TEMPERATURE: 98.8 F | OXYGEN SATURATION: 99 % | DIASTOLIC BLOOD PRESSURE: 74 MMHG | RESPIRATION RATE: 16 BRPM

## 2023-12-11 DIAGNOSIS — M00.811 ARTHRITIS DUE TO OTHER BACTERIA, RIGHT SHOULDER: ICD-10-CM

## 2023-12-11 LAB
ALBUMIN SERPL-MCNC: 4.1 G/DL (ref 3.5–5.2)
ALBUMIN/GLOB SERPL: 1.3 G/DL
ALP SERPL-CCNC: 84 U/L (ref 39–117)
ALT SERPL W P-5'-P-CCNC: 13 U/L (ref 1–33)
ANION GAP SERPL CALCULATED.3IONS-SCNC: 12 MMOL/L (ref 5–15)
AST SERPL-CCNC: 13 U/L (ref 1–32)
BASOPHILS # BLD AUTO: 0.07 10*3/MM3 (ref 0–0.2)
BASOPHILS NFR BLD AUTO: 1 % (ref 0–1.5)
BILIRUB SERPL-MCNC: 0.2 MG/DL (ref 0–1.2)
BUN SERPL-MCNC: 14 MG/DL (ref 8–23)
BUN/CREAT SERPL: 15.7 (ref 7–25)
CALCIUM SPEC-SCNC: 9.8 MG/DL (ref 8.6–10.5)
CHLORIDE SERPL-SCNC: 100 MMOL/L (ref 98–107)
CO2 SERPL-SCNC: 25 MMOL/L (ref 22–29)
CREAT SERPL-MCNC: 0.89 MG/DL (ref 0.57–1)
CRP SERPL-MCNC: 1.39 MG/DL (ref 0–0.5)
DEPRECATED RDW RBC AUTO: 35.9 FL (ref 37–54)
EGFRCR SERPLBLD CKD-EPI 2021: 73 ML/MIN/1.73
EOSINOPHIL # BLD AUTO: 0.21 10*3/MM3 (ref 0–0.4)
EOSINOPHIL NFR BLD AUTO: 2.9 % (ref 0.3–6.2)
ERYTHROCYTE [DISTWIDTH] IN BLOOD BY AUTOMATED COUNT: 11.3 % (ref 12.3–15.4)
GLOBULIN UR ELPH-MCNC: 3.1 GM/DL
GLUCOSE SERPL-MCNC: 85 MG/DL (ref 65–99)
HCT VFR BLD AUTO: 33.3 % (ref 34–46.6)
HGB BLD-MCNC: 10.8 G/DL (ref 12–15.9)
IMM GRANULOCYTES # BLD AUTO: 0.02 10*3/MM3 (ref 0–0.05)
IMM GRANULOCYTES NFR BLD AUTO: 0.3 % (ref 0–0.5)
LYMPHOCYTES # BLD AUTO: 1.62 10*3/MM3 (ref 0.7–3.1)
LYMPHOCYTES NFR BLD AUTO: 22.5 % (ref 19.6–45.3)
MCH RBC QN AUTO: 28.3 PG (ref 26.6–33)
MCHC RBC AUTO-ENTMCNC: 32.4 G/DL (ref 31.5–35.7)
MCV RBC AUTO: 87.4 FL (ref 79–97)
MONOCYTES # BLD AUTO: 0.53 10*3/MM3 (ref 0.1–0.9)
MONOCYTES NFR BLD AUTO: 7.4 % (ref 5–12)
NEUTROPHILS NFR BLD AUTO: 4.75 10*3/MM3 (ref 1.7–7)
NEUTROPHILS NFR BLD AUTO: 65.9 % (ref 42.7–76)
NRBC BLD AUTO-RTO: 0 /100 WBC (ref 0–0.2)
PLATELET # BLD AUTO: 581 10*3/MM3 (ref 140–450)
PMV BLD AUTO: 8.7 FL (ref 6–12)
POTASSIUM SERPL-SCNC: 3.8 MMOL/L (ref 3.5–5.2)
PROT SERPL-MCNC: 7.2 G/DL (ref 6–8.5)
RBC # BLD AUTO: 3.81 10*6/MM3 (ref 3.77–5.28)
SODIUM SERPL-SCNC: 137 MMOL/L (ref 136–145)
WBC NRBC COR # BLD AUTO: 7.2 10*3/MM3 (ref 3.4–10.8)

## 2023-12-11 PROCEDURE — 80053 COMPREHEN METABOLIC PANEL: CPT | Performed by: INTERNAL MEDICINE

## 2023-12-11 PROCEDURE — 86140 C-REACTIVE PROTEIN: CPT | Performed by: INTERNAL MEDICINE

## 2023-12-11 PROCEDURE — G0299 HHS/HOSPICE OF RN EA 15 MIN: HCPCS

## 2023-12-11 PROCEDURE — 85025 COMPLETE CBC W/AUTO DIFF WBC: CPT | Performed by: INTERNAL MEDICINE

## 2023-12-12 ENCOUNTER — READMISSION MANAGEMENT (OUTPATIENT)
Dept: CALL CENTER | Facility: HOSPITAL | Age: 63
End: 2023-12-12
Payer: COMMERCIAL

## 2023-12-12 NOTE — OUTREACH NOTE
Medical Week 2 Survey      Flowsheet Row Responses   [facility] patient discharged from? [place]   Does the patient have one of the following disease processes/diagnoses(primary or secondary)? Other   Week 2 attempt successful? Yes   Call start time 1335   Discharge diagnosis right shoulder pain, Septic arthritis of right sternoclavicular joint   Call end time 1339   Medication alerts for this patient IV Atbs x 6 more days   Meds reviewed with patient/caregiver? Yes   Is the patient having any side effects they believe may be caused by any medication additions or changes? No   Does the patient have all medications ordered at discharge? Yes   Is the patient taking all medications as directed (includes completed medication regime)? Yes   Does the patient have a primary care provider?  Yes   Has the patient kept scheduled appointments due by today? Yes   Comments Dr. Rodriguez on 12/18/23   What is the Home health agency?  [facility] infusion for IV abx via PICC line   Has home health visited the patient within 72 hours of discharge? Yes   Psychosocial issues? No   Did the patient receive a copy of their discharge instructions? Yes   Nursing interventions Reviewed instructions with patient   What is the patient's perception of their health status since discharge? Improving  [Feels as if improving, pain to shoulder has lessened, blood work is improving, still watching CRP, no fever--aware to monitor for worsening s/s, HH is following.]   Is the patient/caregiver able to teach back signs and symptoms related to disease process for when to call PCP? Yes   Is the patient/caregiver able to teach back signs and symptoms related to disease process for when to call 911? Yes   Additional teach back comments Denies any s/s r/t PNA dx   Week 2 Call Completed? Yes   Graduated Yes  [No immediate needs,  appts in place, HH following]   Call end time 1339            AKOSUA CANDELARIO - Registered Nurse

## 2023-12-12 NOTE — HOME HEALTH
"Routine Visit Note:    Skill/education provided: C/P ASSESS PICC CARE AND LABS    Patient/caregiver response:\"I START ORAL PCN FOR 2 WEEKS ON 8/18\"    Plan for next visit: C/P ASSESS PULL PICC AND DCH    Other pertinent info:"

## 2023-12-18 ENCOUNTER — HOME CARE VISIT (OUTPATIENT)
Dept: HOME HEALTH SERVICES | Facility: HOME HEALTHCARE | Age: 63
End: 2023-12-18
Payer: COMMERCIAL

## 2023-12-18 ENCOUNTER — LAB REQUISITION (OUTPATIENT)
Dept: LAB | Facility: HOSPITAL | Age: 63
End: 2023-12-18
Payer: COMMERCIAL

## 2023-12-18 ENCOUNTER — OFFICE VISIT (OUTPATIENT)
Dept: SURGERY | Facility: CLINIC | Age: 63
End: 2023-12-18
Payer: COMMERCIAL

## 2023-12-18 VITALS
HEIGHT: 64 IN | WEIGHT: 161 LBS | BODY MASS INDEX: 27.49 KG/M2 | DIASTOLIC BLOOD PRESSURE: 88 MMHG | SYSTOLIC BLOOD PRESSURE: 130 MMHG | OXYGEN SATURATION: 98 % | HEART RATE: 103 BPM

## 2023-12-18 VITALS
DIASTOLIC BLOOD PRESSURE: 78 MMHG | RESPIRATION RATE: 16 BRPM | OXYGEN SATURATION: 99 % | TEMPERATURE: 98.5 F | SYSTOLIC BLOOD PRESSURE: 104 MMHG | HEART RATE: 104 BPM

## 2023-12-18 DIAGNOSIS — Z00.00 ENCOUNTER FOR GENERAL ADULT MEDICAL EXAMINATION WITHOUT ABNORMAL FINDINGS: ICD-10-CM

## 2023-12-18 DIAGNOSIS — M25.511 STERNOCLAVICULAR JOINT PAIN, RIGHT: Primary | ICD-10-CM

## 2023-12-18 LAB
ALBUMIN SERPL-MCNC: 4 G/DL (ref 3.5–5.2)
ALBUMIN/GLOB SERPL: 1.3 G/DL
ALP SERPL-CCNC: 88 U/L (ref 39–117)
ALT SERPL W P-5'-P-CCNC: 10 U/L (ref 1–33)
ANION GAP SERPL CALCULATED.3IONS-SCNC: 12 MMOL/L (ref 5–15)
AST SERPL-CCNC: 14 U/L (ref 1–32)
BASOPHILS # BLD AUTO: 0.1 10*3/MM3 (ref 0–0.2)
BASOPHILS NFR BLD AUTO: 1.7 % (ref 0–1.5)
BILIRUB SERPL-MCNC: 0.2 MG/DL (ref 0–1.2)
BUN SERPL-MCNC: 18 MG/DL (ref 8–23)
BUN/CREAT SERPL: 20.7 (ref 7–25)
CALCIUM SPEC-SCNC: 9.6 MG/DL (ref 8.6–10.5)
CHLORIDE SERPL-SCNC: 102 MMOL/L (ref 98–107)
CO2 SERPL-SCNC: 24 MMOL/L (ref 22–29)
CREAT SERPL-MCNC: 0.87 MG/DL (ref 0.57–1)
CRP SERPL-MCNC: 0.38 MG/DL (ref 0–0.5)
DEPRECATED RDW RBC AUTO: 39.4 FL (ref 37–54)
EGFRCR SERPLBLD CKD-EPI 2021: 75 ML/MIN/1.73
EOSINOPHIL # BLD AUTO: 0.37 10*3/MM3 (ref 0–0.4)
EOSINOPHIL NFR BLD AUTO: 6.5 % (ref 0.3–6.2)
ERYTHROCYTE [DISTWIDTH] IN BLOOD BY AUTOMATED COUNT: 11.8 % (ref 12.3–15.4)
GLOBULIN UR ELPH-MCNC: 3.2 GM/DL
GLUCOSE SERPL-MCNC: 89 MG/DL (ref 65–99)
HCT VFR BLD AUTO: 35.8 % (ref 34–46.6)
HGB BLD-MCNC: 11.9 G/DL (ref 12–15.9)
IMM GRANULOCYTES # BLD AUTO: 0 10*3/MM3 (ref 0–0.05)
IMM GRANULOCYTES NFR BLD AUTO: 0 % (ref 0–0.5)
LYMPHOCYTES # BLD AUTO: 2.1 10*3/MM3 (ref 0.7–3.1)
LYMPHOCYTES NFR BLD AUTO: 36.7 % (ref 19.6–45.3)
MCH RBC QN AUTO: 30.1 PG (ref 26.6–33)
MCHC RBC AUTO-ENTMCNC: 33.2 G/DL (ref 31.5–35.7)
MCV RBC AUTO: 90.6 FL (ref 79–97)
MONOCYTES # BLD AUTO: 0.47 10*3/MM3 (ref 0.1–0.9)
MONOCYTES NFR BLD AUTO: 8.2 % (ref 5–12)
NEUTROPHILS NFR BLD AUTO: 2.68 10*3/MM3 (ref 1.7–7)
NEUTROPHILS NFR BLD AUTO: 46.9 % (ref 42.7–76)
NRBC BLD AUTO-RTO: 0 /100 WBC (ref 0–0.2)
PLATELET # BLD AUTO: 527 10*3/MM3 (ref 140–450)
PMV BLD AUTO: 9.1 FL (ref 6–12)
POTASSIUM SERPL-SCNC: 4.5 MMOL/L (ref 3.5–5.2)
PROT SERPL-MCNC: 7.2 G/DL (ref 6–8.5)
RBC # BLD AUTO: 3.95 10*6/MM3 (ref 3.77–5.28)
SODIUM SERPL-SCNC: 138 MMOL/L (ref 136–145)
WBC NRBC COR # BLD AUTO: 5.72 10*3/MM3 (ref 3.4–10.8)

## 2023-12-18 PROCEDURE — 80053 COMPREHEN METABOLIC PANEL: CPT | Performed by: INTERNAL MEDICINE

## 2023-12-18 PROCEDURE — 86140 C-REACTIVE PROTEIN: CPT | Performed by: INTERNAL MEDICINE

## 2023-12-18 PROCEDURE — 85025 COMPLETE CBC W/AUTO DIFF WBC: CPT | Performed by: INTERNAL MEDICINE

## 2023-12-18 PROCEDURE — G0299 HHS/HOSPICE OF RN EA 15 MIN: HCPCS

## 2023-12-18 NOTE — HOME HEALTH
Chief Complaint   Patient presents with   • Cough     cough, congestion, runny nose since yesterday. started wheezing this morning   • Fever     101 this morning       SUBJECTIVE:  HPI:   This 31 month old  male presents to Urgent Care for evaluation of cough and fever. Patient's chief complaint documented by nursing staff was reviewed. Mom reports patient developed cough and runny nose yesterday. This morning patient started with wheezing and fever. Describes cough as a loose cough. Patient is short of breath. Patient did not eat much this morning but he is drinking fluids. Patient was up during the night. Patient has not taken anything for his symptoms. Denies history of asthma. Patient does not attend . Denies any known exposure to Covid or influenza. Denies other members of the household have similar symptoms. Mom and dad are both vaccinated for Covid with boosters.     Review of Systems:  A review of systems was performed and findings relevant to these symptoms are included in the HPI.    No current outpatient medications on file.     Current Facility-Administered Medications   Medication Dose Route Frequency Provider Last Rate Last Admin   • albuterol (VENTOLIN) nebulizer 2.5 mg  2.5 mg Nebulization Once Jada Kong NP           ALLERGIES:  No Known Allergies    OBJECTIVE:  Vitals:    04/17/22 1029 04/17/22 1128   Pulse: 146 (!) 150   Resp: (!) 46    Temp: 99.1 °F (37.3 °C)    TempSrc: Temporal    SpO2: 96% 98%   Weight: 15.4 kg (34 lb)        Vitals were reviewed.     Physical Exam:  Constitutional: This alert 31 month old male presents to Urgent Care in mild respiratory distress. Patient appears well nourished and well hydrated.  Head: Normocephalic, atraumatic.   Eyes: Conjunctiva are clear without injection, inflammation or drainage.   Ears: External auditory canals and tympanic membranes clear.  Nose: Nasal mucosa is pink and moist without erythema or edema. Clear nasal drainage.  Mouth:  LI Oropharynx pink and moist without erythema, edema or exudates. Uvula midline.  Neck: Supple, non tender. No anterior, posterior or supraclavicular lymphadenopathy present. Trachea midline.  Lungs: Tachypnea with nasal flaring and intercostal retractions. Clear to auscultation in all fields with decreased breath sounds bilateral.   Cardiac: Heart rate and rhythm are regular. Normal S1, S2. No murmur auscultated.    Diagnostics:  Results for orders placed or performed in visit on 04/17/22   COVID/FLU/RSV PANEL   Result Value    Rapid SARS-COV-2 by PCR Detected (A)    Influenza A by PCR Not Detected    Influenza B by PCR Not Detected    RSV BY PCR Not Detected    Procedural Comment      Comment: SARS-COV-2 nucleic acid has been detected indicating the presence of COVID-19.    This test was performed using the [employer] Xpress SARS-CoV-2/Flu/RSV RT-PCR test that has been given Emergency Use Authorization (EUA) by the [place] Food and Drug Administration (FDA).  These tests are considered definitive and do not need to be confirmed by another method.    Some medical conditions may benefit from monoclonal antibody therapy. Talk to your provider about monoclonal antibody therapy and if it is right for you. This treatment is approved by the FDA for emergency use and may help your immune system respond to the virus more effectively.      SARS-CoV-2 Ct Value      Comment: The results of this test are interpreted as POSITIVE. The Cycle Threshold (CT) value is provided for informational purposes only. While Ct values in a positive SARS-CoV-2 test may indicate the relative amount of viral RNA (not intact virus) present in the sample at the time of the test, these values may change over the course of the illness and should NOT be used to predict intervention strategy, infectious potential or patient outcome.  The Ct values in a positive specimen may range from 0 - 45.     XR CHEST PA AND LATERAL    Result Date:  4/17/2022  EXAM: XR CHEST PA AND LATERAL COMPARISONS: None. CLINICAL INDICATION: cough. FINDINGS: The cardiomediastinal silhouette, pulmonary vasculature, and lungs are within normal limits.     IMPRESSION: Negative chest. Specifically, no focal infiltrate.     Clinical Course:  Attempt to give albuterol neb treatment was unsuccessful because of patient's refusal to cooperate causing increased dyspnea. Spoke with Dr. Modi who recommended patient be admitted to [facility]. Discussed patient with Dr. Hicks, [facility] intensivist at [place] who requested patient be evaluated in their ER because he is currently on room air.     ASSESSMENT:  Covid 19  Dyspnea    PLAN:  - Go to [facility] ER for further evaluation.       FEMI Valencia

## 2023-12-18 NOTE — PROGRESS NOTES
"Chief Complaint  Sternoclavicular joint pain    Subjective        Madison Acevedo presents to [facility] THORACIC SURGERY  History of Present Illness  Ms. Acevedo is a pleasant 63-year-old lady who presented to the hospital with septic arthritis of the right sternoclavicular joint. She presents in follow-up. She is accompanied by an adult male.    The patient reports she is feeling much better. She states she thought after a month of antibiotics, she would feel better than this. She notes she is still weak and shaky walking around the house. She reports she does not remember being in the hospital for 13 days because she was taking Dilaudid. She states the pain is still 60 to 80 percent better. She notes she still gets muscle spasms through the joint of the scapula. She reports she has been trying to reach a certain way, but it hurts a lot. She states it is stiff because she has not done it. She does have exercise bands at home. She notes she has not had any narcotics in 3 days. She reports she does not know anything about septic arthritis, but she has learned a lot. She states her joint tenderness is not too bad. She reports she can tell when she moves it a lot during the day, by nighttime, she is in pain. She states her PICC line came out this morning.      Objective   Vital Signs:  /88 (BP Location: Right arm, Patient Position: Sitting, Cuff Size: Adult)   Pulse 103   Ht 162.6 cm (64\")   Wt 73 kg (161 lb)   SpO2 98%   BMI 27.64 kg/m²   Estimated body mass index is 27.64 kg/m² as calculated from the following:    Height as of this encounter: 162.6 cm (64\").    Weight as of this encounter: 73 kg (161 lb).         Physical Exam  Vitals and nursing note reviewed.   Constitutional:       Appearance: She is well-developed.   HENT:      Head: Normocephalic and atraumatic.      Nose: Nose normal.   Eyes:      Conjunctiva/sclera: Conjunctivae normal.   Neck:      Comments: No significant point " tenderness at the sternoclavicular joint  Cardiovascular:      Rate and Rhythm: Normal rate.   Pulmonary:      Effort: Pulmonary effort is normal.   Abdominal:      Palpations: Abdomen is soft.   Musculoskeletal:      Cervical back: Neck supple.   Skin:     General: Skin is warm and dry.   Neurological:      Mental Status: She is alert and oriented to person, place, and time.   Psychiatric:         Behavior: Behavior normal.         Thought Content: Thought content normal.         Judgment: Judgment normal.           Result Review :          I have independently reviewed the CT of the chest performed on 11/30/2023, which demonstrates no lung nodules. No significant fluid around the sternoclavicular joint. Mild consolidation in the right middle lobe and lower lobes can be consistent with atelectasis. There is a trace right pleural effusion.           Assessment and Plan   Diagnoses and all orders for this visit:    1. Sternoclavicular joint pain, right (Primary)        Ms. Aecvedo is a pleasant 63-year-old lady with Pasteurella bacteremia associated with some pain in the sternoclavicular joint, concerning for a stable septic arthritis given her symptoms of pain.  She had no evidence of fluid or necrosis on multiple imaging studies, there was nothing to drain so elected to manage this without surgery. She was managed conservatively with pain meds as well as antibiotics. She presents today for follow-up.  She is doing reasonably well, her pain has improved.  I suspect she has some component of a frozen shoulder at this point given her complaints.  I have suggested movement to the shoulder joint.  I would like to see her again in 6 weeks to follow-up with her symptoms.       I spent 20 minutes caring for Madison on this date of service. This time includes time spent by me in the following activities:preparing for the visit, reviewing tests, obtaining and/or reviewing a separately obtained history, performing a medically  appropriate examination and/or evaluation , counseling and educating the patient/family/caregiver, ordering medications, tests, or procedures, documenting information in the medical record, and independently interpreting results and communicating that information with the patient/family/caregiver  Follow Up   No follow-ups on file.  The patient will follow-up in 6 weeks.  Patient was given instructions and counseling regarding her condition or for health maintenance advice. Please see specific information pulled into the AVS if appropriate.       Transcribed from ambient dictation for Mohini Rodriguez MD by Marian Muhammad.  12/18/23   16:37 EST    Patient or patient representative verbalized consent to the visit recording.  I have personally performed the services described in this document as transcribed by the above individual, and it is both accurate and complete.

## 2023-12-18 NOTE — LETTER
December 29, 2023     SIMONE Clements  [address] [facility]  [address]  [facility] [address]    Patient: Madison Acevedo   YOB: 1960   Date of Visit: 12/18/2023     Dear SIMONE Clements:       Thank you for referring Madison Acevedo to me for evaluation. Below are the relevant portions of my assessment and plan of care.    If you have questions, please do not hesitate to call me. I look forward to following Madison along with you.         Sincerely,        Mohini Rodriguez MD        CC: No Recipients    Mohini Rodriguez MD  12/29/23 1914  Sign when Signing Visit  Chief Complaint  Sternoclavicular joint pain    Subjective       Madison Acevedo presents to [facility] THORACIC SURGERY  History of Present Illness  Ms. Acevedo is a pleasant 63-year-old lady who presented to the hospital with septic arthritis of the right sternoclavicular joint. She presents in follow-up. She is accompanied by an adult male.    The patient reports she is feeling much better. She states she thought after a month of antibiotics, she would feel better than this. She notes she is still weak and shaky walking around the house. She reports she does not remember being in the hospital for 13 days because she was taking Dilaudid. She states the pain is still 60 to 80 percent better. She notes she still gets muscle spasms through the joint of the scapula. She reports she has been trying to reach a certain way, but it hurts a lot. She states it is stiff because she has not done it. She does have exercise bands at home. She notes she has not had any narcotics in 3 days. She reports she does not know anything about septic arthritis, but she has learned a lot. She states her joint tenderness is not too bad. She reports she can tell when she moves it a lot during the day, by nighttime, she is in pain. She states her PICC line came out this morning.      Objective  Vital Signs:  /88 (BP Location: Right arm, Patient  "Position: Sitting, Cuff Size: Adult)   Pulse 103   Ht 162.6 cm (64\")   Wt 73 kg (161 lb)   SpO2 98%   BMI 27.64 kg/m²   Estimated body mass index is 27.64 kg/m² as calculated from the following:    Height as of this encounter: 162.6 cm (64\").    Weight as of this encounter: 73 kg (161 lb).         Physical Exam  Vitals and nursing note reviewed.   Constitutional:       Appearance: She is well-developed.   HENT:      Head: Normocephalic and atraumatic.      Nose: Nose normal.   Eyes:      Conjunctiva/sclera: Conjunctivae normal.   Neck:      Comments: No significant point tenderness at the sternoclavicular joint  Cardiovascular:      Rate and Rhythm: Normal rate.   Pulmonary:      Effort: Pulmonary effort is normal.   Abdominal:      Palpations: Abdomen is soft.   Musculoskeletal:      Cervical back: Neck supple.   Skin:     General: Skin is warm and dry.   Neurological:      Mental Status: She is alert and oriented to person, place, and time.   Psychiatric:         Behavior: Behavior normal.         Thought Content: Thought content normal.         Judgment: Judgment normal.           Result Review:          I have independently reviewed the CT of the chest performed on 11/30/2023, which demonstrates no lung nodules. No significant fluid around the sternoclavicular joint. Mild consolidation in the right middle lobe and lower lobes can be consistent with atelectasis. There is a trace right pleural effusion.           Assessment and Plan   Diagnoses and all orders for this visit:    1. Sternoclavicular joint pain, right (Primary)        Ms. Acevedo is a pleasant 63-year-old lady with Pasteurella bacteremia associated with some pain in the sternoclavicular joint, concerning for a stable septic arthritis given her symptoms of pain.  She had no evidence of fluid or necrosis on multiple imaging studies, there was nothing to drain so elected to manage this without surgery. She was managed conservatively with pain meds as " well as antibiotics. She presents today for follow-up.  She is doing reasonably well, her pain has improved.  I suspect she has some component of a frozen shoulder at this point given her complaints.  I have suggested movement to the shoulder joint.  I would like to see her again in 6 weeks to follow-up with her symptoms.       I spent 20 minutes caring for Madison on this date of service. This time includes time spent by me in the following activities:preparing for the visit, reviewing tests, obtaining and/or reviewing a separately obtained history, performing a medically appropriate examination and/or evaluation , counseling and educating the patient/family/caregiver, ordering medications, tests, or procedures, documenting information in the medical record, and independently interpreting results and communicating that information with the patient/family/caregiver  Follow Up   No follow-ups on file.  The patient will follow-up in 6 weeks.  Patient was given instructions and counseling regarding her condition or for health maintenance advice. Please see specific information pulled into the AVS if appropriate.       Transcribed from ambient dictation for Mohini Rodriguez MD by Marian Muhammad.  12/18/23   16:37 EST    Patient or patient representative verbalized consent to the visit recording.  I have personally performed the services described in this document as transcribed by the above individual, and it is both accurate and complete.

## 2023-12-28 ENCOUNTER — TELEPHONE (OUTPATIENT)
Dept: SURGERY | Facility: CLINIC | Age: 63
End: 2023-12-28

## 2023-12-28 NOTE — TELEPHONE ENCOUNTER
Caller: Madison Acevedo    Relationship: Self    Best call back number: [phone]    What is the best time to reach you: ANY    Who are you requesting to speak with (clinical staff, provider,  specific staff member): CLINICAL    Do you know the name of the person who called: PT    What was the call regarding: PT NEEDS A RELEASE TO WORK AS SHE IS RETURNING TO WORK TODAY AT NOON. PLEASE GIVE HER A CALL AND LET HER KNOW IF SHE CAN COME BY AND PICK IT UP FROM OUR OFFICE BEFORE SHE GOES IN TO WORK. THANK YOU    Is it okay if the provider responds through MyChart: NO

## 2024-01-22 ENCOUNTER — OFFICE VISIT (OUTPATIENT)
Dept: FAMILY MEDICINE CLINIC | Facility: CLINIC | Age: 64
End: 2024-01-22
Payer: COMMERCIAL

## 2024-01-22 VITALS
HEART RATE: 97 BPM | OXYGEN SATURATION: 98 % | WEIGHT: 165.4 LBS | BODY MASS INDEX: 28.24 KG/M2 | HEIGHT: 64 IN | SYSTOLIC BLOOD PRESSURE: 126 MMHG | DIASTOLIC BLOOD PRESSURE: 62 MMHG

## 2024-01-22 DIAGNOSIS — Z13.220 SCREENING FOR HYPERLIPIDEMIA: ICD-10-CM

## 2024-01-22 DIAGNOSIS — M00.9 SEPTIC ARTHRITIS OF RIGHT STERNOCLAVICULAR JOINT: Primary | ICD-10-CM

## 2024-01-22 PROCEDURE — 99213 OFFICE O/P EST LOW 20 MIN: CPT | Performed by: NURSE PRACTITIONER

## 2024-01-22 NOTE — PROGRESS NOTES
"Chief Complaint  Annual Exam    Subjective        Madison Acevedo presents to [facility] PRIMARY CARE  History of Present Illness  Ms. Acevedo presents today to discuss continuation of Meloxicam 15 mg daily for her pain. She would like to continue the medication. She was hospitalized last fall with septic arthritis of the sternoclavicular joint. She is now mary to manage her pain with Meloxecam 15 mg daily. She would like to continue the medication.       I have reviewed the patient's medical history in detail and updated the computerized patient record.       Current Outpatient Medications:     amLODIPine (NORVASC) 2.5 MG tablet, Take 1 tablet by mouth Daily., Disp: 90 tablet, Rfl: 1    meloxicam (MOBIC) 15 MG tablet, Take 1 tablet by mouth Daily., Disp: 30 tablet, Rfl: 0    omeprazole-sodium bicarbonate (Zegerid)  MG per capsule, Take 1 capsule by mouth Daily., Disp: 30 capsule, Rfl: 10    VITAMIN D, CHOLECALCIFEROL, PO, Take  by mouth Daily., Disp: , Rfl:      Objective   Vital Signs:  /62 (BP Location: Left arm, Patient Position: Sitting, Cuff Size: Adult)   Pulse 97   Ht 162.6 cm (64.02\")   Wt 75 kg (165 lb 6.4 oz)   SpO2 98%   BMI 28.38 kg/m²   Estimated body mass index is 28.38 kg/m² as calculated from the following:    Height as of this encounter: 162.6 cm (64.02\").    Weight as of this encounter: 75 kg (165 lb 6.4 oz).       Physical Exam  Vitals reviewed.   Constitutional:       Appearance: Normal appearance. She is normal weight.   Cardiovascular:      Rate and Rhythm: Normal rate and regular rhythm.      Pulses: Normal pulses.      Heart sounds: Normal heart sounds.   Pulmonary:      Effort: Pulmonary effort is normal.      Breath sounds: Normal breath sounds.   Skin:     General: Skin is warm and dry.   Neurological:      Mental Status: She is alert and oriented to person, place, and time.   Psychiatric:      Comments: No acute distress        Result Review " :                     Assessment and Plan     Diagnoses and all orders for this visit:    1. Septic arthritis of right sternoclavicular joint (Primary)    2. Screening for hyperlipidemia  -     Lipid Panel With / Chol / HDL Ratio    Ms. Acevedo appears to be doing well today.  She is to continue Meloxicam 15 mg daily for her arthritic pain.   I have also put in an order to evaluate her lipid levels since it has been years since her last level was drawn.          Follow Up     Return in about 2 weeks (around 2/5/2024) for f/u on lipids.  Patient was given instructions and counseling regarding her condition or for health maintenance advice. Please see specific information pulled into the AVS if appropriate.

## 2024-01-24 ENCOUNTER — TELEPHONE (OUTPATIENT)
Dept: FAMILY MEDICINE CLINIC | Facility: CLINIC | Age: 64
End: 2024-01-24
Payer: COMMERCIAL

## 2024-01-24 DIAGNOSIS — A28.0: ICD-10-CM

## 2024-01-24 RX ORDER — MELOXICAM 15 MG/1
15 TABLET ORAL DAILY
Qty: 90 TABLET | Refills: 3 | Status: SHIPPED | OUTPATIENT
Start: 2024-01-24

## 2024-01-24 RX ORDER — MELOXICAM 15 MG/1
15 TABLET ORAL DAILY
Qty: 30 TABLET | Refills: 0 | Status: CANCELLED | OUTPATIENT
Start: 2024-01-24

## 2024-01-24 NOTE — TELEPHONE ENCOUNTER
Caller: Madison Acevedo    Relationship: Self    Best call back number: [phone]     Which medication are you concerned about: meloxicam (MOBIC) 15 MG tablet             What are your concerns: PATIENT IS CALLING TO CHECK THE STATUS OF THE ABOVE MEDICATION REQUEST.        [facility] [place] [phone]     PLEASE ADVISE.

## 2024-01-29 ENCOUNTER — TELEPHONE (OUTPATIENT)
Dept: FAMILY MEDICINE CLINIC | Facility: CLINIC | Age: 64
End: 2024-01-29
Payer: COMMERCIAL

## 2024-01-29 ENCOUNTER — LAB (OUTPATIENT)
Dept: LAB | Facility: HOSPITAL | Age: 64
End: 2024-01-29
Payer: COMMERCIAL

## 2024-01-29 DIAGNOSIS — E78.2 MIXED HYPERLIPIDEMIA: Primary | ICD-10-CM

## 2024-01-29 DIAGNOSIS — Z13.1 SCREENING FOR DIABETES MELLITUS (DM): ICD-10-CM

## 2024-01-29 LAB
CHOLEST SERPL-MCNC: 258 MG/DL (ref 0–200)
HDLC SERPL QL: 2.39
HDLC SERPL-MCNC: 108 MG/DL (ref 40–60)
LDLC SERPL CALC-MCNC: 137 MG/DL (ref 0–100)
TRIGL SERPL-MCNC: 80 MG/DL (ref 0–150)
VLDLC SERPL-MCNC: 13 MG/DL (ref 5–40)

## 2024-01-29 PROCEDURE — 80061 LIPID PANEL: CPT | Performed by: NURSE PRACTITIONER

## 2024-01-29 RX ORDER — ROSUVASTATIN CALCIUM 5 MG/1
5 TABLET, COATED ORAL DAILY
Qty: 90 TABLET | Refills: 1 | Status: SHIPPED | OUTPATIENT
Start: 2024-01-29

## 2024-01-29 NOTE — PROGRESS NOTES
Please let her know her lipid levels are in my chart. Her levels are elevated. I will start her on Crestor 5 mg daily. Have her make an appointment in 6 months for an annual physical with labs the week before.

## 2024-01-29 NOTE — TELEPHONE ENCOUNTER
Spoke with patient and informed her of this. Patient stated she will call back later this week to set up an appointment with Paulette.

## 2024-02-05 ENCOUNTER — OFFICE VISIT (OUTPATIENT)
Dept: SURGERY | Facility: CLINIC | Age: 64
End: 2024-02-05
Payer: COMMERCIAL

## 2024-02-05 VITALS
HEIGHT: 64 IN | HEART RATE: 87 BPM | BODY MASS INDEX: 28.34 KG/M2 | WEIGHT: 166 LBS | SYSTOLIC BLOOD PRESSURE: 134 MMHG | OXYGEN SATURATION: 98 % | DIASTOLIC BLOOD PRESSURE: 86 MMHG

## 2024-02-05 DIAGNOSIS — J90 PLEURAL EFFUSION: ICD-10-CM

## 2024-02-05 DIAGNOSIS — M25.511 STERNOCLAVICULAR JOINT PAIN, RIGHT: Primary | ICD-10-CM

## 2024-02-05 PROCEDURE — 99212 OFFICE O/P EST SF 10 MIN: CPT | Performed by: THORACIC SURGERY (CARDIOTHORACIC VASCULAR SURGERY)

## 2024-02-05 NOTE — LETTER
February 20, 2024     SIMONE Clements  [address] [facility]  [address]  [facility] [address]    Patient: Madison Acevedo   YOB: 1960   Date of Visit: 2/5/2024     Dear SIMONE Clements:       Thank you for referring Madison Acevedo to me for evaluation. Below are the relevant portions of my assessment and plan of care.    If you have questions, please do not hesitate to call me. I look forward to following Madison along with you.         Sincerely,        Mohini Rodriguez MD        CC: No Recipients    Mohini Rodriguez MD  02/20/24 8051  Sign when Signing Visit  Chief Complaint  Septic arthritis of the sternoclavicular joint    Subjective       Madison Acevedo presents to [facility] THORACIC SURGERY  History of Present Illness  Ms. Madison Acevedo is a very pleasant 64-year-old lady who was recently seen and evaluated for pain in her sternoclavicular joint, likely septic arthritis.    The patient reports she is 98 percent improved and is almost back to normal. She still has some twinges of pain that travels to different areas. For the most part Tylenol works for her. She reports she did get her primary provider to extend the Mobic, which is helpful. She states it also helps all of her other body parts. Her range of motion is good. She has difficulty wearing over the head shirts or doing anything that involves cross body, such as tying her shoe. She can not wear her scrub tops because she can not remove them without having pain. She expresses that she will feel stabs of pain. She is unsure if it is from just weakness. She denies it feeling swollen. Occasionally, she will rub on it, and feel pain. She has redefined her concept of pain. She states she is now aware of what a 10 on the pain scale feels like and everything else is minor compared to it. Her cat that started it all the issues recently passed away.      Objective  Vital Signs:  /86 (BP Location: Left arm, Patient Position:  "Sitting, Cuff Size: Adult)   Pulse 87   Ht 162.6 cm (64\")   Wt 75.3 kg (166 lb)   SpO2 98%   BMI 28.49 kg/m²   Estimated body mass index is 28.49 kg/m² as calculated from the following:    Height as of this encounter: 162.6 cm (64\").    Weight as of this encounter: 75.3 kg (166 lb).               Physical Exam  Vitals and nursing note reviewed.   Constitutional:       Appearance: She is well-developed.   HENT:      Head: Normocephalic and atraumatic.      Nose: Nose normal.   Eyes:      Conjunctiva/sclera: Conjunctivae normal.   Cardiovascular:      Rate and Rhythm: Normal rate.   Pulmonary:      Effort: Pulmonary effort is normal.   Abdominal:      Palpations: Abdomen is soft.   Musculoskeletal:      Cervical back: Neck supple.   Skin:     General: Skin is warm and dry.   Neurological:      Mental Status: She is alert and oriented to person, place, and time.   Psychiatric:         Behavior: Behavior normal.         Thought Content: Thought content normal.         Judgment: Judgment normal.        Result Review:          I have independently reviewed the CT of the chest performed on 11/30/2023, which demonstrates trace right pleural effusion. No evidence of abscess at the sternoclavicular joint. No lung nodules. No mediastinal or hilar lymphadenopathy.             Assessment and Plan     Diagnoses and all orders for this visit:    1. Sternoclavicular joint pain, right (Primary)  -     CT Chest Without Contrast; Future    2. Pleural effusion  -     CT Chest Without Contrast; Future    Ms. Acevedo is a very pleasant 64-year-old lady with right sternoclavicular joint issue and pleural effusion.  Her joint pain has resolved with conservative treatment.  We will plan a CT of the chest for surveillance in her effusion. She will continue her meloxicam. The patient does not need a follow up appointment.         I spent 15 minutes caring for Madison on this date of service. This time includes time spent by me in the " following activities:preparing for the visit, reviewing tests, obtaining and/or reviewing a separately obtained history, performing a medically appropriate examination and/or evaluation , counseling and educating the patient/family/caregiver, ordering medications, tests, or procedures, documenting information in the medical record, and independently interpreting results and communicating that information with the patient/family/caregiver  Follow Up     No follow-ups on file.  Patient was given instructions and counseling regarding her condition or for health maintenance advice. Please see specific information pulled into the AVS if appropriate.     Transcribed from ambient dictation for Mohini Rodriguez MD by Lucía Santo.  02/05/24   15:34 EST    Patient or patient representative verbalized consent to the visit recording.  I have personally performed the services described in this document as transcribed by the above individual, and it is both accurate and complete.

## 2024-02-05 NOTE — PROGRESS NOTES
"Chief Complaint  Septic arthritis of the sternoclavicular joint    Subjective        Madison Acevedo presents to [facility] THORACIC SURGERY  History of Present Illness  Ms. Madison Acevedo is a very pleasant 64-year-old lady who was recently seen and evaluated for pain in her sternoclavicular joint, likely septic arthritis.    The patient reports she is 98 percent improved and is almost back to normal. She still has some twinges of pain that travels to different areas. For the most part Tylenol works for her. She reports she did get her primary provider to extend the Mobic, which is helpful. She states it also helps all of her other body parts. Her range of motion is good. She has difficulty wearing over the head shirts or doing anything that involves cross body, such as tying her shoe. She can not wear her scrub tops because she can not remove them without having pain. She expresses that she will feel stabs of pain. She is unsure if it is from just weakness. She denies it feeling swollen. Occasionally, she will rub on it, and feel pain. She has redefined her concept of pain. She states she is now aware of what a 10 on the pain scale feels like and everything else is minor compared to it. Her cat that started it all the issues recently passed away.      Objective   Vital Signs:  /86 (BP Location: Left arm, Patient Position: Sitting, Cuff Size: Adult)   Pulse 87   Ht 162.6 cm (64\")   Wt 75.3 kg (166 lb)   SpO2 98%   BMI 28.49 kg/m²   Estimated body mass index is 28.49 kg/m² as calculated from the following:    Height as of this encounter: 162.6 cm (64\").    Weight as of this encounter: 75.3 kg (166 lb).               Physical Exam  Vitals and nursing note reviewed.   Constitutional:       Appearance: She is well-developed.   HENT:      Head: Normocephalic and atraumatic.      Nose: Nose normal.   Eyes:      Conjunctiva/sclera: Conjunctivae normal.   Cardiovascular:      Rate and Rhythm: " Normal rate.   Pulmonary:      Effort: Pulmonary effort is normal.   Abdominal:      Palpations: Abdomen is soft.   Musculoskeletal:      Cervical back: Neck supple.   Skin:     General: Skin is warm and dry.   Neurological:      Mental Status: She is alert and oriented to person, place, and time.   Psychiatric:         Behavior: Behavior normal.         Thought Content: Thought content normal.         Judgment: Judgment normal.        Result Review :          I have independently reviewed the CT of the chest performed on 11/30/2023, which demonstrates trace right pleural effusion. No evidence of abscess at the sternoclavicular joint. No lung nodules. No mediastinal or hilar lymphadenopathy.             Assessment and Plan     Diagnoses and all orders for this visit:    1. Sternoclavicular joint pain, right (Primary)  -     CT Chest Without Contrast; Future    2. Pleural effusion  -     CT Chest Without Contrast; Future    Ms. Acevedo is a very pleasant 64-year-old lady with right sternoclavicular joint issue and pleural effusion.  Her joint pain has resolved with conservative treatment.  We will plan a CT of the chest for surveillance in her effusion. She will continue her meloxicam. The patient does not need a follow up appointment.         I spent 15 minutes caring for Madison on this date of service. This time includes time spent by me in the following activities:preparing for the visit, reviewing tests, obtaining and/or reviewing a separately obtained history, performing a medically appropriate examination and/or evaluation , counseling and educating the patient/family/caregiver, ordering medications, tests, or procedures, documenting information in the medical record, and independently interpreting results and communicating that information with the patient/family/caregiver  Follow Up     No follow-ups on file.  Patient was given instructions and counseling regarding her condition or for health maintenance  advice. Please see specific information pulled into the AVS if appropriate.     Transcribed from ambient dictation for Mohini Rodriguez MD by Lucía Santo.  02/05/24   15:34 EST    Patient or patient representative verbalized consent to the visit recording.  I have personally performed the services described in this document as transcribed by the above individual, and it is both accurate and complete.

## 2024-02-14 RX ORDER — AMLODIPINE BESYLATE 2.5 MG/1
2.5 TABLET ORAL DAILY
Qty: 90 TABLET | Refills: 1 | Status: CANCELLED | OUTPATIENT
Start: 2024-02-14

## 2024-02-16 RX ORDER — AMLODIPINE BESYLATE 2.5 MG/1
2.5 TABLET ORAL DAILY
Qty: 90 TABLET | Refills: 1 | Status: SHIPPED | OUTPATIENT
Start: 2024-02-16

## 2024-04-02 ENCOUNTER — HOSPITAL ENCOUNTER (OUTPATIENT)
Dept: CT IMAGING | Facility: HOSPITAL | Age: 64
Discharge: HOME | End: 2024-04-02
Admitting: THORACIC SURGERY (CARDIOTHORACIC VASCULAR SURGERY)
Payer: COMMERCIAL

## 2024-04-02 DIAGNOSIS — J90 PLEURAL EFFUSION: ICD-10-CM

## 2024-04-02 DIAGNOSIS — M25.511 STERNOCLAVICULAR JOINT PAIN, RIGHT: ICD-10-CM

## 2024-04-02 PROCEDURE — 71250 CT THORAX DX C-: CPT

## 2024-04-08 ENCOUNTER — TELEPHONE (OUTPATIENT)
Dept: CARDIOLOGY | Facility: CLINIC | Age: 64
End: 2024-04-08

## 2024-04-10 ENCOUNTER — HOSPITAL ENCOUNTER (OUTPATIENT)
Dept: CARDIOLOGY | Facility: HOSPITAL | Age: 64
Discharge: HOME | End: 2024-04-10
Payer: COMMERCIAL

## 2024-04-10 DIAGNOSIS — R07.2 PRECORDIAL PAIN: Primary | ICD-10-CM

## 2024-04-10 DIAGNOSIS — R07.2 PRECORDIAL PAIN: ICD-10-CM

## 2024-04-10 DIAGNOSIS — I10 ESSENTIAL HYPERTENSION: ICD-10-CM

## 2024-04-10 PROBLEM — A28.0: Status: RESOLVED | Noted: 2023-11-22 | Resolved: 2024-04-10

## 2024-04-10 PROBLEM — E87.6 HYPOKALEMIA: Status: RESOLVED | Noted: 2023-11-19 | Resolved: 2024-04-10

## 2024-04-10 PROBLEM — A41.9 SEPSIS DUE TO PNEUMONIA: Status: RESOLVED | Noted: 2023-11-20 | Resolved: 2024-04-10

## 2024-04-10 PROBLEM — A41.9 SEPSIS: Status: RESOLVED | Noted: 2023-11-19 | Resolved: 2024-04-10

## 2024-04-10 PROBLEM — R00.2 PALPITATIONS: Status: RESOLVED | Noted: 2020-11-19 | Resolved: 2024-04-10

## 2024-04-10 PROBLEM — M00.9: Status: RESOLVED | Noted: 2023-11-27 | Resolved: 2024-04-10

## 2024-04-10 PROBLEM — R78.81 BACTEREMIA: Status: RESOLVED | Noted: 2023-11-20 | Resolved: 2024-04-10

## 2024-04-10 PROBLEM — J18.9 SEPSIS DUE TO PNEUMONIA: Status: RESOLVED | Noted: 2023-11-20 | Resolved: 2024-04-10

## 2024-04-10 LAB
ALBUMIN SERPL-MCNC: 4.6 G/DL (ref 3.5–5.2)
ALBUMIN/GLOB SERPL: 1.8 G/DL
ALP SERPL-CCNC: 63 U/L (ref 39–117)
ALT SERPL W P-5'-P-CCNC: 15 U/L (ref 1–33)
ANION GAP SERPL CALCULATED.3IONS-SCNC: 10 MMOL/L (ref 5–15)
AORTIC ARCH: 2.8 CM
AORTIC DIMENSIONLESS INDEX: 0.8 (DI)
ASCENDING AORTA: 2.9 CM
AST SERPL-CCNC: 15 U/L (ref 1–32)
BASOPHILS # BLD AUTO: 0.06 10*3/MM3 (ref 0–0.2)
BASOPHILS NFR BLD AUTO: 1 % (ref 0–1.5)
BH CV ECHO MEAS - ACS: 2.04 CM
BH CV ECHO MEAS - AO MAX PG: 9.4 MMHG
BH CV ECHO MEAS - AO MEAN PG: 5 MMHG
BH CV ECHO MEAS - AO ROOT DIAM: 3 CM
BH CV ECHO MEAS - AO V2 MAX: 153 CM/SEC
BH CV ECHO MEAS - AO V2 VTI: 30.5 CM
BH CV ECHO MEAS - AVA(I,D): 1.89 CM2
BH CV ECHO MEAS - EDV(CUBED): 64 ML
BH CV ECHO MEAS - EDV(MOD-SP2): 63 ML
BH CV ECHO MEAS - EDV(MOD-SP4): 64 ML
BH CV ECHO MEAS - EF(MOD-BP): 56.5 %
BH CV ECHO MEAS - EF(MOD-SP2): 55.6 %
BH CV ECHO MEAS - EF(MOD-SP4): 65.6 %
BH CV ECHO MEAS - ESV(CUBED): 15.1 ML
BH CV ECHO MEAS - ESV(MOD-SP2): 28 ML
BH CV ECHO MEAS - ESV(MOD-SP4): 22 ML
BH CV ECHO MEAS - FS: 38.2 %
BH CV ECHO MEAS - IVS/LVPW: 0.89 CM
BH CV ECHO MEAS - IVSD: 0.8 CM
BH CV ECHO MEAS - LAT PEAK E' VEL: 8.6 CM/SEC
BH CV ECHO MEAS - LV MASS(C)D: 101.4 GRAMS
BH CV ECHO MEAS - LV MAX PG: 6.3 MMHG
BH CV ECHO MEAS - LV MEAN PG: 3 MMHG
BH CV ECHO MEAS - LV V1 MAX: 125 CM/SEC
BH CV ECHO MEAS - LV V1 VTI: 23 CM
BH CV ECHO MEAS - LVIDD: 4 CM
BH CV ECHO MEAS - LVIDS: 2.47 CM
BH CV ECHO MEAS - LVOT AREA: 2.5 CM2
BH CV ECHO MEAS - LVOT DIAM: 1.78 CM
BH CV ECHO MEAS - LVPWD: 0.9 CM
BH CV ECHO MEAS - MED PEAK E' VEL: 9.1 CM/SEC
BH CV ECHO MEAS - MV A DUR: 0.1 SEC
BH CV ECHO MEAS - MV A MAX VEL: 103.4 CM/SEC
BH CV ECHO MEAS - MV DEC SLOPE: 519.7 CM/SEC2
BH CV ECHO MEAS - MV DEC TIME: 0.15 SEC
BH CV ECHO MEAS - MV E MAX VEL: 86.9 CM/SEC
BH CV ECHO MEAS - MV E/A: 0.84
BH CV ECHO MEAS - MV MAX PG: 4.9 MMHG
BH CV ECHO MEAS - MV MEAN PG: 1.92 MMHG
BH CV ECHO MEAS - MV P1/2T: 56.3 MSEC
BH CV ECHO MEAS - MV V2 VTI: 23.3 CM
BH CV ECHO MEAS - MVA(P1/2T): 3.9 CM2
BH CV ECHO MEAS - MVA(VTI): 2.47 CM2
BH CV ECHO MEAS - PA ACC TIME: 0.07 SEC
BH CV ECHO MEAS - PA V2 MAX: 104.5 CM/SEC
BH CV ECHO MEAS - PULM A REVS DUR: 0.1 SEC
BH CV ECHO MEAS - PULM A REVS VEL: 28.8 CM/SEC
BH CV ECHO MEAS - PULM DIAS VEL: 38.6 CM/SEC
BH CV ECHO MEAS - PULM S/D: 1.04
BH CV ECHO MEAS - PULM SYS VEL: 40.1 CM/SEC
BH CV ECHO MEAS - QP/QS: 0.98
BH CV ECHO MEAS - RAP SYSTOLE: 3 MMHG
BH CV ECHO MEAS - RV MAX PG: 2.9 MMHG
BH CV ECHO MEAS - RV V1 MAX: 85.7 CM/SEC
BH CV ECHO MEAS - RV V1 VTI: 16 CM
BH CV ECHO MEAS - RVOT DIAM: 2.12 CM
BH CV ECHO MEAS - RVSP: 29.6 MMHG
BH CV ECHO MEAS - SUP REN AO DIAM: 2.1 CM
BH CV ECHO MEAS - SV(LVOT): 57.5 ML
BH CV ECHO MEAS - SV(MOD-SP2): 35 ML
BH CV ECHO MEAS - SV(MOD-SP4): 42 ML
BH CV ECHO MEAS - SV(RVOT): 56.4 ML
BH CV ECHO MEAS - TAPSE (>1.6): 2.5 CM
BH CV ECHO MEAS - TR MAX PG: 26.6 MMHG
BH CV ECHO MEAS - TR MAX VEL: 257.9 CM/SEC
BH CV ECHO MEASUREMENTS AVERAGE E/E' RATIO: 9.82
BH CV STRESS BP STAGE 1: NORMAL
BH CV STRESS BP STAGE 2: NORMAL
BH CV STRESS DURATION MIN STAGE 1: 3
BH CV STRESS DURATION MIN STAGE 2: 3
BH CV STRESS DURATION SEC STAGE 1: 0
BH CV STRESS DURATION SEC STAGE 2: 0
BH CV STRESS ECHO POST STRESS EJECTION FRACTION EF: 73 %
BH CV STRESS GRADE STAGE 1: 10
BH CV STRESS GRADE STAGE 2: 12
BH CV STRESS HR STAGE 1: 129
BH CV STRESS HR STAGE 2: 152
BH CV STRESS METS STAGE 1: 5
BH CV STRESS METS STAGE 2: 7.5
BH CV STRESS PROTOCOL 1: NORMAL
BH CV STRESS SPEED STAGE 1: 1.7
BH CV STRESS SPEED STAGE 2: 2.5
BH CV STRESS STAGE 1: 1
BH CV STRESS STAGE 2: 2
BH CV XLRA - RV BASE: 3.2 CM
BH CV XLRA - RV LENGTH: 8.1 CM
BH CV XLRA - RV MID: 2.7 CM
BH CV XLRA - TDI S': 13.4 CM/SEC
BILIRUB SERPL-MCNC: 0.2 MG/DL (ref 0–1.2)
BUN SERPL-MCNC: 18 MG/DL (ref 8–23)
BUN/CREAT SERPL: 15.3 (ref 7–25)
CALCIUM SPEC-SCNC: 9.2 MG/DL (ref 8.6–10.5)
CHLORIDE SERPL-SCNC: 103 MMOL/L (ref 98–107)
CO2 SERPL-SCNC: 26 MMOL/L (ref 22–29)
CREAT SERPL-MCNC: 1.18 MG/DL (ref 0.57–1)
D DIMER PPP FEU-MCNC: 0.27 MCGFEU/ML (ref 0–0.64)
DEPRECATED RDW RBC AUTO: 41.4 FL (ref 37–54)
EGFRCR SERPLBLD CKD-EPI 2021: 51.7 ML/MIN/1.73
EOSINOPHIL # BLD AUTO: 0.13 10*3/MM3 (ref 0–0.4)
EOSINOPHIL NFR BLD AUTO: 2.2 % (ref 0.3–6.2)
ERYTHROCYTE [DISTWIDTH] IN BLOOD BY AUTOMATED COUNT: 12.3 % (ref 12.3–15.4)
GLOBULIN UR ELPH-MCNC: 2.6 GM/DL
GLUCOSE SERPL-MCNC: 91 MG/DL (ref 65–99)
HCT VFR BLD AUTO: 38.9 % (ref 34–46.6)
HGB BLD-MCNC: 13.1 G/DL (ref 12–15.9)
IMM GRANULOCYTES # BLD AUTO: 0.01 10*3/MM3 (ref 0–0.05)
IMM GRANULOCYTES NFR BLD AUTO: 0.2 % (ref 0–0.5)
LEFT ATRIUM VOLUME INDEX: 24.4 ML/M2
LYMPHOCYTES # BLD AUTO: 1.95 10*3/MM3 (ref 0.7–3.1)
LYMPHOCYTES NFR BLD AUTO: 33.2 % (ref 19.6–45.3)
MAXIMAL PREDICTED HEART RATE: 156 BPM
MCH RBC QN AUTO: 31 PG (ref 26.6–33)
MCHC RBC AUTO-ENTMCNC: 33.7 G/DL (ref 31.5–35.7)
MCV RBC AUTO: 92.2 FL (ref 79–97)
MONOCYTES # BLD AUTO: 0.62 10*3/MM3 (ref 0.1–0.9)
MONOCYTES NFR BLD AUTO: 10.5 % (ref 5–12)
NEUTROPHILS NFR BLD AUTO: 3.11 10*3/MM3 (ref 1.7–7)
NEUTROPHILS NFR BLD AUTO: 52.9 % (ref 42.7–76)
NRBC BLD AUTO-RTO: 0 /100 WBC (ref 0–0.2)
PERCENT MAX PREDICTED HR: 97.44 %
PLATELET # BLD AUTO: 295 10*3/MM3 (ref 140–450)
PMV BLD AUTO: 9.4 FL (ref 6–12)
POTASSIUM SERPL-SCNC: 3.7 MMOL/L (ref 3.5–5.2)
PROT SERPL-MCNC: 7.2 G/DL (ref 6–8.5)
RBC # BLD AUTO: 4.22 10*6/MM3 (ref 3.77–5.28)
SINUS: 2.9 CM
SODIUM SERPL-SCNC: 139 MMOL/L (ref 136–145)
STJ: 2.8 CM
STRESS BASELINE BP: NORMAL MMHG
STRESS BASELINE HR: 88 BPM
STRESS O2 SAT REST: 98 %
STRESS PERCENT HR: 115 %
STRESS POST ESTIMATED WORKLOAD: 7.5 METS
STRESS POST EXERCISE DUR MIN: 6 MIN
STRESS POST EXERCISE DUR SEC: 0 SEC
STRESS POST PEAK BP: NORMAL MMHG
STRESS POST PEAK HR: 152 BPM
STRESS TARGET HR: 133 BPM
T-UPTAKE NFR SERPL: 1.05 TBI (ref 0.8–1.3)
T4 SERPL-MCNC: 6.19 MCG/DL (ref 4.5–11.7)
TROPONIN T SERPL HS-MCNC: <6 NG/L
TSH SERPL DL<=0.05 MIU/L-ACNC: 1.45 UIU/ML (ref 0.27–4.2)
WBC NRBC COR # BLD AUTO: 5.88 10*3/MM3 (ref 3.4–10.8)

## 2024-04-10 PROCEDURE — 36415 COLL VENOUS BLD VENIPUNCTURE: CPT

## 2024-04-10 PROCEDURE — 93017 CV STRESS TEST TRACING ONLY: CPT

## 2024-04-10 PROCEDURE — 93325 DOPPLER ECHO COLOR FLOW MAPG: CPT

## 2024-04-10 PROCEDURE — 93320 DOPPLER ECHO COMPLETE: CPT | Performed by: INTERNAL MEDICINE

## 2024-04-10 PROCEDURE — 93352 ADMIN ECG CONTRAST AGENT: CPT | Performed by: INTERNAL MEDICINE

## 2024-04-10 PROCEDURE — 93018 CV STRESS TEST I&R ONLY: CPT | Performed by: INTERNAL MEDICINE

## 2024-04-10 PROCEDURE — 85379 FIBRIN DEGRADATION QUANT: CPT | Performed by: INTERNAL MEDICINE

## 2024-04-10 PROCEDURE — 93005 ELECTROCARDIOGRAM TRACING: CPT | Performed by: INTERNAL MEDICINE

## 2024-04-10 PROCEDURE — 93350 STRESS TTE ONLY: CPT

## 2024-04-10 PROCEDURE — 93325 DOPPLER ECHO COLOR FLOW MAPG: CPT | Performed by: INTERNAL MEDICINE

## 2024-04-10 PROCEDURE — 93016 CV STRESS TEST SUPVJ ONLY: CPT | Performed by: INTERNAL MEDICINE

## 2024-04-10 PROCEDURE — 84436 ASSAY OF TOTAL THYROXINE: CPT

## 2024-04-10 PROCEDURE — 25510000001 PERFLUTREN (DEFINITY) 8.476 MG IN SODIUM CHLORIDE (PF) 0.9 % 10 ML INJECTION: Performed by: INTERNAL MEDICINE

## 2024-04-10 PROCEDURE — 93320 DOPPLER ECHO COMPLETE: CPT

## 2024-04-10 PROCEDURE — 80050 GENERAL HEALTH PANEL: CPT

## 2024-04-10 PROCEDURE — 93350 STRESS TTE ONLY: CPT | Performed by: INTERNAL MEDICINE

## 2024-04-10 PROCEDURE — 84479 ASSAY OF THYROID (T3 OR T4): CPT

## 2024-04-10 PROCEDURE — 84484 ASSAY OF TROPONIN QUANT: CPT | Performed by: INTERNAL MEDICINE

## 2024-04-10 RX ORDER — FEXOFENADINE HCL 180 MG/1
180 TABLET ORAL DAILY
COMMUNITY

## 2024-04-10 RX ORDER — NITROGLYCERIN 0.4 MG/1
0.4 TABLET SUBLINGUAL
Status: SHIPPED | OUTPATIENT
Start: 2024-04-10

## 2024-04-10 RX ORDER — SODIUM CHLORIDE 0.9 % (FLUSH) 0.9 %
10 SYRINGE (ML) INJECTION AS NEEDED
Status: SHIPPED | OUTPATIENT
Start: 2024-04-10

## 2024-04-10 RX ADMIN — PERFLUTREN 3 ML: 6.52 INJECTION, SUSPENSION INTRAVENOUS at 14:24

## 2024-04-10 NOTE — PROGRESS NOTES
Date of Hospital Visit: 04/10/24  Encounter Provider: Gael Rodriguez MD  Place of Service: [facility] CARDIOLOGY  Patient Name: Madison Acevedo  :1960  Referral Provider: Gael Rodriguez MD    Chief complaint: chest discomfort    History of Present Illness    Ms. Acevedo presented to the office today for a sick visit and I transferred her to [facility]. I have reviewed prior notes and there are no changes except for any new updates described below. I have also reviewed any information entered into the medical record by the patient or by ancillary staff.      She has a history of mild hypertension and takes low dose amlodipine; this also helps with what has been presumed to be esophageal spasm. She has a history of symptomatic PACs. However, her symptoms have been relatively mild so we have not put her on an AVN blocker.  She reported jaw pain in May 2022; a stress echo was normal.    Her cardiac status has been quite stable.  Unfortunately, earlier this year, she developed Pasteurella multocida sepsis from a cat bite and required prolonged antibiotics.  There were no cardiac problems at that time.    For the last week she has noticed an increase in her systolic blood pressure to the 135-140 mmHg range.  She feels a whooshing in her head and she feels a constant chest pressure that gets better and worse at times.  She is a little short of breath with it.  She denies worsening of her palpitations.  She denies lightheadedness or syncope.    Past Medical History:   Diagnosis Date    Arthritis     Breast cyst     COVID-19 2021    Esophageal reflux     Esophageal spasm     Essential hypertension 2020    Fibrocystic disease of breast     Hyperlipidemia     Palpitations 2020    Pasteurella cellulitis due to cat bite     PONV (postoperative nausea and vomiting)     Septic arthritis of right sternoclavicular joint 2023    Vitamin D deficiency        Past Surgical History:    Procedure Laterality Date    BREAST CYST ASPIRATION      CHOLECYSTECTOMY      COLONOSCOPY N/A 2018    Procedure: COLONOSCOPY TO CECUM AND TERMINAL ILEUM;  Surgeon: Jan Brannon MD;  Location:  [facility];  Service:     ENDOSCOPY      X2    ENDOSCOPY N/A 2018    Procedure: ESOPHAGOGASTRODUODENOSCOPY WITH BIOPSIES;  Surgeon: Jan Brannon MD;  Location:  [facility];  Service:     STEREOTACTIC BIOPSY / ASPIRATION / EXCISION Left     Bx breast percutan needle core use imag guide    THUMB ARTHROSCOPY      left thumb joint implant    WISDOM TOOTH EXTRACTION         Prior to Admission medications    Medication Sig Start Date End Date Taking? Authorizing Provider   amLODIPine (NORVASC) 2.5 MG tablet Take 1 tablet by mouth Daily. 24  Yes Gael Rodriguez MD   fexofenadine (ALLEGRA) 180 MG tablet Take 1 tablet by mouth Daily.   Yes Cristóbal Nuñez MD   magnesium gluconate (MAGONATE) 500 MG tablet Take 1 tablet by mouth 2 (Two) Times a Day.   Yes Cristóbal Nuñez MD   meloxicam (MOBIC) 15 MG tablet Take 1 tablet by mouth Daily.  Patient taking differently: Take 1 tablet by mouth Daily As Needed for Moderate Pain (For shoulder arthritis pain). 24  Yes Paulette Coulter APRN   omeprazole-sodium bicarbonate (Zegerid)  MG per capsule Take 1 capsule by mouth Daily. 6/15/23  Yes    rosuvastatin (Crestor) 5 MG tablet Take 1 tablet by mouth Daily. 24  Yes Paulette Coulter APRN   VITAMIN D, CHOLECALCIFEROL, PO Take  by mouth Daily.  Patient not taking: Reported on 4/10/2024    Cristóbal Nuñez MD       Social History     Socioeconomic History    Marital status:     Number of children: 2   Tobacco Use    Smoking status: Former     Current packs/day: 0.00     Types: Cigarettes     Quit date: 1975     Years since quittin.9    Smokeless tobacco: Never    Tobacco comments:     Have not smoked in 30 years   Vaping Use    Vaping status: Never Used   Substance and  Sexual Activity    Alcohol use: Yes     Alcohol/week: 7.0 standard drinks of alcohol     Types: 7 Glasses of wine per week     Comment: vodka daily    Drug use: No    Sexual activity: Yes     Partners: Male     Birth control/protection: Post-menopausal       Family History   Problem Relation Age of Onset    Heart disease Father         MI/CABG early 60s    Hypertension Father     Lung cancer Father     Stroke Father     GIACOMO disease Sister         esophageal reflux    COPD Sister     Hypertension Sister     Esophageal cancer Mother     Malig Hyperthermia Neg Hx        Review of Systems   Cardiovascular:  Positive for chest pain.   All other systems reviewed and are negative.       Objective:   There were no vitals filed for this visit.  There is no height or weight on file to calculate BMI.      Physical Exam  Vitals reviewed.   Constitutional:       Appearance: She is well-developed.   HENT:      Head: Normocephalic.      Nose: Nose normal.      Mouth/Throat:      Pharynx: Oropharynx is clear.   Eyes:      Conjunctiva/sclera: Conjunctivae normal.   Neck:      Vascular: No JVD.   Cardiovascular:      Rate and Rhythm: Normal rate and regular rhythm.      Pulses: Normal pulses.      Heart sounds: Normal heart sounds.   Pulmonary:      Effort: Pulmonary effort is normal.      Breath sounds: Normal breath sounds.   Abdominal:      Palpations: Abdomen is soft.      Tenderness: There is no abdominal tenderness.   Musculoskeletal:         General: Normal range of motion.      Cervical back: Normal range of motion.   Skin:     General: Skin is warm and dry.   Neurological:      General: No focal deficit present.      Mental Status: She is alert.      Cranial Nerves: No cranial nerve deficit.   Psychiatric:         Mood and Affect: Mood normal.                 Lab Review:                Results from last 7 days   Lab Units 04/10/24  1219   SODIUM mmol/L 139   POTASSIUM mmol/L 3.7   CHLORIDE mmol/L 103   CO2 mmol/L 26.0   BUN  mg/dL 18   CREATININE mg/dL 1.18*   GLUCOSE mg/dL 91   CALCIUM mg/dL 9.2     Results from last 7 days   Lab Units 04/10/24  1219   HSTROP T ng/L <6     Results from last 7 days   Lab Units 04/10/24  1219   WBC 10*3/mm3 5.88   HEMOGLOBIN g/dL 13.1   HEMATOCRIT % 38.9   PLATELETS 10*3/mm3 295         Lab Results   Component Value Date    TSH 1.450 04/10/2024     Lab Results   Component Value Date    DDIMERQUANT 0.27 04/10/2024     Lab Results   Component Value Date    PROBNP 47.0 05/26/2021         ECG 12 Lead    Date/Time: 4/10/2024 5:09 PM  Performed by: Gael Rodriguez MD    Authorized by: Gael Rodriguez MD  Comparison: compared with previous ECG   Similar to previous ECG  Rhythm: sinus rhythm  Conduction: conduction normal  T Waves: T waves normal  QRS axis: normal  Other findings: non-specific ST-T wave changes    Clinical impression: non-specific ECG            Assessment/Plan:     1. Precordial pain    2. Essential hypertension      Mrs Acevedo has noticed mild blood pressure elevation for the last week and has been taking 5 mg of amlodipine daily for the last 3 days; her usual dose is 2.5 mg.  She has constant chest discomfort and feels a whooshing in her head.  Her EKG shows nonspecific ST flattening which is not new.  She thankfully has normal high-sensitivity troponin, even with the chronicity of her symptoms.  We did a stress echocardiogram and her stress EKG and stress echo were both normal.  The rest of her labs are unremarkable.  I asked her to stay on 5 mg of amlodipine daily to see if she feels better when she is reach steady state, and if she does not, to continue to increase it to 7.5 mg daily.  I would like her to go ahead and take a low-dose of aspirin in case this does represent endothelial dysfunction, and we could always consider nitrates or ranolazine going forward as well if that is truly felt to be the case.  I will call and check on her next week.

## 2024-04-15 ENCOUNTER — TELEPHONE (OUTPATIENT)
Dept: CARDIOLOGY | Facility: CLINIC | Age: 64
End: 2024-04-15
Payer: COMMERCIAL

## 2024-04-15 RX ORDER — AMLODIPINE BESYLATE 5 MG/1
5 TABLET ORAL DAILY
Qty: 90 TABLET | Refills: 0 | Status: SHIPPED | OUTPATIENT
Start: 2024-04-15

## 2024-04-15 NOTE — TELEPHONE ENCOUNTER
Patient was seen in [facility] last week for chest discomfort, all tests WNL, meds adjusted. Will you call and check on her?    Yoshi contreras

## 2024-04-15 NOTE — TELEPHONE ENCOUNTER
Madison Acevedo returned call.  She is doing well.  No more CP.  Today is first time she checked her BP.  She took amlodipine 5 mg at 0400 and checked her BP about 1000 and it was 139/91.  She does note that she is 'running around at work.'    She is requesting a new script for amlodipine to [facility] pharmacy.  If she is going to stay on 5 mg she wants a 5 mg tablet if possible (she is currently taking two 2.5 mg tablets).  I am happy to send that in if you do not want to make any further med changes.  Let me know.  Pt requests a [employer] message for any follow up with this message.    Liliya Beltran RN  [facility] Triage  04/15/24 10:35 EDT

## 2024-04-15 NOTE — TELEPHONE ENCOUNTER
Called and left VM, will continue to try to reach pt.    HUB- please put patient straight through to triage    Rosie Gresham, RN  Triage RN  04/15/24 10:12 EDT

## 2024-04-15 NOTE — TELEPHONE ENCOUNTER
Script sent and also sent pt a MyChart message per pt request.    Liliya [facility] Triage  04/15/24 14:15 EDT

## 2024-07-05 ENCOUNTER — OFFICE (OUTPATIENT)
Dept: URBAN - METROPOLITAN AREA CLINIC 65 | Facility: CLINIC | Age: 64
End: 2024-07-05

## 2024-07-05 VITALS
DIASTOLIC BLOOD PRESSURE: 73 MMHG | HEART RATE: 100 BPM | RESPIRATION RATE: 22 BRPM | SYSTOLIC BLOOD PRESSURE: 113 MMHG | HEIGHT: 64 IN | WEIGHT: 165 LBS

## 2024-07-05 DIAGNOSIS — Z80.0 FAMILY HISTORY OF MALIGNANT NEOPLASM OF DIGESTIVE ORGANS: ICD-10-CM

## 2024-07-05 DIAGNOSIS — K21.9 GASTRO-ESOPHAGEAL REFLUX DISEASE WITHOUT ESOPHAGITIS: ICD-10-CM

## 2024-07-05 DIAGNOSIS — K58.9 IRRITABLE BOWEL SYNDROME WITHOUT DIARRHEA: ICD-10-CM

## 2024-07-05 PROCEDURE — 99213 OFFICE O/P EST LOW 20 MIN: CPT | Performed by: INTERNAL MEDICINE

## 2024-07-05 RX ORDER — COLESEVELAM HYDROCHLORIDE 625 MG/1
TABLET, COATED ORAL
Qty: 30 | Refills: 11 | Status: ACTIVE

## 2024-07-09 RX ORDER — ROSUVASTATIN CALCIUM 5 MG/1
5 TABLET, COATED ORAL DAILY
Qty: 90 TABLET | Refills: 1 | Status: SHIPPED | OUTPATIENT
Start: 2024-07-09

## 2024-07-11 RX ORDER — AMLODIPINE BESYLATE 5 MG/1
5 TABLET ORAL DAILY
Qty: 90 TABLET | Refills: 0 | Status: SHIPPED | OUTPATIENT
Start: 2024-07-11

## 2024-10-10 NOTE — TELEPHONE ENCOUNTER
Patient needs an appointment. She has not been seen by a provider since 2022. Please call and get her scheduled to see Dr. Rodriguez or SIMONE in the near future.

## 2024-10-22 RX ORDER — AMLODIPINE BESYLATE 5 MG/1
5 TABLET ORAL DAILY
Qty: 30 TABLET | Refills: 0 | Status: SHIPPED | OUTPATIENT
Start: 2024-10-22 | End: 2024-10-25 | Stop reason: SDUPTHER

## 2024-10-22 NOTE — TELEPHONE ENCOUNTER
PT CALLED BACK AND PER PT SHE WAS SEEN BY DR VOSS 04/10/2024 AND THEN THEY WENT TO [facility] FOR TEST.  CAN YOU REFILL?

## 2024-10-25 RX ORDER — AMLODIPINE BESYLATE 5 MG/1
5 TABLET ORAL DAILY
Qty: 30 TABLET | Refills: 5 | Status: SHIPPED | OUTPATIENT
Start: 2024-10-25

## 2024-12-20 DIAGNOSIS — E04.2 MULTIPLE THYROID NODULES: Primary | ICD-10-CM

## 2025-01-08 ENCOUNTER — HOSPITAL ENCOUNTER (OUTPATIENT)
Facility: HOSPITAL | Age: 65
Discharge: HOME | End: 2025-01-08
Admitting: SURGERY
Payer: COMMERCIAL

## 2025-01-08 DIAGNOSIS — E04.2 MULTIPLE THYROID NODULES: ICD-10-CM

## 2025-01-08 PROCEDURE — 76536 US EXAM OF HEAD AND NECK: CPT

## 2025-01-08 RX ORDER — ROSUVASTATIN CALCIUM 5 MG/1
5 TABLET, COATED ORAL DAILY
Qty: 90 TABLET | Refills: 1 | Status: SHIPPED | OUTPATIENT
Start: 2025-01-08

## 2025-01-09 DIAGNOSIS — E04.2 MULTIPLE THYROID NODULES: Primary | ICD-10-CM

## 2025-01-11 NOTE — PROGRESS NOTES
01/24/25 0001   Pre-Procedure Phone Call   Procedure Time Verified Yes   Arrival Time 1230   Procedure Location Verified Yes   Medical History Reviewed No   NPO Status Reinforced No   Ride and Caregiver Arranged N/A   Patient Knows to Bring Current Medications No   Bring Outside Films Requested No

## 2025-01-24 ENCOUNTER — HOSPITAL ENCOUNTER (OUTPATIENT)
Dept: ULTRASOUND IMAGING | Facility: HOSPITAL | Age: 65
Discharge: HOME | End: 2025-01-24
Payer: COMMERCIAL

## 2025-01-24 VITALS
OXYGEN SATURATION: 98 % | SYSTOLIC BLOOD PRESSURE: 136 MMHG | HEIGHT: 64 IN | DIASTOLIC BLOOD PRESSURE: 87 MMHG | HEART RATE: 96 BPM | RESPIRATION RATE: 14 BRPM | TEMPERATURE: 97.8 F | WEIGHT: 170 LBS | BODY MASS INDEX: 29.02 KG/M2

## 2025-01-24 DIAGNOSIS — E04.2 MULTIPLE THYROID NODULES: ICD-10-CM

## 2025-01-24 PROCEDURE — 25010000002 LIDOCAINE 1 % SOLUTION: Performed by: RADIOLOGY

## 2025-01-24 PROCEDURE — 88305 TISSUE EXAM BY PATHOLOGIST: CPT | Performed by: SURGERY

## 2025-01-24 PROCEDURE — 88173 CYTOPATH EVAL FNA REPORT: CPT | Performed by: SURGERY

## 2025-01-24 RX ORDER — LIDOCAINE HYDROCHLORIDE 10 MG/ML
10 INJECTION, SOLUTION INFILTRATION; PERINEURAL ONCE
Status: COMPLETED | OUTPATIENT
Start: 2025-01-24 | End: 2025-01-24

## 2025-01-24 RX ADMIN — LIDOCAINE HYDROCHLORIDE 2.5 ML: 10 INJECTION, SOLUTION INFILTRATION; PERINEURAL at 13:37

## 2025-01-24 NOTE — DISCHARGE INSTRUCTIONS
EDUCATION / DISCHARGE INSTRUCTIONS  Aspiration:  An aspiration is a procedure used to take a sample of cells or tissue from a lump, mass or other area of concern.   Within a week the radiologist will send a report to your physician.  A pathologist will also examine the tissue and send a report.    Post Procedure:    *  Rest today (no pushing pulling or straining).   *  Slowly increase activity tomorow.    *  If you received sedation do not drive for 24 hours.   *  Keep dressing clean and dry.   *  Leave dressing on puncture site for 24 hours.    *  You may shower when dressing removed.   *  If needed, use an ice pack at the site for up to 20 minutes at a time for pain.    Call your doctor if experiencing:   *  Signs of infection such as redness, swelling, excessive pain and / or foul smelling drainage from the puncture site.   *  Chills or fever over 101 degrees (by mouth).   *  Unrelieved pain.   *  Any new or severe symptoms.      Following the procedure:     Follow-up with the ordering physician as directed.    Continue to take other medications as directed by your physician unless otherwise instructed.      If you have any concerns please call the Radiology Nurses Desk at [phone].  You are the most important factor in your recovery.  Follow the above instructions carefully.

## 2025-01-27 DIAGNOSIS — E04.2 MULTIPLE THYROID NODULES: Primary | ICD-10-CM

## 2025-01-27 LAB
CYTO UR: NORMAL
LAB AP CASE REPORT: NORMAL
LAB AP NON-GYN SPECIMEN ADEQUACY: NORMAL
PATH REPORT.FINAL DX SPEC: NORMAL
PATH REPORT.GROSS SPEC: NORMAL

## 2025-03-10 ENCOUNTER — OFFICE VISIT (OUTPATIENT)
Dept: CARDIOLOGY | Facility: CLINIC | Age: 65
End: 2025-03-10
Payer: COMMERCIAL

## 2025-03-10 VITALS
HEIGHT: 64 IN | OXYGEN SATURATION: 97 % | WEIGHT: 175 LBS | HEART RATE: 74 BPM | DIASTOLIC BLOOD PRESSURE: 98 MMHG | SYSTOLIC BLOOD PRESSURE: 138 MMHG | BODY MASS INDEX: 29.88 KG/M2

## 2025-03-10 DIAGNOSIS — I34.0 NONRHEUMATIC MITRAL VALVE REGURGITATION: ICD-10-CM

## 2025-03-10 DIAGNOSIS — I10 ESSENTIAL HYPERTENSION: Primary | ICD-10-CM

## 2025-03-10 DIAGNOSIS — I49.1 APC (ATRIAL PREMATURE CONTRACTIONS): ICD-10-CM

## 2025-03-10 PROBLEM — J18.9 COMMUNITY ACQUIRED PNEUMONIA OF RIGHT LOWER LOBE OF LUNG: Status: RESOLVED | Noted: 2023-11-19 | Resolved: 2025-03-10

## 2025-03-10 PROCEDURE — 99214 OFFICE O/P EST MOD 30 MIN: CPT | Performed by: NURSE PRACTITIONER

## 2025-03-10 PROCEDURE — 93000 ELECTROCARDIOGRAM COMPLETE: CPT | Performed by: NURSE PRACTITIONER

## 2025-03-10 RX ORDER — CYCLOBENZAPRINE HCL 5 MG
5 TABLET ORAL AS NEEDED
COMMUNITY

## 2025-03-10 RX ORDER — ONDANSETRON 8 MG/1
8 TABLET, FILM COATED ORAL AS NEEDED
COMMUNITY

## 2025-03-10 RX ORDER — AMLODIPINE BESYLATE 10 MG/1
10 TABLET ORAL DAILY
Qty: 90 TABLET | Refills: 0 | Status: SHIPPED | OUTPATIENT
Start: 2025-03-10

## 2025-03-10 NOTE — PROGRESS NOTES
Date of Office Visit: 03/10/2025  Encounter Provider: SIMONE Vickers  Place of Service: [facility] CARDIOLOGY  Patient Name: Madison Acevedo  :1960  Primary Cardiologist: Dr. Gael Rodriguez    Chief Complaint   Patient presents with    Hypertension    Annual Exam   :     HPI: Madison Acevedo is a 65 y.o. female who presents today for cardiac follow-up visit.  I have reviewed her medical records.  She is an RN at [employer].    She has a history of palpitations dating back to .  In 2020, she saw Dr. Rodriguez for palpitations and hypertension.  She was diagnosed with PACs.  She was was recommended to stop taking pseudoephedrine, decrease caffeine intake, start magnesium oxide 400 mg daily, and start amlodipine.  Echocardiogram showed normal LVEF 59% and trivial pericardial effusion.    In May 2021, she was evaluated in our [facility] for chest pain radiating to her jaws, dyspnea, and fatigue.  She ruled out for ACS and stress echocardiogram was normal.    In 2023, echocardiogram showed normal LVEF and mild mitral regurgitation.      In , she developed Pasteurella multi pseudo sepsis from cat bite requiring prolonged antibiotics.  In 2024, he was seen in our [facility] for whooshing in her head, chest pressure and shortness of breath.  Stress echocardiogram showed normal LVEF, trace mitral regurgitation, physiologic tricuspid regurgitation, trace pulmonic regurgitation, and normal stress portion.  Dr. Rodriguez recommended aspirin 81 mg daily for possible endothelial dysfunction and to continue amlodipine 5 mg daily.    She presents today for follow-up visit. Her blood pressure has been elevated 130-140/90s. She is not exercising and may eat a higher salt diet. She denies chest pain, shortness of breath, paroxysmal nocturnal dyspnea, orthopnea, cough, wheezing, edema, palpitations, dizziness, syncopal episodes, falls, or bleeding. She takes meloxicam as needed.        Past Medical History:   Diagnosis Date    Arthritis     Breast cyst     Community acquired pneumonia of right lower lobe of lung 2023    COVID-19 2021    Esophageal reflux     Esophageal spasm     Essential hypertension 2020    Fibrocystic disease of breast     Hyperlipidemia     Palpitations 2020    Pasteurella cellulitis due to cat bite     PONV (postoperative nausea and vomiting)     Septic arthritis of right sternoclavicular joint 2023    Vitamin D deficiency        Past Surgical History:   Procedure Laterality Date    BREAST CYST ASPIRATION      CHOLECYSTECTOMY      COLONOSCOPY N/A 2018    Procedure: COLONOSCOPY TO CECUM AND TERMINAL ILEUM;  Surgeon: Jan Brannon MD;  Location: [facility] ENDOSCOPY;  Service:     ENDOSCOPY      X2    ENDOSCOPY N/A 2018    Procedure: ESOPHAGOGASTRODUODENOSCOPY WITH BIOPSIES;  Surgeon: Jan Brannon MD;  Location: [facility] ENDOSCOPY;  Service:     STEREOTACTIC BIOPSY / ASPIRATION / EXCISION Left     Bx breast percutan needle core use imag guide    THUMB ARTHROSCOPY      left thumb joint implant    WISDOM TOOTH EXTRACTION         Social History     Socioeconomic History    Marital status:     Number of children: 2   Tobacco Use    Smoking status: Former     Current packs/day: 0.00     Types: Cigarettes     Quit date: 1975     Years since quittin.9    Smokeless tobacco: Never    Tobacco comments:     Have not smoked in 30 years   Vaping Use    Vaping status: Never Used   Substance and Sexual Activity    Alcohol use: Yes     Alcohol/week: 7.0 standard drinks of alcohol     Types: 7 Glasses of wine per week     Comment: vodka daily    Drug use: No    Sexual activity: Yes     Partners: Male     Birth control/protection: Post-menopausal       Family History   Problem Relation Age of Onset    Heart disease Father         MI/CABG early 60s    Hypertension Father     Lung cancer Father     Stroke Father     GIACOMO disease Sister   "       esophageal reflux    COPD Sister     Hypertension Sister     Esophageal cancer Mother     Malig Hyperthermia Neg Hx        The following portion of the patient's history were reviewed and updated as appropriate: past medical history, past surgical history, past social history, past family history, allergies, current medications, and problem list.    Review of Systems   Constitutional: Negative.   Cardiovascular: Negative.    Respiratory: Negative.     Hematologic/Lymphatic: Negative.    Neurological: Negative.        No Known Allergies      Current Outpatient Medications:     amLODIPine (NORVASC) 5 MG tablet, Take 1 tablet by mouth Daily., Disp: 90 tablet, Rfl: 0    colesevelam (WELCHOL) 625 MG tablet, Take 1 tablet by mouth Daily., Disp: 30 tablet, Rfl: 10    cyclobenzaprine (FLEXERIL) 5 MG tablet, Take 1 tablet by mouth As Needed., Disp: , Rfl:     dexlansoprazole (DEXILANT) 60 MG capsule, Take 1 capsule by mouth Daily., Disp: 90 capsule, Rfl: 4    fexofenadine (ALLEGRA) 180 MG tablet, Take 1 tablet by mouth Daily., Disp: , Rfl:     magnesium gluconate (MAGONATE) 500 MG tablet, Take 27 mg by mouth As Needed., Disp: , Rfl:     meloxicam (MOBIC) 15 MG tablet, Take 1 tablet by mouth Daily. (Patient taking differently: Take 1 tablet by mouth As Needed for Moderate Pain (For shoulder arthritis pain).), Disp: 90 tablet, Rfl: 3    ondansetron (ZOFRAN) 8 MG tablet, Take 1 tablet by mouth As Needed., Disp: , Rfl:     rosuvastatin (Crestor) 5 MG tablet, Take 1 tablet by mouth nightly, Disp: 90 tablet, Rfl: 1         Objective:     Vitals:    03/10/25 1144   BP: 138/98   BP Location: Left arm   Patient Position: Sitting   Cuff Size: Adult   Pulse: 74   SpO2: 97%   Weight: 79.4 kg (175 lb)   Height: 162.6 cm (64.02\")     Body mass index is 30.02 kg/m².    PHYSICAL EXAM:    Vitals Reviewed.   General Appearance: No acute distress, well developed and well nourished. Obese.   Eyes: Glasses.   HENT: No hearing loss noted.  "   Respiratory: No signs of respiratory distress. Respiration rhythm and depth normal.  Clear to auscultation.   Cardiovascular:  Jugular Venous Pressure: Normal  Heart Rate and Rhythm: Normal, Heart Sounds: Normal S1 and S2. No S3 or S4 noted.  Murmurs: No murmurs noted. No rubs, thrills, or gallops.   Lower Extremities: No edema noted.  Musculoskeletal: Normal movement of extremities.  Skin: General appearance normal.    Psychiatric: Patient alert and oriented to person, place, and time. Speech and behavior appropriate. Normal mood and affect.       ECG 12 Lead    Date/Time: 3/10/2025 10:47 AM  Performed by: Trini Delgado APRN    Authorized by: Trini Delgado APRN  Comparison: compared with previous ECG from 4/10/2024  Similar to previous ECG  Rhythm: sinus rhythm  Rate: normal  BPM: 74  Conduction: conduction normal  QRS axis: normal  Other findings: non-specific ST-T wave changes    Clinical impression: non-specific ECG            Assessment:       Diagnosis Plan   1. Essential hypertension        2. Nonrheumatic mitral valve regurgitation        3. APC (atrial premature contractions)               Plan:       1.  Hypertension: Blood pressure remains elevated.  Increase amlodipine to 10 mg daily.  I recommend regular exercise program and low-salt diet.    2.  Mild mitral regurgitation noted per echocardiogram in 2024.    3.  PACs noted in the past. She denies palpitations.  Continue magnesium oxide.    4.  She takes meloxicam as needed. We discussed that this can increase her risk of coronary artery disease.    5.  She never started her aspirin.  Since she is not having any further symptoms, I think we can hold off on aspirin.    6.  She will monitor her blood pressure at home.  Will call for an update in a few weeks.  If stable, follow-up with Dr. Rodriguez in 1 year.    As always, it has been a pleasure to participate in your patient's care. Thank you.         Sincerely,         SIMONE Fernandes  [facility]  [facility]      Dictated utilizing Dragon Dictation

## 2025-03-11 PROBLEM — I49.1 APC (ATRIAL PREMATURE CONTRACTIONS): Status: ACTIVE | Noted: 2025-03-11

## 2025-03-11 PROBLEM — I34.0 NONRHEUMATIC MITRAL VALVE REGURGITATION: Status: ACTIVE | Noted: 2025-03-11

## 2025-04-08 RX ORDER — AMLODIPINE BESYLATE 10 MG/1
10 TABLET ORAL DAILY
Qty: 90 TABLET | Refills: 0 | Status: SHIPPED | OUTPATIENT
Start: 2025-04-08

## 2025-04-16 ENCOUNTER — TELEPHONE (OUTPATIENT)
Dept: CARDIOLOGY | Age: 65
End: 2025-04-16
Payer: COMMERCIAL

## 2025-04-16 NOTE — TELEPHONE ENCOUNTER
In March 2025, I increased amlodipine to 10 mg daily.  I recommended exercise and low-salt diet.  Please call to her check on her home blood pressure and heart rates.  If BPs are running 130s/80s or less, continue current medications.      Thank you

## 2025-04-16 NOTE — TELEPHONE ENCOUNTER
Called and left VM. Will continue to try to reach patient. HUB transfer call to triage.     Nathalie Ram RN  [place]riage [facility]

## 2025-04-17 NOTE — TELEPHONE ENCOUNTER
See [employer] message:    Patient Message with Trini Delgado APRN (04/16/2025)     Nathalie Ram RN  Triage [facility]

## 2025-04-25 DIAGNOSIS — I10 ESSENTIAL HYPERTENSION: Primary | ICD-10-CM

## 2025-04-25 RX ORDER — VALSARTAN 80 MG/1
80 TABLET ORAL DAILY
Qty: 90 TABLET | Refills: 0 | Status: SHIPPED | OUTPATIENT
Start: 2025-04-25

## 2025-05-23 ENCOUNTER — LAB (OUTPATIENT)
Dept: LAB | Facility: HOSPITAL | Age: 65
End: 2025-05-23
Payer: COMMERCIAL

## 2025-05-23 DIAGNOSIS — I10 ESSENTIAL HYPERTENSION: ICD-10-CM

## 2025-05-23 LAB
ANION GAP SERPL CALCULATED.3IONS-SCNC: 10.2 MMOL/L (ref 5–15)
BUN SERPL-MCNC: 18 MG/DL (ref 8–23)
BUN/CREAT SERPL: 20 (ref 7–25)
CALCIUM SPEC-SCNC: 9.3 MG/DL (ref 8.6–10.5)
CHLORIDE SERPL-SCNC: 103 MMOL/L (ref 98–107)
CO2 SERPL-SCNC: 25.8 MMOL/L (ref 22–29)
CREAT SERPL-MCNC: 0.9 MG/DL (ref 0.57–1)
EGFRCR SERPLBLD CKD-EPI 2021: 71.1 ML/MIN/1.73
GLUCOSE SERPL-MCNC: 85 MG/DL (ref 65–99)
POTASSIUM SERPL-SCNC: 3.8 MMOL/L (ref 3.5–5.2)
SODIUM SERPL-SCNC: 139 MMOL/L (ref 136–145)

## 2025-05-23 PROCEDURE — 80048 BASIC METABOLIC PNL TOTAL CA: CPT

## 2025-05-23 PROCEDURE — 36415 COLL VENOUS BLD VENIPUNCTURE: CPT

## 2025-05-27 DIAGNOSIS — Z00.00 ANNUAL PHYSICAL EXAM: Primary | ICD-10-CM

## 2025-05-29 ENCOUNTER — LAB (OUTPATIENT)
Dept: LAB | Facility: HOSPITAL | Age: 65
End: 2025-05-29
Payer: COMMERCIAL

## 2025-05-29 LAB
CHOLEST SERPL-MCNC: 177 MG/DL (ref 0–200)
DEPRECATED RDW RBC AUTO: 42.3 FL (ref 37–54)
ERYTHROCYTE [DISTWIDTH] IN BLOOD BY AUTOMATED COUNT: 12.2 % (ref 12.3–15.4)
HBA1C MFR BLD: 5.1 % (ref 4.8–5.6)
HCT VFR BLD AUTO: 36.5 % (ref 34–46.6)
HDLC SERPL QL: 2.11
HDLC SERPL-MCNC: 84 MG/DL (ref 40–60)
HGB BLD-MCNC: 11.8 G/DL (ref 12–15.9)
LDLC SERPL CALC-MCNC: 77 MG/DL (ref 0–100)
MCH RBC QN AUTO: 30.7 PG (ref 26.6–33)
MCHC RBC AUTO-ENTMCNC: 32.3 G/DL (ref 31.5–35.7)
MCV RBC AUTO: 95.1 FL (ref 79–97)
PLATELET # BLD AUTO: 265 10*3/MM3 (ref 140–450)
PMV BLD AUTO: 9.4 FL (ref 6–12)
RBC # BLD AUTO: 3.84 10*6/MM3 (ref 3.77–5.28)
TRIGL SERPL-MCNC: 90 MG/DL (ref 0–150)
VLDLC SERPL-MCNC: 16 MG/DL (ref 5–40)
WBC NRBC COR # BLD AUTO: 4.98 10*3/MM3 (ref 3.4–10.8)

## 2025-05-29 PROCEDURE — 80061 LIPID PANEL: CPT | Performed by: NURSE PRACTITIONER

## 2025-05-29 PROCEDURE — 36415 COLL VENOUS BLD VENIPUNCTURE: CPT | Performed by: NURSE PRACTITIONER

## 2025-05-29 PROCEDURE — 83036 HEMOGLOBIN GLYCOSYLATED A1C: CPT | Performed by: NURSE PRACTITIONER

## 2025-05-29 PROCEDURE — 85027 COMPLETE CBC AUTOMATED: CPT | Performed by: NURSE PRACTITIONER

## 2025-05-30 ENCOUNTER — OFFICE VISIT (OUTPATIENT)
Dept: CARDIOLOGY | Age: 65
End: 2025-05-30
Payer: COMMERCIAL

## 2025-05-30 VITALS
HEIGHT: 64 IN | SYSTOLIC BLOOD PRESSURE: 128 MMHG | HEART RATE: 75 BPM | WEIGHT: 171.2 LBS | DIASTOLIC BLOOD PRESSURE: 82 MMHG | BODY MASS INDEX: 29.23 KG/M2 | OXYGEN SATURATION: 97 %

## 2025-05-30 DIAGNOSIS — I10 ESSENTIAL HYPERTENSION: Primary | ICD-10-CM

## 2025-05-30 DIAGNOSIS — I49.1 PAC (PREMATURE ATRIAL CONTRACTION): ICD-10-CM

## 2025-05-30 DIAGNOSIS — R60.0 LOWER EXTREMITY EDEMA: ICD-10-CM

## 2025-05-30 PROCEDURE — 93000 ELECTROCARDIOGRAM COMPLETE: CPT | Performed by: NURSE PRACTITIONER

## 2025-05-30 PROCEDURE — 99214 OFFICE O/P EST MOD 30 MIN: CPT | Performed by: NURSE PRACTITIONER

## 2025-05-30 RX ORDER — VALSARTAN 80 MG/1
80 TABLET ORAL DAILY
Qty: 90 TABLET | Refills: 3 | Status: SHIPPED | OUTPATIENT
Start: 2025-05-30

## 2025-05-30 NOTE — PROGRESS NOTES
Date of Office Visit: 2025  Encounter Provider: ISMONE Vickers  Place of Service: [facility]  Patient Name: Madison Acevedo  :1960  Primary Cardiologist: Dr. Gael Rodriguez    Chief Complaint   Patient presents with    Hypertension    Leg Swelling    Follow-up   :     HPI: Madison Acevedo is a 65 y.o. female who presents today for follow-up on hypertension.  I have reviewed her medical records.  She is an RN at [employer].    She has a history of palpitations dating back to .  In 2020, she saw Dr. Rodriguez for palpitations, hypertension, and was diagnosed with PACs.  She was was recommended to stop taking pseudoephedrine, decrease caffeine intake, start magnesium oxide 400 mg daily for palpitations, and start amlodipine for hypertension.  Echocardiogram showed normal LVEF 59% and trivial pericardial effusion.    In May 2021, she was evaluated in our [facility] for chest pain radiating to her jaws, dyspnea, and fatigue.  She ruled out for ACS and stress echocardiogram was normal. In 2023, echocardiogram showed normal LVEF and mild mitral regurgitation.      In , she developed Pasteurella multi pseudo sepsis from a cat bite requiring prolonged antibiotics.  In 2024, he was seen in our [facility] for whooshing in her head, chest pressure and shortness of breath.  Stress echocardiogram showed normal LVEF, trace mitral regurgitation, physiologic tricuspid regurgitation, trace pulmonic regurgitation, and normal stress portion.  Dr. Rodriguez recommended aspirin 81 mg daily for possible endothelial dysfunction and to continue amlodipine 5 mg daily.    I recently saw her in the office and blood pressure was elevated.  I increased the amlodipine to 10 mg daily and she developed lower extremity edema.  I stopped the amlodipine and placed her on valsartan 80 mg daily.  Follow-up BMP was normal.    She presents today for follow-up visit.  Her blood pressure  is excellent and lower extremity edema has resolved.  She denies chest pain, shortness of air, palpitations, dizziness, or syncope.      Past Medical History:   Diagnosis Date    Arthritis     Breast cyst     Community acquired pneumonia of right lower lobe of lung 2023    COVID-19 2021    Esophageal reflux     Esophageal spasm     Essential hypertension 2020    Fibrocystic disease of breast     Hyperlipidemia     Palpitations 2020    Pasteurella cellulitis due to cat bite     PONV (postoperative nausea and vomiting)     Septic arthritis of right sternoclavicular joint 2023    Vitamin D deficiency        Past Surgical History:   Procedure Laterality Date    BREAST CYST ASPIRATION      CHOLECYSTECTOMY      COLONOSCOPY N/A 2018    Procedure: COLONOSCOPY TO CECUM AND TERMINAL ILEUM;  Surgeon: Jan Brannon MD;  Location: [facility] ENDOSCOPY;  Service:     ENDOSCOPY      X2    ENDOSCOPY N/A 2018    Procedure: ESOPHAGOGASTRODUODENOSCOPY WITH BIOPSIES;  Surgeon: Jan Brannon MD;  Location: [facility] ENDOSCOPY;  Service:     STEREOTACTIC BIOPSY / ASPIRATION / EXCISION Left     Bx breast percutan needle core use imag guide    THUMB ARTHROSCOPY      left thumb joint implant    WISDOM TOOTH EXTRACTION         Social History     Socioeconomic History    Marital status:     Number of children: 2   Tobacco Use    Smoking status: Former     Current packs/day: 0.00     Types: Cigarettes     Quit date: 1975     Years since quittin.1    Smokeless tobacco: Never    Tobacco comments:     Have not smoked in 30 years   Vaping Use    Vaping status: Never Used   Substance and Sexual Activity    Alcohol use: Yes     Alcohol/week: 7.0 standard drinks of alcohol     Types: 7 Glasses of wine per week     Comment: vodka daily    Drug use: No    Sexual activity: Yes     Partners: Male     Birth control/protection: Post-menopausal       Family History   Problem Relation Age of Onset    Heart  "disease Father         MI/CABG early 60s    Hypertension Father     Lung cancer Father     Stroke Father     GIACOMO disease Sister         esophageal reflux    COPD Sister     Hypertension Sister     Esophageal cancer Mother     Malig Hyperthermia Neg Hx        The following portion of the patient's history were reviewed and updated as appropriate: past medical history, past surgical history, past social history, past family history, allergies, current medications, and problem list.    Review of Systems   Constitutional: Negative.   Cardiovascular: Negative.    Respiratory: Negative.     Hematologic/Lymphatic: Negative.    Neurological: Negative.        No Known Allergies      Current Outpatient Medications:     amLODIPine (NORVASC) 5 MG tablet, Take 1 tablet by mouth Daily., Disp: 90 tablet, Rfl: 0    colesevelam (WELCHOL) 625 MG tablet, Take 1 tablet by mouth Daily., Disp: 30 tablet, Rfl: 10    cyclobenzaprine (FLEXERIL) 5 MG tablet, Take 1 tablet by mouth As Needed., Disp: , Rfl:     dexlansoprazole (DEXILANT) 60 MG capsule, Take 1 capsule by mouth Daily., Disp: 90 capsule, Rfl: 4    fexofenadine (ALLEGRA) 180 MG tablet, Take 1 tablet by mouth Daily., Disp: , Rfl:     magnesium gluconate (MAGONATE) 500 MG tablet, Take 27 mg by mouth As Needed., Disp: , Rfl:     meloxicam (MOBIC) 15 MG tablet, Take 1 tablet by mouth Daily. (Patient taking differently: Take 1 tablet by mouth As Needed for Moderate Pain (For shoulder arthritis pain).), Disp: 90 tablet, Rfl: 3    ondansetron (ZOFRAN) 8 MG tablet, Take 1 tablet by mouth As Needed., Disp: , Rfl:     rosuvastatin (Crestor) 5 MG tablet, Take 1 tablet by mouth nightly, Disp: 90 tablet, Rfl: 1         Objective:     Vitals:    05/30/25 1104   BP: 128/82   BP Location: Right arm   Patient Position: Sitting   Cuff Size: Adult   Pulse: 75   SpO2: 97%   Weight: 77.7 kg (171 lb 3.2 oz)   Height: 162.6 cm (64.02\")     Body mass index is 29.37 kg/m².    PHYSICAL EXAM:    Vitals " Reviewed.   General Appearance: No acute distress, well developed and well nourished.   HENT: No hearing loss noted.    Respiratory: No signs of respiratory distress. Respiration rhythm and depth normal.  Clear to auscultation.   Cardiovascular:  Jugular Venous Pressure: Normal  Heart Rate and Rhythm: Normal, Heart Sounds: Normal S1 and S2. No S3 or S4 noted.  Murmurs: No murmurs noted. No rubs, thrills, or gallops.   Lower Extremities: No edema noted.  Musculoskeletal: Normal movement of extremities.  Skin: General appearance normal.    Psychiatric: Patient alert and oriented to person, place, and time. Speech and behavior appropriate. Normal mood and affect.       ECG 12 Lead    Date/Time: 5/30/2025 11:09 AM  Performed by: Trini Delgado APRN    Authorized by: Trini Delgado APRN  Comparison: compared with previous ECG from 3/10/2025  Similar to previous ECG  Rhythm: sinus rhythm  Rate: normal  BPM: 75  Conduction: conduction normal  ST Segments: ST segments normal  T Waves: T waves normal  QRS axis: normal    Clinical impression: normal ECG            Assessment:       Diagnosis Plan   1. Essential hypertension        2. Lower extremity edema        3. PAC (premature atrial contraction)                 Plan:       1.  Hypertension: She was previously on amlodipine 5 mg daily and her blood pressure remains elevated.  When I increase the amlodipine to 10 mg daily, she developed lower extremity edema.  I have stopped her amlodipine and she is doing well on valsartan 80 mg daily.  I recommended low-sodium diet and regular exercise.  She will monitor and call with concerns.    2.  Lower extremity edema has resolved off amlodipine.    3.  Premature atrial contractions noted in the past.  Denies palpitations and remains on magnesium oxide.    4.  Trace tricuspid, mitral, and pulmonic regurgitation per echocardiogram in 2024.  Stable    5.  She will call with cardiac concerns.  Follow-up with Dr. Rodriguez as scheduled  in March 2026.    As always, it has been a pleasure to participate in your patient's care. Thank you.         Sincerely,         SIMONE Fernandes  [facility]      Dictated utilizing Dragon Dictation

## 2025-06-02 ENCOUNTER — OFFICE VISIT (OUTPATIENT)
Dept: FAMILY MEDICINE CLINIC | Facility: CLINIC | Age: 65
End: 2025-06-02
Payer: COMMERCIAL

## 2025-06-02 VITALS
OXYGEN SATURATION: 98 % | BODY MASS INDEX: 28.75 KG/M2 | SYSTOLIC BLOOD PRESSURE: 130 MMHG | WEIGHT: 168.4 LBS | HEIGHT: 64 IN | TEMPERATURE: 98.2 F | HEART RATE: 83 BPM | DIASTOLIC BLOOD PRESSURE: 80 MMHG

## 2025-06-02 DIAGNOSIS — Z00.00 ANNUAL PHYSICAL EXAM: Primary | ICD-10-CM

## 2025-06-02 DIAGNOSIS — E78.2 MIXED HYPERLIPIDEMIA: ICD-10-CM

## 2025-06-02 PROCEDURE — 99397 PER PM REEVAL EST PAT 65+ YR: CPT | Performed by: NURSE PRACTITIONER

## 2025-06-02 PROCEDURE — 90471 IMMUNIZATION ADMIN: CPT | Performed by: NURSE PRACTITIONER

## 2025-06-02 PROCEDURE — 90677 PCV20 VACCINE IM: CPT | Performed by: NURSE PRACTITIONER

## 2025-06-02 RX ORDER — ROSUVASTATIN CALCIUM 5 MG/1
5 TABLET, COATED ORAL DAILY
Qty: 90 TABLET | Refills: 3 | Status: SHIPPED | OUTPATIENT
Start: 2025-06-02

## 2025-06-02 NOTE — PROGRESS NOTES
"Chief Complaint  Annual Exam (Annual Physical)    Subjective        Madison Acevedo presents to [facility]  History of Present Illness  Ms. Acevedo is here today for annual physical exam with lab review.        I have reviewed the patient's medical history in detail and updated the computerized patient record.       Current Outpatient Medications:     colesevelam (WELCHOL) 625 MG tablet, Take 1 tablet by mouth Daily., Disp: 30 tablet, Rfl: 10    cyclobenzaprine (FLEXERIL) 5 MG tablet, Take 1 tablet by mouth As Needed., Disp: , Rfl:     dexlansoprazole (DEXILANT) 60 MG capsule, Take 1 capsule by mouth Daily., Disp: 90 capsule, Rfl: 4    fexofenadine (ALLEGRA) 180 MG tablet, Take 1 tablet by mouth Daily., Disp: , Rfl:     magnesium gluconate (MAGONATE) 500 MG tablet, Take 27 mg by mouth As Needed., Disp: , Rfl:     meloxicam (MOBIC) 15 MG tablet, Take 1 tablet by mouth Daily. (Patient taking differently: Take 1 tablet by mouth As Needed for Moderate Pain (For shoulder arthritis pain).), Disp: 90 tablet, Rfl: 3    rosuvastatin (Crestor) 5 MG tablet, Take 1 tablet by mouth nightly, Disp: 90 tablet, Rfl: 3    valsartan (DIOVAN) 80 MG tablet, Take 1 tablet by mouth Daily., Disp: 90 tablet, Rfl: 3     Review of Systems   Constitutional: Negative.    HENT: Negative.     Eyes: Negative.    Respiratory: Negative.     Cardiovascular: Negative.    Gastrointestinal: Negative.    Genitourinary: Negative.    Musculoskeletal: Negative.    Neurological: Negative.    Psychiatric/Behavioral: Negative.        Objective   Vital Signs:  /80 (BP Location: Right arm, Patient Position: Sitting, Cuff Size: Adult)   Pulse 83   Temp 98.2 °F (36.8 °C) (Temporal)   Ht 162.6 cm (64.02\")   Wt 76.4 kg (168 lb 6.4 oz)   SpO2 98%   BMI 28.89 kg/m²   Estimated body mass index is 28.89 kg/m² as calculated from the following:    Height as of this encounter: 162.6 cm (64.02\").    Weight as of this encounter: 76.4 kg " (168 lb 6.4 oz).          Physical Exam  Vitals reviewed.   Constitutional:       Appearance: Normal appearance.   Neck:      Vascular: No carotid bruit.   Cardiovascular:      Rate and Rhythm: Normal rate and regular rhythm.      Pulses: Normal pulses.      Heart sounds: Normal heart sounds.   Pulmonary:      Effort: Pulmonary effort is normal.      Breath sounds: Normal breath sounds.   Abdominal:      General: Bowel sounds are normal.      Palpations: Abdomen is soft.   Musculoskeletal:         General: Normal range of motion.      Cervical back: Normal range of motion.   Skin:     General: Skin is warm and dry.   Neurological:      Mental Status: She is alert and oriented to person, place, and time.   Psychiatric:      Comments: No acute distress        Result Review :    Common labs          5/23/2025    15:38 5/29/2025    07:53   Common Labs   Glucose 85     BUN 18     Creatinine 0.90     Sodium 139     Potassium 3.8     Chloride 103     Calcium 9.3     WBC  4.98    Hemoglobin  11.8    Hematocrit  36.5    Platelets  265    Total Cholesterol  177    Triglycerides  90    HDL Cholesterol  84    LDL Cholesterol   77    Hemoglobin A1C  5.10                Assessment and Plan   Diagnoses and all orders for this visit:    1. Annual physical exam (Primary)  -     Pneumococcal Conjugate Vaccine 20-Valent All    2. Mixed hyperlipidemia  -     rosuvastatin (Crestor) 5 MG tablet; Take 1 tablet by mouth nightly  Dispense: 90 tablet; Refill: 3    Ms. Acevedo appears to be doing well today.  Her physical exam is unremarkable. I have reviewed her labs with her today.   We have discussed age related healthy behaviors.   She has received a Prevnar 20 vaccine today in the office.        Follow Up   Return in about 1 year (around 6/2/2026), or if symptoms worsen or fail to improve, for Annual physical, Fasting labs 1 week prior to next f/u.  Patient was given instructions and counseling regarding her condition or for health  maintenance advice. Please see specific information pulled into the AVS if appropriate.

## 2025-06-11 ENCOUNTER — TRANSCRIBE ORDERS (OUTPATIENT)
Dept: ADMINISTRATIVE | Facility: HOSPITAL | Age: 65
End: 2025-06-11
Payer: COMMERCIAL

## 2025-06-11 DIAGNOSIS — Z12.31 VISIT FOR SCREENING MAMMOGRAM: Primary | ICD-10-CM

## 2025-07-02 ENCOUNTER — HOSPITAL ENCOUNTER (OUTPATIENT)
Dept: MAMMOGRAPHY | Facility: HOSPITAL | Age: 65
Discharge: HOME | End: 2025-07-02
Admitting: NURSE PRACTITIONER
Payer: COMMERCIAL

## 2025-07-02 DIAGNOSIS — Z12.31 VISIT FOR SCREENING MAMMOGRAM: ICD-10-CM

## 2025-07-02 PROCEDURE — 77063 BREAST TOMOSYNTHESIS BI: CPT

## 2025-07-02 PROCEDURE — 77067 SCR MAMMO BI INCL CAD: CPT

## 2025-07-18 ENCOUNTER — OFFICE (OUTPATIENT)
Dept: URBAN - METROPOLITAN AREA CLINIC 65 | Facility: CLINIC | Age: 65
End: 2025-07-18
Payer: COMMERCIAL

## 2025-07-18 VITALS
HEART RATE: 104 BPM | SYSTOLIC BLOOD PRESSURE: 120 MMHG | OXYGEN SATURATION: 97 % | WEIGHT: 166 LBS | DIASTOLIC BLOOD PRESSURE: 70 MMHG | HEIGHT: 64 IN

## 2025-07-18 DIAGNOSIS — K21.9 GASTRO-ESOPHAGEAL REFLUX DISEASE WITHOUT ESOPHAGITIS: ICD-10-CM

## 2025-07-18 DIAGNOSIS — Z80.0 FAMILY HISTORY OF MALIGNANT NEOPLASM OF DIGESTIVE ORGANS: ICD-10-CM

## 2025-07-18 DIAGNOSIS — K58.9 IRRITABLE BOWEL SYNDROME, UNSPECIFIED: ICD-10-CM

## 2025-07-18 PROCEDURE — 99213 OFFICE O/P EST LOW 20 MIN: CPT | Performed by: INTERNAL MEDICINE

## 2025-07-18 RX ORDER — COLESEVELAM HYDROCHLORIDE 625 MG/1
1250 TABLET, COATED ORAL
Qty: 180 | Refills: 4 | Status: ACTIVE

## (undated) DEVICE — SINGLE-USE BIOPSY FORCEPS: Brand: RADIAL JAW 4

## (undated) DEVICE — TUBING, SUCTION, 1/4" X 10', STRAIGHT: Brand: MEDLINE

## (undated) DEVICE — BITEBLOCK OMNI BLOC

## (undated) DEVICE — THE TORRENT IRRIGATION SCOPE CONNECTOR IS USED WITH THE TORRENT IRRIGATION TUBING TO PROVIDE IRRIGATION FLUIDS SUCH AS STERILE WATER DURING GASTROINTESTINAL ENDOSCOPIC PROCEDURES WHEN USED IN CONJUNCTION WITH AN IRRIGATION PUMP (OR ELECTROSURGICAL UNIT).: Brand: TORRENT

## (undated) DEVICE — CANN NASL CO2 TRULINK W/O2 A/

## (undated) DEVICE — Device: Brand: DEFENDO AIR/WATER/SUCTION AND BIOPSY VALVE

## (undated) DEVICE — TBG 02 CRUSH RESIST LF CLR 7FT